# Patient Record
Sex: MALE | Race: WHITE | NOT HISPANIC OR LATINO | Employment: OTHER | URBAN - METROPOLITAN AREA
[De-identification: names, ages, dates, MRNs, and addresses within clinical notes are randomized per-mention and may not be internally consistent; named-entity substitution may affect disease eponyms.]

---

## 2017-01-02 RX ORDER — POTASSIUM CHLORIDE 20 MEQ/1
TABLET, EXTENDED RELEASE ORAL
Qty: 90 TABLET | Refills: 3 | Status: SHIPPED | OUTPATIENT
Start: 2017-01-02 | End: 2017-12-15 | Stop reason: SDUPTHER

## 2017-02-17 RX ORDER — DIGOXIN 125 MCG
TABLET ORAL
Qty: 90 TABLET | Refills: 3 | Status: SHIPPED | OUTPATIENT
Start: 2017-02-17

## 2017-02-17 RX ORDER — SIMVASTATIN 40 MG/1
TABLET, FILM COATED ORAL
Qty: 90 TABLET | Refills: 3 | Status: SHIPPED | OUTPATIENT
Start: 2017-02-17 | End: 2018-01-31

## 2017-02-27 ENCOUNTER — TELEPHONE (OUTPATIENT)
Dept: CARDIOLOGY | Facility: CLINIC | Age: 77
End: 2017-02-27

## 2017-02-27 NOTE — TELEPHONE ENCOUNTER
----- Message from Juanita Ramey sent at 2/27/2017 11:47 AM CST -----  Contact: Spouse/ Krista Velasco is requesting to speak with nurse in response to letter received by mail. Pls call Krista back at [phone]

## 2017-03-03 ENCOUNTER — OFFICE VISIT (OUTPATIENT)
Dept: URGENT CARE | Facility: CLINIC | Age: 77
End: 2017-03-03
Payer: MEDICARE

## 2017-03-03 VITALS
SYSTOLIC BLOOD PRESSURE: 120 MMHG | DIASTOLIC BLOOD PRESSURE: 70 MMHG | WEIGHT: 194.69 LBS | TEMPERATURE: 97 F | HEART RATE: 60 BPM | HEIGHT: 71 IN | OXYGEN SATURATION: 96 % | BODY MASS INDEX: 27.26 KG/M2

## 2017-03-03 DIAGNOSIS — S46.812A TRAPEZIUS STRAIN, LEFT, INITIAL ENCOUNTER: ICD-10-CM

## 2017-03-03 DIAGNOSIS — S16.1XXA NECK STRAIN, INITIAL ENCOUNTER: Primary | ICD-10-CM

## 2017-03-03 PROCEDURE — 3074F SYST BP LT 130 MM HG: CPT | Mod: S$GLB,,, | Performed by: NURSE PRACTITIONER

## 2017-03-03 PROCEDURE — 1159F MED LIST DOCD IN RCRD: CPT | Mod: S$GLB,,, | Performed by: NURSE PRACTITIONER

## 2017-03-03 PROCEDURE — 99213 OFFICE O/P EST LOW 20 MIN: CPT | Mod: S$GLB,,, | Performed by: NURSE PRACTITIONER

## 2017-03-03 PROCEDURE — 1125F AMNT PAIN NOTED PAIN PRSNT: CPT | Mod: S$GLB,,, | Performed by: NURSE PRACTITIONER

## 2017-03-03 PROCEDURE — 3078F DIAST BP <80 MM HG: CPT | Mod: S$GLB,,, | Performed by: NURSE PRACTITIONER

## 2017-03-03 PROCEDURE — 99999 PR PBB SHADOW E&M-EST. PATIENT-LVL IV: CPT | Mod: PBBFAC,,, | Performed by: NURSE PRACTITIONER

## 2017-03-03 PROCEDURE — 1157F ADVNC CARE PLAN IN RCRD: CPT | Mod: S$GLB,,, | Performed by: NURSE PRACTITIONER

## 2017-03-03 PROCEDURE — 1160F RVW MEDS BY RX/DR IN RCRD: CPT | Mod: S$GLB,,, | Performed by: NURSE PRACTITIONER

## 2017-03-03 RX ORDER — DICLOFENAC SODIUM 10 MG/G
2 GEL TOPICAL 3 TIMES DAILY PRN
Qty: 100 G | Refills: 0 | Status: SHIPPED | OUTPATIENT
Start: 2017-03-03 | End: 2017-04-17

## 2017-03-03 RX ORDER — TIZANIDINE 4 MG/1
4 TABLET ORAL EVERY 6 HOURS PRN
Qty: 30 TABLET | Refills: 0 | Status: SHIPPED | OUTPATIENT
Start: 2017-03-03 | End: 2017-03-13

## 2017-03-03 NOTE — PATIENT INSTRUCTIONS
Gentle massage may be helpful for easing tight muscles.    You may feel more soreness and spasm the first few days. Reduce your activity level until symptoms begin to improve.    When lying down, use a comfortable pillow that supports the head and keeps the spine in a neutral position. The position of the head should not be tilted forward or backwards.    Use ice packs (ice in a plastic bag, wrapped in a towel) to treat acute pain. Apply for 20 minutes every 2-4 hours during the first two days. Then, begin local heat (hot shower, hot bath or heating pad) and massage to reduce muscle spasm. Some patients feel best alternating hot and cold treatments, or just staying with one method only. Do what feels the best to you and gives the most relief.    If symptoms worsen or fail to improve with treatment, see your Primary Care Provider or go to the nearest Emergency Room.            Neck Sprain or Strain  A sudden force that causes turning or bending of the neck can cause sprain or strain. An example would be the force from a car accident. This can stretch or tear muscles called a strain. It can also stretch or tear ligaments called a sprain. Either of these can cause neck pain. Sometimes neck pain occurs after a simple awkward movement. In either case, muscle spasm is commonly present and contributes to the pain.    Unless you had a forceful physical injury (for example, a car accident or fall), X-rays are usually not ordered for the initial evaluation of neck pain. If pain continues and dose not respond to medical treatment, X-rays and other tests may be performed at a later time.  Home care  · You may feel more soreness and spasm the first few days after the injury. Rest until symptoms begin to improve.  · When lying down, use a comfortable pillow or a rolled towel that supports the head and keeps the spine in a neutral position. The position of the head should not be tilted forward or backward.  · Apply an ice pack  over the injured area for 15 to 20 minutes every 3 to 6 hours. You should do this for the first 24 to 48 hours. You can make an ice pack by filling a plastic bag that seals at the top with ice cubes and then wrapping it with a thin towel. After 48 hours, apply heat (warm shower or warm bath) for 15 to 20 minutes several times a day, or alternate ice and heat.  · You may use over-the-counter pain medicine to control pain, unless another pain medicine was prescribed. If you have chronic liver or kidney disease or ever had a stomach ulcer or GI bleeding, talk with your healthcare provider before using these medicines.  · If a soft cervical collar was prescribed, it should be worn only for periods of increased pain. It should not be worn for more than 3 hours a day, or for a period longer than 1 to 2 weeks.  Follow-up care  Follow up with your healthcare provider as directed. Physical therapy may be needed.  Sometimes fractures dont show up on the first X-ray. Bruises and sprains can sometimes hurt as much as a fracture. These injuries can take time to heal completely. If your symptoms dont improve or they get worse, talk with your healthcare provider. You may need a repeat X-ray or other tests. If X-rays were taken, you will be told of any new findings that may affect your care.  Call 911  Call 911 if you have:  · Neck swelling, difficulty or painful swallowing  · Difficulty breathing  · Chest pain  When to seek medical advice  Call your healthcare provider right away if any of these occur:  · Pain becomes worse or spreads into your arms  · Weakness or numbness in one or both arms  Date Last Reviewed: 11/19/2015 © 2000-2016 [email or web address]. [address] [place] [address], [place] [address]. All rights reserved. This information is not intended as a substitute for professional medical care. Always follow your healthcare professional's instructions.

## 2017-03-03 NOTE — MR AVS SNAPSHOT
[facility]  [address] [place] [address]  Phone: [phone]  Fax: [phone]                  Eleazar Solo   3/3/2017 9:00 AM   Office Visit    Description:  Male : 1940   Provider:  LATRICE Corcoran   Department:  [facility]           Reason for Visit     Neck Pain           Diagnoses this Visit        Comments    Neck strain, initial encounter    -  Primary     Trapezius strain, left, initial encounter                To Do List           Future Appointments        Provider Department Dept Phone    2017 9:20 AM Vinay Beck MD [facility] Cardiology [phone]    2017 9:20 AM Poly Fitch MD [facility] [phone]      Goals (5 Years of Data)     None      Follow-Up and Disposition     Return if symptoms worsen or fail to improve.       These Medications        Disp Refills Start End    diclofenac sodium (VOLTAREN) 1 % Gel 100 g 0 3/3/2017     Apply 2 g topically 3 (three) times daily as needed. Neck pain - Topical    Pharmacy: [facility] #[id number] - [place] - [address] Ph #: [phone]       Notes to Pharmacy: May substitute generic for lower copay    tizanidine (ZANAFLEX) 4 MG tablet 30 tablet 0 3/3/2017 3/13/2017    Take 1 tablet (4 mg total) by mouth every 6 (six) hours as needed (muscle spasms). - Oral    Pharmacy: [facility] #[id number] - [place] - [address] Ph #: [phone]         [facility] On Call     [facility] On Call Nurse Care Line -  Assistance  Registered nurses in the [facility] On Call Center provide clinical advisement, health education, appointment booking, and other advisory services.  Call for this free service at [phone].             Medications           Message regarding Medications     Verify the changes and/or additions to your medication regime listed below are the same as discussed with your clinician  "today.  If any of these changes or additions are incorrect, please notify your healthcare provider.        START taking these NEW medications        Refills    diclofenac sodium (VOLTAREN) 1 % Gel 0    Sig: Apply 2 g topically 3 (three) times daily as needed. Neck pain    Class: Normal    Route: Topical    tizanidine (ZANAFLEX) 4 MG tablet 0    Sig: Take 1 tablet (4 mg total) by mouth every 6 (six) hours as needed (muscle spasms).    Class: Normal    Route: Oral           Verify that the below list of medications is an accurate representation of the medications you are currently taking.  If none reported, the list may be blank. If incorrect, please contact your healthcare provider. Carry this list with you in case of emergency.           Current Medications     allopurinol (ZYLOPRIM) 100 MG tablet TAKE 2 TABLETS  DAILY.    diclofenac sodium (VOLTAREN) 1 % Gel Apply 2 g topically 3 (three) times daily as needed. Neck pain    digoxin (LANOXIN) 125 mcg tablet TAKE 1 TABLET ONE TIME DAILY    ELIQUIS 5 mg Tab TAKE 1 TABLET TWICE DAILY    hydrochlorothiazide (HYDRODIURIL) 25 MG tablet Take 1 tablet (25 mg total) by mouth once daily.    losartan (COZAAR) 100 MG tablet Take 1 tablet (100 mg total) by mouth once daily.    potassium chloride SA (K-DUR,KLOR-CON) 20 MEQ tablet TAKE 1 TABLET ONE TIME DAILY    simvastatin (ZOCOR) 40 MG tablet TAKE 1 TABLET EVERY DAY    tizanidine (ZANAFLEX) 4 MG tablet Take 1 tablet (4 mg total) by mouth every 6 (six) hours as needed (muscle spasms).    verapamil (CALAN-SR) 240 MG CR tablet TAKE 1 TABLET EVERY DAY           Clinical Reference Information           Your Vitals Were     BP Pulse Temp Height    120/70 (BP Location: Right arm, Patient Position: Sitting, BP Method: Manual) 60 96.8 °F (36 °C) (Tympanic) 5' 10.5" (1.791 m)    Weight SpO2 BMI    88.3 kg (194 lb 10.7 oz) 96% 27.54 kg/m2      Blood Pressure          Most Recent Value    BP  120/70      Allergies as of 3/3/2017     No Known " Drug Allergies      Immunizations Administered on Date of Encounter - 3/3/2017     None      Instructions      Gentle massage may be helpful for easing tight muscles.    You may feel more soreness and spasm the first few days. Reduce your activity level until symptoms begin to improve.    When lying down, use a comfortable pillow that supports the head and keeps the spine in a neutral position. The position of the head should not be tilted forward or backwards.    Use ice packs (ice in a plastic bag, wrapped in a towel) to treat acute pain. Apply for 20 minutes every 2-4 hours during the first two days. Then, begin local heat (hot shower, hot bath or heating pad) and massage to reduce muscle spasm. Some patients feel best alternating hot and cold treatments, or just staying with one method only. Do what feels the best to you and gives the most relief.    If symptoms worsen or fail to improve with treatment, see your Primary Care Provider or go to the nearest Emergency Room.            Neck Sprain or Strain  A sudden force that causes turning or bending of the neck can cause sprain or strain. An example would be the force from a car accident. This can stretch or tear muscles called a strain. It can also stretch or tear ligaments called a sprain. Either of these can cause neck pain. Sometimes neck pain occurs after a simple awkward movement. In either case, muscle spasm is commonly present and contributes to the pain.    Unless you had a forceful physical injury (for example, a car accident or fall), X-rays are usually not ordered for the initial evaluation of neck pain. If pain continues and dose not respond to medical treatment, X-rays and other tests may be performed at a later time.  Home care  · You may feel more soreness and spasm the first few days after the injury. Rest until symptoms begin to improve.  · When lying down, use a comfortable pillow or a rolled towel that supports the head and keeps the spine in  a neutral position. The position of the head should not be tilted forward or backward.  · Apply an ice pack over the injured area for 15 to 20 minutes every 3 to 6 hours. You should do this for the first 24 to 48 hours. You can make an ice pack by filling a plastic bag that seals at the top with ice cubes and then wrapping it with a thin towel. After 48 hours, apply heat (warm shower or warm bath) for 15 to 20 minutes several times a day, or alternate ice and heat.  · You may use over-the-counter pain medicine to control pain, unless another pain medicine was prescribed. If you have chronic liver or kidney disease or ever had a stomach ulcer or GI bleeding, talk with your healthcare provider before using these medicines.  · If a soft cervical collar was prescribed, it should be worn only for periods of increased pain. It should not be worn for more than 3 hours a day, or for a period longer than 1 to 2 weeks.  Follow-up care  Follow up with your healthcare provider as directed. Physical therapy may be needed.  Sometimes fractures dont show up on the first X-ray. Bruises and sprains can sometimes hurt as much as a fracture. These injuries can take time to heal completely. If your symptoms dont improve or they get worse, talk with your healthcare provider. You may need a repeat X-ray or other tests. If X-rays were taken, you will be told of any new findings that may affect your care.  Call 911  Call 911 if you have:  · Neck swelling, difficulty or painful swallowing  · Difficulty breathing  · Chest pain  When to seek medical advice  Call your healthcare provider right away if any of these occur:  · Pain becomes worse or spreads into your arms  · Weakness or numbness in one or both arms  Date Last Reviewed: 11/19/2015  © 2896-5559 [employer]. [address] [place] [address], [place] [address]. All rights reserved. This information is not intended as a substitute for professional medical care. Always follow your  healthcare professional's instructions.             Language Assistance Services     ATTENTION: Language assistance services are available, free of charge. Please call [phone].      ATENCIÓN: Si habla ariel, tiene a vizcarra disposición servicios gratuitos de asistencia lingüística. Llame al [phone].     CHÚ Ý: N?u b?n nói Ti?ng Vi?t, có các d?ch v? h? tr? ngôn ng? mi?n phí dành cho b?n. G?i s? [phone].         [facility] complies with applicable Federal civil rights laws and does not discriminate on the basis of race, color, national origin, age, disability, or sex.

## 2017-03-03 NOTE — PROGRESS NOTES
"Subjective:       Patient ID: Eleazar Solo is a 76 y.o. male.    Chief Complaint: Neck Pain    HPI Comments: Patient denies injury or trauma.     Neck Pain    This is a new problem. The current episode started yesterday. The problem occurs constantly. The problem has been waxing and waning. The pain is associated with nothing. The pain is present in the left side. The quality of the pain is described as aching. The pain is at a severity of 8/10. The symptoms are aggravated by position, twisting and bending. The pain is same all the time. Stiffness is present in the morning. Pertinent negatives include no chest pain, fever, headaches, leg pain, numbness, pain with swallowing, paresis, photophobia, syncope, tingling, trouble swallowing, visual change, weakness or weight loss. He has tried acetaminophen and heat for the symptoms. The treatment provided no relief.       /70 (BP Location: Right arm, Patient Position: Sitting, BP Method: Manual)  Pulse 60  Temp 96.8 °F (36 °C) (Tympanic)   Ht 5' 10.5" (1.791 m)  Wt 88.3 kg (194 lb 10.7 oz)  SpO2 96%  BMI 27.54 kg/m2    Review of Systems   Constitutional: Negative for activity change, appetite change, chills, diaphoresis, fatigue, fever, unexpected weight change and weight loss.   HENT: Negative.  Negative for trouble swallowing.    Eyes: Negative.  Negative for photophobia.   Respiratory: Negative for apnea, chest tightness, shortness of breath and stridor.    Cardiovascular: Negative for chest pain, palpitations, leg swelling and syncope.   Gastrointestinal: Negative.    Endocrine: Negative.    Genitourinary: Negative.    Musculoskeletal: Positive for myalgias (neck), neck pain and neck stiffness. Negative for arthralgias, back pain, gait problem and joint swelling.   Skin: Negative for color change, pallor, rash and wound.   Allergic/Immunologic: Negative.    Neurological: Negative for dizziness, tingling, facial asymmetry, weakness, light-headedness, " numbness and headaches.   Hematological: Negative for adenopathy. Bruises/bleeds easily.   Psychiatric/Behavioral: Negative for agitation and behavioral problems.       Objective:      Physical Exam   Constitutional: He is oriented to person, place, and time. He appears well-developed and well-nourished. No distress.   HENT:   Head: Normocephalic and atraumatic.   Neck: Muscular tenderness present. No spinous process tenderness present. No rigidity. Decreased range of motion present. No edema and no erythema present.       ttp to left neck per drawing. No rash, no erythema, swelling, or bruising. Negative spurlings.    Cardiovascular: Normal rate, regular rhythm and normal heart sounds.    Pulmonary/Chest: Effort normal and breath sounds normal. No respiratory distress.   Neurological: He is alert and oriented to person, place, and time.   Skin: Skin is warm and dry. No rash noted. He is not diaphoretic.   Psychiatric: He has a normal mood and affect. His behavior is normal. Judgment and thought content normal.   Nursing note and vitals reviewed.      Assessment:       1. Neck strain, initial encounter    2. Trapezius strain, left, initial encounter        Plan:       Eleazar was seen today for neck pain.    Diagnoses and all orders for this visit:    Neck strain, initial encounter  -     diclofenac sodium (VOLTAREN) 1 % Gel; Apply 2 g topically 3 (three) times daily as needed. Neck pain  -     tizanidine (ZANAFLEX) 4 MG tablet; Take 1 tablet (4 mg total) by mouth every 6 (six) hours as needed (muscle spasms).    Trapezius strain, left, initial encounter  -     diclofenac sodium (VOLTAREN) 1 % Gel; Apply 2 g topically 3 (three) times daily as needed. Neck pain  -     tizanidine (ZANAFLEX) 4 MG tablet; Take 1 tablet (4 mg total) by mouth every 6 (six) hours as needed (muscle spasms).    rest  Ice  No nsaids due to eliquis  Extra strength tylenol recommended  If symptoms worsen or fail to improve with treatment, see  your Primary Care Provider or go to the nearest Emergency Room.

## 2017-03-20 RX ORDER — ALLOPURINOL 100 MG/1
TABLET ORAL
Qty: 180 TABLET | Refills: 3 | Status: SHIPPED | OUTPATIENT
Start: 2017-03-20 | End: 2018-03-19 | Stop reason: SDUPTHER

## 2017-04-13 DIAGNOSIS — I10 BENIGN ESSENTIAL HTN: ICD-10-CM

## 2017-04-13 DIAGNOSIS — I48.19 PERSISTENT ATRIAL FIBRILLATION: Primary | ICD-10-CM

## 2017-04-13 DIAGNOSIS — E78.2 MIXED HYPERLIPIDEMIA: ICD-10-CM

## 2017-04-17 ENCOUNTER — CLINICAL SUPPORT (OUTPATIENT)
Dept: CARDIOLOGY | Facility: CLINIC | Age: 77
End: 2017-04-17
Payer: MEDICARE

## 2017-04-17 ENCOUNTER — OFFICE VISIT (OUTPATIENT)
Dept: CARDIOLOGY | Facility: CLINIC | Age: 77
End: 2017-04-17
Payer: MEDICARE

## 2017-04-17 VITALS
HEART RATE: 67 BPM | HEIGHT: 70 IN | BODY MASS INDEX: 28.06 KG/M2 | DIASTOLIC BLOOD PRESSURE: 60 MMHG | SYSTOLIC BLOOD PRESSURE: 90 MMHG | WEIGHT: 196 LBS

## 2017-04-17 DIAGNOSIS — I48.19 PERSISTENT ATRIAL FIBRILLATION: ICD-10-CM

## 2017-04-17 DIAGNOSIS — E78.2 MIXED HYPERLIPIDEMIA: Chronic | ICD-10-CM

## 2017-04-17 DIAGNOSIS — I48.21 ATRIAL FIBRILLATION, PERMANENT: Primary | Chronic | ICD-10-CM

## 2017-04-17 DIAGNOSIS — I10 BENIGN ESSENTIAL HTN: ICD-10-CM

## 2017-04-17 DIAGNOSIS — E78.2 MIXED HYPERLIPIDEMIA: ICD-10-CM

## 2017-04-17 DIAGNOSIS — I10 ESSENTIAL HYPERTENSION: Chronic | ICD-10-CM

## 2017-04-17 PROCEDURE — 1160F RVW MEDS BY RX/DR IN RCRD: CPT | Mod: S$GLB,,, | Performed by: NUCLEAR MEDICINE

## 2017-04-17 PROCEDURE — 99214 OFFICE O/P EST MOD 30 MIN: CPT | Mod: S$GLB,,, | Performed by: NUCLEAR MEDICINE

## 2017-04-17 PROCEDURE — 99499 UNLISTED E&M SERVICE: CPT | Mod: S$GLB,,, | Performed by: NUCLEAR MEDICINE

## 2017-04-17 PROCEDURE — 3074F SYST BP LT 130 MM HG: CPT | Mod: S$GLB,,, | Performed by: NUCLEAR MEDICINE

## 2017-04-17 PROCEDURE — 93000 ELECTROCARDIOGRAM COMPLETE: CPT | Mod: S$GLB,,, | Performed by: NUCLEAR MEDICINE

## 2017-04-17 PROCEDURE — 99999 PR PBB SHADOW E&M-EST. PATIENT-LVL III: CPT | Mod: PBBFAC,,, | Performed by: NUCLEAR MEDICINE

## 2017-04-17 PROCEDURE — 1159F MED LIST DOCD IN RCRD: CPT | Mod: S$GLB,,, | Performed by: NUCLEAR MEDICINE

## 2017-04-17 PROCEDURE — 3078F DIAST BP <80 MM HG: CPT | Mod: S$GLB,,, | Performed by: NUCLEAR MEDICINE

## 2017-04-17 PROCEDURE — 1126F AMNT PAIN NOTED NONE PRSNT: CPT | Mod: S$GLB,,, | Performed by: NUCLEAR MEDICINE

## 2017-04-17 NOTE — PROGRESS NOTES
Subjective:   Patient ID:  Eleazar Solo is a 77 y.o. male who presents for follow-up of Atrial Fibrillation (6 month followup)      HPI 1- CHRONIC PERMANENT AF, 2- ESSENTIAL HYPERTENSION,  3- DYSLIPIDEMIA  DOING WELL. NO RECENT HOSPITALIZATIONS FOR ACS OR ADHF. OR EXACERBATION OF PALPITATIONS. OR TIA OR STROKE  NO HX OF ANGINA. OR EQUIVALENT  NO ABNORMAL BLEEDING ON NOAC  NO FOCAL CNS SYMPTOMS OR SIGNS  TO SUGGEST TIA OR STROKE  NO EDEMA. NO CALVE TENDERNESS.  NO INTERMITTENT CLAUDICATION  CARD MED- GOOD COMPLIANCE    Review of Systems   Constitution: Negative for chills, fever, weakness, night sweats, weight gain and weight loss.   HENT: Negative for headaches and nosebleeds.    Eyes: Negative for blurred vision, double vision and visual disturbance.   Cardiovascular: Negative for chest pain, dyspnea on exertion, irregular heartbeat, leg swelling, orthopnea, palpitations, paroxysmal nocturnal dyspnea and syncope.   Respiratory: Negative for cough, hemoptysis and wheezing.    Endocrine: Negative for polydipsia and polyuria.   Hematologic/Lymphatic: Does not bruise/bleed easily.   Skin: Negative for rash.   Musculoskeletal: Negative for joint pain, joint swelling, muscle weakness and myalgias.   Gastrointestinal: Negative for abdominal pain, hematemesis, jaundice and melena.   Genitourinary: Negative for dysuria, hematuria and nocturia.   Neurological: Negative for dizziness, focal weakness and sensory change.   Psychiatric/Behavioral: Negative for depression. The patient does not have insomnia and is not nervous/anxious.      Family History   Problem Relation Age of Onset    Heart disease Mother     Hypertension Mother     Gout Mother     Stroke Father     Alcohol abuse Father     Hypertension Father     Heart disease Brother     Arrhythmia Brother     Diabetes Brother     Gout Brother     Diabetes Sister     Drug abuse Neg Hx     Cancer Neg Hx     Mental illness Neg Hx     Mental retardation Neg Hx      Kidney disease Neg Hx     Hyperlipidemia Neg Hx      Past Medical History:   Diagnosis Date    *Atrial fibrillation     Atrial fibrillation     Bilateral carotid artery disease 8/10/2015    COPD (chronic obstructive pulmonary disease)     Gout     Hyperlipidemia     Hypertension     Low testosterone     Pneumonia     Stroke 1/3/15 L occiput    Unspecified disorder of kidney and ureter      Current Outpatient Prescriptions on File Prior to Visit   Medication Sig Dispense Refill    allopurinol (ZYLOPRIM) 100 MG tablet TAKE 2 TABLETS  DAILY. 180 tablet 3    digoxin (LANOXIN) 125 mcg tablet TAKE 1 TABLET ONE TIME DAILY 90 tablet 3    ELIQUIS 5 mg Tab TAKE 1 TABLET TWICE DAILY 180 tablet 3    hydrochlorothiazide (HYDRODIURIL) 25 MG tablet Take 1 tablet (25 mg total) by mouth once daily. 90 tablet 3    losartan (COZAAR) 100 MG tablet Take 1 tablet (100 mg total) by mouth once daily. 90 tablet 3    potassium chloride SA (K-DUR,KLOR-CON) 20 MEQ tablet TAKE 1 TABLET ONE TIME DAILY 90 tablet 3    simvastatin (ZOCOR) 40 MG tablet TAKE 1 TABLET EVERY DAY 90 tablet 3    verapamil (CALAN-SR) 240 MG CR tablet TAKE 1 TABLET EVERY DAY 90 tablet 3    [DISCONTINUED] diclofenac sodium (VOLTAREN) 1 % Gel Apply 2 g topically 3 (three) times daily as needed. Neck pain 100 g 0     No current facility-administered medications on file prior to visit.      Review of patient's allergies indicates:   Allergen Reactions    No known drug allergies        Objective:     Physical Exam   Constitutional: He is oriented to person, place, and time. He appears well-developed. No distress.   HENT:   Head: Normocephalic.   Eyes: Conjunctivae are normal. Pupils are equal, round, and reactive to light.   Neck: Neck supple. No JVD present. No thyromegaly present.   Cardiovascular: Normal rate and intact distal pulses.  An irregularly irregular rhythm present. Exam reveals no gallop and no friction rub.    Murmur  heard.  High-pitched blowing crescendo holosystolic murmur is present with a grade of 3/6  at the apex radiating to the axilla The intensity increases with respiration.  Pulses:       Carotid pulses are 2+ on the right side, and 2+ on the left side.       Radial pulses are 2+ on the right side, and 2+ on the left side.        Femoral pulses are 2+ on the right side, and 2+ on the left side.       Popliteal pulses are 2+ on the right side, and 2+ on the left side.        Dorsalis pedis pulses are 2+ on the right side, and 2+ on the left side.        Posterior tibial pulses are 2+ on the right side, and 2+ on the left side.   Pulmonary/Chest: Breath sounds normal. He has no wheezes. He has no rales. He exhibits no tenderness.   Abdominal: Soft. Bowel sounds are normal. He exhibits no mass. There is no hepatosplenomegaly. There is no tenderness.   Musculoskeletal: He exhibits no edema or tenderness.        Cervical back: Normal.        Thoracic back: Normal.        Lumbar back: Normal.   Lymphadenopathy:     He has no cervical adenopathy.     He has no axillary adenopathy.        Right: No supraclavicular adenopathy present.        Left: No supraclavicular adenopathy present.   Neurological: He is alert and oriented to person, place, and time. He has normal strength. No sensory deficit. Gait normal.   Skin: Skin is warm. No cyanosis. No pallor. Nails show no clubbing.   Psychiatric: He has a normal mood and affect. His speech is normal and behavior is normal. Cognition and memory are normal.       Assessment:     1. Atrial fibrillation, permanent    2. Essential hypertension    3. Mixed hyperlipidemia      ECG TODAY- AF WITH CONTROLLED VR 67 BMP. NO ACUTE ST T CHANGES  NO CLINICAL EVIDENCE OF ACUTE HF. NO ACTIVE MYOCARDIAL ISCHEMIA  CNS STATUS STABLE  CONTROLLED BP  CARD MED WELL TOLERATED  Plan:     Atrial fibrillation, permanent    Essential hypertension    Mixed hyperlipidemia    1- CONTINUE PRESENT CARD  MANAGEMENT    2- RETURN IN 6 MONTHS.

## 2017-04-17 NOTE — MR AVS SNAPSHOT
[facility]  [address] [place] [address]  Phone: [phone]  Fax: [phone]                  Eleazar Solo   2017 9:20 AM   Office Visit    Description:  Male : 1940   Provider:  Vinay Beck MD   Department:  [facility] Cardiology           Reason for Visit     Atrial Fibrillation           Diagnoses this Visit        Comments    Atrial fibrillation, permanent    -  Primary     Essential hypertension         Mixed hyperlipidemia                To Do List           Future Appointments        Provider Department Dept Phone    2017 9:20 AM [facility] [phone]      Goals (5 Years of Data)     None      Follow-Up and Disposition     Return in about 6 months (around 10/17/2017).      [facility]     [facility] On Call Nurse Care Line -  Assistance  Unless otherwise directed by your provider, please contact [facility], our nurse care line that is available for  assistance.     Registered nurses in the [facility] On Call Center provide: appointment scheduling, clinical advisement, health education, and other advisory services.  Call: [phone] (toll free)               Medications           Message regarding Medications     Verify the changes and/or additions to your medication regime listed below are the same as discussed with your clinician today.  If any of these changes or additions are incorrect, please notify your healthcare provider.        STOP taking these medications     diclofenac sodium (VOLTAREN) 1 % Gel Apply 2 g topically 3 (three) times daily as needed. Neck pain           Verify that the below list of medications is an accurate representation of the medications you are currently taking.  If none reported, the list may be blank. If incorrect, please contact your healthcare provider. Carry this list with you in case of emergency.           Current Medications     allopurinol (ZYLOPRIM) 100 MG  "tablet TAKE 2 TABLETS  DAILY.    digoxin (LANOXIN) 125 mcg tablet TAKE 1 TABLET ONE TIME DAILY    ELIQUIS 5 mg Tab TAKE 1 TABLET TWICE DAILY    hydrochlorothiazide (HYDRODIURIL) 25 MG tablet Take 1 tablet (25 mg total) by mouth once daily.    losartan (COZAAR) 100 MG tablet Take 1 tablet (100 mg total) by mouth once daily.    menthol (BIOFREEZE, MENTHOL,) 4 % Gel Apply topically.    potassium chloride SA (K-DUR,KLOR-CON) 20 MEQ tablet TAKE 1 TABLET ONE TIME DAILY    simvastatin (ZOCOR) 40 MG tablet TAKE 1 TABLET EVERY DAY    verapamil (CALAN-SR) 240 MG CR tablet TAKE 1 TABLET EVERY DAY    VIT A/VIT C/VIT E/ZINC/COPPER (PRESERVISION AREDS ORAL) Take 1 capsule by mouth 2 (two) times daily.           Clinical Reference Information           Your Vitals Were     BP Pulse Height Weight BMI    90/60 (BP Location: Left arm, Patient Position: Sitting, BP Method: Manual) 67 5' 10" (1.778 m) 88.9 kg (196 lb) 28.12 kg/m2      Blood Pressure          Most Recent Value    Right Arm BP - Sitting  110/70    Right Arm BP - Standing  115/70    BP  90/60      Allergies as of 4/17/2017     No Known Drug Allergies      Immunizations Administered on Date of Encounter - 4/17/2017     None      Language Assistance Services     ATTENTION: Language assistance services are available, free of charge. Please call [phone].      ATENCIÓN: Si habla español, tiene a vizcarra disposición servicios gratuitos de asistencia lingüística. Llame al [phone].     [facility] Ý: N?u b?n nói Ti?ng Vi?t, có các d?ch v? h? tr? ngôn ng? mi?n phí dành cho b?n. G?i s? [phone].         [facility] complies with applicable Federal civil rights laws and does not discriminate on the basis of race, color, national origin, age, disability, or sex.        "

## 2017-05-18 ENCOUNTER — LAB VISIT (OUTPATIENT)
Dept: LAB | Facility: HOSPITAL | Age: 77
End: 2017-05-18
Attending: FAMILY MEDICINE
Payer: MEDICARE

## 2017-05-18 ENCOUNTER — OFFICE VISIT (OUTPATIENT)
Dept: INTERNAL MEDICINE | Facility: CLINIC | Age: 77
End: 2017-05-18
Payer: MEDICARE

## 2017-05-18 VITALS
TEMPERATURE: 98 F | HEIGHT: 70 IN | SYSTOLIC BLOOD PRESSURE: 112 MMHG | HEART RATE: 70 BPM | BODY MASS INDEX: 28.41 KG/M2 | DIASTOLIC BLOOD PRESSURE: 80 MMHG | WEIGHT: 198.44 LBS

## 2017-05-18 DIAGNOSIS — M1A.0710 IDIOPATHIC CHRONIC GOUT OF RIGHT FOOT WITHOUT TOPHUS: ICD-10-CM

## 2017-05-18 DIAGNOSIS — N18.30 CHRONIC KIDNEY DISEASE, STAGE 3: Chronic | ICD-10-CM

## 2017-05-18 DIAGNOSIS — Z79.01 CHRONIC ANTICOAGULATION: Chronic | ICD-10-CM

## 2017-05-18 DIAGNOSIS — I48.21 ATRIAL FIBRILLATION, PERMANENT: Chronic | ICD-10-CM

## 2017-05-18 DIAGNOSIS — E78.2 MIXED HYPERLIPIDEMIA: Chronic | ICD-10-CM

## 2017-05-18 DIAGNOSIS — I77.9 BILATERAL CAROTID ARTERY DISEASE: Chronic | ICD-10-CM

## 2017-05-18 DIAGNOSIS — J44.9 CHRONIC OBSTRUCTIVE PULMONARY DISEASE, UNSPECIFIED COPD TYPE: Chronic | ICD-10-CM

## 2017-05-18 DIAGNOSIS — I10 ESSENTIAL HYPERTENSION: Chronic | ICD-10-CM

## 2017-05-18 DIAGNOSIS — I70.0 ATHEROSCLEROSIS OF AORTA: Chronic | ICD-10-CM

## 2017-05-18 DIAGNOSIS — Z00.00 ROUTINE GENERAL MEDICAL EXAMINATION AT A HEALTH CARE FACILITY: ICD-10-CM

## 2017-05-18 DIAGNOSIS — Z00.00 ROUTINE GENERAL MEDICAL EXAMINATION AT A HEALTH CARE FACILITY: Primary | ICD-10-CM

## 2017-05-18 DIAGNOSIS — H35.30 MACULAR DEGENERATION: ICD-10-CM

## 2017-05-18 LAB
ALBUMIN SERPL BCP-MCNC: 3.7 G/DL
ALP SERPL-CCNC: 67 U/L
ALT SERPL W/O P-5'-P-CCNC: 40 U/L
ANION GAP SERPL CALC-SCNC: 10 MMOL/L
AST SERPL-CCNC: 32 U/L
BASOPHILS # BLD AUTO: 0.02 K/UL
BASOPHILS NFR BLD: 0.2 %
BILIRUB SERPL-MCNC: 1 MG/DL
BUN SERPL-MCNC: 20 MG/DL
CALCIUM SERPL-MCNC: 9.4 MG/DL
CHLORIDE SERPL-SCNC: 100 MMOL/L
CHOLEST/HDLC SERPL: 3.6 {RATIO}
CO2 SERPL-SCNC: 30 MMOL/L
CREAT SERPL-MCNC: 1.3 MG/DL
DIFFERENTIAL METHOD: ABNORMAL
EOSINOPHIL # BLD AUTO: 0.4 K/UL
EOSINOPHIL NFR BLD: 3.5 %
ERYTHROCYTE [DISTWIDTH] IN BLOOD BY AUTOMATED COUNT: 13.9 %
EST. GFR  (AFRICAN AMERICAN): >60 ML/MIN/1.73 M^2
EST. GFR  (NON AFRICAN AMERICAN): 53.4 ML/MIN/1.73 M^2
GLUCOSE SERPL-MCNC: 165 MG/DL
HCT VFR BLD AUTO: 42.6 %
HDL/CHOLESTEROL RATIO: 27.4 %
HDLC SERPL-MCNC: 135 MG/DL
HDLC SERPL-MCNC: 37 MG/DL
HGB BLD-MCNC: 14.4 G/DL
LDLC SERPL CALC-MCNC: 58 MG/DL
LYMPHOCYTES # BLD AUTO: 2 K/UL
LYMPHOCYTES NFR BLD: 19.3 %
MCH RBC QN AUTO: 33.2 PG
MCHC RBC AUTO-ENTMCNC: 33.8 %
MCV RBC AUTO: 98 FL
MONOCYTES # BLD AUTO: 1.3 K/UL
MONOCYTES NFR BLD: 12.8 %
NEUTROPHILS # BLD AUTO: 6.7 K/UL
NEUTROPHILS NFR BLD: 63.9 %
NONHDLC SERPL-MCNC: 98 MG/DL
PLATELET # BLD AUTO: 152 K/UL
PMV BLD AUTO: 12.5 FL
POTASSIUM SERPL-SCNC: 4.3 MMOL/L
PROT SERPL-MCNC: 7.1 G/DL
RBC # BLD AUTO: 4.34 M/UL
SODIUM SERPL-SCNC: 140 MMOL/L
TRIGL SERPL-MCNC: 200 MG/DL
URATE SERPL-MCNC: 4.7 MG/DL
WBC # BLD AUTO: 10.47 K/UL

## 2017-05-18 PROCEDURE — 85025 COMPLETE CBC W/AUTO DIFF WBC: CPT

## 2017-05-18 PROCEDURE — 3074F SYST BP LT 130 MM HG: CPT | Mod: S$GLB,,, | Performed by: FAMILY MEDICINE

## 2017-05-18 PROCEDURE — 3079F DIAST BP 80-89 MM HG: CPT | Mod: S$GLB,,, | Performed by: FAMILY MEDICINE

## 2017-05-18 PROCEDURE — 80053 COMPREHEN METABOLIC PANEL: CPT

## 2017-05-18 PROCEDURE — 99999 PR PBB SHADOW E&M-EST. PATIENT-LVL III: CPT | Mod: PBBFAC,,, | Performed by: FAMILY MEDICINE

## 2017-05-18 PROCEDURE — 80061 LIPID PANEL: CPT

## 2017-05-18 PROCEDURE — 99499 UNLISTED E&M SERVICE: CPT | Mod: S$GLB,,, | Performed by: FAMILY MEDICINE

## 2017-05-18 PROCEDURE — 36415 COLL VENOUS BLD VENIPUNCTURE: CPT | Mod: PO

## 2017-05-18 PROCEDURE — 99397 PER PM REEVAL EST PAT 65+ YR: CPT | Mod: S$GLB,,, | Performed by: FAMILY MEDICINE

## 2017-05-18 PROCEDURE — 84550 ASSAY OF BLOOD/URIC ACID: CPT

## 2017-05-18 NOTE — PROGRESS NOTES
Subjective:      Patient ID: Eleazar Solo is a 77 y.o. male.    Chief Complaint: Follow-up (6m)    HPI Comments: Pt is here today for prevention exam.   1. Recently saw Cardiologist in April. On 1/2 tablet of statin now due to CCB.  Also has hx of testosterone def, not on supplementation because of hx of CVA. Also with afib. Rate controlled. Labs doing today.   2. Gout- stable with allopurinol 200mg. Needing labs. Right ankle pain improved with meds.   3. Dyslipidemia- needing labs today. On statin but half dose.    4. htn- controlled with meds. No chest pain or headaches.   5. Copd- no sob. No recent exacerbations. Hx of spirometry 3-4 yrs ago through work, but none recently. Willing to do again. Quit smoking > 20 yrs ago.         Lab Results   Component Value Date    WBC 7.14 11/23/2016    HGB 14.8 11/23/2016    HCT 44.4 11/23/2016     11/23/2016    CHOL 140 11/23/2016    TRIG 139 11/23/2016    HDL 40 11/23/2016    ALT 41 11/23/2016    AST 36 11/23/2016     11/23/2016    K 4.4 11/23/2016     11/23/2016    CREATININE 1.2 11/23/2016    BUN 14 11/23/2016    CO2 30 (H) 11/23/2016    TSH 1.130 11/23/2016    PSA 0.84 11/23/2016    INR 2.2 05/09/2014    HGBA1C 5.7 01/02/2015       Review of Systems   Constitutional: Negative for chills, fatigue and unexpected weight change.   HENT: Negative for congestion, ear pain, postnasal drip, sneezing, sore throat and trouble swallowing.    Eyes: Negative for pain and visual disturbance.   Respiratory: Negative for cough and shortness of breath.    Cardiovascular: Negative for chest pain and leg swelling.   Gastrointestinal: Negative for abdominal pain, constipation, diarrhea, nausea and vomiting.   Endocrine: Negative for cold intolerance and heat intolerance.   Genitourinary: Negative for difficulty urinating, dysuria and flank pain.   Musculoskeletal: Negative for arthralgias, back pain, joint swelling and neck pain.   Skin: Negative for color change and  rash.   Neurological: Negative for dizziness, seizures and headaches.   Psychiatric/Behavioral: Negative for behavioral problems and dysphoric mood.     Objective:     Physical Exam   Constitutional: He is oriented to person, place, and time. He appears well-developed and well-nourished. No distress.   HENT:   Head: Normocephalic and atraumatic.   Right Ear: External ear normal.   Left Ear: External ear normal.   Nose: Nose normal.   Mouth/Throat: Oropharynx is clear and moist.   Eyes: EOM are normal. Pupils are equal, round, and reactive to light.   Neck: Normal range of motion. Neck supple. No thyromegaly present.   Cardiovascular: Normal rate.  An irregularly irregular rhythm present. Exam reveals no gallop and no friction rub.    Murmur heard.   Systolic murmur is present with a grade of 1/6   Pulmonary/Chest: Effort normal and breath sounds normal. No respiratory distress.   Abdominal: Soft. Bowel sounds are normal. He exhibits no distension. There is no tenderness. There is no rebound.   Musculoskeletal: Normal range of motion.   Lymphadenopathy:     He has no cervical adenopathy.   Neurological: He is alert and oriented to person, place, and time. Coordination normal.   Skin: Skin is warm and dry.   Psychiatric: He has a normal mood and affect.   Vitals reviewed.    Assessment:     1. Routine general medical examination at a health care facility    2. Mixed hyperlipidemia    3. Essential hypertension    4. Chronic kidney disease, stage 3    5. Chronic anticoagulation    6. Bilateral carotid artery disease    7. Atrial fibrillation, permanent    8. Atherosclerosis of aorta    9. Idiopathic chronic gout of right foot without tophus    10. Macular degeneration    11. Chronic obstructive pulmonary disease, unspecified COPD type      Plan:   Eleazar was seen today for follow-up.    Diagnoses and all orders for this visit:    Routine general medical examination at a health care facility- - labs ordered. Discussed  Health Maintenance issues.     -     Lipid panel; Future  -     Comprehensive metabolic panel; Future  -     CBC auto differential; Future  -     Uric acid; Future    Mixed hyperlipidemia - checking level since on half dose statin.  Stable  -     Lipid panel; Future  -     Comprehensive metabolic panel; Future  -     CBC auto differential; Future  -     Uric acid; Future    Essential hypertension - stable, Continue with current medications and interventions. Labs ordered    -     Lipid panel; Future  -     Comprehensive metabolic panel; Future  -     CBC auto differential; Future  -     Uric acid; Future    Chronic kidney disease, stage 3 - stable, Continue with current medications and interventions. Labs ordered    -     Lipid panel; Future  -     Comprehensive metabolic panel; Future  -     CBC auto differential; Future  -     Uric acid; Future    Chronic anticoagulation - stable, Continue with current medications and interventions. Labs ordered    -     Lipid panel; Future  -     Comprehensive metabolic panel; Future  -     CBC auto differential; Future  -     Uric acid; Future    Bilateral carotid artery disease - stable, Continue with current medications and interventions. Labs ordered    -     Lipid panel; Future  -     Comprehensive metabolic panel; Future  -     CBC auto differential; Future  -     Uric acid; Future    Atrial fibrillation, permanent - stable, Continue with current medications and interventions. Labs ordered    -     Lipid panel; Future  -     Comprehensive metabolic panel; Future  -     CBC auto differential; Future  -     Uric acid; Future    Atherosclerosis of aorta - stable, Continue with current medications and interventions. Labs ordered    -     Lipid panel; Future  -     Comprehensive metabolic panel; Future  -     CBC auto differential; Future  -     Uric acid; Future    Idiopathic chronic gout of right foot without tophus - stable, Continue with current medications and  interventions. Labs ordered    -     Lipid panel; Future  -     Comprehensive metabolic panel; Future  -     CBC auto differential; Future  -     Uric acid; Future    Macular degeneration- now on eye vitamin    Chronic obstructive pulmonary disease, unspecified COPD type- schedule pfts no symptoms.   -     Complete PFT with bronchodilator; Future  -     PULSE OXIMETRY WITH REST - PULM; Future          Return in about 1 year (around 5/18/2018) for physical.

## 2017-05-19 ENCOUNTER — PATIENT MESSAGE (OUTPATIENT)
Dept: INTERNAL MEDICINE | Facility: CLINIC | Age: 77
End: 2017-05-19

## 2017-05-19 DIAGNOSIS — R73.09 ABNORMAL GLUCOSE: Primary | ICD-10-CM

## 2017-05-19 NOTE — TELEPHONE ENCOUNTER
Cholesterol levels are similar to before with just the half tablet of the cholesterol medication, but his sugar level is up high consistent with diabetes since his blood work was fasting. Let's have him get an a1c drawn and get in with diabetes educator and me in 2 weeks. His  blood counts, kidney, liver functions, and thyroid functions are within goal ranges.     Poly Ficth MD

## 2017-06-01 ENCOUNTER — LAB VISIT (OUTPATIENT)
Dept: LAB | Facility: HOSPITAL | Age: 77
End: 2017-06-01
Attending: FAMILY MEDICINE
Payer: MEDICARE

## 2017-06-01 DIAGNOSIS — R73.09 ABNORMAL GLUCOSE: ICD-10-CM

## 2017-06-01 PROCEDURE — 83036 HEMOGLOBIN GLYCOSYLATED A1C: CPT

## 2017-06-01 PROCEDURE — 36415 COLL VENOUS BLD VENIPUNCTURE: CPT | Mod: PO

## 2017-06-02 ENCOUNTER — TELEPHONE (OUTPATIENT)
Dept: INTERNAL MEDICINE | Facility: CLINIC | Age: 77
End: 2017-06-02

## 2017-06-02 ENCOUNTER — PATIENT MESSAGE (OUTPATIENT)
Dept: INTERNAL MEDICINE | Facility: CLINIC | Age: 77
End: 2017-06-02

## 2017-06-02 ENCOUNTER — PROCEDURE VISIT (OUTPATIENT)
Dept: PULMONOLOGY | Facility: CLINIC | Age: 77
End: 2017-06-02
Payer: MEDICARE

## 2017-06-02 DIAGNOSIS — E11.9 TYPE 2 DIABETES MELLITUS WITHOUT COMPLICATION: ICD-10-CM

## 2017-06-02 DIAGNOSIS — J44.9 CHRONIC OBSTRUCTIVE PULMONARY DISEASE, UNSPECIFIED COPD TYPE: Chronic | ICD-10-CM

## 2017-06-02 LAB
ESTIMATED AVG GLUCOSE: 163 MG/DL
HBA1C MFR BLD HPLC: 7.3 %

## 2017-06-02 PROCEDURE — 94060 EVALUATION OF WHEEZING: CPT | Mod: S$GLB,,, | Performed by: INTERNAL MEDICINE

## 2017-06-02 PROCEDURE — 94729 DIFFUSING CAPACITY: CPT | Mod: S$GLB,,, | Performed by: INTERNAL MEDICINE

## 2017-06-02 PROCEDURE — 94726 PLETHYSMOGRAPHY LUNG VOLUMES: CPT | Mod: S$GLB,,, | Performed by: INTERNAL MEDICINE

## 2017-06-02 NOTE — TELEPHONE ENCOUNTER
Tried calling pt, no answer. Left message asking for a return call. That we need to get him scheduled with Dr. Fitch next week.

## 2017-06-02 NOTE — TELEPHONE ENCOUNTER
----- Message from Autumn Pryor sent at 6/2/2017  1:03 PM CDT -----  Contact: Pt  Pt is returning the nurse call. Pls call pt back at [phone].

## 2017-06-05 ENCOUNTER — PATIENT MESSAGE (OUTPATIENT)
Dept: INTERNAL MEDICINE | Facility: CLINIC | Age: 77
End: 2017-06-05

## 2017-06-05 ENCOUNTER — OFFICE VISIT (OUTPATIENT)
Dept: INTERNAL MEDICINE | Facility: CLINIC | Age: 77
End: 2017-06-05
Payer: MEDICARE

## 2017-06-05 VITALS
BODY MASS INDEX: 27.94 KG/M2 | DIASTOLIC BLOOD PRESSURE: 64 MMHG | SYSTOLIC BLOOD PRESSURE: 118 MMHG | TEMPERATURE: 98 F | HEART RATE: 64 BPM | HEIGHT: 70 IN | WEIGHT: 195.13 LBS

## 2017-06-05 DIAGNOSIS — J44.9 CHRONIC OBSTRUCTIVE PULMONARY DISEASE, UNSPECIFIED COPD TYPE: ICD-10-CM

## 2017-06-05 DIAGNOSIS — N18.30 CHRONIC KIDNEY DISEASE, STAGE 3: Chronic | ICD-10-CM

## 2017-06-05 DIAGNOSIS — R94.2 ABNORMAL PFT: Primary | ICD-10-CM

## 2017-06-05 DIAGNOSIS — E11.9 TYPE 2 DIABETES MELLITUS WITHOUT COMPLICATION, WITHOUT LONG-TERM CURRENT USE OF INSULIN: Primary | ICD-10-CM

## 2017-06-05 LAB
POST FEF 25 75: 0.43 L/S (ref 1.46–3.08)
POST FET 100: 15.02 S
POST FEV1 FVC: 48 %
POST FEV1: 1.77 L (ref 2.73–3.53)
POST FIF 50: 3.54 L/S
POST FVC: 3.67 L (ref 3.84–4.79)
POST PEF: 5.38 L/S (ref 6.76–9.11)
PRE DLCO: 18.31 ML/MMHG/MIN (ref 17.06–25.35)
PRE ERV: 0.35 L
PRE FEF 25 75: 0.45 L/S (ref 1.46–3.08)
PRE FET 100: 17.98 S
PRE FEV1 FVC: 49 %
PRE FEV1: 1.65 L (ref 2.73–3.53)
PRE FIF 50: 2.22 L/S
PRE FRC PL: 5.1 L (ref 3.08–4.29)
PRE FVC: 3.36 L (ref 3.84–4.79)
PRE KROGHS K: 3.26 1/MIN
PRE PEF: 4.95 L/S (ref 6.76–9.11)
PRE RV: 4.32 L (ref 2.27–3.06)
PRE SVC: 3.36 L
PRE TLC: 7.69 L (ref 6.05–7.05)
PREDICTED DLCO: 21.21 ML/MMHG/MIN (ref 17.06–25.35)
PREDICTED FEV1 FVC: 72.57 % (ref 67.73–77.41)
PREDICTED FEV1: 3.13 L (ref 2.73–3.53)
PREDICTED FRC N2: 3.68 L (ref 3.08–4.29)
PREDICTED FRC PL: 3.68 L (ref 3.08–4.29)
PREDICTED FVC: 4.31 L (ref 3.84–4.79)
PREDICTED RV: 2.66 L (ref 2.27–3.06)
PREDICTED SVC: 4.09 L
PREDICTED TLC: 6.55 L (ref 6.05–7.05)
PROVOCATION PROTOCOL: ABNORMAL

## 2017-06-05 PROCEDURE — 99999 PR PBB SHADOW E&M-EST. PATIENT-LVL III: CPT | Mod: PBBFAC,,, | Performed by: FAMILY MEDICINE

## 2017-06-05 PROCEDURE — 99499 UNLISTED E&M SERVICE: CPT | Mod: S$GLB,,, | Performed by: FAMILY MEDICINE

## 2017-06-05 PROCEDURE — 1159F MED LIST DOCD IN RCRD: CPT | Mod: S$GLB,,, | Performed by: FAMILY MEDICINE

## 2017-06-05 PROCEDURE — 3045F PR MOST RECENT HEMOGLOBIN A1C LEVEL 7.0-9.0%: CPT | Mod: S$GLB,,, | Performed by: FAMILY MEDICINE

## 2017-06-05 PROCEDURE — 99214 OFFICE O/P EST MOD 30 MIN: CPT | Mod: S$GLB,,, | Performed by: FAMILY MEDICINE

## 2017-06-05 PROCEDURE — 4010F ACE/ARB THERAPY RXD/TAKEN: CPT | Mod: S$GLB,,, | Performed by: FAMILY MEDICINE

## 2017-06-05 PROCEDURE — 1126F AMNT PAIN NOTED NONE PRSNT: CPT | Mod: S$GLB,,, | Performed by: FAMILY MEDICINE

## 2017-06-05 RX ORDER — GLIMEPIRIDE 2 MG/1
2 TABLET ORAL
Qty: 90 TABLET | Refills: 3 | Status: SHIPPED | OUTPATIENT
Start: 2017-06-05 | End: 2018-03-19 | Stop reason: SDUPTHER

## 2017-06-05 RX ORDER — INSULIN PUMP SYRINGE, 3 ML
EACH MISCELLANEOUS
Qty: 1 EACH | Refills: 0 | Status: SHIPPED | OUTPATIENT
Start: 2017-06-05

## 2017-06-05 RX ORDER — LANCETS 28 GAUGE
1 EACH MISCELLANEOUS DAILY PRN
Qty: 100 EACH | Refills: 3 | Status: SHIPPED | OUTPATIENT
Start: 2017-06-05

## 2017-06-05 NOTE — TELEPHONE ENCOUNTER
Pt with significant COPD on pulmonary function tests. Would recommend pt to see Pulm. Please schedule.

## 2017-06-05 NOTE — PROGRESS NOTES
Subjective:      Patient ID: Eleazar Solo is a 77 y.o. male.    Chief Complaint: Diabetes (follow up)    Patient's coming in today with a new onset of diabetes.  He's had some elevated sugars in the past but most recently his A1c was now checked and was noted to be an A1c is 7.3.  He reports that he really ever since he quit smoking is been eating a lot of sugary candies as well as regular Cokes.  Since finding out that his sugar number was elevated and now consistent with diabetes he subsequently cut out the takes and cut back on his sugar eating.  He finds that this is helped.  He prefer not to do a diabetes class nor check his blood sugars at this time.  He really just wants to change his diet if at all possible.  His son is diabetic and his wife's willing to help him with his diet as well.  He is willing to follow a diabetic diet also.  He has some history of having some damage to his feet some in the past due to hematoma last year as well as some nerve damage before but otherwise he just recently had an eye exam done last month for some cataract surgery macular degeneration by Dr. Hercules.  He is not due back for another year.  It was a dilated eye exam.      Diabetes   He presents for his initial diabetic visit. He has type 2 diabetes mellitus. No MedicAlert identification noted. His disease course has been worsening. There are no hypoglycemic associated symptoms. Pertinent negatives for diabetes include no blurred vision, no chest pain, no fatigue, no foot paresthesias, no foot ulcerations, no polydipsia, no polyphagia, no polyuria, no visual change, no weakness and no weight loss. There are no hypoglycemic complications. Symptoms are worsening. There are no diabetic complications. Risk factors for coronary artery disease include dyslipidemia, family history, obesity, male sex, hypertension and tobacco exposure. When asked about current treatments, none were reported. He is compliant with treatment some of  the time. His weight is decreasing steadily. He is following a generally unhealthy diet. When asked about meal planning, he reported none. He has not had a previous visit with a dietitian. He participates in exercise intermittently. There is no change in his home blood glucose trend. An ACE inhibitor/angiotensin II receptor blocker is being taken. He does not see a podiatrist.Eye exam is current.       Lab Results   Component Value Date    WBC 10.47 05/18/2017    HGB 14.4 05/18/2017    HCT 42.6 05/18/2017     05/18/2017    CHOL 135 05/18/2017    TRIG 200 (H) 05/18/2017    HDL 37 (L) 05/18/2017    ALT 40 05/18/2017    AST 32 05/18/2017     05/18/2017    K 4.3 05/18/2017     05/18/2017    CREATININE 1.3 05/18/2017    BUN 20 05/18/2017    CO2 30 (H) 05/18/2017    TSH 1.130 11/23/2016    PSA 0.84 11/23/2016    INR 2.2 05/09/2014    HGBA1C 7.3 (H) 06/01/2017       Review of Systems   Constitutional: Negative for activity change, appetite change, fatigue and weight loss.   Eyes: Negative for blurred vision, pain, discharge, redness and itching.   Respiratory: Negative for cough and shortness of breath.    Cardiovascular: Negative for chest pain and palpitations.   Endocrine: Negative for polydipsia, polyphagia and polyuria.   Skin: Negative for wound.   Neurological: Negative for weakness.     Objective:     Physical Exam   Constitutional: He appears well-developed and well-nourished.   Cardiovascular: Normal rate and regular rhythm.    Pulses:       Dorsalis pedis pulses are 2+ on the right side, and 2+ on the left side.   Pulmonary/Chest: Effort normal and breath sounds normal.   Musculoskeletal:        Right foot: There is normal range of motion and no deformity.        Left foot: There is normal range of motion and no deformity.   Feet:   Right Foot:   Protective Sensation: 4 sites tested. 4 sites sensed.   Skin Integrity: Positive for warmth. Negative for ulcer, blister, skin breakdown, erythema,  callus or dry skin.   Left Foot:   Protective Sensation: 4 sites tested. 4 sites sensed.   Skin Integrity: Positive for warmth. Negative for ulcer, blister, skin breakdown, erythema, callus or dry skin.   Vitals reviewed.    Assessment:     1. Type 2 diabetes mellitus without complication, without long-term current use of insulin    2. Chronic kidney disease, stage 3      Plan:   Eleazar was seen today for diabetes.    Diagnoses and all orders for this visit:    Type 2 diabetes mellitus without complication, without long-term current use of insulin-new diagnosis discussed care, increased risk for cardiovascular events discussed medications discussed dietary planning.  At this time the patient declined the dietitian visit.  He does not need a podiatrist visit at this time.  We will prescribe him a meter that he can use mainly just as needed if he's feeling bad but otherwise due to his chronic kidney disease history we'll start low-dose Chlamydia ride.  Advised him of hypoglycemic symptoms.  He'll follow back up in 3 months with lab work prior.  -     Hemoglobin A1c; Future  -     Comprehensive metabolic panel; Future    Chronic kidney disease stage III-for this reason were avoiding metformin at this time.  -     blood sugar diagnostic (TRUETEST TEST STRIPS) Strp; 1 strip by Misc.(Non-Drug; Combo Route) route daily as needed. [employer] True Metrix Test Strips  -     lancets (TRUEPLUS LANCETS) 28 gauge Misc; 1 lancet by Misc.(Non-Drug; Combo Route) route daily as needed. [employer] brand  -     blood-glucose meter (FREESTYLE SYSTEM KIT) kit; [employer] TRUE METRIX AIR METER Use as instructed for TWICE DAILY testing  -     glimepiride (AMARYL) 2 MG tablet; Take 1 tablet (2 mg total) by mouth before breakfast.            Return in about 3 months (around 9/5/2017) for diabetes .

## 2017-06-07 ENCOUNTER — TELEPHONE (OUTPATIENT)
Dept: INTERNAL MEDICINE | Facility: CLINIC | Age: 77
End: 2017-06-07

## 2017-06-07 NOTE — TELEPHONE ENCOUNTER
----- Message from Jenise Beckett sent at 6/7/2017  2:00 PM CDT -----  Contact: tova/wife [phone]  States that she is returning call please call back at [phone]//thank you acc

## 2017-06-09 RX ORDER — APIXABAN 5 MG/1
TABLET, FILM COATED ORAL
Qty: 180 TABLET | Refills: 3 | Status: SHIPPED | OUTPATIENT
Start: 2017-06-09 | End: 2018-06-21 | Stop reason: SDUPTHER

## 2017-06-09 RX ORDER — VERAPAMIL HYDROCHLORIDE 240 MG/1
TABLET, FILM COATED, EXTENDED RELEASE ORAL
Qty: 90 TABLET | Refills: 3 | Status: SHIPPED | OUTPATIENT
Start: 2017-06-09 | End: 2018-03-19 | Stop reason: SDUPTHER

## 2017-06-26 ENCOUNTER — TELEPHONE (OUTPATIENT)
Dept: PULMONOLOGY | Facility: CLINIC | Age: 77
End: 2017-06-26

## 2017-06-26 DIAGNOSIS — J44.9 CHRONIC OBSTRUCTIVE PULMONARY DISEASE, UNSPECIFIED COPD TYPE: Primary | ICD-10-CM

## 2017-06-28 ENCOUNTER — HOSPITAL ENCOUNTER (OUTPATIENT)
Dept: RADIOLOGY | Facility: HOSPITAL | Age: 77
Discharge: HOME | End: 2017-06-28
Attending: NURSE PRACTITIONER
Payer: MEDICARE

## 2017-06-28 ENCOUNTER — OFFICE VISIT (OUTPATIENT)
Dept: PULMONOLOGY | Facility: CLINIC | Age: 77
End: 2017-06-28
Payer: MEDICARE

## 2017-06-28 VITALS
HEART RATE: 92 BPM | HEIGHT: 70 IN | SYSTOLIC BLOOD PRESSURE: 124 MMHG | DIASTOLIC BLOOD PRESSURE: 70 MMHG | WEIGHT: 194.88 LBS | RESPIRATION RATE: 20 BRPM | OXYGEN SATURATION: 97 % | BODY MASS INDEX: 27.9 KG/M2

## 2017-06-28 DIAGNOSIS — I48.21 ATRIAL FIBRILLATION, PERMANENT: Chronic | ICD-10-CM

## 2017-06-28 DIAGNOSIS — J44.9 CHRONIC OBSTRUCTIVE PULMONARY DISEASE, UNSPECIFIED COPD TYPE: ICD-10-CM

## 2017-06-28 DIAGNOSIS — J44.9 COPD, MODERATE: Primary | ICD-10-CM

## 2017-06-28 PROCEDURE — 1159F MED LIST DOCD IN RCRD: CPT | Mod: S$GLB,,, | Performed by: NURSE PRACTITIONER

## 2017-06-28 PROCEDURE — 99214 OFFICE O/P EST MOD 30 MIN: CPT | Mod: S$GLB,,, | Performed by: NURSE PRACTITIONER

## 2017-06-28 PROCEDURE — 71020 XR CHEST PA AND LATERAL: CPT | Mod: 26,,, | Performed by: RADIOLOGY

## 2017-06-28 PROCEDURE — 99999 PR PBB SHADOW E&M-EST. PATIENT-LVL IV: CPT | Mod: PBBFAC,,, | Performed by: NURSE PRACTITIONER

## 2017-06-28 PROCEDURE — 99499 UNLISTED E&M SERVICE: CPT | Mod: S$GLB,,, | Performed by: NURSE PRACTITIONER

## 2017-06-28 NOTE — ASSESSMENT & PLAN NOTE
6/2/2017 CPFT moderate obstruction FEV1 53% predicted.  no significant change compared to 2010 pft with FEV1 54% predicted   No significant change compared to 2004 pft with FEV1 53% predicted.   Asymptomatic   Begin Anoro controller inhaler to preserve lung function.

## 2017-06-28 NOTE — LETTER
June 28, 2017      Poly Fitch MD  [address]  [address]  [place] [address]           [facility]  [address]  [place] [address]  Phone: [phone]  Fax: [phone]          Patient: Eleazar Solo   MR Number: [id number]   YOB: 1940   Date of Visit: 6/28/2017       Dear Dr. Poly Fitch:    Thank you for referring Eleazar Solo to me for evaluation. Attached you will find relevant portions of my assessment and plan of care.    If you have questions, please do not hesitate to call me. I look forward to following Eleazar Solo along with you.    Sincerely,    Sandy Hendricks, NP    Enclosure  CC:  No Recipients    If you would like to receive this communication electronically, please contact [email or web address] or [phone] to request more information on [employer] [facility] access.    For providers and/or their staff who would like to refer a patient to [facility], please contact us through our one-stop-shop provider referral line, [facility] , at [phone].    If you feel you have received this communication in error or would no longer like to receive these types of communications, please e-mail [email or web address]

## 2017-06-28 NOTE — PROGRESS NOTES
Subjective:      Patient ID: Eleazar Solo is a 77 y.o. male.    Patient Active Problem List   Diagnosis    Atrial fibrillation, permanent    Mixed hyperlipidemia    Testosterone deficiency    Essential hypertension    Right ankle pain    Chronic anticoagulation    COPD, moderate    Chronic gout without tophus    Chronic kidney disease, stage 3    Bilateral carotid artery disease    Atherosclerosis of aorta    History of stroke    Macular degeneration       Problem list has been reviewed.    he has been referred by Poly Fitch MD for evaluation and management for   Chief Complaint   Patient presents with    COPD       Chief Complaint: COPD      HPI:  77-year-old male reports to pulmonary clinic initial evaluation related to having a recent pulmonary function study done on 6/2/2017 that revealed moderate to severe obstructive defect.  Lung volumes and DLCO were normal.   There is no cough, no wheezing, no shortness of breath, no hemoptysis, no sputum production, no weight loss,  no chest pain.   Occupational History:  Retired. Occupational exposure to Asbestos. Exposure asbestosis in 1960's about 5 yrs while at work, never worked directly with asbestos.    Avocational Exposure:   None currently.     Pet Exposures:  Dogs inside/outside dog.  Denies allergy to Dog and  cat dander.     Former smoker quit 1995.  Immunizations up to date.     Discussed sleep apnea evaluation at this visit related to afib hx, patient and wife report no significant snoring, awakening refreshed, no day time sleepiness, occasional nap after lunch, about 1 hour.   Awakens 2 times up to bathroom. He had 2 episodes of awakening feeling shortness of breath and was diagnosis with a fib and once on med for a fib, no longer has felt shortness of breath at night.   Patient declines consideration for sleep testing, states will monitor at home and return if notices sign of sleep apnea.    Previous Report Reviewed: lab reports,  office notes and radiology reports     Past Medical History: The following portions of the patient's history were reviewed and updated as appropriate:   He  has a past surgical history that includes Fracture surgery (Left, 1986); Eye surgery (Bilateral, 5/21/15, 7/16/15); and Tonsillectomy (1948 approx).  His family history includes Alcohol abuse in his father; Arrhythmia in his brother; Diabetes in his brother and sister; Gout in his brother and mother; Heart disease in his brother and mother; Hypertension in his father and mother; Stroke in his father.  He  reports that he quit smoking about 22 years ago. His smoking use included Cigarettes. He has a 60.00 pack-year smoking history. He quit smokeless tobacco use about 18 years ago. His smokeless tobacco use included Chew. He reports that he drinks about 1.2 oz of alcohol per week . He reports that he does not use drugs.  He has a current medication list which includes the following prescription(s): allopurinol, blood sugar diagnostic, blood-glucose meter, digoxin, eliquis, glimepiride, hydrochlorothiazide, lancets, losartan, menthol, potassium chloride sa, simvastatin, verapamil, vit a/vit c/vit e/zinc/copper, and umeclidinium-vilanterol.  He is allergic to no known drug allergies..    Review of Systems   Constitutional: Negative for fever, chills, weight loss, weight gain, activity change, appetite change, fatigue and night sweats.   HENT: Negative for postnasal drip, rhinorrhea, sinus pressure, voice change and congestion.    Eyes: Negative for redness and itching.   Respiratory: Negative for snoring, cough, sputum production, chest tightness, shortness of breath, wheezing, orthopnea, asthma nighttime symptoms, dyspnea on extertion, use of rescue inhaler and somnolence.    Cardiovascular: Negative for chest pain, palpitations and leg swelling.   Genitourinary: Negative for difficulty urinating and hematuria.   Endocrine: Negative for polydipsia, polyphagia, cold  "intolerance, heat intolerance and polyuria.    Musculoskeletal: Negative for arthralgias, back pain, gait problem, joint swelling and myalgias.   Skin: Negative.    Gastrointestinal: Negative for nausea, vomiting, abdominal pain and acid reflux.   Neurological: Negative for dizziness, weakness, light-headedness and headaches.   Hematological: Negative for adenopathy. No excessive bruising.   Psychiatric/Behavioral: Negative for sleep disturbance. The patient is not nervous/anxious.         Objective:     Physical Exam   Constitutional: He is oriented to person, place, and time. Vital signs are normal. He appears well-developed and well-nourished. He is active and cooperative.  Non-toxic appearance. He does not have a sickly appearance. He does not appear ill. No distress.   HENT:   Head: Normocephalic and atraumatic.   Right Ear: External ear normal.   Left Ear: External ear normal.   Nose: Nose normal.   Mouth/Throat: Oropharynx is clear and moist. No oropharyngeal exudate.   Neck circumference: 44 cm (17 inches).  Malampati: 2.    Eyes: Conjunctivae are normal.   Neck: Normal range of motion. Neck supple.   Cardiovascular: Normal rate, regular rhythm, normal heart sounds and intact distal pulses.    Pulmonary/Chest: Effort normal and breath sounds normal.   Abdominal: Soft. Bowel sounds are normal.   Musculoskeletal: Normal range of motion.   Neurological: He is alert and oriented to person, place, and time. He has normal reflexes.   Skin: Skin is warm and dry.   Psychiatric: He has a normal mood and affect. His behavior is normal. Judgment and thought content normal.   Vitals reviewed.    Vitals:    06/28/17 1303   BP: 124/70   Pulse: 92   Resp: 20   SpO2: 97%   Weight: 88.4 kg (194 lb 14.2 oz)   Height: 5' 10.24" (1.784 m)     Estimated body mass index is 27.78 kg/m² as calculated from the following:    Height as of this encounter: 5' 10.24" (1.784 m).    Weight as of this encounter: 88.4 kg (194 lb 14.2 " randy).    Personal Diagnostic Review  Pulmonary function tests: 2017 FEV1: 1.65  (53 % predicted), FVC:  3.36 (78 % predicted), FEV1/FVC:  49 (68 % predicted), T.69 (117 % predicted), RV/TLVC: 56 (133 % predicted), DLCO: 18.3 (86 % predicted)   Post bronchodilator: FEV1: 1.77  (57 % predicted), FVC:  3.67 (65 % predicted), FEV1/FVC:  48 (67 % predicted).   Moderately severe obstruction (FEV1>50 and <59% of predicted).  Airflow is not clearly improved after bronchodilator. Clinical improvement following bronchodilator therapy may occur in  the absence of spirometric improvement.  Normal diffusion capacity (DLCO). (corrected for Hb)  Normal lung volumes.    2017 chest xray   Lungs are free of infiltrates or effusions.       echo  EF 55 - 65 60   Mitral Valve Regurgitation  MILD   Aortic Valve Stenosis  MILD    Est. PA Systolic Pressure  35.04   Tricuspid Valve Regurgitation   TRIVIAL TO MILD       Assessment:     1. COPD, moderate    2. Atrial fibrillation, permanent      Orders Placed This Encounter   Procedures    Spirometry with/without bronchodilator     Standing Status:   Future     Standing Expiration Date:   2018     Plan:     Discussed diagnosis, its evaluation, treatment and usual course. All questions answered.  Problem List Items Addressed This Visit     Atrial fibrillation, permanent (Chronic)     Followed by cardiology.  Patient declines sleep study at this visit.  No significant snoring, awakens refreshed, no witnessed apnea by wife  Continue to monitor          COPD, moderate - Primary (Chronic)     2017 CPFT moderate obstruction FEV1 53% predicted.  no significant change compared to  pft with FEV1 54% predicted   No significant change compared to  pft with FEV1 53% predicted.   Asymptomatic   Begin Anoro controller inhaler to preserve lung function.          Relevant Medications    umeclidinium-vilanterol (ANORO ELLIPTA) 62.5-25 mcg/actuation DsDv    Other Relevant  Orders    Spirometry with/without bronchodilator      Other Visit Diagnoses    None.         Return in about 2 months (around 8/28/2017) for COPD follow up with review of scottie after beginning anoro controller inhaler..

## 2017-06-28 NOTE — ASSESSMENT & PLAN NOTE
Followed by cardiology.  Patient declines sleep study at this visit.  No significant snoring, awakens refreshed, no witnessed apnea by wife  Continue to monitor

## 2017-08-23 RX ORDER — LOSARTAN POTASSIUM 100 MG/1
TABLET ORAL
Qty: 90 TABLET | Refills: 3 | Status: SHIPPED | OUTPATIENT
Start: 2017-08-23 | End: 2018-08-06 | Stop reason: SDUPTHER

## 2017-09-11 ENCOUNTER — LAB VISIT (OUTPATIENT)
Dept: LAB | Facility: HOSPITAL | Age: 77
End: 2017-09-11
Attending: FAMILY MEDICINE
Payer: MEDICARE

## 2017-09-11 DIAGNOSIS — E11.9 TYPE 2 DIABETES MELLITUS WITHOUT COMPLICATION, WITHOUT LONG-TERM CURRENT USE OF INSULIN: ICD-10-CM

## 2017-09-11 LAB
ALBUMIN SERPL BCP-MCNC: 3.8 G/DL
ALP SERPL-CCNC: 79 U/L
ALT SERPL W/O P-5'-P-CCNC: 20 U/L
ANION GAP SERPL CALC-SCNC: 10 MMOL/L
AST SERPL-CCNC: 22 U/L
BILIRUB SERPL-MCNC: 0.7 MG/DL
BUN SERPL-MCNC: 24 MG/DL
CALCIUM SERPL-MCNC: 9.6 MG/DL
CHLORIDE SERPL-SCNC: 103 MMOL/L
CO2 SERPL-SCNC: 29 MMOL/L
CREAT SERPL-MCNC: 1.2 MG/DL
EST. GFR  (AFRICAN AMERICAN): >60 ML/MIN/1.73 M^2
EST. GFR  (NON AFRICAN AMERICAN): 58.8 ML/MIN/1.73 M^2
GLUCOSE SERPL-MCNC: 126 MG/DL
POTASSIUM SERPL-SCNC: 4.1 MMOL/L
PROT SERPL-MCNC: 7.1 G/DL
SODIUM SERPL-SCNC: 142 MMOL/L

## 2017-09-11 PROCEDURE — 83036 HEMOGLOBIN GLYCOSYLATED A1C: CPT

## 2017-09-11 PROCEDURE — 80053 COMPREHEN METABOLIC PANEL: CPT

## 2017-09-11 PROCEDURE — 36415 COLL VENOUS BLD VENIPUNCTURE: CPT | Mod: PO

## 2017-09-12 LAB
ESTIMATED AVG GLUCOSE: 114 MG/DL
HBA1C MFR BLD HPLC: 5.6 %

## 2017-09-18 ENCOUNTER — OFFICE VISIT (OUTPATIENT)
Dept: INTERNAL MEDICINE | Facility: CLINIC | Age: 77
End: 2017-09-18
Payer: MEDICARE

## 2017-09-18 VITALS
TEMPERATURE: 98 F | SYSTOLIC BLOOD PRESSURE: 110 MMHG | WEIGHT: 192.25 LBS | HEART RATE: 92 BPM | HEIGHT: 70 IN | BODY MASS INDEX: 27.52 KG/M2 | DIASTOLIC BLOOD PRESSURE: 80 MMHG

## 2017-09-18 DIAGNOSIS — J44.9 COPD, MODERATE: Chronic | ICD-10-CM

## 2017-09-18 DIAGNOSIS — Z79.01 CHRONIC ANTICOAGULATION: Chronic | ICD-10-CM

## 2017-09-18 DIAGNOSIS — E11.29 CONTROLLED TYPE 2 DIABETES MELLITUS WITH OTHER DIABETIC KIDNEY COMPLICATION: Primary | ICD-10-CM

## 2017-09-18 DIAGNOSIS — I48.21 ATRIAL FIBRILLATION, PERMANENT: Chronic | ICD-10-CM

## 2017-09-18 DIAGNOSIS — I10 ESSENTIAL HYPERTENSION: Chronic | ICD-10-CM

## 2017-09-18 DIAGNOSIS — Z12.5 PROSTATE CANCER SCREENING: ICD-10-CM

## 2017-09-18 DIAGNOSIS — M20.61 TOE DEFORMITY, ACQUIRED, RIGHT: ICD-10-CM

## 2017-09-18 DIAGNOSIS — N18.30 CHRONIC KIDNEY DISEASE, STAGE 3: Chronic | ICD-10-CM

## 2017-09-18 PROCEDURE — 3079F DIAST BP 80-89 MM HG: CPT | Mod: S$GLB,,, | Performed by: FAMILY MEDICINE

## 2017-09-18 PROCEDURE — 1126F AMNT PAIN NOTED NONE PRSNT: CPT | Mod: S$GLB,,, | Performed by: FAMILY MEDICINE

## 2017-09-18 PROCEDURE — 99999 PR PBB SHADOW E&M-EST. PATIENT-LVL III: CPT | Mod: PBBFAC,,, | Performed by: FAMILY MEDICINE

## 2017-09-18 PROCEDURE — 1159F MED LIST DOCD IN RCRD: CPT | Mod: S$GLB,,, | Performed by: FAMILY MEDICINE

## 2017-09-18 PROCEDURE — 99214 OFFICE O/P EST MOD 30 MIN: CPT | Mod: S$GLB,,, | Performed by: FAMILY MEDICINE

## 2017-09-18 PROCEDURE — 3074F SYST BP LT 130 MM HG: CPT | Mod: S$GLB,,, | Performed by: FAMILY MEDICINE

## 2017-09-18 PROCEDURE — 4010F ACE/ARB THERAPY RXD/TAKEN: CPT | Mod: S$GLB,,, | Performed by: FAMILY MEDICINE

## 2017-09-18 PROCEDURE — 99499 UNLISTED E&M SERVICE: CPT | Mod: S$GLB,,, | Performed by: FAMILY MEDICINE

## 2017-09-18 PROCEDURE — 3044F HG A1C LEVEL LT 7.0%: CPT | Mod: S$GLB,,, | Performed by: FAMILY MEDICINE

## 2017-09-18 PROCEDURE — 3008F BODY MASS INDEX DOCD: CPT | Mod: S$GLB,,, | Performed by: FAMILY MEDICINE

## 2017-09-18 NOTE — PROGRESS NOTES
Subjective:      Patient ID: Eleazar Solo is a 77 y.o. male.    Chief Complaint: Follow-up (dm, breathing, labs)    Patient's coming in today for follow-up of diabetes.  He is now tolerating a half tablet of the glimeperide.   Labs look very good.  No low blood sugars.     Last night he forgot to take his evening medicines.  His heart rate is faster today.  Blood pressure is controlled however.  No chest pain no shortness of breath.    Does have COPD.  Has seen pulmonary but decided not to follow through with the follow-up visit because did not get started on the Mikayla.  Mainly due to cost and also because he doesn't feel like he has any issues with his breathing.    He would like to see the podiatrist because he has a little deformity of his right fifth digit.  It seems to overlap with his fourth digit and causes a lot of irritation.  He's tried some spacers but doesn't seem to be helping as much.          Diabetes   He presents for his follow-up diabetic visit. He has type 2 diabetes mellitus. No MedicAlert identification noted. His disease course has been worsening. There are no hypoglycemic associated symptoms. Pertinent negatives for hypoglycemia include no dizziness. Pertinent negatives for diabetes include no blurred vision, no chest pain, no fatigue, no foot paresthesias, no foot ulcerations, no polydipsia, no polyphagia, no polyuria, no visual change, no weakness and no weight loss. There are no hypoglycemic complications. Symptoms are improving. There are no diabetic complications. Risk factors for coronary artery disease include dyslipidemia, family history, obesity, male sex, hypertension and tobacco exposure. When asked about current treatments, none were reported. He is compliant with treatment most of the time. His weight is decreasing steadily. He is following a generally healthy diet. When asked about meal planning, he reported none. He has not had a previous visit with a dietitian. He  participates in exercise intermittently. There is no change in his home blood glucose trend. An ACE inhibitor/angiotensin II receptor blocker is being taken. He does not see a podiatrist.Eye exam is current.       Lab Results   Component Value Date    WBC 10.47 05/18/2017    HGB 14.4 05/18/2017    HCT 42.6 05/18/2017     05/18/2017    CHOL 135 05/18/2017    TRIG 200 (H) 05/18/2017    HDL 37 (L) 05/18/2017    ALT 20 09/11/2017    AST 22 09/11/2017     09/11/2017    K 4.1 09/11/2017     09/11/2017    CREATININE 1.2 09/11/2017    BUN 24 (H) 09/11/2017    CO2 29 09/11/2017    TSH 1.130 11/23/2016    PSA 0.84 11/23/2016    INR 2.2 05/09/2014    HGBA1C 5.6 09/11/2017       Review of Systems   Constitutional: Negative for activity change, appetite change, fatigue, fever and weight loss.   Eyes: Negative for blurred vision.   Respiratory: Negative for cough and shortness of breath.    Cardiovascular: Negative for chest pain, palpitations and leg swelling.   Gastrointestinal: Negative for abdominal distention and abdominal pain.   Endocrine: Negative for polydipsia, polyphagia and polyuria.   Musculoskeletal: Positive for arthralgias and gait problem.   Skin: Positive for color change and wound.   Neurological: Negative for dizziness, weakness, light-headedness and numbness.   Psychiatric/Behavioral: Negative for decreased concentration, dysphoric mood and sleep disturbance.     Objective:     Physical Exam   Constitutional: He appears well-developed and well-nourished.   HENT:   Head: Normocephalic and atraumatic.   Right Ear: Tympanic membrane normal.   Left Ear: Tympanic membrane normal.   Mouth/Throat: Oropharynx is clear and moist.   Eyes: Conjunctivae and EOM are normal.   Neck: Normal range of motion. Neck supple.   Cardiovascular: Normal rate and regular rhythm.    Pulmonary/Chest: Effort normal and breath sounds normal.   Musculoskeletal:        Right foot: There is deformity.   Feet:   Right  Foot:   Skin Integrity: Positive for blister, callus and dry skin. Negative for skin breakdown.   Psychiatric: He has a normal mood and affect. His behavior is normal.   Nursing note and vitals reviewed.    Assessment:     1. 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 Atrial fibrillation, permanent    3. Chronic kidney disease, stage 3    4. COPD, moderate    5. Essential hypertension    6. Chronic anticoagulation    7. Prostate cancer screening    8. Toe deformity, acquired, right      Plan:   Eleazar was seen today for follow-up.    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  Continue with current meds and interventions      -     Lipid panel; Future  -     Hemoglobin A1c; Future  -     Comprehensive metabolic panel; Future  -     CBC auto differential; Future  -     Uric acid; Future  -     TSH; Future  -     PSA, Screening; Future  -     Microalbumin/creatinine urine ratio; Future    Atrial fibrillation, permanent  Comments:  elevated today b/c missed last night's meds. go home and take verapamil.   Orders:  -     Lipid panel; Future  -     Hemoglobin A1c; Future  -     Comprehensive metabolic panel; Future  -     CBC auto differential; Future  -     Uric acid; Future  -     TSH; Future  -     PSA, Screening; Future  -     Microalbumin/creatinine urine ratio; Future    Chronic kidney disease, stage 3 - stable, labs reviewed today.  Continue with current meds and interventions    -     Lipid panel; Future  -     Hemoglobin A1c; Future  -     Comprehensive metabolic panel; Future  -     CBC auto differential; Future  -     Uric acid; Future  -     TSH; Future  -     PSA, Screening; Future  -     Microalbumin/creatinine urine ratio; Future    COPD, moderate- not taking anero due to cost and didn't feel it would make much benefit.   -     Lipid panel; Future  -      Hemoglobin A1c; Future  -     Comprehensive metabolic panel; Future  -     CBC auto differential; Future  -     Uric acid; Future  -     TSH; Future  -     PSA, Screening; Future  -     Microalbumin/creatinine urine ratio; Future    Essential hypertension  - stable, labs reviewed today.  Continue with current meds and interventions    -     Lipid panel; Future  -     Hemoglobin A1c; Future  -     Comprehensive metabolic panel; Future  -     CBC auto differential; Future  -     Uric acid; Future  -     TSH; Future  -     PSA, Screening; Future  -     Microalbumin/creatinine urine ratio; Future    Chronic anticoagulation  - stable, labs reviewed today.  Continue with current meds and interventions    -     Lipid panel; Future  -     Hemoglobin A1c; Future  -     Comprehensive metabolic panel; Future  -     CBC auto differential; Future  -     Uric acid; Future  -     TSH; Future  -     PSA, Screening; Future  -     Microalbumin/creatinine urine ratio; Future    Prostate cancer screening  -     Lipid panel; Future  -     Hemoglobin A1c; Future  -     Comprehensive metabolic panel; Future  -     CBC auto differential; Future  -     Uric acid; Future  -     TSH; Future  -     PSA, Screening; Future  -     Microalbumin/creatinine urine ratio; Future    Toe deformity, acquired, right- requests referral to Dr Ayala.   -     Ambulatory referral to Podiatry            Return in about 6 months (around 3/18/2018) for chronic issues Dr Fitch.

## 2017-10-06 ENCOUNTER — OFFICE VISIT (OUTPATIENT)
Dept: PODIATRY | Facility: CLINIC | Age: 77
End: 2017-10-06
Payer: MEDICARE

## 2017-10-06 VITALS — RESPIRATION RATE: 20 BRPM | BODY MASS INDEX: 27.49 KG/M2 | HEIGHT: 70 IN | WEIGHT: 192 LBS

## 2017-10-06 DIAGNOSIS — M20.61 ACQUIRED DEFORMITY OF RIGHT TOE: Primary | ICD-10-CM

## 2017-10-06 DIAGNOSIS — M89.8X9 BONY EXOSTOSIS: ICD-10-CM

## 2017-10-06 DIAGNOSIS — L84 CORN OR CALLUS: ICD-10-CM

## 2017-10-06 PROCEDURE — 99499 UNLISTED E&M SERVICE: CPT | Mod: S$GLB,,, | Performed by: PODIATRIST

## 2017-10-06 PROCEDURE — 99203 OFFICE O/P NEW LOW 30 MIN: CPT | Mod: 25,S$GLB,, | Performed by: PODIATRIST

## 2017-10-06 PROCEDURE — 99999 PR PBB SHADOW E&M-EST. PATIENT-LVL III: CPT | Mod: PBBFAC,,, | Performed by: PODIATRIST

## 2017-10-06 NOTE — PROGRESS NOTES
Office Visit 10/6/2017  Last encounter in this department: Visit date not found    Subjective:      Patient ID: Eleazar Sool is a 77 y.o. male.    Chief Complaint: Foot Problem; Gout; and Diabetes Mellitus (PCP Dr Fitch 9/18/17)    Eleazar Solo is a  77 y.o. year old male presenting to podiatry clinic for complaint of kissing corns between 4th toe and 5th toe on the right foot by referral from Poly Fitch MD. Patient relates noticing pain in between the toes for about 4 years. The patient noticed some buildup and thickening of the skin between the toes. The patient rates the pain as bad as 5/10 sharp pain. The patient has noticed the pain worse with weightbearing activities and certain types of tighter fitting shoes. The patient has tried different shoes but not corn pads.    Review of Systems   Constitution: Negative for chills and fever.   Cardiovascular: Negative for chest pain.   Respiratory: Negative for shortness of breath.    Gastrointestinal: Negative for nausea and vomiting.           Objective:      Physical Exam   Constitutional: He is oriented to person, place, and time. He appears well-developed and well-nourished. No distress.   Cardiovascular:   Pulses:       Dorsalis pedis pulses are 2+ on the right side, and 2+ on the left side.        Posterior tibial pulses are 2+ on the right side, and 2+ on the left side.   Capillary fill time 3-5 seconds bilaterally.   Musculoskeletal:   Lower extremities:    Deformities: Adducted fifth toe with hypertrophic condyle medial DIPJ and lateral fourth digit PIPJ right.     Normal arch height and rectus feet bilaterally.     5/5 muscle strength and tone in all four quadrants bilaterally.     Pain-free range of motion in all four quadrants with stiffness and limitation bilaterally.     Pain on palpation: sub keratotic lesions.   Neurological: He is alert and oriented to person, place, and time. He displays no Babinski's sign on the right side. He displays no  Babinski's sign on the left side.   Plantar protective threshold sensation by touch via 5.07 SWMF present bilaterally.    Skin:   Lower extremities:    Normal turgor, texture, temperature bilaterally. Digital hair present bilaterally. Warm equally bilaterally.     No edema, varicosities, pigmentary changes.    No wounds, drainage, malodor, erythema, interdigital maceration were noted.     Hyperkeratotic lesion: Lateral fourth digit PIPJ and medial fifth digit DIPJ right.     Nails are clear bilaterally.     Psychiatric: He has a normal mood and affect.   Nursing note and vitals reviewed.            X-rays: ordered, but results not yet available    Assessment:       Encounter Diagnoses   Name Primary?    Acquired deformity of right toe Yes    Bony exostosis     Corn or callus          Plan:       Eleazar was seen today for foot problem, gout and diabetes mellitus.    Diagnoses and all orders for this visit:    Acquired deformity of right toe  -     X-Ray Foot Complete Right; Future    Bony exostosis  -     X-Ray Foot Complete Right; Future    Corn or callus  -     X-Ray Foot Complete Right; Future      I counseled the patient on his conditions, their implications and medical management.    The interdigital corns were sharply excisionally debrided of hyperkeratotic tissue with number 15 blade.We also discussed the underlying cause of the lesion as a combination of the proximity of the toes rubbing on one another along with the prominent bony condyles or spurs. Various types of pads were dispensed to the patient to relieve pressure between the toes. The patient was also given guidance on shoe modifications along with urea lotion to prevent rapid build up of the keratotic tissue. We also briefly discussed the possibility of surgical resection of the prominent bone with possible soft tissue release if conservative measures fail to resolve the patients problem.

## 2017-10-06 NOTE — PATIENT INSTRUCTIONS
"How to Order Medium Roll Foam Pads from [facility] [place]:   (Pedi-Foam Sleeves MD 7/8"x12" [facility])     Call [phone] and present them with reorder number [id number]. [facility] [place] will ask for your insurance information and you may possibly be able to bill your insurance for the price of the pads depending on what insurance you have. [facility] [place] will then mail the bag of pads to your house.     Additional Information:     UPN Number: [id number]   [facility] PK/10    You may also use their web site, [email or web address], for ordering.     "

## 2017-10-06 NOTE — LETTER
October 6, 2017      Poly Fitch MD  [address]  [address]  [place] [address]           [place] [facility] Podiatry  [address] [address]  Phone: [phone]          Patient: Eleazar Solo   MR Number: [id number]   YOB: 1940   Date of Visit: 10/6/2017       Dear Dr. Poly Fitch:    Thank you for referring Eleazar Solo to me for evaluation. Attached you will find relevant portions of my assessment and plan of care.    If you have questions, please do not hesitate to call me. I look forward to following Eleazar Solo along with you.    Sincerely,    Lay Ayala, DPCARLITA    Enclosure  CC:  No Recipients    If you would like to receive this communication electronically, please contact [email or web address] or [phone] to request more information on [employer] Link access.    For providers and/or their staff who would like to refer a patient to [facility], please contact us through our one-stop-shop provider referral line, [facility], at [phone].    If you feel you have received this communication in error or would no longer like to receive these types of communications, please e-mail [email or web address]

## 2017-10-09 RX ORDER — HYDROCHLOROTHIAZIDE 25 MG/1
TABLET ORAL
Qty: 90 TABLET | Refills: 3 | Status: SHIPPED | OUTPATIENT
Start: 2017-10-09 | End: 2018-03-19

## 2017-10-16 ENCOUNTER — OFFICE VISIT (OUTPATIENT)
Dept: CARDIOLOGY | Facility: CLINIC | Age: 77
End: 2017-10-16
Payer: MEDICARE

## 2017-10-16 VITALS
WEIGHT: 191 LBS | BODY MASS INDEX: 27.35 KG/M2 | SYSTOLIC BLOOD PRESSURE: 86 MMHG | DIASTOLIC BLOOD PRESSURE: 56 MMHG | HEART RATE: 56 BPM | HEIGHT: 70 IN

## 2017-10-16 DIAGNOSIS — E78.2 MIXED HYPERLIPIDEMIA: Chronic | ICD-10-CM

## 2017-10-16 DIAGNOSIS — I48.21 ATRIAL FIBRILLATION, PERMANENT: Primary | ICD-10-CM

## 2017-10-16 DIAGNOSIS — I48.0 PAROXYSMAL ATRIAL FIBRILLATION: Primary | ICD-10-CM

## 2017-10-16 DIAGNOSIS — I10 ESSENTIAL HYPERTENSION: Chronic | ICD-10-CM

## 2017-10-16 DIAGNOSIS — I48.0 PAROXYSMAL ATRIAL FIBRILLATION: ICD-10-CM

## 2017-10-16 PROCEDURE — 93000 ELECTROCARDIOGRAM COMPLETE: CPT | Mod: S$GLB,,, | Performed by: INTERNAL MEDICINE

## 2017-10-16 PROCEDURE — 99999 PR PBB SHADOW E&M-EST. PATIENT-LVL III: CPT | Mod: PBBFAC,,, | Performed by: NUCLEAR MEDICINE

## 2017-10-16 PROCEDURE — 99499 UNLISTED E&M SERVICE: CPT | Mod: S$GLB,,, | Performed by: NUCLEAR MEDICINE

## 2017-10-16 PROCEDURE — 99214 OFFICE O/P EST MOD 30 MIN: CPT | Mod: S$GLB,,, | Performed by: NUCLEAR MEDICINE

## 2017-10-16 NOTE — PROGRESS NOTES
Subjective:   Patient ID:  Eleazar Solo is a 77 y.o. male who presents for follow-up of Atrial Fibrillation (6 month followup) and Hypertension      HPI1- CHRONIC PERMANENT AF,  2- ESSENTIAL HTN,  3- DYSLIPIDEMIA  DOING WELL. NO RECENT HOSPITALIZATIONS OR ED VISITS FOR TIA OR STROKE. OR ABNORMAL BLEEDING. OR EXACERBATION OF ARRHYTHMIAS      Review of Systems   Constitution: Negative for chills, fever, weakness, night sweats, weight gain and weight loss.   HENT: Negative for nosebleeds.    Eyes: Negative for blurred vision, double vision and visual disturbance.   Cardiovascular: Negative for chest pain, dyspnea on exertion, irregular heartbeat, leg swelling, orthopnea, palpitations, paroxysmal nocturnal dyspnea and syncope.   Respiratory: Negative for cough, hemoptysis and wheezing.    Endocrine: Negative for polydipsia and polyuria.   Hematologic/Lymphatic: Does not bruise/bleed easily.   Skin: Negative for rash.   Musculoskeletal: Negative for joint pain, joint swelling, muscle weakness and myalgias.   Gastrointestinal: Negative for abdominal pain, hematemesis, jaundice and melena.   Genitourinary: Negative for dysuria, hematuria and nocturia.   Neurological: Negative for dizziness, focal weakness, headaches and sensory change.   Psychiatric/Behavioral: Negative for depression. The patient does not have insomnia and is not nervous/anxious.      Family History   Problem Relation Age of Onset    Heart disease Mother     Hypertension Mother     Gout Mother     Stroke Father     Alcohol abuse Father     Hypertension Father     Heart disease Brother     Arrhythmia Brother     Diabetes Brother     Gout Brother     Diabetes Sister     Drug abuse Neg Hx     Cancer Neg Hx     Mental illness Neg Hx     Mental retardation Neg Hx     Kidney disease Neg Hx     Hyperlipidemia Neg Hx      Past Medical History:   Diagnosis Date    *Atrial fibrillation     Atrial fibrillation     Bilateral carotid artery  disease 8/10/2015    COPD (chronic obstructive pulmonary disease)     Diabetes mellitus     Gout     Hyperlipidemia     Hypertension     Low testosterone     Pneumonia     Stroke 1/3/15 L occiput    Unspecified disorder of kidney and ureter      Current Outpatient Prescriptions on File Prior to Visit   Medication Sig Dispense Refill    allopurinol (ZYLOPRIM) 100 MG tablet TAKE 2 TABLETS  DAILY. 180 tablet 3    blood sugar diagnostic (TRUETEST TEST STRIPS) Strp 1 strip by Misc.(Non-Drug; Combo Route) route daily as needed. [employer] True Metrix Test Strips 100 strip 3    blood-glucose meter (FREESTYLE SYSTEM KIT) kit [employer] TRUE METRIX AIR METER Use as instructed for TWICE DAILY testing 1 each 0    digoxin (LANOXIN) 125 mcg tablet TAKE 1 TABLET ONE TIME DAILY 90 tablet 3    ELIQUIS 5 mg Tab TAKE 1 TABLET TWICE DAILY 180 tablet 3    glimepiride (AMARYL) 2 MG tablet Take 1 tablet (2 mg total) by mouth before breakfast. 90 tablet 3    hydrochlorothiazide (HYDRODIURIL) 25 MG tablet TAKE 1 TABLET ONE TIME DAILY 90 tablet 3    lancets (TRUEPLUS LANCETS) 28 gauge Misc 1 lancet by Misc.(Non-Drug; Combo Route) route daily as needed. [employer] brand 100 each 3    losartan (COZAAR) 100 MG tablet TAKE 1 TABLET EVERY DAY 90 tablet 3    potassium chloride SA (K-DUR,KLOR-CON) 20 MEQ tablet TAKE 1 TABLET ONE TIME DAILY 90 tablet 3    simvastatin (ZOCOR) 40 MG tablet TAKE 1 TABLET EVERY DAY 90 tablet 3    verapamil (CALAN-SR) 240 MG CR tablet TAKE 1 TABLET EVERY DAY 90 tablet 3    VIT A/VIT C/VIT E/ZINC/COPPER (PRESERVISION AREDS ORAL) Take 1 capsule by mouth 2 (two) times daily.      menthol (BIOFREEZE, MENTHOL,) 4 % Gel Apply topically.       No current facility-administered medications on file prior to visit.      Review of patient's allergies indicates:   Allergen Reactions    No known drug allergies        Objective:     Physical Exam   Constitutional: He is oriented to person, place, and time. He appears  well-developed. No distress.   HENT:   Head: Normocephalic.   Eyes: Conjunctivae are normal. Pupils are equal, round, and reactive to light.   Neck: Neck supple. No JVD present. No thyromegaly present.   Cardiovascular: Intact distal pulses.  An irregularly irregular rhythm present. Bradycardia present.  Exam reveals no gallop and no friction rub.    Murmur heard.   Medium-pitched blowing midsystolic murmur is present with a grade of 3/6  at the upper left sternal border, lower left sternal border The intensity increases with respiration.  Pulses:       Carotid pulses are 2+ on the right side, and 2+ on the left side.       Radial pulses are 2+ on the right side, and 2+ on the left side.        Femoral pulses are 2+ on the right side, and 2+ on the left side.       Popliteal pulses are 2+ on the right side, and 2+ on the left side.        Dorsalis pedis pulses are 2+ on the right side, and 2+ on the left side.        Posterior tibial pulses are 2+ on the right side, and 2+ on the left side.   Pulmonary/Chest: Breath sounds normal. He has no wheezes. He has no rales. He exhibits no tenderness.   Abdominal: Soft. Bowel sounds are normal. He exhibits no mass. There is no hepatosplenomegaly. There is no tenderness.   Musculoskeletal: He exhibits no edema or tenderness.        Cervical back: Normal.        Thoracic back: Normal.        Lumbar back: Normal.   Lymphadenopathy:     He has no cervical adenopathy.     He has no axillary adenopathy.        Right: No supraclavicular adenopathy present.        Left: No supraclavicular adenopathy present.   Neurological: He is alert and oriented to person, place, and time. He has normal strength. No sensory deficit. Gait normal.   Skin: Skin is warm. No cyanosis. No pallor. Nails show no clubbing.   Psychiatric: He has a normal mood and affect. His speech is normal and behavior is normal. Cognition and memory are normal.       Assessment:     1. Atrial fibrillation, permanent     2. Essential hypertension    3. Mixed hyperlipidemia      ECG- AF WITH CONTROLLED VR- 56 BMP- NO ACUTE ST T CHANGES  BP WELL CONTROLLED  NO EVIDENCE OF ACTIVE MYOCARDIAL ISCHEMIA. NO ACUTE HF  CSN STATUS STABLE  NO ABNORMAL BLEEDING- ON NOAC  CARD MED WELL TOLERATED  Plan:     Atrial fibrillation, permanent    Essential hypertension    Mixed hyperlipidemia    1- CONTINUE PRESENT CARD MANAGEMENT    2- RETURN IN  6 MONTHS.

## 2017-11-21 ENCOUNTER — OFFICE VISIT (OUTPATIENT)
Dept: PODIATRY | Facility: CLINIC | Age: 77
End: 2017-11-21
Payer: MEDICARE

## 2017-11-21 ENCOUNTER — HOSPITAL ENCOUNTER (OUTPATIENT)
Dept: RADIOLOGY | Facility: HOSPITAL | Age: 77
Discharge: HOME | End: 2017-11-21
Attending: PODIATRIST
Payer: MEDICARE

## 2017-11-21 VITALS
HEIGHT: 70 IN | WEIGHT: 194.69 LBS | SYSTOLIC BLOOD PRESSURE: 112 MMHG | DIASTOLIC BLOOD PRESSURE: 64 MMHG | BODY MASS INDEX: 27.87 KG/M2

## 2017-11-21 DIAGNOSIS — L84 CORN OR CALLUS: ICD-10-CM

## 2017-11-21 DIAGNOSIS — M89.8X9 BONY EXOSTOSIS: ICD-10-CM

## 2017-11-21 DIAGNOSIS — M20.61 ACQUIRED DEFORMITY OF RIGHT TOE: Primary | ICD-10-CM

## 2017-11-21 DIAGNOSIS — M20.61 ACQUIRED DEFORMITY OF RIGHT TOE: ICD-10-CM

## 2017-11-21 PROCEDURE — 99213 OFFICE O/P EST LOW 20 MIN: CPT | Mod: S$GLB,,, | Performed by: PODIATRIST

## 2017-11-21 PROCEDURE — 73630 X-RAY EXAM OF FOOT: CPT | Mod: TC,PO,RT

## 2017-11-21 PROCEDURE — 73630 X-RAY EXAM OF FOOT: CPT | Mod: 26,RT,, | Performed by: RADIOLOGY

## 2017-11-21 PROCEDURE — 99999 PR PBB SHADOW E&M-EST. PATIENT-LVL II: CPT | Mod: PBBFAC,,, | Performed by: PODIATRIST

## 2017-11-21 NOTE — PROGRESS NOTES
Office Visit 11/21/2017  Last encounter in this department: 10/6/2017    Subjective:      Patient ID: Eleazar Solo is a 77 y.o. male.    Chief Complaint: right 5th toe (patient c/o pain when walking )    Eleazar Solo is a 77 y.o. year-old male following up for right 4th toe  and 5th toe kissing corns. Patient now rates pain as 4/10 aching that has slightly improved with use of padding that was dispensed last visit and modification of shoe gear.  However he continues to have pain in the area and difficulty keeping the padding in place.  Patient inquires if anything else can be done.  He is here to discuss further about corrective surgery and review x-rays.        Review of Systems   Constitution: Negative for chills and fever.   Cardiovascular: Negative for chest pain.   Respiratory: Negative for shortness of breath.    Gastrointestinal: Negative for nausea and vomiting.           Objective:      Physical Exam   Constitutional: He is oriented to person, place, and time. He appears well-developed and well-nourished. No distress.   Cardiovascular:   Pulses:       Dorsalis pedis pulses are 2+ on the right side, and 2+ on the left side.        Posterior tibial pulses are 2+ on the right side, and 2+ on the left side.   Capillary fill time 3-5 seconds bilaterally.   Musculoskeletal:   Lower extremities:    Deformities: Adducted fifth toe with hypertrophic condyle medial DIPJ and lateral fourth digit PIPJ right.     Normal arch height and rectus feet bilaterally.     5/5 muscle strength and tone in all four quadrants bilaterally.     Pain-free range of motion in all four quadrants with stiffness and limitation bilaterally.     Pain on palpation: sub keratotic lesions.   Neurological: He is alert and oriented to person, place, and time. He displays no Babinski's sign on the right side. He displays no Babinski's sign on the left side.   Plantar protective threshold sensation by touch via 5.07 SWMF present bilaterally.     Skin:   Lower extremities:    Normal turgor, texture, temperature bilaterally. Digital hair present bilaterally. Warm equally bilaterally.     No edema, varicosities, pigmentary changes.    No wounds, drainage, malodor, erythema, interdigital maceration were noted.     Hyperkeratotic lesion: Lateral fourth digit PIPJ and medial fifth digit DIPJ right.     Nails are clear bilaterally.     Psychiatric: He has a normal mood and affect.   Nursing note and vitals reviewed.            X-rays: Lateral fourth digit hypertrophic prominent condyles and adducted fifth digit right foot.    Assessment:       Encounter Diagnoses   Name Primary?    Acquired deformity of right toe Yes    Bony exostosis     Corn or callus          Plan:       Eleazar was seen today for right 5th toe.    Diagnoses and all orders for this visit:    Acquired deformity of right toe    Bony exostosis    Corn or callus      I counseled the patient on his conditions, their implications and medical management.  X-ray results were reviewed with the patient and we discussed how osseous prominences are contributing to recurrent callus and pain between the fourth and fifth digits.  Additional padding with modifications were dispensed to the patient today to try as a conservative measure.  We also discussed application of softening lotions to prevent recurrent callus formation.  At this time patient feels that after review of x-ray he would like to plan for surgical correction to prevent recurrent callus and pain.  He will try the new padding and wear appropriate shoe gear in the meantime and return to clinic to discuss further and possibly begin planning for corrective surgery.    .

## 2017-12-16 RX ORDER — POTASSIUM CHLORIDE 20 MEQ/1
TABLET, EXTENDED RELEASE ORAL
Qty: 90 TABLET | Refills: 3 | Status: SHIPPED | OUTPATIENT
Start: 2017-12-16 | End: 2018-03-19 | Stop reason: SDUPTHER

## 2018-01-31 RX ORDER — ROSUVASTATIN CALCIUM 10 MG/1
10 TABLET, COATED ORAL DAILY
Qty: 90 TABLET | Refills: 3 | Status: SHIPPED | OUTPATIENT
Start: 2018-01-31 | End: 2019-01-31

## 2018-01-31 NOTE — TELEPHONE ENCOUNTER
----- Message from Sadia Shah sent at 1/31/2018 11:52 AM CST -----  Contact: [facility]   Would like to consult with nurse regarding drug interaction. Please call back at [phone] ext [phone].        Thanks,  Sadia Shah

## 2018-03-07 ENCOUNTER — PATIENT OUTREACH (OUTPATIENT)
Dept: ADMINISTRATIVE | Facility: HOSPITAL | Age: 78
End: 2018-03-07

## 2018-03-14 ENCOUNTER — LAB VISIT (OUTPATIENT)
Dept: LAB | Facility: HOSPITAL | Age: 78
End: 2018-03-14
Attending: FAMILY MEDICINE
Payer: MEDICARE

## 2018-03-14 ENCOUNTER — PES CALL (OUTPATIENT)
Dept: ADMINISTRATIVE | Facility: CLINIC | Age: 78
End: 2018-03-14

## 2018-03-14 DIAGNOSIS — E78.5 HYPERLIPIDEMIA, UNSPECIFIED HYPERLIPIDEMIA TYPE: ICD-10-CM

## 2018-03-14 DIAGNOSIS — M10.9 GOUT, UNSPECIFIED CAUSE, UNSPECIFIED CHRONICITY, UNSPECIFIED SITE: ICD-10-CM

## 2018-03-14 DIAGNOSIS — Z79.01 CHRONIC ANTICOAGULATION: Chronic | ICD-10-CM

## 2018-03-14 DIAGNOSIS — N18.30 CHRONIC KIDNEY DISEASE, STAGE 3: Chronic | ICD-10-CM

## 2018-03-14 DIAGNOSIS — I48.21 ATRIAL FIBRILLATION, PERMANENT: Chronic | ICD-10-CM

## 2018-03-14 DIAGNOSIS — R73.09 ABNORMAL GLUCOSE: ICD-10-CM

## 2018-03-14 DIAGNOSIS — Z12.5 PROSTATE CANCER SCREENING: ICD-10-CM

## 2018-03-14 DIAGNOSIS — I10 ESSENTIAL HYPERTENSION: Chronic | ICD-10-CM

## 2018-03-14 DIAGNOSIS — E11.22 TYPE 2 DIABETES MELLITUS WITH CHRONIC KIDNEY DISEASE, WITHOUT LONG-TERM CURRENT USE OF INSULIN, UNSPECIFIED CKD STAGE: ICD-10-CM

## 2018-03-14 DIAGNOSIS — J44.9 COPD, MODERATE: Chronic | ICD-10-CM

## 2018-03-14 LAB
ALBUMIN SERPL BCP-MCNC: 3.9 G/DL
ALP SERPL-CCNC: 72 U/L
ALT SERPL W/O P-5'-P-CCNC: 21 U/L
ANION GAP SERPL CALC-SCNC: 11 MMOL/L
AST SERPL-CCNC: 24 U/L
BASOPHILS # BLD AUTO: 0.03 K/UL
BASOPHILS NFR BLD: 0.3 %
BILIRUB SERPL-MCNC: 0.7 MG/DL
BUN SERPL-MCNC: 23 MG/DL
CALCIUM SERPL-MCNC: 9.5 MG/DL
CHLORIDE SERPL-SCNC: 101 MMOL/L
CHOLEST SERPL-MCNC: 122 MG/DL
CHOLEST/HDLC SERPL: 2.9 {RATIO}
CO2 SERPL-SCNC: 30 MMOL/L
COMPLEXED PSA SERPL-MCNC: 0.9 NG/ML
CREAT SERPL-MCNC: 1.3 MG/DL
DIFFERENTIAL METHOD: ABNORMAL
EOSINOPHIL # BLD AUTO: 0.3 K/UL
EOSINOPHIL NFR BLD: 3 %
ERYTHROCYTE [DISTWIDTH] IN BLOOD BY AUTOMATED COUNT: 14 %
EST. GFR  (AFRICAN AMERICAN): >60 ML/MIN/1.73 M^2
EST. GFR  (NON AFRICAN AMERICAN): 53.4 ML/MIN/1.73 M^2
ESTIMATED AVG GLUCOSE: 114 MG/DL
GLUCOSE SERPL-MCNC: 113 MG/DL
HBA1C MFR BLD HPLC: 5.6 %
HCT VFR BLD AUTO: 45.1 %
HDLC SERPL-MCNC: 42 MG/DL
HDLC SERPL: 34.4 %
HGB BLD-MCNC: 14.9 G/DL
IMM GRANULOCYTES # BLD AUTO: 0.03 K/UL
IMM GRANULOCYTES NFR BLD AUTO: 0.3 %
LDLC SERPL CALC-MCNC: 52.8 MG/DL
LYMPHOCYTES # BLD AUTO: 2.6 K/UL
LYMPHOCYTES NFR BLD: 26.5 %
MCH RBC QN AUTO: 32.8 PG
MCHC RBC AUTO-ENTMCNC: 33 G/DL
MCV RBC AUTO: 99 FL
MONOCYTES # BLD AUTO: 1.2 K/UL
MONOCYTES NFR BLD: 12.5 %
NEUTROPHILS # BLD AUTO: 5.5 K/UL
NEUTROPHILS NFR BLD: 57.4 %
NONHDLC SERPL-MCNC: 80 MG/DL
NRBC BLD-RTO: 0 /100 WBC
PLATELET # BLD AUTO: 152 K/UL
PMV BLD AUTO: 12.4 FL
POTASSIUM SERPL-SCNC: 4.1 MMOL/L
PROT SERPL-MCNC: 7.1 G/DL
RBC # BLD AUTO: 4.54 M/UL
SODIUM SERPL-SCNC: 142 MMOL/L
TRIGL SERPL-MCNC: 136 MG/DL
TSH SERPL DL<=0.005 MIU/L-ACNC: 2.02 UIU/ML
URATE SERPL-MCNC: 4.4 MG/DL
WBC # BLD AUTO: 9.61 K/UL

## 2018-03-14 PROCEDURE — 83036 HEMOGLOBIN GLYCOSYLATED A1C: CPT

## 2018-03-14 PROCEDURE — 80061 LIPID PANEL: CPT

## 2018-03-14 PROCEDURE — 36415 COLL VENOUS BLD VENIPUNCTURE: CPT | Mod: PO

## 2018-03-14 PROCEDURE — 80053 COMPREHEN METABOLIC PANEL: CPT

## 2018-03-14 PROCEDURE — 84550 ASSAY OF BLOOD/URIC ACID: CPT

## 2018-03-14 PROCEDURE — 84443 ASSAY THYROID STIM HORMONE: CPT

## 2018-03-14 PROCEDURE — 85025 COMPLETE CBC W/AUTO DIFF WBC: CPT

## 2018-03-14 PROCEDURE — 84153 ASSAY OF PSA TOTAL: CPT

## 2018-03-19 ENCOUNTER — OFFICE VISIT (OUTPATIENT)
Dept: INTERNAL MEDICINE | Facility: CLINIC | Age: 78
End: 2018-03-19
Payer: MEDICARE

## 2018-03-19 VITALS
TEMPERATURE: 98 F | HEART RATE: 78 BPM | SYSTOLIC BLOOD PRESSURE: 98 MMHG | WEIGHT: 192.44 LBS | BODY MASS INDEX: 27.55 KG/M2 | HEIGHT: 70 IN | DIASTOLIC BLOOD PRESSURE: 60 MMHG

## 2018-03-19 DIAGNOSIS — Z00.00 ROUTINE GENERAL MEDICAL EXAMINATION AT A HEALTH CARE FACILITY: Primary | ICD-10-CM

## 2018-03-19 DIAGNOSIS — R71.8 ELEVATED MCV: ICD-10-CM

## 2018-03-19 DIAGNOSIS — N18.30 CHRONIC KIDNEY DISEASE, STAGE 3: Chronic | ICD-10-CM

## 2018-03-19 DIAGNOSIS — M1A.0710 IDIOPATHIC CHRONIC GOUT OF RIGHT FOOT WITHOUT TOPHUS: ICD-10-CM

## 2018-03-19 DIAGNOSIS — I10 ESSENTIAL HYPERTENSION: Chronic | ICD-10-CM

## 2018-03-19 DIAGNOSIS — Z12.5 PROSTATE CANCER SCREENING: ICD-10-CM

## 2018-03-19 DIAGNOSIS — J44.9 COPD, MODERATE: Chronic | ICD-10-CM

## 2018-03-19 DIAGNOSIS — H61.21 HEARING LOSS DUE TO CERUMEN IMPACTION, RIGHT: ICD-10-CM

## 2018-03-19 DIAGNOSIS — Z12.11 COLON CANCER SCREENING: ICD-10-CM

## 2018-03-19 DIAGNOSIS — E11.42 TYPE 2 DIABETES MELLITUS WITH DIABETIC POLYNEUROPATHY, WITHOUT LONG-TERM CURRENT USE OF INSULIN: ICD-10-CM

## 2018-03-19 DIAGNOSIS — E53.8 B12 DEFICIENCY: ICD-10-CM

## 2018-03-19 DIAGNOSIS — E78.2 MIXED HYPERLIPIDEMIA: Chronic | ICD-10-CM

## 2018-03-19 DIAGNOSIS — I70.0 ATHEROSCLEROSIS OF AORTA: Chronic | ICD-10-CM

## 2018-03-19 PROCEDURE — 99499 UNLISTED E&M SERVICE: CPT | Mod: S$GLB,,, | Performed by: FAMILY MEDICINE

## 2018-03-19 PROCEDURE — 99999 PR PBB SHADOW E&M-EST. PATIENT-LVL III: CPT | Mod: PBBFAC,,, | Performed by: FAMILY MEDICINE

## 2018-03-19 PROCEDURE — 99397 PER PM REEVAL EST PAT 65+ YR: CPT | Mod: S$GLB,,, | Performed by: FAMILY MEDICINE

## 2018-03-19 RX ORDER — GLIMEPIRIDE 1 MG/1
1 TABLET ORAL
Qty: 90 TABLET | Refills: 3 | Status: SHIPPED | OUTPATIENT
Start: 2018-03-19

## 2018-03-19 RX ORDER — FUROSEMIDE 20 MG/1
20 TABLET ORAL DAILY PRN
Qty: 90 TABLET | Refills: 3 | Status: SHIPPED | OUTPATIENT
Start: 2018-03-19 | End: 2019-03-19

## 2018-03-19 RX ORDER — VERAPAMIL HYDROCHLORIDE 240 MG/1
240 TABLET, FILM COATED, EXTENDED RELEASE ORAL DAILY
Qty: 90 TABLET | Refills: 3 | Status: ON HOLD | OUTPATIENT
Start: 2018-03-19 | End: 2018-09-21 | Stop reason: HOSPADM

## 2018-03-19 RX ORDER — PNV NO.95/FERROUS FUM/FOLIC AC 28MG-0.8MG
1000 TABLET ORAL DAILY
Start: 2018-03-19

## 2018-03-19 RX ORDER — ALLOPURINOL 100 MG/1
100 TABLET ORAL DAILY
Qty: 90 TABLET | Refills: 3 | Status: SHIPPED | OUTPATIENT
Start: 2018-03-19

## 2018-03-19 RX ORDER — POTASSIUM CHLORIDE 20 MEQ/1
20 TABLET, EXTENDED RELEASE ORAL DAILY PRN
Qty: 90 TABLET | Refills: 3 | Status: SHIPPED | OUTPATIENT
Start: 2018-03-19

## 2018-03-19 NOTE — PROGRESS NOTES
Subjective:      Patient ID: Eleazar Solo is a 77 y.o. male.    Chief Complaint: Follow-up (6m)    Disclaimer:  This note is prepared using voice recognition software and as such is likely to have errors and has not been proof read. Please contact me for questions.     Patient's coming in today for prevention exam and follow-up of medications.  Is doing okay but feeling a little bit tired.  Is notices blood pressures been running a little bit low.     He has type 2 diabetes .   He is now tolerating a half tablet of the glimeperide.   Labs look very good.  No low blood sugars.     Hypertension, getting diabetes.  No chest pain no shortness of breath.    Does have COPD.  not needing inhalers doing well at this time     Has seen the podiatrist and is using some buffering foam pads.  It is helping for the overlap of his fourth and fifth digits on his right foot.  Is needing a few more.      Is on chronic anticoagulation for paroxysmal atrial fibrillation.  Currently stable at this time.      Has had a history of gout currently on allopurinol at 200 mg and willing and desiring to decrease his dose.  No recent gout flareups.            Diabetes   He presents for his follow-up diabetic visit. He has type 2 diabetes mellitus. No MedicAlert identification noted. His disease course has been worsening. There are no hypoglycemic associated symptoms. Pertinent negatives for hypoglycemia include no dizziness, headaches or seizures. Associated symptoms include fatigue. Pertinent negatives for diabetes include no blurred vision, no chest pain, no foot paresthesias, no foot ulcerations, no polydipsia, no polyphagia, no polyuria, no visual change, no weakness and no weight loss. There are no hypoglycemic complications. Symptoms are improving. There are no diabetic complications. Risk factors for coronary artery disease include dyslipidemia, family history, obesity, male sex, hypertension and tobacco exposure. When asked about  current treatments, none were reported. He is compliant with treatment most of the time. His weight is decreasing steadily. He is following a generally healthy diet. When asked about meal planning, he reported none. He has not had a previous visit with a dietitian. He participates in exercise intermittently. There is no change in his home blood glucose trend. An ACE inhibitor/angiotensin II receptor blocker is being taken. He does not see a podiatrist.Eye exam is current.       Lab Results   Component Value Date    WBC 9.61 03/14/2018    HGB 14.9 03/14/2018    HCT 45.1 03/14/2018     03/14/2018    CHOL 122 03/14/2018    TRIG 136 03/14/2018    HDL 42 03/14/2018    ALT 21 03/14/2018    AST 24 03/14/2018     03/14/2018    K 4.1 03/14/2018     03/14/2018    CREATININE 1.3 03/14/2018    BUN 23 03/14/2018    CO2 30 (H) 03/14/2018    TSH 2.025 03/14/2018    PSA 0.90 03/14/2018    INR 2.2 05/09/2014    HGBA1C 5.6 03/14/2018       Review of Systems   Constitutional: Positive for fatigue. Negative for activity change, appetite change, chills, unexpected weight change and weight loss.   HENT: Negative for congestion, ear pain, postnasal drip, sneezing, sore throat and trouble swallowing.    Eyes: Negative for blurred vision, pain and visual disturbance.   Respiratory: Negative for cough and shortness of breath.    Cardiovascular: Negative for chest pain and leg swelling.   Gastrointestinal: Negative for abdominal pain, constipation, diarrhea, nausea and vomiting.   Endocrine: Negative for cold intolerance, heat intolerance, polydipsia, polyphagia and polyuria.   Genitourinary: Negative for difficulty urinating, dysuria and flank pain.   Musculoskeletal: Positive for arthralgias. Negative for back pain, joint swelling and neck pain.   Skin: Negative for color change and rash.   Neurological: Negative for dizziness, seizures, weakness and headaches.   Psychiatric/Behavioral: Negative for behavioral problems,  "dysphoric mood and sleep disturbance.     Objective:     Vitals:    03/19/18 0953   BP: 98/60   Pulse: 78   Temp: 97.7 °F (36.5 °C)   Weight: 87.3 kg (192 lb 7.4 oz)   Height: 5' 10" (1.778 m)     Physical Exam   Constitutional: He is oriented to person, place, and time. He appears well-developed and well-nourished. No distress.   HENT:   Head: Normocephalic and atraumatic.   Right Ear: External ear normal.   Left Ear: External ear normal.   Nose: Nose normal.   Mouth/Throat: Oropharynx is clear and moist.   Eyes: EOM are normal. Pupils are equal, round, and reactive to light.   Neck: Normal range of motion. Neck supple. No thyromegaly present.   Cardiovascular: Normal rate.  An irregularly irregular rhythm present. Exam reveals no gallop and no friction rub.    No murmur heard.  Pulses:       Dorsalis pedis pulses are 2+ on the right side, and 2+ on the left side.   Pulmonary/Chest: Effort normal and breath sounds normal. No respiratory distress.   Abdominal: Soft. Bowel sounds are normal. He exhibits no distension. There is no tenderness. There is no rebound.   Musculoskeletal: Normal range of motion.        Right foot: There is deformity. There is normal range of motion.        Left foot: There is deformity. There is normal range of motion.   Feet:   Right Foot:   Protective Sensation: 4 sites tested. 2 sites sensed.   Skin Integrity: Positive for warmth. Negative for ulcer, blister, skin breakdown, erythema, callus or dry skin.   Left Foot:   Protective Sensation: 4 sites tested. 2 sites sensed.   Skin Integrity: Positive for warmth. Negative for ulcer, blister, skin breakdown, erythema, callus or dry skin.   Lymphadenopathy:     He has no cervical adenopathy.   Neurological: He is alert and oriented to person, place, and time. Coordination normal.   Skin: Skin is warm and dry.   Psychiatric: He has a normal mood and affect.   Vitals reviewed.    Assessment:     1. Routine general medical examination at [facility] care facility    2. Atherosclerosis of aorta    3. Idiopathic chronic gout of right foot without tophus    4. Chronic kidney disease, stage 3    5. Essential hypertension    6. Mixed hyperlipidemia    7. Type 2 diabetes mellitus with diabetic polyneuropathy, without long-term current use of insulin    8. Hearing loss due to cerumen impaction, right    9. Colon cancer screening    10. COPD, moderate    11. Elevated MCV    12. Prostate cancer screening    13. B12 deficiency      Plan:   Eleazar was seen today for follow-up.    Diagnoses and all orders for this visit:    Routine general medical examination at a health care facility - labs reviewed. Discussed Health Maintenance issues.     -     Lipid panel; Future  -     Vitamin B12; Future  -     Uric acid; Future  -     Comprehensive metabolic panel; Future  -     CBC auto differential; Future  -     PSA, Screening; Future  -     Microalbumin/creatinine urine ratio; Future  -     Hemoglobin A1c; Future    Atherosclerosis of aorta - stable, labs reviewed.  today.  Continue with current medications and interventions   -     Lipid panel; Future  -     Vitamin B12; Future  -     Uric acid; Future  -     Comprehensive metabolic panel; Future  -     CBC auto differential; Future  -     PSA, Screening; Future  -     Microalbumin/creatinine urine ratio; Future  -     Hemoglobin A1c; Future    Idiopathic chronic gout of right foot without tophus  Comments:  stopping hctz. change to lasix as needed for swelling or edema with stopping hctz. pull back allopurinol from 200mg to 100mg  Orders:  -     Lipid panel; Future  -     Vitamin B12; Future  -     Uric acid; Future  -     Comprehensive metabolic panel; Future  -     CBC auto differential; Future  -     PSA, Screening; Future  -     Microalbumin/creatinine urine ratio; Future  -     Hemoglobin A1c; Future    Chronic kidney disease, stage 3  Comments:  stopping hctz. change to lasix as needed for swelling or edema with stopping  hctz.   Orders:  -     Lipid panel; Future  -     Vitamin B12; Future  -     Uric acid; Future  -     Comprehensive metabolic panel; Future  -     CBC auto differential; Future  -     PSA, Screening; Future  -     Microalbumin/creatinine urine ratio; Future  -     Hemoglobin A1c; Future    Essential hypertension  Comments:  having some lower BP meds. stopping hctz. change to lasix as needed for swelling or edema with stopping hctz.   Orders:  -     Lipid panel; Future  -     Vitamin B12; Future  -     Uric acid; Future  -     Comprehensive metabolic panel; Future  -     CBC auto differential; Future  -     PSA, Screening; Future  -     Microalbumin/creatinine urine ratio; Future  -     Hemoglobin A1c; Future    Mixed hyperlipidemia  Comments:  improved with crestor 10mg. labs reviewed.   Orders:  -     Lipid panel; Future  -     Vitamin B12; Future  -     Uric acid; Future  -     Comprehensive metabolic panel; Future  -     CBC auto differential; Future  -     PSA, Screening; Future  -     Microalbumin/creatinine urine ratio; Future  -     Hemoglobin A1c; Future    Type 2 diabetes mellitus with diabetic polyneuropathy, without long-term current use of insulin  Comments:  adjusting meds to take off hctz. controlled with HAILE of only 1mg of glimeperide. . a1c at goals.   Orders:  -     Lipid panel; Future  -     Vitamin B12; Future  -     Uric acid; Future  -     Comprehensive metabolic panel; Future  -     CBC auto differential; Future  -     PSA, Screening; Future  -     Microalbumin/creatinine urine ratio; Future  -     Hemoglobin A1c; Future    Hearing loss due to cerumen impaction, right  Comments:  clean out today in office.   Orders:  -     Lipid panel; Future  -     Vitamin B12; Future  -     Uric acid; Future  -     Comprehensive metabolic panel; Future  -     CBC auto differential; Future  -     PSA, Screening; Future  -     Microalbumin/creatinine urine ratio; Future  -     Hemoglobin A1c; Future    Colon  cancer screening  -     Fecal Immunochemical Test (iFOBT); Future    COPD, moderate  Comments:  stable with meds. no breathing issues.     Elevated MCV- starting b12  -     Lipid panel; Future  -     Vitamin B12; Future  -     Uric acid; Future  -     Comprehensive metabolic panel; Future  -     CBC auto differential; Future  -     PSA, Screening; Future  -     Microalbumin/creatinine urine ratio; Future  -     Hemoglobin A1c; Future    Prostate cancer screening  -     PSA, Screening; Future    B12 deficiency  -     Vitamin B12; Future    Other orders  -     furosemide (LASIX) 20 MG tablet; Take 1 tablet (20 mg total) by mouth daily as needed. As needed for swelling  -     allopurinol (ZYLOPRIM) 100 MG tablet; Take 1 tablet (100 mg total) by mouth once daily.  -     verapamil (CALAN-SR) 240 MG CR tablet; Take 1 tablet (240 mg total) by mouth once daily.  -     potassium chloride SA (K-DUR,KLOR-CON) 20 MEQ tablet; Take 1 tablet (20 mEq total) by mouth daily as needed (with taking furosemide).  -     glimepiride (AMARYL) 1 MG tablet; Take 1 tablet (1 mg total) by mouth before breakfast.  -     cyanocobalamin (VITAMIN B-12) 100 MCG tablet; Take 10 tablets (1,000 mcg total) by mouth once daily.            Follow-up in about 1 year (around 3/19/2019) for physical with Dr BISHOP.

## 2018-03-27 ENCOUNTER — APPOINTMENT (OUTPATIENT)
Dept: LAB | Facility: HOSPITAL | Age: 78
End: 2018-03-27
Attending: FAMILY MEDICINE
Payer: MEDICARE

## 2018-04-03 ENCOUNTER — HOSPITAL ENCOUNTER (OUTPATIENT)
Dept: RADIOLOGY | Facility: HOSPITAL | Age: 78
Discharge: HOME | End: 2018-04-03
Payer: MEDICARE

## 2018-04-03 ENCOUNTER — OFFICE VISIT (OUTPATIENT)
Dept: INTERNAL MEDICINE | Facility: CLINIC | Age: 78
End: 2018-04-03
Payer: MEDICARE

## 2018-04-03 ENCOUNTER — TELEPHONE (OUTPATIENT)
Dept: INTERNAL MEDICINE | Facility: CLINIC | Age: 78
End: 2018-04-03

## 2018-04-03 VITALS
HEIGHT: 70 IN | TEMPERATURE: 98 F | SYSTOLIC BLOOD PRESSURE: 140 MMHG | DIASTOLIC BLOOD PRESSURE: 80 MMHG | OXYGEN SATURATION: 97 % | WEIGHT: 195.56 LBS | HEART RATE: 100 BPM | BODY MASS INDEX: 28 KG/M2

## 2018-04-03 DIAGNOSIS — R06.02 SHORTNESS OF BREATH: ICD-10-CM

## 2018-04-03 DIAGNOSIS — I10 ESSENTIAL HYPERTENSION: Primary | ICD-10-CM

## 2018-04-03 DIAGNOSIS — I48.21 ATRIAL FIBRILLATION, PERMANENT: ICD-10-CM

## 2018-04-03 PROCEDURE — 71046 X-RAY EXAM CHEST 2 VIEWS: CPT | Mod: 26,,, | Performed by: RADIOLOGY

## 2018-04-03 PROCEDURE — 93010 ELECTROCARDIOGRAM REPORT: CPT | Mod: S$GLB,,, | Performed by: INTERNAL MEDICINE

## 2018-04-03 PROCEDURE — 3077F SYST BP >= 140 MM HG: CPT | Mod: CPTII,S$GLB,,

## 2018-04-03 PROCEDURE — 3079F DIAST BP 80-89 MM HG: CPT | Mod: CPTII,S$GLB,,

## 2018-04-03 PROCEDURE — 99214 OFFICE O/P EST MOD 30 MIN: CPT | Mod: S$GLB,,,

## 2018-04-03 PROCEDURE — 93005 ELECTROCARDIOGRAM TRACING: CPT | Mod: S$GLB,,,

## 2018-04-03 PROCEDURE — 99999 PR PBB SHADOW E&M-EST. PATIENT-LVL V: CPT | Mod: PBBFAC,,,

## 2018-04-03 PROCEDURE — 99499 UNLISTED E&M SERVICE: CPT | Mod: S$GLB,,,

## 2018-04-03 PROCEDURE — 71046 X-RAY EXAM CHEST 2 VIEWS: CPT | Mod: TC,FY,PO

## 2018-04-03 RX ORDER — HYDROCHLOROTHIAZIDE 12.5 MG/1
12.5 TABLET ORAL DAILY
Qty: 90 TABLET | Refills: 3 | Status: SHIPPED | OUTPATIENT
Start: 2018-04-03 | End: 2018-04-16 | Stop reason: SDUPTHER

## 2018-04-03 NOTE — TELEPHONE ENCOUNTER
----- Message from Ashley Hutchins sent at 4/3/2018  8:03 AM CDT -----  Contact: Mrs. Solo/wife  Mrs. Solo called to speak with the nurse; she stated the patients medications were changed and she thinks he had a reaction to them last night. She can be contacted at [phone].    Thanks,  Ashley

## 2018-04-03 NOTE — TELEPHONE ENCOUNTER
Wife states that he was taken off of the HCTZ and put on lasix. The lasix is only as needed and then he is to take a potassium as well when if he takes. His pressure was elevated around 161/108 and pulse was 104 at 8:00 last night. At this point he HCTZ and a potassium. He started this on Sunday. He is wanting to know what to do at this point? He did also have the feeling of not being able to breathe again back when he had this and that was when they stated he had A-fib.       Losartan 100 mg tablet taking once a day in the morning   Verapamil 240 mg tablet taking once a day in the evening   Digoxin 125 mcg tablet taking once every other day

## 2018-04-03 NOTE — TELEPHONE ENCOUNTER
Have him come on into the urgent care or see Dusty in primary care to be reevaluated and that way they can do an EKG as well.

## 2018-04-03 NOTE — PROGRESS NOTES
"Subjective:       Patient ID: Eleazar Solo is a 78 y.o. male.    Chief Complaint: Hypertension (SOB )    HPI   The patient presents with a one-day complaint of shortness of breath that he says is mild and has been self-limited.  He states that he has been taking hydrochlorothiazide 25 mg by mouth daily for several years for essential hypertension.  Recently his hydrochlorothiazide was discontinued because his blood pressure was running low in the high 80s to low 90s systolically.  The patient was given Lasix on an as-needed basis for any edema.  He stopped taking his hydrochlorothiazide 3 days ago he woke up department 2 AM this morning feeling short of breath like he was "smothering".  He checked his blood pressure noted that it was 170/104.  He took one of his hydrochlorthiazide's and waited an hour or so and then went back to bed.  He states this morning he does not feel as short of breath.  He says his blood pressure was 110/75 this morning when he checked.  The patient says that he does not feel short of breath at this time.  He denies any chest pain.  The patient has a chronic history of atrial fibrillation and COPD.     Review of Systems   Constitutional: Negative for activity change, appetite change, fatigue and unexpected weight change.   HENT: Negative.    Eyes: Negative.    Respiratory: Positive for shortness of breath. Negative for cough, chest tightness and wheezing.    Cardiovascular: Negative for chest pain, palpitations and leg swelling.        Elevated blood pressure   Gastrointestinal: Negative for constipation, diarrhea, nausea and vomiting.   Endocrine: Negative.    Genitourinary: Negative.    Musculoskeletal: Negative.    Skin: Negative for color change.   Allergic/Immunologic: Negative.    Neurological: Negative for dizziness, weakness and light-headedness.   Hematological: Negative.    Psychiatric/Behavioral: Negative for sleep disturbance.         Objective:      Physical Exam "   Constitutional: He is oriented to person, place, and time. Vital signs are normal. He appears well-developed and well-nourished. He is active and cooperative. No distress.   HENT:   Head: Normocephalic and atraumatic.   Eyes: Conjunctivae are normal. Pupils are equal, round, and reactive to light. No scleral icterus.   Neck: Normal range of motion. Neck supple. Hepatojugular reflux and JVD present.   Cardiovascular: Normal rate and intact distal pulses.  An irregularly irregular rhythm present. Exam reveals no gallop and no friction rub.    Murmur heard.   Systolic murmur is present with a grade of 3/6   Pulses:       Carotid pulses are 2+ on the right side, and 2+ on the left side.       Radial pulses are 2+ on the right side, and 2+ on the left side.        Dorsalis pedis pulses are 2+ on the right side, and 2+ on the left side.   Pulmonary/Chest: Effort normal. No respiratory distress. He has no wheezes. He has rales in the right lower field. He exhibits no tenderness.   Musculoskeletal: Normal range of motion. He exhibits no edema or tenderness.   Neurological: He is alert and oriented to person, place, and time. He exhibits normal muscle tone. Coordination normal.   Skin: Skin is warm and dry. No rash noted. He is not diaphoretic. No erythema. No pallor.   Psychiatric: He has a normal mood and affect. His speech is normal and behavior is normal. Judgment and thought content normal.       Assessment:       1. Essential hypertension    2. Atrial fibrillation, permanent    3. Shortness of breath          Plan:   Essential hypertension  -     EKG 12-lead  -     hydroCHLOROthiazide (HYDRODIURIL) 12.5 MG Tab; Take 1 tablet (12.5 mg total) by mouth once daily.  Dispense: 90 tablet; Refill: 3    Atrial fibrillation, permanent  -     X-Ray Chest PA And Lateral; Future; Expected date: 04/03/2018  -     EKG 12-lead    Shortness of breath  -     X-Ray Chest PA And Lateral; Future; Expected date: 04/03/2018  -     Brain  natriuretic peptide; Future; Expected date: 04/03/2018            Disclaimer: This note is prepared using voice recognition software.

## 2018-04-16 ENCOUNTER — OFFICE VISIT (OUTPATIENT)
Dept: CARDIOLOGY | Facility: CLINIC | Age: 78
End: 2018-04-16
Payer: MEDICARE

## 2018-04-16 ENCOUNTER — CLINICAL SUPPORT (OUTPATIENT)
Dept: CARDIOLOGY | Facility: CLINIC | Age: 78
End: 2018-04-16
Payer: MEDICARE

## 2018-04-16 VITALS
WEIGHT: 191 LBS | HEIGHT: 70 IN | DIASTOLIC BLOOD PRESSURE: 76 MMHG | HEART RATE: 73 BPM | BODY MASS INDEX: 27.35 KG/M2 | SYSTOLIC BLOOD PRESSURE: 126 MMHG

## 2018-04-16 DIAGNOSIS — I48.91 ATRIAL FIBRILLATION: ICD-10-CM

## 2018-04-16 DIAGNOSIS — I48.21 ATRIAL FIBRILLATION, PERMANENT: Primary | ICD-10-CM

## 2018-04-16 DIAGNOSIS — I48.19 PERSISTENT ATRIAL FIBRILLATION: ICD-10-CM

## 2018-04-16 DIAGNOSIS — I48.19 PERSISTENT ATRIAL FIBRILLATION: Primary | ICD-10-CM

## 2018-04-16 DIAGNOSIS — E78.2 MIXED HYPERLIPIDEMIA: Chronic | ICD-10-CM

## 2018-04-16 DIAGNOSIS — I48.91 ATRIAL FIBRILLATION: Primary | ICD-10-CM

## 2018-04-16 DIAGNOSIS — I10 ESSENTIAL HYPERTENSION: Chronic | ICD-10-CM

## 2018-04-16 PROCEDURE — 93000 ELECTROCARDIOGRAM COMPLETE: CPT | Mod: S$GLB,,, | Performed by: NUCLEAR MEDICINE

## 2018-04-16 PROCEDURE — 99214 OFFICE O/P EST MOD 30 MIN: CPT | Mod: S$GLB,,, | Performed by: NUCLEAR MEDICINE

## 2018-04-16 PROCEDURE — 3078F DIAST BP <80 MM HG: CPT | Mod: CPTII,S$GLB,, | Performed by: NUCLEAR MEDICINE

## 2018-04-16 PROCEDURE — 99999 PR PBB SHADOW E&M-EST. PATIENT-LVL III: CPT | Mod: PBBFAC,,, | Performed by: NUCLEAR MEDICINE

## 2018-04-16 PROCEDURE — 3074F SYST BP LT 130 MM HG: CPT | Mod: CPTII,S$GLB,, | Performed by: NUCLEAR MEDICINE

## 2018-04-16 RX ORDER — HYDROCHLOROTHIAZIDE 25 MG/1
25 TABLET ORAL DAILY
Qty: 90 TABLET | Refills: 3 | Status: SHIPPED | OUTPATIENT
Start: 2018-04-16 | End: 2018-06-25 | Stop reason: SDUPTHER

## 2018-04-16 NOTE — PROGRESS NOTES
Subjective:   Patient ID:  Eleazar Solo is a 78 y.o. male who presents for follow-up of Hypertension (6 month followup) and Atrial Fibrillation (chronic, permanent)      HPI1- CHRONIC PERMANENT AF,  2- ESSENTIAL HYPERTENSION  2- DYSLPIDEMIA  DOING WELL. NO RECENT HOSPITALIZATIONS OR ED VISITS FOR EXACERBATION OF CARD ARRHYTHMIAS.  TIA OR STROKE, NO ACS OR ADHF  NO ABNORMAL BLEEDING- ON NOAC  NO ANGINA OR EQUIVALENT  NO EDEMA. NO CALVE TENDERNESS  NO ORTHOPNEA OR PND  ECG - AF , CONTROLLED VR. 74, NO ACUTE ST T CHANGES  NO FOCAL CNS SYMPTOMS OR SIGNS TO SUGGEST TIA OR STROKE  CARD MED GOOD COMPLIANCE    Review of Systems   Constitution: Negative for chills, fever, weakness, night sweats, weight gain and weight loss.   HENT: Negative for nosebleeds.    Eyes: Negative for blurred vision, double vision and visual disturbance.   Cardiovascular: Negative for chest pain, dyspnea on exertion, irregular heartbeat, leg swelling, orthopnea, palpitations, paroxysmal nocturnal dyspnea and syncope.   Respiratory: Negative for cough, hemoptysis and wheezing.    Endocrine: Negative for polydipsia and polyuria.   Hematologic/Lymphatic: Does not bruise/bleed easily.   Skin: Negative for rash.   Musculoskeletal: Negative for joint pain, joint swelling, muscle weakness and myalgias.   Gastrointestinal: Negative for abdominal pain, hematemesis, jaundice and melena.   Genitourinary: Negative for dysuria, hematuria and nocturia.   Neurological: Negative for dizziness, focal weakness, headaches and sensory change.   Psychiatric/Behavioral: Negative for depression. The patient does not have insomnia and is not nervous/anxious.      Family History   Problem Relation Age of Onset    Heart disease Mother     Hypertension Mother     Gout Mother     Stroke Father     Alcohol abuse Father     Hypertension Father     Heart disease Brother     Arrhythmia Brother     Diabetes Brother     Gout Brother     Diabetes Sister     Drug abuse  Neg Hx     Cancer Neg Hx     Mental illness Neg Hx     Mental retardation Neg Hx     Kidney disease Neg Hx     Hyperlipidemia Neg Hx      Past Medical History:   Diagnosis Date    *Atrial fibrillation     Atrial fibrillation     Bilateral carotid artery disease 8/10/2015    COPD (chronic obstructive pulmonary disease)     Diabetes mellitus     Gout     Hyperlipidemia     Hypertension     Low testosterone     Pneumonia     Stroke 1/3/15 L occiput    Unspecified disorder of kidney and ureter      Current Outpatient Prescriptions on File Prior to Visit   Medication Sig Dispense Refill    allopurinol (ZYLOPRIM) 100 MG tablet Take 1 tablet (100 mg total) by mouth once daily. 90 tablet 3    cyanocobalamin (VITAMIN B-12) 100 MCG tablet Take 10 tablets (1,000 mcg total) by mouth once daily. (Patient taking differently: Take by mouth once daily. )      digoxin (LANOXIN) 125 mcg tablet TAKE 1 TABLET ONE TIME DAILY 90 tablet 3    ELIQUIS 5 mg Tab TAKE 1 TABLET TWICE DAILY 180 tablet 3    hydroCHLOROthiazide (HYDRODIURIL) 12.5 MG Tab Take 1 tablet (12.5 mg total) by mouth once daily. (Patient taking differently: Take 25 mg by mouth once daily. ) 90 tablet 3    losartan (COZAAR) 100 MG tablet TAKE 1 TABLET EVERY DAY 90 tablet 3    potassium chloride SA (K-DUR,KLOR-CON) 20 MEQ tablet Take 1 tablet (20 mEq total) by mouth daily as needed (with taking furosemide). 90 tablet 3    rosuvastatin (CRESTOR) 10 MG tablet Take 1 tablet (10 mg total) by mouth once daily. Stop simvastatin 90 tablet 3    verapamil (CALAN-SR) 240 MG CR tablet Take 1 tablet (240 mg total) by mouth once daily. 90 tablet 3    VIT A/VIT C/VIT E/ZINC/COPPER (PRESERVISION AREDS ORAL) Take 1 capsule by mouth 2 (two) times daily.      blood sugar diagnostic (TRUETEST TEST STRIPS) Strp 1 strip by Misc.(Non-Drug; Combo Route) route daily as needed. [employer] True Metrix Test Strips 100 strip 3    blood-glucose meter (FREESTYLE SYSTEM KIT) kit  [employer] TRUE METRIX AIR METER Use as instructed for TWICE DAILY testing 1 each 0    furosemide (LASIX) 20 MG tablet Take 1 tablet (20 mg total) by mouth daily as needed. As needed for swelling 90 tablet 3    glimepiride (AMARYL) 1 MG tablet Take 1 tablet (1 mg total) by mouth before breakfast. 90 tablet 3    lancets (TRUEPLUS LANCETS) 28 gauge Misc 1 lancet by Misc.(Non-Drug; Combo Route) route daily as needed. [employer] brand 100 each 3    menthol (BIOFREEZE, MENTHOL,) 4 % Gel Apply topically.       No current facility-administered medications on file prior to visit.      Review of patient's allergies indicates:   Allergen Reactions    No known drug allergies        Objective:     Physical Exam   Constitutional: He is oriented to person, place, and time. He appears well-developed. No distress.   HENT:   Head: Normocephalic.   Eyes: Conjunctivae are normal. Pupils are equal, round, and reactive to light.   Neck: Neck supple. No JVD present. No thyromegaly present.   Cardiovascular: Normal rate and intact distal pulses.  An irregularly irregular rhythm present. Exam reveals no gallop and no friction rub.    Murmur heard.   Medium-pitched harsh crescendo-decrescendo midsystolic murmur is present with a grade of 3/6  at the upper left sternal border, apex  Pulses:       Carotid pulses are 2+ on the right side, and 2+ on the left side.       Radial pulses are 2+ on the right side, and 2+ on the left side.        Femoral pulses are 2+ on the right side, and 2+ on the left side.       Popliteal pulses are 2+ on the right side, and 2+ on the left side.        Dorsalis pedis pulses are 2+ on the right side, and 2+ on the left side.        Posterior tibial pulses are 2+ on the right side, and 2+ on the left side.   Pulmonary/Chest: Breath sounds normal. He has no wheezes. He has no rales. He exhibits no tenderness.   Abdominal: Soft. Bowel sounds are normal. He exhibits no mass. There is no hepatosplenomegaly. There is no  tenderness.   Musculoskeletal: He exhibits no edema or tenderness.        Cervical back: Normal.        Thoracic back: Normal.        Lumbar back: Normal.   Lymphadenopathy:     He has no cervical adenopathy.     He has no axillary adenopathy.        Right: No supraclavicular adenopathy present.        Left: No supraclavicular adenopathy present.   Neurological: He is alert and oriented to person, place, and time. He has normal strength. No sensory deficit. Gait normal.   Skin: Skin is warm. No cyanosis. No pallor. Nails show no clubbing.   Psychiatric: He has a normal mood and affect. His speech is normal and behavior is normal. Cognition and memory are normal.       Assessment:     1. Atrial fibrillation, permanent    2. Essential hypertension    3. Mixed hyperlipidemia      STABLE CV STATUS  AF WITH CONTROLLED VR  NO ACTIVE MYOCARDIAL ISCHEMIA.   NO ADHF.  CNS STATUS STABLE  BP WELL CONTROLLED  Plan:     Atrial fibrillation, permanent    Essential hypertension    Mixed hyperlipidemia    1- CONTINUE PRESENT CARD MANAGEMENT    2- RETURN IN 6 MONTHS.

## 2018-04-18 ENCOUNTER — TELEPHONE (OUTPATIENT)
Dept: CARDIOLOGY | Facility: CLINIC | Age: 78
End: 2018-04-18

## 2018-04-18 NOTE — TELEPHONE ENCOUNTER
----- Message from Renata Romero sent at 4/18/2018  1:26 PM CDT -----  Contact: ms canela-wife  needs callback rg hydrochlorothiazide mgs, will elaborate..[phone]

## 2018-04-18 NOTE — TELEPHONE ENCOUNTER
Returned call and patient not ready to send HCTZ refill to [facility]. Will call back when refill needed, 2 weeks in advance.

## 2018-06-21 RX ORDER — APIXABAN 5 MG/1
TABLET, FILM COATED ORAL
Qty: 180 TABLET | Refills: 3 | Status: SHIPPED | OUTPATIENT
Start: 2018-06-21

## 2018-06-25 DIAGNOSIS — I10 ESSENTIAL HYPERTENSION: Chronic | ICD-10-CM

## 2018-06-25 RX ORDER — HYDROCHLOROTHIAZIDE 25 MG/1
25 TABLET ORAL DAILY
Qty: 90 TABLET | Refills: 3 | Status: SHIPPED | OUTPATIENT
Start: 2018-06-25 | End: 2019-06-25

## 2018-06-25 NOTE — TELEPHONE ENCOUNTER
Spouse notified    Approved Medications   hydroCHLOROthiazide (HYDRODIURIL) 25 MG tablet  Take 1 tablet (25 mg total) by mouth once daily.  Disp: 90 tablet Refills: 3    Class: Normal Start: 6/25/2018 - 6/25/2019   For: Essential hypertension  Approved by: Vinay Beck MD  To be filled at: [facility] - [facility] [address]: [phone]

## 2018-06-25 NOTE — TELEPHONE ENCOUNTER
----- Message from Laureen Phoenix sent at 6/25/2018 12:25 PM CDT -----  Contact: wife(Candi)[phone]  .1. What is the name of the medication you are requesting? Hydrochlorothiazide  2. What is the dose? 25mg  3. How do you take the medication? Orally, topically, etc? orally  4. How often do you take this medication? Once a day  5. Do you need a 30 day or 90 day supply? 90  6. How many refills are you requesting? 1  7. What is your preferred pharmacy and location of the pharmacy? .    [facility] - [place], [place] - [address]  [address] [address]  Phone: [phone] Fax: [phone]      8. Who can we contact with further questions? Wife(Candi)-[phone]

## 2018-08-06 RX ORDER — LOSARTAN POTASSIUM 100 MG/1
TABLET ORAL
Qty: 90 TABLET | Refills: 3 | Status: ON HOLD | OUTPATIENT
Start: 2018-08-06 | End: 2018-09-21 | Stop reason: HOSPADM

## 2018-09-06 ENCOUNTER — TELEPHONE (OUTPATIENT)
Dept: CARDIOLOGY | Facility: CLINIC | Age: 78
End: 2018-09-06

## 2018-09-06 NOTE — TELEPHONE ENCOUNTER
----- Message from Hany Bean sent at 9/6/2018  3:54 PM CDT -----  Contact: Pt Wife   Pt Wife is calling regarding requesting nurse  To call. Pt wife did not want to state the reason for the call .[phone]

## 2018-09-06 NOTE — TELEPHONE ENCOUNTER
I returned call to pts wife. She says pt is in hospital in [place]. She says he c/o upset stomach last night. Started coughing and vomiting blood this morning.   They have done a scope to see where bleeding is coming from. She is awaiting results from this now.   I advised her to let us know as soon as pt is discharged so we can schedule f/u appt. I let her know I would notify Dr Beck

## 2018-09-10 ENCOUNTER — TELEPHONE (OUTPATIENT)
Dept: CARDIOLOGY | Facility: CLINIC | Age: 78
End: 2018-09-10

## 2018-09-10 ENCOUNTER — HOSPITAL ENCOUNTER (INPATIENT)
Facility: HOSPITAL | Age: 78
LOS: 11 days | Discharge: HOME HEALTH | DRG: 871 | End: 2018-09-21
Attending: EMERGENCY MEDICINE | Admitting: HOSPITALIST
Payer: MEDICARE

## 2018-09-10 DIAGNOSIS — I50.33 ACUTE ON CHRONIC DIASTOLIC CONGESTIVE HEART FAILURE: ICD-10-CM

## 2018-09-10 DIAGNOSIS — I48.20 CHRONIC ATRIAL FIBRILLATION WITH RVR: ICD-10-CM

## 2018-09-10 DIAGNOSIS — I48.91 ATRIAL FIBRILLATION: ICD-10-CM

## 2018-09-10 DIAGNOSIS — R65.21 SEPTIC SHOCK: ICD-10-CM

## 2018-09-10 DIAGNOSIS — I35.0 NONRHEUMATIC AORTIC VALVE STENOSIS: ICD-10-CM

## 2018-09-10 DIAGNOSIS — A41.9 SEPTIC SHOCK: ICD-10-CM

## 2018-09-10 DIAGNOSIS — N18.30 CHRONIC KIDNEY DISEASE, STAGE 3: Chronic | ICD-10-CM

## 2018-09-10 DIAGNOSIS — G72.81 CRITICAL ILLNESS MYOPATHY: ICD-10-CM

## 2018-09-10 DIAGNOSIS — J44.0 CHRONIC OBSTRUCTIVE PULMONARY DISEASE WITH ACUTE LOWER RESPIRATORY INFECTION: ICD-10-CM

## 2018-09-10 DIAGNOSIS — K92.2 ACUTE UPPER GI BLEED: ICD-10-CM

## 2018-09-10 DIAGNOSIS — J18.9 PNEUMONIA OF BOTH LUNGS DUE TO INFECTIOUS ORGANISM, UNSPECIFIED PART OF LUNG: ICD-10-CM

## 2018-09-10 DIAGNOSIS — J69.0 ASPIRATION PNEUMONIA OF RIGHT UPPER LOBE, UNSPECIFIED ASPIRATION PNEUMONIA TYPE: ICD-10-CM

## 2018-09-10 DIAGNOSIS — J18.9 PNEUMONIA OF RIGHT LOWER LOBE DUE TO INFECTIOUS ORGANISM: Primary | ICD-10-CM

## 2018-09-10 DIAGNOSIS — J96.01 ACUTE HYPOXEMIC RESPIRATORY FAILURE: ICD-10-CM

## 2018-09-10 DIAGNOSIS — I48.21 ATRIAL FIBRILLATION, PERMANENT: Chronic | ICD-10-CM

## 2018-09-10 DIAGNOSIS — E11.9 TYPE 2 DIABETES MELLITUS WITHOUT COMPLICATION, WITHOUT LONG-TERM CURRENT USE OF INSULIN: Chronic | ICD-10-CM

## 2018-09-10 DIAGNOSIS — Z99.11 ON MECHANICALLY ASSISTED VENTILATION: ICD-10-CM

## 2018-09-10 DIAGNOSIS — D64.9 ANEMIA, UNSPECIFIED TYPE: ICD-10-CM

## 2018-09-10 LAB
ALBUMIN SERPL BCP-MCNC: 2.4 G/DL
ALLENS TEST: ABNORMAL
ALP SERPL-CCNC: 89 U/L
ALT SERPL W/O P-5'-P-CCNC: 155 U/L
ANION GAP SERPL CALC-SCNC: 10 MMOL/L
APTT BLDCRRT: 32.7 SEC
AST SERPL-CCNC: 77 U/L
BASOPHILS # BLD AUTO: 0.01 K/UL
BASOPHILS NFR BLD: 0.1 %
BILIRUB SERPL-MCNC: 1.7 MG/DL
BNP SERPL-MCNC: 845 PG/ML
BUN SERPL-MCNC: 38 MG/DL
CALCIUM SERPL-MCNC: 7.6 MG/DL
CHLORIDE SERPL-SCNC: 100 MMOL/L
CO2 SERPL-SCNC: 24 MMOL/L
CREAT SERPL-MCNC: 1.4 MG/DL
DELSYS: ABNORMAL
DIFFERENTIAL METHOD: ABNORMAL
EOSINOPHIL # BLD AUTO: 0 K/UL
EOSINOPHIL NFR BLD: 0 %
EP: 5
ERYTHROCYTE [DISTWIDTH] IN BLOOD BY AUTOMATED COUNT: 15.8 %
ERYTHROCYTE [SEDIMENTATION RATE] IN BLOOD BY WESTERGREN METHOD: 16 MM/H
EST. GFR  (AFRICAN AMERICAN): 56 ML/MIN/1.73 M^2
EST. GFR  (NON AFRICAN AMERICAN): 48 ML/MIN/1.73 M^2
FIO2: 50
GLUCOSE SERPL-MCNC: 163 MG/DL
HCO3 UR-SCNC: 26.4 MMOL/L (ref 24–28)
HCT VFR BLD AUTO: 24.6 %
HGB BLD-MCNC: 8.4 G/DL
INR PPP: 1.2
IP: 10
LACTATE SERPL-SCNC: 1.5 MMOL/L
LYMPHOCYTES # BLD AUTO: 1.3 K/UL
LYMPHOCYTES NFR BLD: 7.8 %
MAGNESIUM SERPL-MCNC: 2.2 MG/DL
MCH RBC QN AUTO: 32.3 PG
MCHC RBC AUTO-ENTMCNC: 34.1 G/DL
MCV RBC AUTO: 95 FL
MODE: ABNORMAL
MONOCYTES # BLD AUTO: 3.5 K/UL
MONOCYTES NFR BLD: 21.2 %
NEUTROPHILS # BLD AUTO: 11.7 K/UL
NEUTROPHILS NFR BLD: 71.4 %
PCO2 BLDA: 44.4 MMHG (ref 35–45)
PH SMN: 7.38 [PH] (ref 7.35–7.45)
PLATELET # BLD AUTO: 136 K/UL
PMV BLD AUTO: 11.1 FL
PO2 BLDA: 60 MMHG (ref 80–100)
POC BE: 1 MMOL/L
POC SATURATED O2: 90 % (ref 95–100)
POCT GLUCOSE: 164 MG/DL (ref 70–110)
POLYCHROMASIA BLD QL SMEAR: ABNORMAL
POTASSIUM SERPL-SCNC: 4.1 MMOL/L
PROT SERPL-MCNC: 5.6 G/DL
PROTHROMBIN TIME: 12.4 SEC
RBC # BLD AUTO: 2.6 M/UL
SAMPLE: ABNORMAL
SITE: ABNORMAL
SODIUM SERPL-SCNC: 134 MMOL/L
TSH SERPL DL<=0.005 MIU/L-ACNC: 1.58 UIU/ML
WBC # BLD AUTO: 16.46 K/UL

## 2018-09-10 PROCEDURE — 83735 ASSAY OF MAGNESIUM: CPT

## 2018-09-10 PROCEDURE — 20000000 HC ICU ROOM

## 2018-09-10 PROCEDURE — 84443 ASSAY THYROID STIM HORMONE: CPT

## 2018-09-10 PROCEDURE — 25000003 PHARM REV CODE 250: Performed by: NURSE PRACTITIONER

## 2018-09-10 PROCEDURE — 85730 THROMBOPLASTIN TIME PARTIAL: CPT

## 2018-09-10 PROCEDURE — 27000190 HC CPAP FULL FACE MASK W/VALVE

## 2018-09-10 PROCEDURE — 63600175 PHARM REV CODE 636 W HCPCS: Performed by: INTERNAL MEDICINE

## 2018-09-10 PROCEDURE — 36415 COLL VENOUS BLD VENIPUNCTURE: CPT

## 2018-09-10 PROCEDURE — 85610 PROTHROMBIN TIME: CPT

## 2018-09-10 PROCEDURE — 99900035 HC TECH TIME PER 15 MIN (STAT)

## 2018-09-10 PROCEDURE — 36600 WITHDRAWAL OF ARTERIAL BLOOD: CPT

## 2018-09-10 PROCEDURE — 85025 COMPLETE CBC W/AUTO DIFF WBC: CPT

## 2018-09-10 PROCEDURE — 94640 AIRWAY INHALATION TREATMENT: CPT

## 2018-09-10 PROCEDURE — 83880 ASSAY OF NATRIURETIC PEPTIDE: CPT

## 2018-09-10 PROCEDURE — 87040 BLOOD CULTURE FOR BACTERIA: CPT

## 2018-09-10 PROCEDURE — 25000242 PHARM REV CODE 250 ALT 637 W/ HCPCS: Performed by: INTERNAL MEDICINE

## 2018-09-10 PROCEDURE — 83605 ASSAY OF LACTIC ACID: CPT

## 2018-09-10 PROCEDURE — 80053 COMPREHEN METABOLIC PANEL: CPT

## 2018-09-10 PROCEDURE — C9113 INJ PANTOPRAZOLE SODIUM, VIA: HCPCS | Performed by: INTERNAL MEDICINE

## 2018-09-10 PROCEDURE — 94660 CPAP INITIATION&MGMT: CPT

## 2018-09-10 PROCEDURE — 25000003 PHARM REV CODE 250: Performed by: INTERNAL MEDICINE

## 2018-09-10 PROCEDURE — 82803 BLOOD GASES ANY COMBINATION: CPT

## 2018-09-10 RX ORDER — ARFORMOTEROL TARTRATE 15 UG/2ML
15 SOLUTION RESPIRATORY (INHALATION) 2 TIMES DAILY
Status: DISCONTINUED | OUTPATIENT
Start: 2018-09-10 | End: 2018-09-21 | Stop reason: HOSPADM

## 2018-09-10 RX ORDER — SODIUM CHLORIDE 9 MG/ML
INJECTION, SOLUTION INTRAVENOUS CONTINUOUS
Status: DISCONTINUED | OUTPATIENT
Start: 2018-09-10 | End: 2018-09-11

## 2018-09-10 RX ORDER — IPRATROPIUM BROMIDE AND ALBUTEROL SULFATE 2.5; .5 MG/3ML; MG/3ML
3 SOLUTION RESPIRATORY (INHALATION) EVERY 6 HOURS PRN
Status: DISCONTINUED | OUTPATIENT
Start: 2018-09-10 | End: 2018-09-21 | Stop reason: HOSPADM

## 2018-09-10 RX ORDER — IBUPROFEN 200 MG
16 TABLET ORAL
Status: DISCONTINUED | OUTPATIENT
Start: 2018-09-10 | End: 2018-09-21 | Stop reason: HOSPADM

## 2018-09-10 RX ORDER — GLUCAGON 1 MG
1 KIT INJECTION
Status: DISCONTINUED | OUTPATIENT
Start: 2018-09-10 | End: 2018-09-21 | Stop reason: HOSPADM

## 2018-09-10 RX ORDER — IBUPROFEN 200 MG
24 TABLET ORAL
Status: DISCONTINUED | OUTPATIENT
Start: 2018-09-10 | End: 2018-09-21 | Stop reason: HOSPADM

## 2018-09-10 RX ORDER — BUDESONIDE 0.5 MG/2ML
0.5 INHALANT ORAL 2 TIMES DAILY
Status: DISCONTINUED | OUTPATIENT
Start: 2018-09-10 | End: 2018-09-21 | Stop reason: HOSPADM

## 2018-09-10 RX ORDER — PANTOPRAZOLE SODIUM 40 MG/10ML
40 INJECTION, POWDER, LYOPHILIZED, FOR SOLUTION INTRAVENOUS DAILY
Status: DISCONTINUED | OUTPATIENT
Start: 2018-09-10 | End: 2018-09-14

## 2018-09-10 RX ORDER — VANCOMYCIN HCL IN 5 % DEXTROSE 1G/250ML
1000 PLASTIC BAG, INJECTION (ML) INTRAVENOUS ONCE
Status: COMPLETED | OUTPATIENT
Start: 2018-09-10 | End: 2018-09-11

## 2018-09-10 RX ORDER — INSULIN ASPART 100 [IU]/ML
1-10 INJECTION, SOLUTION INTRAVENOUS; SUBCUTANEOUS
Status: DISCONTINUED | OUTPATIENT
Start: 2018-09-10 | End: 2018-09-13

## 2018-09-10 RX ADMIN — PANTOPRAZOLE SODIUM 40 MG: 40 INJECTION, POWDER, LYOPHILIZED, FOR SOLUTION INTRAVENOUS at 08:09

## 2018-09-10 RX ADMIN — SODIUM CHLORIDE 1000 ML: 0.9 INJECTION, SOLUTION INTRAVENOUS at 10:09

## 2018-09-10 RX ADMIN — BUDESONIDE 0.5 MG: 0.5 SUSPENSION RESPIRATORY (INHALATION) at 08:09

## 2018-09-10 RX ADMIN — AMPICILLIN AND SULBACTAM 1.5 G: 1; .5 INJECTION, POWDER, FOR SOLUTION INTRAVENOUS at 11:09

## 2018-09-10 RX ADMIN — ARFORMOTEROL TARTRATE 15 MCG: 15 SOLUTION RESPIRATORY (INHALATION) at 08:09

## 2018-09-10 RX ADMIN — INSULIN ASPART 1 UNITS: 100 INJECTION, SOLUTION INTRAVENOUS; SUBCUTANEOUS at 09:09

## 2018-09-10 RX ADMIN — SODIUM CHLORIDE: 0.9 INJECTION, SOLUTION INTRAVENOUS at 08:09

## 2018-09-10 NOTE — TELEPHONE ENCOUNTER
Returned call, spouse stated  is in ICU at [facility] for bloody emesis.  Also spoke with daughter and seeking information how to transfer from [facility] ICU to [facility]- ICU.    Dr. Beck was informed and spoke with family and provided information on transfer if decided.    All questions and concerns addressed and NP Good updated.

## 2018-09-10 NOTE — TELEPHONE ENCOUNTER
----- Message from Denise Huston sent at 9/10/2018  7:54 AM CDT -----  Contact: Candi/pt spouse   Caller states that she need to speak with nurse regarding pt being in hospital. [phone]

## 2018-09-11 PROBLEM — E11.9 TYPE 2 DIABETES MELLITUS WITHOUT COMPLICATION, WITHOUT LONG-TERM CURRENT USE OF INSULIN: Chronic | Status: ACTIVE | Noted: 2018-09-11

## 2018-09-11 PROBLEM — R79.89 ELEVATED LFTS: Status: ACTIVE | Noted: 2018-09-11

## 2018-09-11 LAB
ABO + RH BLD: NORMAL
ALBUMIN SERPL BCP-MCNC: 2.2 G/DL
ALBUMIN SERPL BCP-MCNC: 2.5 G/DL
ALLENS TEST: ABNORMAL
ALP SERPL-CCNC: 80 U/L
ALP SERPL-CCNC: 91 U/L
ALT SERPL W/O P-5'-P-CCNC: 124 U/L
ALT SERPL W/O P-5'-P-CCNC: 150 U/L
ANION GAP SERPL CALC-SCNC: 6 MMOL/L
ANION GAP SERPL CALC-SCNC: 7 MMOL/L
AORTIC VALVE STENOSIS: ABNORMAL
APTT BLDCRRT: 32.8 SEC
AST SERPL-CCNC: 55 U/L
AST SERPL-CCNC: 74 U/L
BACTERIA #/AREA URNS HPF: ABNORMAL /HPF
BASOPHILS # BLD AUTO: 0.01 K/UL
BASOPHILS # BLD AUTO: 0.01 K/UL
BASOPHILS NFR BLD: 0.1 %
BASOPHILS NFR BLD: 0.1 %
BILIRUB SERPL-MCNC: 1.6 MG/DL
BILIRUB SERPL-MCNC: 1.8 MG/DL
BILIRUB UR QL STRIP: ABNORMAL
BILIRUB UR QL STRIP: NORMAL
BLD GP AB SCN CELLS X3 SERPL QL: NORMAL
BUN SERPL-MCNC: 42 MG/DL
BUN SERPL-MCNC: 42 MG/DL
CALCIUM SERPL-MCNC: 7.1 MG/DL
CALCIUM SERPL-MCNC: 7.5 MG/DL
CHLORIDE SERPL-SCNC: 102 MMOL/L
CHLORIDE SERPL-SCNC: 103 MMOL/L
CLARITY UR: CLEAR
CLARITY UR: CLEAR
CO2 SERPL-SCNC: 27 MMOL/L
CO2 SERPL-SCNC: 27 MMOL/L
COLOR UR: NORMAL
COLOR UR: YELLOW
CREAT SERPL-MCNC: 1.2 MG/DL
CREAT SERPL-MCNC: 1.3 MG/DL
DELSYS: ABNORMAL
DIASTOLIC DYSFUNCTION: NO
DIFFERENTIAL METHOD: ABNORMAL
DIFFERENTIAL METHOD: ABNORMAL
EOSINOPHIL # BLD AUTO: 0 K/UL
EOSINOPHIL # BLD AUTO: 0 K/UL
EOSINOPHIL NFR BLD: 0 %
EOSINOPHIL NFR BLD: 0.1 %
EP: 5
ERYTHROCYTE [DISTWIDTH] IN BLOOD BY AUTOMATED COUNT: 15.7 %
ERYTHROCYTE [DISTWIDTH] IN BLOOD BY AUTOMATED COUNT: 15.9 %
ERYTHROCYTE [SEDIMENTATION RATE] IN BLOOD BY WESTERGREN METHOD: 16 MM/H
EST. GFR  (AFRICAN AMERICAN): >60 ML/MIN/1.73 M^2
EST. GFR  (AFRICAN AMERICAN): >60 ML/MIN/1.73 M^2
EST. GFR  (NON AFRICAN AMERICAN): 53 ML/MIN/1.73 M^2
EST. GFR  (NON AFRICAN AMERICAN): 58 ML/MIN/1.73 M^2
ESTIMATED PA SYSTOLIC PRESSURE: 56
FIO2: 50
GLUCOSE SERPL-MCNC: 130 MG/DL
GLUCOSE SERPL-MCNC: 139 MG/DL
GLUCOSE UR QL STRIP: NEGATIVE
GLUCOSE UR QL STRIP: NORMAL
HCO3 UR-SCNC: 24.7 MMOL/L (ref 24–28)
HCT VFR BLD AUTO: 21.9 %
HCT VFR BLD AUTO: 22.6 %
HCT VFR BLD AUTO: 24.5 %
HCT VFR BLD AUTO: 25.3 %
HGB BLD-MCNC: 7.5 G/DL
HGB BLD-MCNC: 7.5 G/DL
HGB BLD-MCNC: 8.2 G/DL
HGB BLD-MCNC: 8.4 G/DL
HGB UR QL STRIP: NEGATIVE
HGB UR QL STRIP: NORMAL
HYALINE CASTS #/AREA URNS LPF: 20 /LPF
INR PPP: 1.2
IP: 12
KETONES UR QL STRIP: NEGATIVE
KETONES UR QL STRIP: NORMAL
LACTATE SERPL-SCNC: 1.4 MMOL/L
LEUKOCYTE ESTERASE UR QL STRIP: NEGATIVE
LEUKOCYTE ESTERASE UR QL STRIP: NORMAL
LYMPHOCYTES # BLD AUTO: 1.2 K/UL
LYMPHOCYTES # BLD AUTO: 1.3 K/UL
LYMPHOCYTES NFR BLD: 6.9 %
LYMPHOCYTES NFR BLD: 7.5 %
MAGNESIUM SERPL-MCNC: 2.2 MG/DL
MCH RBC QN AUTO: 31.5 PG
MCH RBC QN AUTO: 31.8 PG
MCHC RBC AUTO-ENTMCNC: 33.2 G/DL
MCHC RBC AUTO-ENTMCNC: 33.5 G/DL
MCV RBC AUTO: 95 FL
MCV RBC AUTO: 95 FL
MICROSCOPIC COMMENT: ABNORMAL
MITRAL VALVE REGURGITATION: ABNORMAL
MODE: ABNORMAL
MONOCYTES # BLD AUTO: 2.4 K/UL
MONOCYTES # BLD AUTO: 2.7 K/UL
MONOCYTES NFR BLD: 14.4 %
MONOCYTES NFR BLD: 15.4 %
NEUTROPHILS # BLD AUTO: 13.3 K/UL
NEUTROPHILS # BLD AUTO: 13.4 K/UL
NEUTROPHILS NFR BLD: 76.9 %
NEUTROPHILS NFR BLD: 79.2 %
NITRITE UR QL STRIP: NEGATIVE
NITRITE UR QL STRIP: NORMAL
PCO2 BLDA: 49.9 MMHG (ref 35–45)
PH SMN: 7.3 [PH] (ref 7.35–7.45)
PH UR STRIP: 6 [PH] (ref 5–8)
PH UR STRIP: NORMAL [PH] (ref 5–8)
PHOSPHATE SERPL-MCNC: 1.9 MG/DL
PLATELET # BLD AUTO: 147 K/UL
PLATELET # BLD AUTO: 152 K/UL
PMV BLD AUTO: 11.1 FL
PMV BLD AUTO: 11.2 FL
PO2 BLDA: 62 MMHG (ref 80–100)
POC BE: -2 MMOL/L
POC SATURATED O2: 88 % (ref 95–100)
POCT GLUCOSE: 126 MG/DL (ref 70–110)
POCT GLUCOSE: 148 MG/DL (ref 70–110)
POCT GLUCOSE: 153 MG/DL (ref 70–110)
POCT GLUCOSE: 182 MG/DL (ref 70–110)
POTASSIUM SERPL-SCNC: 3.9 MMOL/L
POTASSIUM SERPL-SCNC: 4.1 MMOL/L
PROT SERPL-MCNC: 5.2 G/DL
PROT SERPL-MCNC: 5.9 G/DL
PROT UR QL STRIP: ABNORMAL
PROT UR QL STRIP: NORMAL
PROTHROMBIN TIME: 12.2 SEC
RBC # BLD AUTO: 2.38 M/UL
RBC # BLD AUTO: 2.58 M/UL
RBC #/AREA URNS HPF: 1 /HPF (ref 0–4)
RETIRED EF AND QEF - SEE NOTES: 50 (ref 55–65)
SAMPLE: ABNORMAL
SITE: ABNORMAL
SODIUM SERPL-SCNC: 136 MMOL/L
SODIUM SERPL-SCNC: 136 MMOL/L
SP GR UR STRIP: 1.02 (ref 1–1.03)
SP GR UR STRIP: NORMAL (ref 1–1.03)
TRICUSPID VALVE REGURGITATION: ABNORMAL
URN SPEC COLLECT METH UR: ABNORMAL
URN SPEC COLLECT METH UR: NORMAL
UROBILINOGEN UR STRIP-ACNC: ABNORMAL EU/DL
UROBILINOGEN UR STRIP-ACNC: NORMAL EU/DL
VANCOMYCIN SERPL-MCNC: 5 UG/ML
WBC # BLD AUTO: 17 K/UL
WBC # BLD AUTO: 17.31 K/UL
WBC #/AREA URNS HPF: 1 /HPF (ref 0–5)

## 2018-09-11 PROCEDURE — 85018 HEMOGLOBIN: CPT | Mod: 91

## 2018-09-11 PROCEDURE — 85610 PROTHROMBIN TIME: CPT

## 2018-09-11 PROCEDURE — 25000003 PHARM REV CODE 250: Performed by: FAMILY MEDICINE

## 2018-09-11 PROCEDURE — 87106 FUNGI IDENTIFICATION YEAST: CPT

## 2018-09-11 PROCEDURE — 84100 ASSAY OF PHOSPHORUS: CPT

## 2018-09-11 PROCEDURE — 99291 CRITICAL CARE FIRST HOUR: CPT | Mod: 25,,, | Performed by: INTERNAL MEDICINE

## 2018-09-11 PROCEDURE — 85730 THROMBOPLASTIN TIME PARTIAL: CPT

## 2018-09-11 PROCEDURE — 99223 1ST HOSP IP/OBS HIGH 75: CPT | Mod: ,,, | Performed by: INTERNAL MEDICINE

## 2018-09-11 PROCEDURE — 99223 1ST HOSP IP/OBS HIGH 75: CPT | Mod: ,,, | Performed by: PHYSICIAN ASSISTANT

## 2018-09-11 PROCEDURE — 83735 ASSAY OF MAGNESIUM: CPT

## 2018-09-11 PROCEDURE — 81003 URINALYSIS AUTO W/O SCOPE: CPT

## 2018-09-11 PROCEDURE — 85014 HEMATOCRIT: CPT

## 2018-09-11 PROCEDURE — 5A1945Z RESPIRATORY VENTILATION, 24-96 CONSECUTIVE HOURS: ICD-10-PCS | Performed by: INTERNAL MEDICINE

## 2018-09-11 PROCEDURE — 94660 CPAP INITIATION&MGMT: CPT

## 2018-09-11 PROCEDURE — 36600 WITHDRAWAL OF ARTERIAL BLOOD: CPT

## 2018-09-11 PROCEDURE — 63600175 PHARM REV CODE 636 W HCPCS: Performed by: INTERNAL MEDICINE

## 2018-09-11 PROCEDURE — 99900035 HC TECH TIME PER 15 MIN (STAT)

## 2018-09-11 PROCEDURE — 87070 CULTURE OTHR SPECIMN AEROBIC: CPT

## 2018-09-11 PROCEDURE — 25000242 PHARM REV CODE 250 ALT 637 W/ HCPCS: Performed by: INTERNAL MEDICINE

## 2018-09-11 PROCEDURE — 86901 BLOOD TYPING SEROLOGIC RH(D): CPT

## 2018-09-11 PROCEDURE — 63600175 PHARM REV CODE 636 W HCPCS: Performed by: NURSE PRACTITIONER

## 2018-09-11 PROCEDURE — 0BH17EZ INSERTION OF ENDOTRACHEAL AIRWAY INTO TRACHEA, VIA NATURAL OR ARTIFICIAL OPENING: ICD-10-PCS | Performed by: INTERNAL MEDICINE

## 2018-09-11 PROCEDURE — 94002 VENT MGMT INPAT INIT DAY: CPT

## 2018-09-11 PROCEDURE — 97802 MEDICAL NUTRITION INDIV IN: CPT

## 2018-09-11 PROCEDURE — 81000 URINALYSIS NONAUTO W/SCOPE: CPT

## 2018-09-11 PROCEDURE — 20000000 HC ICU ROOM

## 2018-09-11 PROCEDURE — 82803 BLOOD GASES ANY COMBINATION: CPT

## 2018-09-11 PROCEDURE — 31500 INSERT EMERGENCY AIRWAY: CPT | Mod: 59,,, | Performed by: NURSE PRACTITIONER

## 2018-09-11 PROCEDURE — 02HV33Z INSERTION OF INFUSION DEVICE INTO SUPERIOR VENA CAVA, PERCUTANEOUS APPROACH: ICD-10-PCS | Performed by: FAMILY MEDICINE

## 2018-09-11 PROCEDURE — 83605 ASSAY OF LACTIC ACID: CPT

## 2018-09-11 PROCEDURE — 80053 COMPREHEN METABOLIC PANEL: CPT | Mod: 91

## 2018-09-11 PROCEDURE — 93306 TTE W/DOPPLER COMPLETE: CPT

## 2018-09-11 PROCEDURE — 25000003 PHARM REV CODE 250: Performed by: NURSE PRACTITIONER

## 2018-09-11 PROCEDURE — 80053 COMPREHEN METABOLIC PANEL: CPT

## 2018-09-11 PROCEDURE — 36556 INSERT NON-TUNNEL CV CATH: CPT | Mod: ,,, | Performed by: NURSE PRACTITIONER

## 2018-09-11 PROCEDURE — 36415 COLL VENOUS BLD VENIPUNCTURE: CPT

## 2018-09-11 PROCEDURE — 25000003 PHARM REV CODE 250: Performed by: INTERNAL MEDICINE

## 2018-09-11 PROCEDURE — 63600175 PHARM REV CODE 636 W HCPCS: Performed by: FAMILY MEDICINE

## 2018-09-11 PROCEDURE — 94640 AIRWAY INHALATION TREATMENT: CPT

## 2018-09-11 PROCEDURE — 36556 INSERT NON-TUNNEL CV CATH: CPT

## 2018-09-11 PROCEDURE — 80202 ASSAY OF VANCOMYCIN: CPT

## 2018-09-11 PROCEDURE — 87205 SMEAR GRAM STAIN: CPT

## 2018-09-11 PROCEDURE — 99900026 HC AIRWAY MAINTENANCE (STAT)

## 2018-09-11 PROCEDURE — 86920 COMPATIBILITY TEST SPIN: CPT

## 2018-09-11 PROCEDURE — 25000003 PHARM REV CODE 250: Performed by: STUDENT IN AN ORGANIZED HEALTH CARE EDUCATION/TRAINING PROGRAM

## 2018-09-11 PROCEDURE — C9113 INJ PANTOPRAZOLE SODIUM, VIA: HCPCS | Performed by: INTERNAL MEDICINE

## 2018-09-11 PROCEDURE — 93306 TTE W/DOPPLER COMPLETE: CPT | Mod: 26,,, | Performed by: INTERNAL MEDICINE

## 2018-09-11 PROCEDURE — 85025 COMPLETE CBC W/AUTO DIFF WBC: CPT | Mod: 91

## 2018-09-11 RX ORDER — NOREPINEPHRINE BITARTRATE/D5W 8 MG/250ML
0.04 PLASTIC BAG, INJECTION (ML) INTRAVENOUS CONTINUOUS
Status: DISCONTINUED | OUTPATIENT
Start: 2018-09-11 | End: 2018-09-14

## 2018-09-11 RX ORDER — METOPROLOL TARTRATE 1 MG/ML
2.5 INJECTION, SOLUTION INTRAVENOUS ONCE
Status: COMPLETED | OUTPATIENT
Start: 2018-09-11 | End: 2018-09-11

## 2018-09-11 RX ORDER — ONDANSETRON 2 MG/ML
4 INJECTION INTRAMUSCULAR; INTRAVENOUS EVERY 6 HOURS PRN
Status: DISCONTINUED | OUTPATIENT
Start: 2018-09-11 | End: 2018-09-21 | Stop reason: HOSPADM

## 2018-09-11 RX ORDER — DIGOXIN 125 MCG
0.12 TABLET ORAL DAILY
Status: DISCONTINUED | OUTPATIENT
Start: 2018-09-11 | End: 2018-09-19

## 2018-09-11 RX ORDER — FENTANYL CITRAT/DEXTROSE 5%/PF 100 MCG/10
PATIENT CONTROLLED ANALGESIA SYRINGE INTRAVENOUS CONTINUOUS
Status: DISCONTINUED | OUTPATIENT
Start: 2018-09-11 | End: 2018-09-13

## 2018-09-11 RX ORDER — VANCOMYCIN HCL IN 5 % DEXTROSE 1G/250ML
1000 PLASTIC BAG, INJECTION (ML) INTRAVENOUS
Status: DISCONTINUED | OUTPATIENT
Start: 2018-09-13 | End: 2018-09-12

## 2018-09-11 RX ORDER — FUROSEMIDE 10 MG/ML
INJECTION INTRAMUSCULAR; INTRAVENOUS
Status: DISPENSED
Start: 2018-09-11 | End: 2018-09-11

## 2018-09-11 RX ORDER — FUROSEMIDE 10 MG/ML
20 INJECTION INTRAMUSCULAR; INTRAVENOUS ONCE
Status: COMPLETED | OUTPATIENT
Start: 2018-09-11 | End: 2018-09-11

## 2018-09-11 RX ORDER — HEPARIN SODIUM,PORCINE/D5W 25000/250
12 INTRAVENOUS SOLUTION INTRAVENOUS CONTINUOUS
Status: DISCONTINUED | OUTPATIENT
Start: 2018-09-11 | End: 2018-09-11

## 2018-09-11 RX ORDER — CHLORHEXIDINE GLUCONATE ORAL RINSE 1.2 MG/ML
15 SOLUTION DENTAL 2 TIMES DAILY
Status: DISCONTINUED | OUTPATIENT
Start: 2018-09-11 | End: 2018-09-18

## 2018-09-11 RX ORDER — PROPOFOL 10 MG/ML
20 INJECTION, EMULSION INTRAVENOUS CONTINUOUS
Status: DISCONTINUED | OUTPATIENT
Start: 2018-09-11 | End: 2018-09-14

## 2018-09-11 RX ORDER — ACETAMINOPHEN 325 MG/1
650 TABLET ORAL EVERY 6 HOURS PRN
Status: DISCONTINUED | OUTPATIENT
Start: 2018-09-11 | End: 2018-09-21 | Stop reason: HOSPADM

## 2018-09-11 RX ORDER — PROPOFOL 10 MG/ML
60 VIAL (ML) INTRAVENOUS ONCE
Status: COMPLETED | OUTPATIENT
Start: 2018-09-11 | End: 2018-09-11

## 2018-09-11 RX ORDER — METOPROLOL TARTRATE 1 MG/ML
5 INJECTION, SOLUTION INTRAVENOUS EVERY 6 HOURS PRN
Status: DISCONTINUED | OUTPATIENT
Start: 2018-09-11 | End: 2018-09-14

## 2018-09-11 RX ADMIN — PROPOFOL 25 MCG/KG/MIN: 10 INJECTION, EMULSION INTRAVENOUS at 05:09

## 2018-09-11 RX ADMIN — SODIUM CHLORIDE 500 ML: 0.9 INJECTION, SOLUTION INTRAVENOUS at 02:09

## 2018-09-11 RX ADMIN — FUROSEMIDE 20 MG: 10 INJECTION, SOLUTION INTRAVENOUS at 07:09

## 2018-09-11 RX ADMIN — AMPICILLIN AND SULBACTAM 1.5 G: 1; .5 INJECTION, POWDER, FOR SOLUTION INTRAVENOUS at 11:09

## 2018-09-11 RX ADMIN — AMPICILLIN AND SULBACTAM 1.5 G: 1; .5 INJECTION, POWDER, FOR SOLUTION INTRAVENOUS at 06:09

## 2018-09-11 RX ADMIN — BUDESONIDE 0.5 MG: 0.5 SUSPENSION RESPIRATORY (INHALATION) at 07:09

## 2018-09-11 RX ADMIN — DIGOXIN 0.12 MG: 125 TABLET ORAL at 09:09

## 2018-09-11 RX ADMIN — PROPOFOL 60 MG: 10 INJECTION, EMULSION INTRAVENOUS at 11:09

## 2018-09-11 RX ADMIN — PROPOFOL 20 MCG/KG/MIN: 10 INJECTION, EMULSION INTRAVENOUS at 12:09

## 2018-09-11 RX ADMIN — AMPICILLIN AND SULBACTAM 1.5 G: 1; .5 INJECTION, POWDER, FOR SOLUTION INTRAVENOUS at 02:09

## 2018-09-11 RX ADMIN — Medication 200 MCG: at 09:09

## 2018-09-11 RX ADMIN — VANCOMYCIN HYDROCHLORIDE 1000 MG: 1 INJECTION, POWDER, LYOPHILIZED, FOR SOLUTION INTRAVENOUS at 12:09

## 2018-09-11 RX ADMIN — PANTOPRAZOLE SODIUM 40 MG: 40 INJECTION, POWDER, LYOPHILIZED, FOR SOLUTION INTRAVENOUS at 08:09

## 2018-09-11 RX ADMIN — METOPROLOL TARTRATE 2.5 MG: 5 INJECTION, SOLUTION INTRAVENOUS at 08:09

## 2018-09-11 RX ADMIN — Medication 50 MCG: at 11:09

## 2018-09-11 RX ADMIN — INSULIN ASPART 2 UNITS: 100 INJECTION, SOLUTION INTRAVENOUS; SUBCUTANEOUS at 05:09

## 2018-09-11 RX ADMIN — VANCOMYCIN HYDROCHLORIDE 1500 MG: 1 INJECTION, POWDER, LYOPHILIZED, FOR SOLUTION INTRAVENOUS at 09:09

## 2018-09-11 RX ADMIN — ARFORMOTEROL TARTRATE 15 MCG: 15 SOLUTION RESPIRATORY (INHALATION) at 07:09

## 2018-09-11 RX ADMIN — FUROSEMIDE 20 MG: 10 INJECTION, SOLUTION INTRAMUSCULAR; INTRAVENOUS at 08:09

## 2018-09-11 RX ADMIN — SODIUM CHLORIDE: 0.9 INJECTION, SOLUTION INTRAVENOUS at 02:09

## 2018-09-11 RX ADMIN — CHLORHEXIDINE GLUCONATE 15 ML: 1.2 RINSE ORAL at 08:09

## 2018-09-11 RX ADMIN — IPRATROPIUM BROMIDE AND ALBUTEROL SULFATE 3 ML: .5; 3 SOLUTION RESPIRATORY (INHALATION) at 08:09

## 2018-09-11 RX ADMIN — BUDESONIDE 0.5 MG: 0.5 SUSPENSION RESPIRATORY (INHALATION) at 08:09

## 2018-09-11 RX ADMIN — METOPROLOL TARTRATE 2.5 MG: 5 INJECTION, SOLUTION INTRAVENOUS at 09:09

## 2018-09-11 RX ADMIN — Medication 0.04 MCG/KG/MIN: at 12:09

## 2018-09-11 RX ADMIN — CHLORHEXIDINE GLUCONATE 15 ML: 1.2 RINSE ORAL at 12:09

## 2018-09-11 RX ADMIN — INSULIN ASPART 1 UNITS: 100 INJECTION, SOLUTION INTRAVENOUS; SUBCUTANEOUS at 11:09

## 2018-09-11 NOTE — ASSESSMENT & PLAN NOTE
- CXR shows right upper lobe infiltrate, new since previous imaging on 9/8.  WBC 16.5, RR >20, SBP <90.  - Will obtain sputum and blood cultures.  - Empiric IV Unasyn and vanc, pharmacy consult for vanc dosing.  - IV fluid resuscitation.  Follow BP response and add pressors if needed.  - Continue BiPAP, wean as tolerated.  Duonebs PRN.    - Continue LABA + ICS.  - Pulmonology/ICU team following, appreciate assistance.

## 2018-09-11 NOTE — PROGRESS NOTES
Patient blood pressure 84/40 with a MAP of 54. Dr. Alvarez notified via EICU. Plan of care discussed with MD and made aware of patient blood pressure being consistently systolic in the 80s and the MAP being in between 54-59. MD orders another 500mL bolus of normal saline at this time. MD made aware of patient not voiding since admission. No new orders at this time related to patient not voiding. Patient currently resting on Bipap in bed with oxygen saturation of 99%. Patient A. Fib on monitor with a heart rate 71. No distress noted. Will continue to monitor.

## 2018-09-11 NOTE — ASSESSMENT & PLAN NOTE
- Patient hypotensive at present.    - Hold CCB, ARB, and diuretics, follow BP trends and resume as needed.

## 2018-09-11 NOTE — HPI
78 year old male admitted to MICU, transfer from [facility].  I have reviewed the patient's medical history in detail and updated the computerized patient record.  Upper GI bleeding, Scoped x 2 and clipped  Developed Af RVR, Known chr A fib on elliquis on hold  Treated with Digoxin, Amiodarone and cardizem GTT  Family requested transfer  Known COPD FEV1:1.65L ( 53%)  ANORO ellipta, not on oxygen  Presented to MICU needed BIPAP for WOB  CXR shows RML infiltrate  Overnight, anxious, WOB  Last echo EF 60%

## 2018-09-11 NOTE — PROGRESS NOTES
Patient SBP currently remaining in the high 80s since arriving to ICU. MAP remaining in between 55-59. CKami Lawler NP notified. NP to order stat lactic acid lab to be drawn. MD also orders 1 time fluid bolus of 1L of normal saline and also to change rate of continuous normal saline. Patient currently resting in bed on Bipap with no complaints. Will continue to monitor.

## 2018-09-11 NOTE — PLAN OF CARE
Problem: Patient Care Overview  Goal: Plan of Care Review  Outcome: Ongoing (interventions implemented as appropriate)  Recommendations     Recommendation/Intervention: 1.Consider tube feed for nutrition support until extubated: Peptamen Bariatric at 80 ml/hr, with flushes as needed per MD or NP for hydration. Provides 1920 calories, 179 g protein, and 1613 ml free water. 2. Check residuals Q4 hours. Hold if > 500 cc. 3. Will continue to monitor.    Goals: Meet > 85 % EEN/EPN while admitted  Nutrition Goal Status: new  Communication of RD Recs: (Reviewed with NP)

## 2018-09-11 NOTE — PROGRESS NOTES
Patient heart rate A. Fib currently 122 beats/min on monitor. Dr. Alvarez notified via EICU. MD wants to review 0600 hemoglobin and hematocrit results first before any new orders are given. MD also orders to be notified if heart rate gets in the 140s. Lab currently at bed side drawing blood. Patient currently resting on Bipap in bed. Last current blood pressure was 121/48 with a MAP 72. Patient oxygen saturation  92%. No complaints of shortness of breathe. Will continue to monitor.

## 2018-09-11 NOTE — CONSULTS
"[facility] -   Gastroenterology  Consult Note    Patient Name: Eleazar Solo  MRN: [id number]  Admission Date: 9/10/2018  Hospital Length of Stay: 1 days  Code Status: Full Code   Attending Provider: Prachi Boyer MD   Consulting Provider: Bob James PA-C  Primary Care Physician: Poly Fitch MD  Principal Problem:Acute upper GI bleed    Inpatient consult to Gastroenterology  Consult performed by: Bob James PA-C  Consult ordered by: Neyda Lawler NP  Reason for consult: Acute GI bleed        Subjective:     HPI:  The patient has been transferred from [facility]. He originally presented with hematemesis on Eliquis. He had an EGD which showed an actively bleeding lesion with clot at the GE junction. It was treated with epi and clipped. He was supposed to have repeat EGD the following day, but developed Afib with RVR requiring cardizem, as well as digoxin and lopressor. After he was stabilized, repeat EGD noted large stable clot with intact clip on gastric side of GE junction. He has been admitted to our ICU, intubated and started on pressors and Heparin. The nurse said he had a couple of stools with "old blood." Hgb has been stable when compared to Hgb at discharge from [facility]. He is noted to have elevated LFTs. They were normal at the time of admit to [facility]. This is likely due to ischemia/sepsis. WBC count elevated, but afebrile. CXR showed worsening consolidation bilaterally.         Past Medical History:   Diagnosis Date    *Atrial fibrillation     Acute hypoxemic respiratory failure     Atrial fibrillation     Bilateral carotid artery disease 8/10/2015    Chronic obstructive pulmonary disease with acute lower respiratory infection     COPD (chronic obstructive pulmonary disease)     Diabetes mellitus     Gout     Hyperlipidemia     Hypertension     Low testosterone     Pneumonia     Stroke 1/3/15 L occiput    Unspecified disorder of kidney " and ureter        Past Surgical History:   Procedure Laterality Date    EYE SURGERY Bilateral 5/21/15, 7/16/15    cataract w/IOL    FRACTURE SURGERY Left     ORIF  heel fx, hardware later removed     TONSILLECTOMY  194 approx       Review of patient's allergies indicates:   Allergen Reactions    No known drug allergies      Family History     Problem Relation (Age of Onset)    Alcohol abuse Father    Arrhythmia Brother    Diabetes Brother, Sister    Gout Mother, Brother    Heart disease Mother, Brother    Hypertension Mother, Father    Stroke Father        Tobacco Use    Smoking status: Former Smoker     Packs/day: 2.00     Years: 30.00     Pack years: 60.00     Types: Cigarettes     Last attempt to quit: 1995     Years since quittin.7    Smokeless tobacco: Former User     Types: Chew     Quit date: 8/10/1998   Substance and Sexual Activity    Alcohol use: Yes     Alcohol/week: 1.2 oz     Types: 2 Cans of beer per week    Drug use: No    Sexual activity: No     Partners: Female     Review of Systems   Unable to perform ROS: Intubated     Objective:     Vital Signs (Most Recent):  Temp: 97.4 °F (36.3 °C) (18 0705)  Pulse: 95 (18 1153)  Resp: (!) 22 (18 1153)  BP: (!) 70/46 (18 1121)  SpO2: (!) 94 % (18 1155) Vital Signs (24h Range):  Temp:  [97.4 °F (36.3 °C)-99.7 °F (37.6 °C)] 97.4 °F (36.3 °C)  Pulse:  [] 95  Resp:  [14-32] 22  SpO2:  [85 %-99 %] 94 %  BP: ()/() 70/46     Weight: 95 kg (209 lb 7 oz) (18 0505)  Body mass index is 30.05 kg/m².      Intake/Output Summary (Last 24 hours) at 2018 1304  Last data filed at 2018 0900  Gross per 24 hour   Intake 3153.33 ml   Output 850 ml   Net 2303.33 ml       Lines/Drains/Airways     Central Venous Catheter Line                 Percutaneous Central Line Insertion/Assessment - triple lumen  18 1124 left internal jugular less than 1 day          Drain                 NG/OG Tube  09/11/18 1123 orogastric Center mouth less than 1 day         Urethral Catheter 09/11/18 1123 Double-lumen less than 1 day          Airway                 Airway - Non-Surgical 09/11/18 1110 Endotracheal Tube less than 1 day          Peripheral Intravenous Line                 Peripheral IV - Single Lumen 09/10/18 1949 Left Antecubital less than 1 day                Physical Exam   Constitutional: He appears well-developed and well-nourished. No distress.   HENT:   Head: Normocephalic and atraumatic.   Eyes: EOM are normal.   Cardiovascular: Normal rate and regular rhythm.   Pulmonary/Chest:   Intubated. No wheezing.    Abdominal: Soft. Bowel sounds are normal. He exhibits no distension. There is no tenderness.   Neurological:   Opens eyes and nods.    Skin: He is not diaphoretic.   Psychiatric: His behavior is normal.       Significant Labs:  CBC:   Recent Labs   Lab  09/10/18   2045  09/11/18   0237  09/11/18   0627  09/11/18   1153   WBC  16.46*   --   17.31*  17.00*   HGB  8.4*  7.5*  8.2*  8.2*  7.5*   HCT  24.6*  21.9*  24.5*  24.5*  22.6*   PLT  136*   --   147*  152     CMP:   Recent Labs   Lab  09/11/18   1200   GLU  130*   CALCIUM  7.1*   ALBUMIN  2.2*   PROT  5.2*   NA  136   K  3.9   CO2  27   CL  103   BUN  42*   CREATININE  1.2   ALKPHOS  80   ALT  124*   AST  55*   BILITOT  1.6*     Coagulation:   Recent Labs   Lab  09/11/18   1153   INR  1.2   APTT  32.8*       Significant Imaging:  Imaging results within the past 24 hours have been reviewed.    Assessment/Plan:     * Acute upper GI bleed with acute blood loss anemia    UGI bleed was secondary to a lesion at the GE junction.   No active bleeding at the time of last scope. However, he is being put on Heparin for his cardiac disease.   Recommend monitoring H/H closely and transfuse as needed.   Contact GI for signs or symptoms of active bleeding.           Elevated LFTs    Elevation is acute and likely related to his acute decompensation.  Monitor  LFTs daily.         Atrial fibrillation, permanent    Management per Cardiology.             Thank you for your consult. I will follow-up with patient. Please contact us if you have any additional questions.    Bob James PA-C  Gastroenterology  [facility] [place]

## 2018-09-11 NOTE — ASSESSMENT & PLAN NOTE
- EGD 9/6 with clot at GE junction s/p clipping and epi injection.  Repeat EGD 9/7 negative for active bleeding.  - Follow serial H&H and transfuse as needed.  - Continue IV Protonix daily.  - Hold Eliquis.  - NPO, IV hydration.  - GI consult in AM.

## 2018-09-11 NOTE — ASSESSMENT & PLAN NOTE
- Cr. Stable at 1.4 (baseline 1.3)  - Avoid nephrotoxic agents.  IV hydration.  Strict I&O's.  - Follow daily BMP.

## 2018-09-11 NOTE — CONSULTS
[facility]  Cardiology  Consult Note    Patient Name: Eleazar Solo  MRN: [id number]  Admission Date: 9/10/2018  Hospital Length of Stay: 1 days  Code Status: Full Code   Attending Provider: Prachi Boyer MD   Consulting Provider: BAKARI Plasencia  Primary Care Physician: Poly Fitch MD  Principal Problem:Acute upper GI bleed    Patient information was obtained from ER records.     Inpatient consult to Cardiology  Consult performed by: BAKARI Plasnecia  Consult ordered by: Neyda Lawler NP  Reason for consult: A-fib with RVR         Subjective:     Chief Complaint:  Initially presented with hemoptysis to [place]      HPI:   Mr. Solo is a 77yo male with a PMHx of permanent AF on Eliquis, HTN, HLD, COPD, DM II, and h/o CVA, who was transferred from [facility] per family request as patient is followed OP by [facility] physicians.  He was originally admitted to [facility] on 9/6 for acute upper GI bleed with blood loss anemia (Hb 12 > 7.4 > 7.9).  Patient received total of 2 units PRBC and Eliquis held (last dose 9/6 AM).  He underwent EGD on 9/6 that showed a clot at the GE junction which was clipped  and injected with epi.  There was concern for further bleeding, therefore, repeat EGD was performed on 9/7 which showed no evidence of active bleeding.  During admission, patient developed AFib with RVR and was placed on diltiazem drip and PO dig.  CXR on 9/8 showed left pneumonia, IV Rocephin started.  Prior to transfer, vital signs and labs stable.  Upon arrival to [facility] ICU, patient hypotensive (SBP 70-80's), now on pressors and was hypoxic requiring continuous BiPAP.  Remains in AFib with controlled rate.  Repeat CXR showed RUL pneumonia.  Repeat labs resulted WBC 16.5, H&H 8.4/24.6, plt 136, BUN 38, cr. 1.4, AST 77, . ABG with pH 7.382, pCO2 44, pO2 60, HCO3 26.  Patient c/o SOB which is resolved while on BiPAP. CXR today shows marked amount of  alveolar consolidation in the lower 2/3 of both lungs.  The this represents an interval worsening of the appearance of the lungs and is characteristic of pneumonia. Decision made to intubate patient  Due to resp distress and wheezing.  H/H this morning 7.5/22.6          Past Medical History:   Diagnosis Date    *Atrial fibrillation     Acute hypoxemic respiratory failure     Atrial fibrillation     Bilateral carotid artery disease 8/10/2015    Chronic obstructive pulmonary disease with acute lower respiratory infection     COPD (chronic obstructive pulmonary disease)     Diabetes mellitus     Gout     Hyperlipidemia     Hypertension     Low testosterone     Pneumonia     Stroke 1/3/15 L occiput    Unspecified disorder of kidney and ureter        Past Surgical History:   Procedure Laterality Date    EYE SURGERY Bilateral 5/21/15, 7/16/15    cataract w/IOL    FRACTURE SURGERY Left 1986    ORIF  heel fx, hardware later removed     TONSILLECTOMY  1948 approx       Review of patient's allergies indicates:   Allergen Reactions    No known drug allergies        No current facility-administered medications on file prior to encounter.      Current Outpatient Medications on File Prior to Encounter   Medication Sig    allopurinol (ZYLOPRIM) 100 MG tablet Take 1 tablet (100 mg total) by mouth once daily.    blood sugar diagnostic (TRUETEST TEST STRIPS) Strp 1 strip by Misc.(Non-Drug; Combo Route) route daily as needed. [employer] True Metrix Test Strips    blood-glucose meter (FREESTYLE SYSTEM KIT) kit [employer] TRUE METRIX AIR METER Use as instructed for TWICE DAILY testing    cyanocobalamin (VITAMIN B-12) 100 MCG tablet Take 10 tablets (1,000 mcg total) by mouth once daily. (Patient taking differently: Take by mouth once daily. )    digoxin (LANOXIN) 125 mcg tablet TAKE 1 TABLET ONE TIME DAILY    ELIQUIS 5 mg Tab TAKE 1 TABLET TWICE DAILY    furosemide (LASIX) 20 MG tablet Take 1 tablet (20 mg total) by  mouth daily as needed. As needed for swelling    glimepiride (AMARYL) 1 MG tablet Take 1 tablet (1 mg total) by mouth before breakfast.    hydroCHLOROthiazide (HYDRODIURIL) 25 MG tablet Take 1 tablet (25 mg total) by mouth once daily.    lancets (TRUEPLUS LANCETS) 28 gauge Misc 1 lancet by Misc.(Non-Drug; Combo Route) route daily as needed. [employer] brand    losartan (COZAAR) 100 MG tablet TAKE 1 TABLET EVERY DAY    menthol (BIOFREEZE, MENTHOL,) 4 % Gel Apply topically.    potassium chloride SA (K-DUR,KLOR-CON) 20 MEQ tablet Take 1 tablet (20 mEq total) by mouth daily as needed (with taking furosemide).    rosuvastatin (CRESTOR) 10 MG tablet Take 1 tablet (10 mg total) by mouth once daily. Stop simvastatin    verapamil (CALAN-SR) 240 MG CR tablet Take 1 tablet (240 mg total) by mouth once daily.    VIT A/VIT C/VIT E/ZINC/COPPER (PRESERVISION AREDS ORAL) Take 1 capsule by mouth 2 (two) times daily.     Family History     Problem Relation (Age of Onset)    Alcohol abuse Father    Arrhythmia Brother    Diabetes Brother, Sister    Gout Mother, Brother    Heart disease Mother, Brother    Hypertension Mother, Father    Stroke Father        Tobacco Use    Smoking status: Former Smoker     Packs/day: 2.00     Years: 30.00     Pack years: 60.00     Types: Cigarettes     Last attempt to quit: 1995     Years since quittin.7    Smokeless tobacco: Former User     Types: Chew     Quit date: 8/10/1998   Substance and Sexual Activity    Alcohol use: Yes     Alcohol/week: 1.2 oz     Types: 2 Cans of beer per week    Drug use: No    Sexual activity: No     Partners: Female     Review of Systems   Reason unable to perform ROS: patient intubated and sedated      Objective:     Vital Signs (Most Recent):  Temp: 97.4 °F (36.3 °C) (18 0705)  Pulse: 87 (18 1346)  Resp: 18 (18 134)  BP: (!) 104/49 (18 1346)  SpO2: (!) 92 % (18 1346) Vital Signs (24h Range):  Temp:  [97.4 °F (36.3 °C)-99.7  °F (37.6 °C)] 97.4 °F (36.3 °C)  Pulse:  [] 87  Resp:  [14-32] 18  SpO2:  [85 %-99 %] 92 %  BP: ()/() 104/49     Weight: 95 kg (209 lb 7 oz)  Body mass index is 30.05 kg/m².    SpO2: (!) 92 %  O2 Device (Oxygen Therapy): ventilator      Intake/Output Summary (Last 24 hours) at 9/11/2018 1443  Last data filed at 9/11/2018 0900  Gross per 24 hour   Intake 3153.33 ml   Output 850 ml   Net 2303.33 ml       Lines/Drains/Airways     Central Venous Catheter Line                 Percutaneous Central Line Insertion/Assessment - triple lumen  09/11/18 1124 left internal jugular less than 1 day          Drain                 NG/OG Tube 09/11/18 1123 orogastric Center mouth less than 1 day         Urethral Catheter 09/11/18 1123 Double-lumen less than 1 day          Airway                 Airway - Non-Surgical 09/11/18 1110 Endotracheal Tube less than 1 day          Peripheral Intravenous Line                 Peripheral IV - Single Lumen 09/10/18 1949 Left Antecubital less than 1 day                Physical Exam   Constitutional: He appears well-developed and well-nourished. He is sedated and intubated.   Neck: Neck supple. No JVD present.   Cardiovascular: Normal rate and normal pulses. An irregularly irregular rhythm present. Exam reveals no friction rub.   Murmur heard.   Harsh midsystolic murmur is present at the upper right sternal border radiating to the neck.  Pulmonary/Chest: Effort normal and breath sounds normal. He is intubated. No respiratory distress. He has no wheezes. He has no rales.   Abdominal: Soft. Bowel sounds are normal. He exhibits no distension.   Musculoskeletal: He exhibits no edema or tenderness.   Neurological:   Patient currently sedated      Skin: Skin is warm and dry. No rash noted.   Nursing note and vitals reviewed.      Significant Labs:   All pertinent lab results from the last 24 hours have been reviewed. and   Recent Lab Results       09/11/18  1234 09/11/18  1200  09/11/18  1153 09/11/18  0906 09/11/18  0848      Albumin  2.2        Alkaline Phosphatase  80        Allens Test     Pass     ALT  124        Anion Gap  6        Appearance, UA  Clear        aPTT   32.8  Comment:  aPTT therapeutic range = 39-69 seconds       AST  55        Bacteria, UA          Baso #   0.01       Basophil%   0.1       Bilirubin (UA)  1+  Comment:  Positive urine bilirubin is not confirmed. Correlate with   serum bilirubin and clinical presentation.          Total Bilirubin  1.6  Comment:  For infants and newborns, interpretation of results should be based  on gestational age, weight and in agreement with clinical  observations.  Premature Infant recommended reference ranges:  Up to 24 hours.............<8.0 mg/dL  Up to 48 hours............<12.0 mg/dL  3-5 days..................<15.0 mg/dL  6-29 days.................<15.0 mg/dL          Blood Culture, Routine          BNP          Site     RR     BUN, Bld  42        Calcium  7.1        Chloride  103        CO2  27        Color, UA  Yellow        Creatinine  1.2        DelSys     CPAP/BiPAP     Differential Method   Automated       eGFR if   >60        eGFR if non   58  Comment:  Calculation used to obtain the estimated glomerular filtration  rate (eGFR) is the CKD-EPI equation.           Eos #   0.0       Eosinophil%   0.0       EP     5     FiO2     50     Glucose  130        Glucose, UA  Negative        Gran # (ANC)   13.4       Gran%   79.2       Group & Rh          Hematocrit   22.6       Hemoglobin   7.5       Hyaline Casts, UA          INDIRECT NUHA          Coumadin Monitoring INR   1.2  Comment:  Coumadin Therapy:  2.0 - 3.0 for INR for all indicators except mechanical heart valves  and antiphospholipid syndromes which should use 2.5 - 3.5.         IP     12     Ketones, UA  Negative        Lactate, Kirk    1.4  Comment:  Falsely low lactic acid results can be found in samples   containing >=13.0 mg/dL  total bilirubin and/or >=3.5 mg/dL   direct bilirubin.        Leukocytes, UA  Negative        Lymph #   1.2       Lymph%   6.9       Magnesium          MCH   31.5       MCHC   33.2       MCV   95       Microscopic Comment          Mode     BiPAP     Mono #   2.4       Mono%   14.4       MPV   11.2       Nitrite, UA  Negative        Occult Blood UA  Negative        pH, UA  6.0        Phosphorus          Platelets   152       POC BE     -2     POC HCO3     24.7     POC PCO2     49.9     POC PH     7.303     POC PO2     62     POC SATURATED O2     88     POCT Glucose 126         Poly          Potassium  3.9        Total Protein  5.2        Protein, UA  Trace  Comment:  Recommend a 24 hour urine protein or a urine   protein/creatinine ratio if globulin induced proteinuria is  clinically suspected.          Protime   12.2       Rate     16     RBC   2.38       RBC, UA          RDW   15.9       Sample     ARTERIAL     Sodium  136        Specific Gravity, UA  1.020        Specimen UA  Urine, Catheterized        TSH          Urobilinogen, UA  4.0-6.0        WBC, UA          WBC   17.00                   09/11/18  0627 09/11/18  0514 09/11/18  0237 09/11/18  0230 09/10/18  2250      Albumin 2.5         Alkaline Phosphatase 91         Allens Test                   Anion Gap 7         Appearance, UA    Clear      aPTT          AST 74         Bacteria, UA    Rare      Baso # 0.01         Basophil% 0.1         Bilirubin (UA)    SEE COMMENT  Comment:  Color may interfere with results      Total Bilirubin 1.8  Comment:  For infants and newborns, interpretation of results should be based  on gestational age, weight and in agreement with clinical  observations.  Premature Infant recommended reference ranges:  Up to 24 hours.............<8.0 mg/dL  Up to 48 hours............<12.0 mg/dL  3-5 days..................<15.0 mg/dL  6-29 days.................<15.0 mg/dL           Blood Culture, Routine     No Growth to date[P]     BNP           Site          BUN, Bld 42         Calcium 7.5         Chloride 102         CO2 27         Color, UA    Maria Fernanda      Creatinine 1.3         DelSys          Differential Method Automated         eGFR if  >60         eGFR if non  53  Comment:  Calculation used to obtain the estimated glomerular filtration  rate (eGFR) is the CKD-EPI equation.            Eos # 0.0         Eosinophil% 0.1         EP          FiO2          Glucose 139         Glucose, UA    SEE COMMENT  Comment:  Color may interfere with results      Gran # (ANC) 13.3         Gran% 76.9         Group & Rh O POS         Hematocrit 24.5  21.9        24.5         Hemoglobin 8.2  7.5        8.2         Hyaline Casts, UA    20      INDIRECT NUHA NEG         Coumadin Monitoring INR          IP          Ketones, UA    SEE COMMENT  Comment:  Color may interfere with results      Lactate, Kirk          Leukocytes, UA    SEE COMMENT  Comment:  Color may interfere with results      Lymph # 1.3         Lymph% 7.5         Magnesium 2.2         MCH 31.8         MCHC 33.5         MCV 95         Microscopic Comment    SEE COMMENT  Comment:  Other formed elements not mentioned in the report are not   present in the microscopic examination.         Mode          Mono # 2.7         Mono% 15.4         MPV 11.1         Nitrite, UA    SEE COMMENT  Comment:  Color may interfere with results      Occult Blood UA    SEE COMMENT  Comment:  Color may interfere with results      pH, UA    SEE COMMENT  Comment:  Color may interfere with results      Phosphorus 1.9         Platelets 147         POC BE          POC HCO3          POC PCO2          POC PH          POC PO2          POC SATURATED O2          POCT Glucose  153        Poly          Potassium 4.1         Total Protein 5.9         Protein, UA    SEE COMMENT  Comment:  Recommend a 24 hour urine protein or a urine   protein/creatinine ratio if globulin induced proteinuria is  clinically  suspected.  Color may interfere with results        Protime          Rate          RBC 2.58         RBC, UA    1      RDW 15.7         Sample          Sodium 136         Specific Holt, UA    SEE COMMENT  Comment:  Color may interfere with results      Specimen UA    Urine, Clean Catch      TSH          Urobilinogen, UA    SEE COMMENT  Comment:  Color may interfere with results      WBC, UA    1      WBC 17.31                     09/10/18  2214 09/10/18  2213 09/10/18  2137 09/10/18  2045 09/10/18  2022      Albumin    2.4      Alkaline Phosphatase    89      Allens Test     Pass     ALT    155      Anion Gap    10      Appearance, UA          aPTT    32.7  Comment:  aPTT therapeutic range = 39-69 seconds      AST    77      Bacteria, UA          Baso #    0.01      Basophil%    0.1      Bilirubin (UA)          Total Bilirubin    1.7  Comment:  For infants and newborns, interpretation of results should be based  on gestational age, weight and in agreement with clinical  observations.  Premature Infant recommended reference ranges:  Up to 24 hours.............<8.0 mg/dL  Up to 48 hours............<12.0 mg/dL  3-5 days..................<15.0 mg/dL  6-29 days.................<15.0 mg/dL        Blood Culture, Routine  No Growth to date[P]        BNP    845  Comment:  Values of less than 100 pg/ml are consistent with non-CHF populations.      Site     LR     BUN, Bld    38      Calcium    7.6      Chloride    100      CO2    24      Color, UA          Creatinine    1.4      DelSys     CPAP/BiPAP     Differential Method    Automated      eGFR if     56      eGFR if non     48  Comment:  Calculation used to obtain the estimated glomerular filtration  rate (eGFR) is the CKD-EPI equation.         Eos #    0.0      Eosinophil%    0.0      EP     5     FiO2     50     Glucose    163      Glucose, UA          Gran # (ANC)    11.7      Gran%    71.4      Group & Rh          Hematocrit    24.6       Hemoglobin    8.4      Hyaline Casts, UA          INDIRECT NUHA          Coumadin Monitoring INR    1.2  Comment:  Coumadin Therapy:  2.0 - 3.0 for INR for all indicators except mechanical heart valves  and antiphospholipid syndromes which should use 2.5 - 3.5.        IP     10     Ketones, UA          Lactate, Kirk 1.5  Comment:  Falsely low lactic acid results can be found in samples   containing >=13.0 mg/dL total bilirubin and/or >=3.5 mg/dL   direct bilirubin.           Leukocytes, UA          Lymph #    1.3      Lymph%    7.8      Magnesium    2.2      MCH    32.3      MCHC    34.1      MCV    95      Microscopic Comment          Mode     BiPAP     Mono #    3.5      Mono%    21.2      MPV    11.1      Nitrite, UA          Occult Blood UA          pH, UA          Phosphorus          Platelets    136      POC BE     1     POC HCO3     26.4     POC PCO2     44.4     POC PH     7.382     POC PO2     60     POC SATURATED O2     90     POCT Glucose   164       Poly    Occasional      Potassium    4.1      Total Protein    5.6      Protein, UA          Protime    12.4      Rate     16     RBC    2.60      RBC, UA          RDW    15.8      Sample     ARTERIAL     Sodium    134      Specific Gravity, UA          Specimen UA          TSH    1.577      Urobilinogen, UA          WBC, UA          WBC    16.46                      Significant Imaging: Echocardiogram:   2D echo with color flow doppler:   Results for orders placed or performed during the hospital encounter of 01/02/15   2D echo with color flow doppler   Result Value Ref Range    EF 60 55 - 65    Mitral Valve Regurgitation MILD     Aortic Valve Stenosis MILD (A)     Est. PA Systolic Pressure 35.04     Tricuspid Valve Regurgitation TRIVIAL TO MILD     and X-Ray: CXR: X-Ray Chest 1 View (CXR):   Results for orders placed or performed during the hospital encounter of 09/10/18   X-Ray Chest 1 View    Narrative    EXAMINATION:  XR CHEST 1 VIEW    CLINICAL  HISTORY:  sob;    COMPARISON:  09/10/2018    FINDINGS:  The size of the heart is normal. The lungs are clear.  There are emphysematous changes in the apex of the right lung.  There is a marked amount of alveolar consolidation in the lower 2/3 of both lungs.  There is no pneumothorax.  The costophrenic angles are sharp.      Impression    1. There is a marked amount of alveolar consolidation in the lower 2/3 of both lungs.  The this represents an interval worsening of the appearance of the lungs and is characteristic of pneumonia.  2. There are emphysematous changes in the apex of the right lung.  .      Electronically signed by: Eleazar Samuel MD  Date:    09/11/2018  Time:    08:43    and X-Ray Chest PA and Lateral (CXR): No results found for this visit on 09/10/18.    Assessment and Plan:     * Acute upper GI bleed with acute blood loss anemia    -Has been transfused 2 units at transferring facility  -Still appears to be anemic on morning labs  -Consider repeat transfusion   -Recommend holding anticoagulation until H/H proven to be more stable and greater than 8.0        Severe sepsis secondary to pneumonia of right upper lobe with acute hypoxic respiratory failure    Being addressed by primary team         Essential hypertension    -BP improving with pressor support  -continue current medical management for now  -Home meds on hold         Atrial fibrillation, permanent    -Patient with chronic A-fib that is currently controlled with Digoxin  -BP unable to tolerate BB or CCB. Is requiring pressor support  -Do not recommend restarting anticoagulation at this time due to severe anemia, recent GIB. Consider restarting anticoagulation when H/H more stable and preferably greater than 8.0  -Will check 2D echo            VTE Risk Mitigation (From admission, onward)        Ordered     IP VTE HIGH RISK PATIENT  Once      09/11/18 0425     Reason for No Pharmacological VTE Prophylaxis  Once      09/11/18 0425     Place  sequential compression device  Until discontinued      09/11/18 9323        Chart reviewed. Patient examined by Dr. Belle and agrees with plan that has been outlined.     Thank you for your consult. I will follow-up with patient. Please contact us if you have any additional questions.    BAKARI Plasencia  Cardiology   [facility]

## 2018-09-11 NOTE — H&P
Tissue Perfusion Assessment        Vital signs reviewed. A focused perfusion assessment was completed.      Neyda Lawler

## 2018-09-11 NOTE — PROCEDURES
"Eleazar Solo is a 78 y.o. male patient.    Temp: 97.4 °F (36.3 °C) (09/11/18 0705)  Pulse: 95 (09/11/18 1153)  Resp: (!) 22 (09/11/18 1153)  BP: (!) 70/46 (09/11/18 1121)  SpO2: (!) 94 % (09/11/18 1155)  Weight: 95 kg (209 lb 7 oz) (09/11/18 0505)  Height: 5' 10" (177.8 cm) (09/11/18 0505)       Intubation  Date/Time: 9/11/2018 11:00 AM  Location procedure was performed: [facility] INTENSIVE CARE UNIT  Performed by: London Ruelas NP  Authorized by: London Ruelas NP   Pre-operative diagnosis: resp failure  Post-operative diagnosis: hypoxic resp failure  Consent Done: Not Needed  Indications: respiratory distress,  respiratory failure and  hypoxemia  Description of findings: none   Intubation method: oral  Patient status: awake  Preoxygenation: bag valve mask  Sedatives: propofol  Paralytic: none  Laryngoscope size: Mac 4  Tube size: 8.0 mm  Tube type: cuffed  Number of attempts: 2  Ventilation between attempts: BVM  Cricoid pressure: no  Cords visualized: yes  Post-procedure assessment: chest rise and CO2 detector  Breath sounds: equal and absent over the epigastrium and rales/crackles  Cuff inflated: yes  ETT to lip: 25 cm  Tube secured with: ETT mae  Chest x-ray interpreted by me.  Chest x-ray findings: endotracheal tube too high  Tube repositioned: tube repositioned successfully  Patient tolerance: Patient tolerated the procedure well with no immediate complications  Complications: No  Estimated blood loss (mL): 0  Specimens: No  Implants: No          London Ruelas  9/11/2018  "

## 2018-09-11 NOTE — SUBJECTIVE & OBJECTIVE
Past Medical History:   Diagnosis Date    *Atrial fibrillation     Atrial fibrillation     Bilateral carotid artery disease 8/10/2015    COPD (chronic obstructive pulmonary disease)     Diabetes mellitus     Gout     Hyperlipidemia     Hypertension     Low testosterone     Pneumonia     Stroke 1/3/15 L occiput    Unspecified disorder of kidney and ureter        Past Surgical History:   Procedure Laterality Date    EYE SURGERY Bilateral 5/21/15, 7/16/15    cataract w/IOL    FRACTURE SURGERY Left 1986    ORIF  heel fx, hardware later removed     TONSILLECTOMY  1948 approx       Review of patient's allergies indicates:   Allergen Reactions    No known drug allergies        No current facility-administered medications on file prior to encounter.      Current Outpatient Medications on File Prior to Encounter   Medication Sig    allopurinol (ZYLOPRIM) 100 MG tablet Take 1 tablet (100 mg total) by mouth once daily.    blood sugar diagnostic (TRUETEST TEST STRIPS) Strp 1 strip by Misc.(Non-Drug; Combo Route) route daily as needed. [employer] True Metrix Test Strips    blood-glucose meter (FREESTYLE SYSTEM KIT) kit [employer] TRUE METRIX AIR METER Use as instructed for TWICE DAILY testing    cyanocobalamin (VITAMIN B-12) 100 MCG tablet Take 10 tablets (1,000 mcg total) by mouth once daily. (Patient taking differently: Take by mouth once daily. )    digoxin (LANOXIN) 125 mcg tablet TAKE 1 TABLET ONE TIME DAILY    ELIQUIS 5 mg Tab TAKE 1 TABLET TWICE DAILY    furosemide (LASIX) 20 MG tablet Take 1 tablet (20 mg total) by mouth daily as needed. As needed for swelling    glimepiride (AMARYL) 1 MG tablet Take 1 tablet (1 mg total) by mouth before breakfast.    hydroCHLOROthiazide (HYDRODIURIL) 25 MG tablet Take 1 tablet (25 mg total) by mouth once daily.    lancets (TRUEPLUS LANCETS) 28 gauge Misc 1 lancet by Misc.(Non-Drug; Combo Route) route daily as needed. [employer] brand    losartan (COZAAR) 100 MG tablet  TAKE 1 TABLET EVERY DAY    menthol (BIOFREEZE, MENTHOL,) 4 % Gel Apply topically.    potassium chloride SA (K-DUR,KLOR-CON) 20 MEQ tablet Take 1 tablet (20 mEq total) by mouth daily as needed (with taking furosemide).    rosuvastatin (CRESTOR) 10 MG tablet Take 1 tablet (10 mg total) by mouth once daily. Stop simvastatin    verapamil (CALAN-SR) 240 MG CR tablet Take 1 tablet (240 mg total) by mouth once daily.    VIT A/VIT C/VIT E/ZINC/COPPER (PRESERVISION AREDS ORAL) Take 1 capsule by mouth 2 (two) times daily.     Family History     Problem Relation (Age of Onset)    Alcohol abuse Father    Arrhythmia Brother    Diabetes Brother, Sister    Gout Mother, Brother    Heart disease Mother, Brother    Hypertension Mother, Father    Stroke Father        Tobacco Use    Smoking status: Former Smoker     Packs/day: 2.00     Years: 30.00     Pack years: 60.00     Types: Cigarettes     Last attempt to quit: 1995     Years since quittin.7    Smokeless tobacco: Former User     Types: Chew     Quit date: 8/10/1998   Substance and Sexual Activity    Alcohol use: Yes     Alcohol/week: 1.2 oz     Types: 2 Cans of beer per week    Drug use: No    Sexual activity: No     Partners: Female     Review of Systems   Constitutional: Negative for chills, diaphoresis, fatigue and fever.   HENT: Negative for congestion, sore throat and trouble swallowing.    Eyes: Negative for visual disturbance.   Respiratory: Negative for cough, shortness of breath and wheezing.    Cardiovascular: Negative for chest pain, palpitations and leg swelling.   Gastrointestinal: Negative for abdominal distention, abdominal pain, blood in stool, constipation, diarrhea, nausea and vomiting.   Genitourinary: Negative for decreased urine volume, difficulty urinating, dysuria, frequency, hematuria and urgency.   Musculoskeletal: Negative for arthralgias, back pain and myalgias.   Skin: Negative for pallor, rash and wound.   Neurological: Negative for  dizziness, syncope, facial asymmetry, speech difficulty, weakness, light-headedness, numbness and headaches.   Psychiatric/Behavioral: Negative for confusion. The patient is not nervous/anxious.    All other systems reviewed and are negative.    Objective:     Vital Signs (Most Recent):  Temp: 98.6 °F (37 °C) (09/10/18 1949)  Pulse: 64 (09/10/18 2033)  Resp: 14 (09/10/18 2033)  BP: (!) 89/42 (09/10/18 2015)  SpO2: (!) 92 % (09/10/18 2033) Vital Signs (24h Range):  Temp:  [98.6 °F (37 °C)-98.7 °F (37.1 °C)] 98.6 °F (37 °C)  Pulse:  [] 64  Resp:  [14-25] 14  SpO2:  [91 %-95 %] 92 %  BP: ()/(41-62) 89/42     Weight: 94.9 kg (209 lb 3.5 oz)  Body mass index is 30.02 kg/m².    Physical Exam   Constitutional: He is oriented to person, place, and time. He appears well-developed and well-nourished. No distress.   HENT:   Head: Normocephalic and atraumatic.   Eyes: Conjunctivae are normal.   PERRL; EOM intact.   Neck: Normal range of motion. Neck supple. No JVD present.   Cardiovascular: Normal rate and intact distal pulses. An irregularly irregular rhythm present. Exam reveals no gallop.   No murmur heard.  Pulses:       Radial pulses are 2+ on the right side, and 2+ on the left side.        Dorsalis pedis pulses are 2+ on the right side, and 2+ on the left side.        Posterior tibial pulses are 2+ on the right side, and 2+ on the left side.   Normal S1, variable S2.   Pulmonary/Chest: Effort normal. No accessory muscle usage. No tachypnea. No respiratory distress. He has decreased breath sounds. He has no wheezes. He has no rhonchi. He has no rales.   Abdominal: Soft. Bowel sounds are normal. He exhibits no distension. There is no tenderness. There is no rigidity, no rebound, no guarding and no CVA tenderness.   Musculoskeletal: Normal range of motion. He exhibits no edema, tenderness or deformity.   Neurological: He is alert and oriented to person, place, and time. No cranial nerve deficit or sensory  deficit.   Skin: Skin is warm, dry and intact. No rash noted. He is not diaphoretic. No cyanosis or erythema.   Psychiatric: He has a normal mood and affect. His speech is normal and behavior is normal. Cognition and memory are normal.   Nursing note and vitals reviewed.          Significant Labs:   Results for orders placed or performed during the hospital encounter of 09/10/18   ISTAT PROCEDURE   Result Value Ref Range    POC PH 7.382 7.35 - 7.45    POC PCO2 44.4 35 - 45 mmHg    POC PO2 60 (L) 80 - 100 mmHg    POC HCO3 26.4 24 - 28 mmol/L    POC BE 1 -2 to 2 mmol/L    POC SATURATED O2 90 (L) 95 - 100 %    Rate 16     Sample ARTERIAL     Site LR     Allens Test Pass     DelSys CPAP/BiPAP     Mode BiPAP     FiO2 50     IP 10     EP 5       All pertinent labs within the past 24 hours have been reviewed.    Significant Imaging:      I have reviewed all pertinent imaging results/findings within the past 24 hours.     EKG: EKG from transferring facility reviewed and showed AFib with RVR, LAD, nonspecific ST-T abnormality, no significant change from previous tracings.

## 2018-09-11 NOTE — ASSESSMENT & PLAN NOTE
- Rate controlled at present.  Monitor telemetry.  - Continue PO dig.  Hold diltiazem due to hypotension, resume once BP can tolerate.  - NOAC on hold as above.  - Cardiology consult in AM.

## 2018-09-11 NOTE — PROGRESS NOTES
Patient given scheduled fluid bolus of 1L of normal saline. Patient systolic blood pressure still currently still in the 80's. MAP is 55-58. Current blood pressure is 88/39 with a MAP of 57. C. LINDSEY Lawler notified. NP also made aware that patient has not voided since arrival to ICU. Patient bladder scanned and currently has 175mL of urine in bladder. NP made aware. NP orders to continue to monitor BP. NP orders to continue with normal saline @ 125mL/hr and monitor patient. Patient has no complaints of shortness of breath at this time. Patient in controlled A. Fib on the monitor with a heart rate 68. Patient on Bipap with O2 saturation of 96%. Patient has no complaints of pain. No distress noted. Will continue to monitor.

## 2018-09-11 NOTE — SUBJECTIVE & OBJECTIVE
Past Medical History:   Diagnosis Date    *Atrial fibrillation     Acute hypoxemic respiratory failure     Atrial fibrillation     Bilateral carotid artery disease 8/10/2015    Chronic obstructive pulmonary disease with acute lower respiratory infection     COPD (chronic obstructive pulmonary disease)     Diabetes mellitus     Gout     Hyperlipidemia     Hypertension     Low testosterone     Pneumonia     Stroke 1/3/15 L occiput    Unspecified disorder of kidney and ureter        Past Surgical History:   Procedure Laterality Date    EYE SURGERY Bilateral 5/21/15, 7/16/15    cataract w/IOL    FRACTURE SURGERY Left 1986    ORIF  heel fx, hardware later removed     TONSILLECTOMY  1948 approx       Review of patient's allergies indicates:   Allergen Reactions    No known drug allergies        No current facility-administered medications on file prior to encounter.      Current Outpatient Medications on File Prior to Encounter   Medication Sig    allopurinol (ZYLOPRIM) 100 MG tablet Take 1 tablet (100 mg total) by mouth once daily.    blood sugar diagnostic (TRUETEST TEST STRIPS) Strp 1 strip by Misc.(Non-Drug; Combo Route) route daily as needed. [employer] True Metrix Test Strips    blood-glucose meter (FREESTYLE SYSTEM KIT) kit [employer] TRUE METRIX AIR METER Use as instructed for TWICE DAILY testing    cyanocobalamin (VITAMIN B-12) 100 MCG tablet Take 10 tablets (1,000 mcg total) by mouth once daily. (Patient taking differently: Take by mouth once daily. )    digoxin (LANOXIN) 125 mcg tablet TAKE 1 TABLET ONE TIME DAILY    ELIQUIS 5 mg Tab TAKE 1 TABLET TWICE DAILY    furosemide (LASIX) 20 MG tablet Take 1 tablet (20 mg total) by mouth daily as needed. As needed for swelling    glimepiride (AMARYL) 1 MG tablet Take 1 tablet (1 mg total) by mouth before breakfast.    hydroCHLOROthiazide (HYDRODIURIL) 25 MG tablet Take 1 tablet (25 mg total) by mouth once daily.    lancets (TRUEPLUS LANCETS) 28  gauge Misc 1 lancet by Misc.(Non-Drug; Combo Route) route daily as needed. [employer] brand    losartan (COZAAR) 100 MG tablet TAKE 1 TABLET EVERY DAY    menthol (BIOFREEZE, MENTHOL,) 4 % Gel Apply topically.    potassium chloride SA (K-DUR,KLOR-CON) 20 MEQ tablet Take 1 tablet (20 mEq total) by mouth daily as needed (with taking furosemide).    rosuvastatin (CRESTOR) 10 MG tablet Take 1 tablet (10 mg total) by mouth once daily. Stop simvastatin    verapamil (CALAN-SR) 240 MG CR tablet Take 1 tablet (240 mg total) by mouth once daily.    VIT A/VIT C/VIT E/ZINC/COPPER (PRESERVISION AREDS ORAL) Take 1 capsule by mouth 2 (two) times daily.     Family History     Problem Relation (Age of Onset)    Alcohol abuse Father    Arrhythmia Brother    Diabetes Brother, Sister    Gout Mother, Brother    Heart disease Mother, Brother    Hypertension Mother, Father    Stroke Father        Tobacco Use    Smoking status: Former Smoker     Packs/day: 2.00     Years: 30.00     Pack years: 60.00     Types: Cigarettes     Last attempt to quit: 1995     Years since quittin.7    Smokeless tobacco: Former User     Types: Chew     Quit date: 8/10/1998   Substance and Sexual Activity    Alcohol use: Yes     Alcohol/week: 1.2 oz     Types: 2 Cans of beer per week    Drug use: No    Sexual activity: No     Partners: Female     Review of Systems   Reason unable to perform ROS: patient intubated and sedated      Objective:     Vital Signs (Most Recent):  Temp: 97.4 °F (36.3 °C) (18 0705)  Pulse: 87 (18 1346)  Resp: 18 (18 1346)  BP: (!) 104/49 (18 1346)  SpO2: (!) 92 % (18 1346) Vital Signs (24h Range):  Temp:  [97.4 °F (36.3 °C)-99.7 °F (37.6 °C)] 97.4 °F (36.3 °C)  Pulse:  [] 87  Resp:  [14-32] 18  SpO2:  [85 %-99 %] 92 %  BP: ()/() 104/49     Weight: 95 kg (209 lb 7 oz)  Body mass index is 30.05 kg/m².    SpO2: (!) 92 %  O2 Device (Oxygen Therapy): ventilator      Intake/Output  Summary (Last 24 hours) at 9/11/2018 1443  Last data filed at 9/11/2018 0900  Gross per 24 hour   Intake 3153.33 ml   Output 850 ml   Net 2303.33 ml       Lines/Drains/Airways     Central Venous Catheter Line                 Percutaneous Central Line Insertion/Assessment - triple lumen  09/11/18 1124 left internal jugular less than 1 day          Drain                 NG/OG Tube 09/11/18 1123 orogastric Center mouth less than 1 day         Urethral Catheter 09/11/18 1123 Double-lumen less than 1 day          Airway                 Airway - Non-Surgical 09/11/18 1110 Endotracheal Tube less than 1 day          Peripheral Intravenous Line                 Peripheral IV - Single Lumen 09/10/18 1949 Left Antecubital less than 1 day                Physical Exam   Constitutional: He appears well-developed and well-nourished. He is sedated and intubated.   Neck: Neck supple. No JVD present.   Cardiovascular: Normal rate and normal pulses. An irregularly irregular rhythm present. Exam reveals no friction rub.   Murmur heard.   Harsh midsystolic murmur is present at the upper right sternal border radiating to the neck.  Pulmonary/Chest: Effort normal and breath sounds normal. He is intubated. No respiratory distress. He has no wheezes. He has no rales.   Abdominal: Soft. Bowel sounds are normal. He exhibits no distension.   Musculoskeletal: He exhibits no edema or tenderness.   Neurological:   Patient currently sedated      Skin: Skin is warm and dry. No rash noted.   Nursing note and vitals reviewed.      Significant Labs:   All pertinent lab results from the last 24 hours have been reviewed. and   Recent Lab Results       09/11/18  1234 09/11/18  1200 09/11/18  1153 09/11/18  0906 09/11/18  0848      Albumin  2.2        Alkaline Phosphatase  80        Allens Test     Pass     ALT  124        Anion Gap  6        Appearance, UA  Clear        aPTT   32.8  Comment:  aPTT therapeutic range = 39-69 seconds       AST  55         Bacteria, UA          Baso #   0.01       Basophil%   0.1       Bilirubin (UA)  1+  Comment:  Positive urine bilirubin is not confirmed. Correlate with   serum bilirubin and clinical presentation.          Total Bilirubin  1.6  Comment:  For infants and newborns, interpretation of results should be based  on gestational age, weight and in agreement with clinical  observations.  Premature Infant recommended reference ranges:  Up to 24 hours.............<8.0 mg/dL  Up to 48 hours............<12.0 mg/dL  3-5 days..................<15.0 mg/dL  6-29 days.................<15.0 mg/dL          Blood Culture, Routine          BNP          Site     RR     BUN, Bld  42        Calcium  7.1        Chloride  103        CO2  27        Color, UA  Yellow        Creatinine  1.2        DelSys     CPAP/BiPAP     Differential Method   Automated       eGFR if   >60        eGFR if non   58  Comment:  Calculation used to obtain the estimated glomerular filtration  rate (eGFR) is the CKD-EPI equation.           Eos #   0.0       Eosinophil%   0.0       EP     5     FiO2     50     Glucose  130        Glucose, UA  Negative        Gran # (ANC)   13.4       Gran%   79.2       Group & Rh          Hematocrit   22.6       Hemoglobin   7.5       Hyaline Casts, UA          INDIRECT NUHA          Coumadin Monitoring INR   1.2  Comment:  Coumadin Therapy:  2.0 - 3.0 for INR for all indicators except mechanical heart valves  and antiphospholipid syndromes which should use 2.5 - 3.5.         IP     12     Ketones, UA  Negative        Lactate, Kirk    1.4  Comment:  Falsely low lactic acid results can be found in samples   containing >=13.0 mg/dL total bilirubin and/or >=3.5 mg/dL   direct bilirubin.        Leukocytes, UA  Negative        Lymph #   1.2       Lymph%   6.9       Magnesium          MCH   31.5       MCHC   33.2       MCV   95       Microscopic Comment          Mode     BiPAP     Mono #   2.4       Mono%    14.4       MPV   11.2       Nitrite, UA  Negative        Occult Blood UA  Negative        pH, UA  6.0        Phosphorus          Platelets   152       POC BE     -2     POC HCO3     24.7     POC PCO2     49.9     POC PH     7.303     POC PO2     62     POC SATURATED O2     88     POCT Glucose 126         Poly          Potassium  3.9        Total Protein  5.2        Protein, UA  Trace  Comment:  Recommend a 24 hour urine protein or a urine   protein/creatinine ratio if globulin induced proteinuria is  clinically suspected.          Protime   12.2       Rate     16     RBC   2.38       RBC, UA          RDW   15.9       Sample     ARTERIAL     Sodium  136        Specific Gravity, UA  1.020        Specimen UA  Urine, Catheterized        TSH          Urobilinogen, UA  4.0-6.0        WBC, UA          WBC   17.00                   09/11/18  0627 09/11/18  0514 09/11/18  0237 09/11/18  0230 09/10/18  2250      Albumin 2.5         Alkaline Phosphatase 91         Allens Test                   Anion Gap 7         Appearance, UA    Clear      aPTT          AST 74         Bacteria, UA    Rare      Baso # 0.01         Basophil% 0.1         Bilirubin (UA)    SEE COMMENT  Comment:  Color may interfere with results      Total Bilirubin 1.8  Comment:  For infants and newborns, interpretation of results should be based  on gestational age, weight and in agreement with clinical  observations.  Premature Infant recommended reference ranges:  Up to 24 hours.............<8.0 mg/dL  Up to 48 hours............<12.0 mg/dL  3-5 days..................<15.0 mg/dL  6-29 days.................<15.0 mg/dL           Blood Culture, Routine     No Growth to date[P]     BNP          Site          BUN, Bld 42         Calcium 7.5         Chloride 102         CO2 27         Color, UA    Maria Fernanda      Creatinine 1.3         DelSys          Differential Method Automated         eGFR if  >60         eGFR if non   53  Comment:  Calculation used to obtain the estimated glomerular filtration  rate (eGFR) is the CKD-EPI equation.            Eos # 0.0         Eosinophil% 0.1         EP          FiO2          Glucose 139         Glucose, UA    SEE COMMENT  Comment:  Color may interfere with results      Gran # (ANC) 13.3         Gran% 76.9         Group & Rh O POS         Hematocrit 24.5  21.9        24.5         Hemoglobin 8.2  7.5        8.2         Hyaline Casts, UA    20      INDIRECT NUHA NEG         Coumadin Monitoring INR          IP          Ketones, UA    SEE COMMENT  Comment:  Color may interfere with results      Lactate, Kirk          Leukocytes, UA    SEE COMMENT  Comment:  Color may interfere with results      Lymph # 1.3         Lymph% 7.5         Magnesium 2.2         MCH 31.8         MCHC 33.5         MCV 95         Microscopic Comment    SEE COMMENT  Comment:  Other formed elements not mentioned in the report are not   present in the microscopic examination.         Mode          Mono # 2.7         Mono% 15.4         MPV 11.1         Nitrite, UA    SEE COMMENT  Comment:  Color may interfere with results      Occult Blood UA    SEE COMMENT  Comment:  Color may interfere with results      pH, UA    SEE COMMENT  Comment:  Color may interfere with results      Phosphorus 1.9         Platelets 147         POC BE          POC HCO3          POC PCO2          POC PH          POC PO2          POC SATURATED O2          POCT Glucose  153        Poly          Potassium 4.1         Total Protein 5.9         Protein, UA    SEE COMMENT  Comment:  Recommend a 24 hour urine protein or a urine   protein/creatinine ratio if globulin induced proteinuria is  clinically suspected.  Color may interfere with results        Protime          Rate          RBC 2.58         RBC, UA    1      RDW 15.7         Sample          Sodium 136         Specific [place], UA    SEE COMMENT  Comment:  Color may interfere with results      Specimen UA     Urine, Clean Catch      TSH          Urobilinogen, UA    SEE COMMENT  Comment:  Color may interfere with results      WBC, UA    1      WBC 17.31                     09/10/18  2214 09/10/18  2213 09/10/18  2137 09/10/18  2045 09/10/18  2022      Albumin    2.4      Alkaline Phosphatase    89      Allens Test     Pass     ALT    155      Anion Gap    10      Appearance, UA          aPTT    32.7  Comment:  aPTT therapeutic range = 39-69 seconds      AST    77      Bacteria, UA          Baso #    0.01      Basophil%    0.1      Bilirubin (UA)          Total Bilirubin    1.7  Comment:  For infants and newborns, interpretation of results should be based  on gestational age, weight and in agreement with clinical  observations.  Premature Infant recommended reference ranges:  Up to 24 hours.............<8.0 mg/dL  Up to 48 hours............<12.0 mg/dL  3-5 days..................<15.0 mg/dL  6-29 days.................<15.0 mg/dL        Blood Culture, Routine  No Growth to date[P]        BNP    845  Comment:  Values of less than 100 pg/ml are consistent with non-CHF populations.      Site     LR     BUN, Bld    38      Calcium    7.6      Chloride    100      CO2    24      Color, UA          Creatinine    1.4      DelSys     CPAP/BiPAP     Differential Method    Automated      eGFR if     56      eGFR if non     48  Comment:  Calculation used to obtain the estimated glomerular filtration  rate (eGFR) is the CKD-EPI equation.         Eos #    0.0      Eosinophil%    0.0      EP     5     FiO2     50     Glucose    163      Glucose, UA          Gran # (ANC)    11.7      Gran%    71.4      Group & Rh          Hematocrit    24.6      Hemoglobin    8.4      Hyaline Casts, UA          INDIRECT NUHA          Coumadin Monitoring INR    1.2  Comment:  Coumadin Therapy:  2.0 - 3.0 for INR for all indicators except mechanical heart valves  and antiphospholipid syndromes which should use 2.5 - 3.5.         IP     10     Ketones, UA          Lactate, Kirk 1.5  Comment:  Falsely low lactic acid results can be found in samples   containing >=13.0 mg/dL total bilirubin and/or >=3.5 mg/dL   direct bilirubin.           Leukocytes, UA          Lymph #    1.3      Lymph%    7.8      Magnesium    2.2      MCH    32.3      MCHC    34.1      MCV    95      Microscopic Comment          Mode     BiPAP     Mono #    3.5      Mono%    21.2      MPV    11.1      Nitrite, UA          Occult Blood UA          pH, UA          Phosphorus          Platelets    136      POC BE     1     POC HCO3     26.4     POC PCO2     44.4     POC PH     7.382     POC PO2     60     POC SATURATED O2     90     POCT Glucose   164       Poly    Occasional      Potassium    4.1      Total Protein    5.6      Protein, UA          Protime    12.4      Rate     16     RBC    2.60      RBC, UA          RDW    15.8      Sample     ARTERIAL     Sodium    134      Specific Gravity, UA          Specimen UA          TSH    1.577      Urobilinogen, UA          WBC, UA          WBC    16.46                      Significant Imaging: Echocardiogram:   2D echo with color flow doppler:   Results for orders placed or performed during the hospital encounter of 01/02/15   2D echo with color flow doppler   Result Value Ref Range    EF 60 55 - 65    Mitral Valve Regurgitation MILD     Aortic Valve Stenosis MILD (A)     Est. PA Systolic Pressure 35.04     Tricuspid Valve Regurgitation TRIVIAL TO MILD     and X-Ray: CXR: X-Ray Chest 1 View (CXR):   Results for orders placed or performed during the hospital encounter of 09/10/18   X-Ray Chest 1 View    Narrative    EXAMINATION:  XR CHEST 1 VIEW    CLINICAL HISTORY:  sob;    COMPARISON:  09/10/2018    FINDINGS:  The size of the heart is normal. The lungs are clear.  There are emphysematous changes in the apex of the right lung.  There is a marked amount of alveolar consolidation in the lower 2/3 of both lungs.  There is no  pneumothorax.  The costophrenic angles are sharp.      Impression    1. There is a marked amount of alveolar consolidation in the lower 2/3 of both lungs.  The this represents an interval worsening of the appearance of the lungs and is characteristic of pneumonia.  2. There are emphysematous changes in the apex of the right lung.  .      Electronically signed by: Eleazar Samuel MD  Date:    09/11/2018  Time:    08:43    and X-Ray Chest PA and Lateral (CXR): No results found for this visit on 09/10/18.

## 2018-09-11 NOTE — HOSPITAL COURSE
09/11: CXR shows melanie infiltrate: edema.  . Lopressor 2.5mg and lasix 20 mg given  9/12 - intubated yesterday and required pressor with sedation.  Resting on vent.  Cynthia TF.  No active bleeding  9/13 - remains intubated, sedated on mechanical ventilation, pO2 trending up with high PEEP ventilation  9/14 - remains intubated on mechanical ventilation with oxygen/PEEP demand decreased; continued atrial fib with RVR despite amiodarone infusion, some improvement with metoprolol yesterday  9/15 - tolerated extubation, on high flow nasal cannula support; awake, slow to respond and faint verbalization but oriented to self and place; remains atrial fib with RVR variable 120-140; afebrile, wbc trending down  9/16 - remains vapotherm with high flow, moderate oxygen demand; afebrile, wbc continues down; afib rate remains uncontrolled  9/17 - remains on vapotherm with slow weaning; mentation with slight improvement daily; remains afebrile; wbc continues down  9/18 - increased work of breathing and hypoxia last evening improved with NIPPV, transitioned back to vapotherm when awake this am; HR range  variable; continued intermittent confusion/agitation worse evening/night  9/19 - restless night reported with intermittent agitation/NIPPV use; improved rate control since increase cardizem dosing, now bradycardia low 40s with hypotension after receiving all am meds  9/20 - tolerating weaning of Vapotherm.  Cynthia diet and OOB daily with PT/OT

## 2018-09-11 NOTE — H&P
[facility] Hospital Medicine  History & Physical    Patient Name: Eleazar Solo  MRN: [id number]  Admission Date: 9/10/2018  Attending Physician: Prachi Boyer MD   Primary Care Provider: Poly Fitch MD         Patient information was obtained from patient, spouse/SO, past medical records, transferring facility records and ER records.     Subjective:     Principal Problem:Acute upper GI bleed    Chief Complaint: Hematemesis       HPI: Mr. Solo is a 77yo male with a PMHx of permanent AF on Eliquis, HTN, HLD, COPD, DM II, and h/o CVA, who was transferred from [facility] per family request as patient is followed OP by [facility] physicians.  He was originally admitted to [facility] on 9/6 for acute upper GI bleed with blood loss anemia (Hb 12 > 7.4 > 7.9).  Patient received total of 2 units PRBC and Eliquis held (last dose 9/6 AM).  He underwent EGD on 9/6 that showed a clot at the GE junction which was clipped  and injected with epi.  There was concern for further bleeding, therefore, repeat EGD was performed on 9/7 which showed no evidence of active bleeding.  During admission, patient developed AFib with RVR and was placed on diltiazem drip and PO dig.  CXR on 9/8 showed left pneumonia, IV Rocephin started.  Prior to transfer, vital signs and labs stable.  Upon arrival to [facility] ICU, patient hypotensive (SBP 70-80's) and hypoxic requiring continuous BiPAP.  Remains in AFib with controlled rate.  Repeat CXR showed RUL pneumonia.  Repeat labs resulted WBC 16.5, H&H 8.4/24.6, plt 136, BUN 38, cr. 1.4, AST 77, . ABG with pH 7.382, pCO2 44, pO2 60, HCO3 26.  Patient c/o SOB which is resolved while on BiPAP.  Denies any CP, palpitations, orthopnea, PND, cough, edema, ABD pain, N/V/D, hematemesis, melena, hematochezia, dysuria, lightheadedness/dzziness, syncope, HA, AMS, focal deficits, weakness, fever, or chills.  Patient admitted to ICU under Hospital Medicine services.        Past Medical History:   Diagnosis Date    *Atrial fibrillation     Atrial fibrillation     Bilateral carotid artery disease 8/10/2015    COPD (chronic obstructive pulmonary disease)     Diabetes mellitus     Gout     Hyperlipidemia     Hypertension     Low testosterone     Pneumonia     Stroke 1/3/15 L occiput    Unspecified disorder of kidney and ureter        Past Surgical History:   Procedure Laterality Date    EYE SURGERY Bilateral 5/21/15, 7/16/15    cataract w/IOL    FRACTURE SURGERY Left 1986    ORIF  heel fx, hardware later removed     TONSILLECTOMY  1948 approx       Review of patient's allergies indicates:   Allergen Reactions    No known drug allergies        No current facility-administered medications on file prior to encounter.      Current Outpatient Medications on File Prior to Encounter   Medication Sig    allopurinol (ZYLOPRIM) 100 MG tablet Take 1 tablet (100 mg total) by mouth once daily.    blood sugar diagnostic (TRUETEST TEST STRIPS) Strp 1 strip by Misc.(Non-Drug; Combo Route) route daily as needed. [employer] True Metrix Test Strips    blood-glucose meter (FREESTYLE SYSTEM KIT) kit [employer] TRUE METRIX AIR METER Use as instructed for TWICE DAILY testing    cyanocobalamin (VITAMIN B-12) 100 MCG tablet Take 10 tablets (1,000 mcg total) by mouth once daily. (Patient taking differently: Take by mouth once daily. )    digoxin (LANOXIN) 125 mcg tablet TAKE 1 TABLET ONE TIME DAILY    ELIQUIS 5 mg Tab TAKE 1 TABLET TWICE DAILY    furosemide (LASIX) 20 MG tablet Take 1 tablet (20 mg total) by mouth daily as needed. As needed for swelling    glimepiride (AMARYL) 1 MG tablet Take 1 tablet (1 mg total) by mouth before breakfast.    hydroCHLOROthiazide (HYDRODIURIL) 25 MG tablet Take 1 tablet (25 mg total) by mouth once daily.    lancets (TRUEPLUS LANCETS) 28 gauge Misc 1 lancet by Misc.(Non-Drug; Combo Route) route daily as needed. [employer] brand    losartan (COZAAR) 100 MG  tablet TAKE 1 TABLET EVERY DAY    menthol (BIOFREEZE, MENTHOL,) 4 % Gel Apply topically.    potassium chloride SA (K-DUR,KLOR-CON) 20 MEQ tablet Take 1 tablet (20 mEq total) by mouth daily as needed (with taking furosemide).    rosuvastatin (CRESTOR) 10 MG tablet Take 1 tablet (10 mg total) by mouth once daily. Stop simvastatin    verapamil (CALAN-SR) 240 MG CR tablet Take 1 tablet (240 mg total) by mouth once daily.    VIT A/VIT C/VIT E/ZINC/COPPER (PRESERVISION AREDS ORAL) Take 1 capsule by mouth 2 (two) times daily.     Family History     Problem Relation (Age of Onset)    Alcohol abuse Father    Arrhythmia Brother    Diabetes Brother, Sister    Gout Mother, Brother    Heart disease Mother, Brother    Hypertension Mother, Father    Stroke Father        Tobacco Use    Smoking status: Former Smoker     Packs/day: 2.00     Years: 30.00     Pack years: 60.00     Types: Cigarettes     Last attempt to quit: 1995     Years since quittin.7    Smokeless tobacco: Former User     Types: Chew     Quit date: 8/10/1998   Substance and Sexual Activity    Alcohol use: Yes     Alcohol/week: 1.2 oz     Types: 2 Cans of beer per week    Drug use: No    Sexual activity: No     Partners: Female     Review of Systems   Constitutional: Negative for chills, diaphoresis, fatigue and fever.   HENT: Negative for congestion, sore throat and trouble swallowing.    Eyes: Negative for visual disturbance.   Respiratory: Negative for cough, shortness of breath and wheezing.    Cardiovascular: Negative for chest pain, palpitations and leg swelling.   Gastrointestinal: Negative for abdominal distention, abdominal pain, blood in stool, constipation, diarrhea, nausea and vomiting.   Genitourinary: Negative for decreased urine volume, difficulty urinating, dysuria, frequency, hematuria and urgency.   Musculoskeletal: Negative for arthralgias, back pain and myalgias.   Skin: Negative for pallor, rash and wound.   Neurological:  Negative for dizziness, syncope, facial asymmetry, speech difficulty, weakness, light-headedness, numbness and headaches.   Psychiatric/Behavioral: Negative for confusion. The patient is not nervous/anxious.    All other systems reviewed and are negative.    Objective:     Vital Signs (Most Recent):  Temp: 98.6 °F (37 °C) (09/10/18 1949)  Pulse: 64 (09/10/18 2033)  Resp: 14 (09/10/18 2033)  BP: (!) 89/42 (09/10/18 2015)  SpO2: (!) 92 % (09/10/18 2033) Vital Signs (24h Range):  Temp:  [98.6 °F (37 °C)-98.7 °F (37.1 °C)] 98.6 °F (37 °C)  Pulse:  [] 64  Resp:  [14-25] 14  SpO2:  [91 %-95 %] 92 %  BP: ()/(41-62) 89/42     Weight: 94.9 kg (209 lb 3.5 oz)  Body mass index is 30.02 kg/m².    Physical Exam   Constitutional: He is oriented to person, place, and time. He appears well-developed and well-nourished. No distress.   HENT:   Head: Normocephalic and atraumatic.   Eyes: Conjunctivae are normal.   PERRL; EOM intact.   Neck: Normal range of motion. Neck supple. No JVD present.   Cardiovascular: Normal rate and intact distal pulses. An irregularly irregular rhythm present. Exam reveals no gallop.   No murmur heard.  Pulses:       Radial pulses are 2+ on the right side, and 2+ on the left side.        Dorsalis pedis pulses are 2+ on the right side, and 2+ on the left side.        Posterior tibial pulses are 2+ on the right side, and 2+ on the left side.   Normal S1, variable S2.   Pulmonary/Chest: Effort normal. No accessory muscle usage. No tachypnea. No respiratory distress. He has decreased breath sounds. He has no wheezes. He has no rhonchi. He has no rales.   Abdominal: Soft. Bowel sounds are normal. He exhibits no distension. There is no tenderness. There is no rigidity, no rebound, no guarding and no CVA tenderness.   Musculoskeletal: Normal range of motion. He exhibits no edema, tenderness or deformity.   Neurological: He is alert and oriented to person, place, and time. No cranial nerve deficit or  sensory deficit.   Skin: Skin is warm, dry and intact. No rash noted. He is not diaphoretic. No cyanosis or erythema.   Psychiatric: He has a normal mood and affect. His speech is normal and behavior is normal. Cognition and memory are normal.   Nursing note and vitals reviewed.          Significant Labs:   Results for orders placed or performed during the hospital encounter of 09/10/18   ISTAT PROCEDURE   Result Value Ref Range    POC PH 7.382 7.35 - 7.45    POC PCO2 44.4 35 - 45 mmHg    POC PO2 60 (L) 80 - 100 mmHg    POC HCO3 26.4 24 - 28 mmol/L    POC BE 1 -2 to 2 mmol/L    POC SATURATED O2 90 (L) 95 - 100 %    Rate 16     Sample ARTERIAL     Site LR     Allens Test Pass     DelSys CPAP/BiPAP     Mode BiPAP     FiO2 50     IP 10     EP 5       All pertinent labs within the past 24 hours have been reviewed.    Significant Imaging:      I have reviewed all pertinent imaging results/findings within the past 24 hours.     EKG: EKG from transferring facility reviewed and showed AFib with RVR, LAD, nonspecific ST-T abnormality, no significant change from previous tracings.           Assessment/Plan:     * Acute upper GI bleed with acute blood loss anemia    - EGD 9/6 with clot at GE junction s/p clipping and epi injection.  Repeat EGD 9/7 negative for active bleeding.  - Follow serial H&H and transfuse as needed.  - Continue IV Protonix daily.  - Hold Eliquis.  - NPO, IV hydration.  - GI consult in AM.        Severe sepsis secondary to pneumonia of right upper lobe with acute hypoxic respiratory failure    - CXR shows right upper lobe infiltrate, new since previous imaging on 9/8.  WBC 16.5, RR >20, SBP <90.  - Will obtain sputum and blood cultures.  - Empiric IV Unasyn and vanc, pharmacy consult for vanc dosing.  - IV fluid resuscitation.  Follow BP response and add pressors if needed.  - Continue BiPAP, wean as tolerated.  Duonebs PRN.    - Continue LABA + ICS.  - Pulmonology/ICU team following, appreciate  assistance.        Atrial fibrillation, permanent    - Rate controlled at present.  Monitor telemetry.  - Continue PO dig.  Hold diltiazem due to hypotension, resume once BP can tolerate.  - NOAC on hold as above.  - Cardiology consult in AM.        Type 2 diabetes mellitus without complication, without long-term current use of insulin    - Hold Amaryl, will resume at DC.  - Accuchecks with low SSI.  - Check HbA1c.        Chronic kidney disease, stage 3    - Cr. Stable at 1.4 (baseline 1.3)  - Avoid nephrotoxic agents.  IV hydration.  Strict I&O's.  - Follow daily BMP.        Chronic obstructive pulmonary disease with acute lower respiratory infection    - Plan as above.  - Pulmonology following.        Essential hypertension    - Patient hypotensive at present.    - Hold CCB, ARB, and diuretics.  Follow BP trends and resume as needed.          VTE Risk Mitigation (From admission, onward)        Ordered     IP VTE HIGH RISK PATIENT  Once      09/11/18 0425     Reason for No Pharmacological VTE Prophylaxis  Once      09/11/18 0425     Place sequential compression device  Until discontinued      09/11/18 0425        Critical care time spent on the evaluation and treatment of severe organ dysfunction, review of pertinent labs and imaging studies, discussions with consulting providers and discussions with patient/family: 40 minutes.     Neyda Lawler NP  Department of Hospital Medicine   [facility]

## 2018-09-11 NOTE — CONSULTS
[facility]   Critical Care Medicine  Consult Note    Patient Name: Eleazar Solo  MRN: [id number]  Admission Date: 9/10/2018  Hospital Length of Stay: 1 days  Code Status: Full Code  Attending Physician: Prachi Boyer MD   Primary Care Provider: Poly Fitch MD   Principal Problem: Acute upper GI bleed      Subjective:     HPI:  78 year old male admitted to MICU, transfer from [facility].  I have reviewed the patient's medical history in detail and updated the computerized patient record.  Upper GI bleeding, Scoped x 2 and clipped  Developed Af RVR, Known chr A fib on elliquis on hold  Treated with Digoxin, Amiodarone and cardizem GTT  Family requested transfer  Known COPD FEV1:1.65L ( 53%)  ANORO ellipta, not on oxygen  Presented to MICU needed BIPAP for WOB  CXR shows RML infiltrate  Overnight, anxious, WOB  Last echo EF 60%      Hospital/ICU Course:  09/11: CXR shows melanie infiltrate: edema  . Lopressor 2.5mg and lasix 20 mg given    Past Medical History:   Diagnosis Date    *Atrial fibrillation     Atrial fibrillation     Bilateral carotid artery disease 8/10/2015    Chronic obstructive pulmonary disease with acute lower respiratory infection     COPD (chronic obstructive pulmonary disease)     Diabetes mellitus     Gout     Hyperlipidemia     Hypertension     Low testosterone     Pneumonia     Stroke 1/3/15 L occiput    Unspecified disorder of kidney and ureter        Past Surgical History:   Procedure Laterality Date    EYE SURGERY Bilateral 5/21/15, 7/16/15    cataract w/IOL    FRACTURE SURGERY Left 1986    ORIF  heel fx, hardware later removed     TONSILLECTOMY  1948 approx       Review of patient's allergies indicates:   Allergen Reactions    No known drug allergies        Family History     Problem Relation (Age of Onset)    Alcohol abuse Father    Arrhythmia Brother    Diabetes Brother, Sister    Gout Mother, Brother    Heart disease Mother, Brother     Hypertension Mother, Father    Stroke Father        Tobacco Use    Smoking status: Former Smoker     Packs/day: 2.00     Years: 30.00     Pack years: 60.00     Types: Cigarettes     Last attempt to quit: 1995     Years since quittin.7    Smokeless tobacco: Former User     Types: Chew     Quit date: 8/10/1998   Substance and Sexual Activity    Alcohol use: Yes     Alcohol/week: 1.2 oz     Types: 2 Cans of beer per week    Drug use: No    Sexual activity: No     Partners: Female         Review of Systems   Constitutional: Positive for fatigue.   HENT: Negative.    Respiratory: Positive for shortness of breath.    Cardiovascular: Negative.    Gastrointestinal: Positive for abdominal distention.   Endocrine: Negative.    Genitourinary: Negative.    Allergic/Immunologic: Negative.    Neurological: Negative.    Hematological: Negative.    Psychiatric/Behavioral: The patient is nervous/anxious.      Objective:     Vital Signs (Most Recent):  Temp: 97.4 °F (36.3 °C) (18 07)  Pulse: 106 (18)  Resp: (!) 30 (18)  BP: (!) 143/100 (18)  SpO2: 95 % (18) Vital Signs (24h Range):  Temp:  [97.4 °F (36.3 °C)-99.7 °F (37.6 °C)] 97.4 °F (36.3 °C)  Pulse:  [] 106  Resp:  [14-32] 30  SpO2:  [85 %-99 %] 95 %  BP: ()/() 143/100     Weight: 95 kg (209 lb 7 oz)  Body mass index is 30.05 kg/m².      Intake/Output Summary (Last 24 hours) at 2018 0835  Last data filed at 2018 0800  Gross per 24 hour   Intake 3153.33 ml   Output 650 ml   Net 2503.33 ml       Physical Exam   Constitutional: He is oriented to person, place, and time. He appears well-developed and well-nourished. He has a sickly appearance.       HENT:   Head: Normocephalic and atraumatic.   Nose: Nose normal.   Mouth/Throat: Oropharynx is clear and moist.   bipap Mask   Eyes: EOM are normal. Pupils are equal, round, and reactive to light.   Pulmonary/Chest: He is in respiratory distress.  He has wheezes.   Abdominal: Soft. He exhibits distension (scanty bowel sounds).   Musculoskeletal: Normal range of motion. He exhibits no edema.   Neurological: He is alert and oriented to person, place, and time.   Skin: Skin is warm and dry. Capillary refill takes 2 to 3 seconds.   Vitals reviewed.      Vents:  Oxygen Concentration (%): 50 (09/11/18 0739)    Lines/Drains/Airways     Peripheral Intravenous Line                 Peripheral IV - Single Lumen 09/10/18 1949 Left Antecubital less than 1 day         Peripheral IV - Single Lumen 09/10/18 1949 Left Wrist less than 1 day                Significant Labs:    CBC/Anemia Profile:  Recent Labs   Lab  09/10/18   2045  09/11/18   0237  09/11/18 0627   WBC  16.46*   --   17.31*   HGB  8.4*  7.5*  8.2*  8.2*   HCT  24.6*  21.9*  24.5*  24.5*   PLT  136*   --   147*   MCV  95   --   95   RDW  15.8*   --   15.7*        Chemistries:  Recent Labs   Lab  09/10/18   2045  09/11/18   0627   NA  134*  136   K  4.1  4.1   CL  100  102   CO2  24  27   BUN  38*  42*   CREATININE  1.4  1.3   CALCIUM  7.6*  7.5*   ALBUMIN  2.4*  2.5*   PROT  5.6*  5.9*   BILITOT  1.7*  1.8*   ALKPHOS  89  91   ALT  155*  150*   AST  77*  74*   MG  2.2  2.2   PHOS   --   1.9*       ABGs:   Recent Labs   Lab  09/10/18   2022   PH  7.382   PCO2  44.4   HCO3  26.4   POCSATURATED  90*   BE  1     Blood Culture: No results for input(s): LABBLOO in the last 48 hours.  Lactic Acid:   Recent Labs   Lab  09/10/18   2214   LACTATE  1.5     Respiratory Culture: No results for input(s): GSRESP, RESPIRATORYC in the last 48 hours.  All pertinent labs within the past 24 hours have been reviewed.    Significant Imaging:   CXR: I have reviewed all pertinent results/findings within the past 24 hours and my personal findings are:        Right upper lobe consolidation.  Small right effusion.  No pneumothorax.  Patient rotated to the right.  Cardiac silhouette and mediastinum are unremarkable.  Osseous structures  intact.      Impression       Pneumonia.         Assessment/Plan:     Pulmonary   Chronic obstructive pulmonary disease with acute lower respiratory infection    Moderate severe COPD  Bronchodilators, immunizations current          Severe sepsis secondary to pneumonia of right upper lobe with acute hypoxic respiratory failure    Serial ABG, will transition from BIPAP to AVAPS  Low threshold for intubation  Bronchodilator  Emperic Abx vancomycin, Unasyn  Keep SpO2 > 92%  Repeat lactate  Cultures: Blood and sputum  Not on pressors  Lasix IV x 1          Cardiac/Vascular   Atrial fibrillation, permanent    Repeat echo  IV Lopressor Q6  Po digoxin          Endocrine   Type 2 diabetes mellitus without complication, without long-term current use of insulin    Sliding scale  POCT glucose        GI   * Acute upper GI bleed with acute blood loss anemia    PPI  Serial H&H  Monitor stools              Critical Care Daily Checklist:    A: Awake: RASS Goal/Actual Goal:    Actual: [place]donnell Agitation Sedation Scale (RASS): Restless   B: Spontaneous Breathing Trial Performed?     C: SAT & SBT Coordinated?  For intubation                      D: Delirium: CAM-ICU Overall CAM-ICU: Positive   E: Early Mobility Performed? Yes   F: Feeding Goal:    Status:     Current Diet Order   Procedures    Diet NPO Except for: Medication     Order Specific Question:   Except for     Answer:   Medication      AS: Analgesia/Sedation NONE   T: Thromboembolic Prophylaxis SCD, LMWH   H: HOB > 300 Yes   U: Stress Ulcer Prophylaxis (if needed) PPI   G: Glucose Control Sliding scale   B: Bowel Function Stool Occurrence: 0   I: Indwelling Catheter (Lines & Adams) Necessity Adams and central line   D: De-escalation of Antimicrobials/Pharmacotherapies Await cultures    Plan for the day/ETD Intubated for hypoxic respiratory failure    Code Status:  Family/Goals of Care: Full Code       Critical Care Time: 45 minutes  Critical secondary to Patient has a  condition that poses threat to life and bodily function: Severe Respiratory Distress     Critical care was time spent personally by me on the following activities: development of treatment plan with patient or surrogate and bedside caregivers, discussions with consultants, evaluation of patient's response to treatment, examination of patient, ordering and performing treatments and interventions, ordering and review of laboratory studies, ordering and review of radiographic studies, pulse oximetry, re-evaluation of patient's condition. This critical care time did not overlap with that of any other provider or involve time for any procedures.    Thank you for your consult. I will follow-up with patient. Please contact us if you have any additional questions.     Abilio Cosme MD  Critical Care Medicine  [facility]

## 2018-09-11 NOTE — HPI
Mr. Solo is a 77yo male with a PMHx of permanent AF on Eliquis, HTN, HLD, COPD, DM II, and h/o CVA, who was transferred from [facility] per family request as patient is followed OP by [facility] physicians.  He was originally admitted to [facility] on 9/6 for acute upper GI bleed with blood loss anemia (Hb 12 > 7.4 > 7.9).  Patient received total of 2 units PRBC and Eliquis held (last dose 9/6 AM).  He underwent EGD on 9/6 that showed a clot at the GE junction which was clipped and injected with epi.  There was concern for further bleeding, therefore, repeat EGD was performed on 9/7 which showed no evidence of active bleeding.  During admission, patient developed AFib with RVR and was placed on diltiazem drip and PO dig.  CXR on 9/8 showed left pneumonia, IV Rocephin started.  Prior to transfer, vital signs and labs stable.  Upon arrival to [facility] ICU, patient hypotensive (SBP 70-80's) and hypoxic requiring continuous BiPAP.  Remains in AFib with controlled rate.  Repeat CXR showed RUL pneumonia.  Repeat labs resulted WBC 16.5, H&H 8.4/24.6, plt 136, BUN 38, cr. 1.4, AST 77, . ABG with pH 7.382, pCO2 44, pO2 60, HCO3 26.  Patient c/o SOB which is resolved while on BiPAP.  Denies any CP, palpitations, orthopnea, PND, cough, edema, ABD pain, N/V/D, hematemesis, melena, hematochezia, dysuria, lightheadedness/dzziness, syncope, HA, AMS, focal deficits, weakness, fever, or chills.  Patient admitted to ICU under Hospital Medicine services.

## 2018-09-11 NOTE — ASSESSMENT & PLAN NOTE
- Patient hypotensive and on pressor  - Hold CCB, ARB, and diuretics.  Follow BP trends and resume as needed.

## 2018-09-11 NOTE — ASSESSMENT & PLAN NOTE
-Has been transfused 2 units at transferring facility  -Still appears to be anemic on morning labs  -Consider repeat transfusion   -Recommend holding anticoagulation until H/H proven to be more stable and greater than 8.0

## 2018-09-11 NOTE — PLAN OF CARE
Patient is intubated and CM unable to plan for an optimal d/c at this time.     09/11/18 1205   Discharge Assessment   Assessment Type Discharge Planning Assessment   Confirmed/corrected address and phone number on facesheet? No   Assessment information obtained from? Medical Record   Expected Length of Stay (days) (TBD)   Communicated expected length of stay with patient/caregiver no   Prior to hospitilization cognitive status: Alert/Oriented   Prior to hospitalization functional status: Independent   Current cognitive status: Alert/Oriented   Current Functional Status: Independent   Lives With spouse   Able to Return to Prior Arrangements unable to determine at this time (comments)   Is patient able to care for self after discharge? Yes   Patient's perception of discharge disposition home or selfcare   Readmission Within The Last 30 Days no previous admission in last 30 days   Patient currently being followed by outpatient case management? Unable to determine (comments)   Patient currently receives any other outside agency services? No   Equipment Currently Used at Home none   Do you have any problems affording any of your prescribed medications? No   Is the patient taking medications as prescribed? yes   Does the patient have transportation home? Yes   Transportation Available family or friend will provide   Does the patient receive services at the [facility]? No   Discharge Plan A Other   Discharge Plan B Other   Patient/Family In Agreement With Plan unable to assess

## 2018-09-11 NOTE — CONSULTS
Eleazar Solo [id number] is a 78 y.o. male who has been consulted for vancomycin dosing.    Dx: Sepsis  Goal trough: 15-20     The patient has the following labs:     Date Creatinine (mg/dl)    BUN WBC Count   9/11/2018 CrCl cannot be calculated (Unknown ideal weight.). Lab Results   Component Value Date    BUN 38 (H) 09/10/2018     Lab Results   Component Value Date    WBC 16.46 (H) 09/10/2018        Current weight is 94.9 kg (209 lb 3.5 oz)    The patient will be started on vancomycin at a dose of 1,000 mg x 1. Patient will be PULSE DOSED due to Scr of 1.4 and patient not voiding. Placeholder dose of 1,000 mg every 48 hours to be given once Vancomycin level <18. Patient will be followed by pharmacy for changes in renal function, toxicity, and efficacy.  The vancomycin Random has been ordered for 9/11 at 1400.     Thank you for allowing us to participate in this patient's care.     Sada Patrick

## 2018-09-11 NOTE — ASSESSMENT & PLAN NOTE
-Patient with chronic A-fib that is currently controlled with Digoxin  -BP unable to tolerate BB or CCB. Is requiring pressor support  -Do not recommend restarting anticoagulation at this time due to severe anemia, recent GIB. Consider restarting anticoagulation when H/H more stable and preferably greater than 8.0  -Will check 2D echo

## 2018-09-11 NOTE — ASSESSMENT & PLAN NOTE
-BP improving with pressor support  -continue current medical management for now  -Home meds on hold

## 2018-09-11 NOTE — SUBJECTIVE & OBJECTIVE
Past Medical History:   Diagnosis Date    *Atrial fibrillation     Atrial fibrillation     Bilateral carotid artery disease 8/10/2015    Chronic obstructive pulmonary disease with acute lower respiratory infection     COPD (chronic obstructive pulmonary disease)     Diabetes mellitus     Gout     Hyperlipidemia     Hypertension     Low testosterone     Pneumonia     Stroke 1/3/15 L occiput    Unspecified disorder of kidney and ureter        Past Surgical History:   Procedure Laterality Date    EYE SURGERY Bilateral 5/21/15, 7/16/15    cataract w/IOL    FRACTURE SURGERY Left     ORIF  heel fx, hardware later removed     TONSILLECTOMY   approx       Review of patient's allergies indicates:   Allergen Reactions    No known drug allergies        Family History     Problem Relation (Age of Onset)    Alcohol abuse Father    Arrhythmia Brother    Diabetes Brother, Sister    Gout Mother, Brother    Heart disease Mother, Brother    Hypertension Mother, Father    Stroke Father        Tobacco Use    Smoking status: Former Smoker     Packs/day: 2.00     Years: 30.00     Pack years: 60.00     Types: Cigarettes     Last attempt to quit: 1995     Years since quittin.7    Smokeless tobacco: Former User     Types: Chew     Quit date: 8/10/1998   Substance and Sexual Activity    Alcohol use: Yes     Alcohol/week: 1.2 oz     Types: 2 Cans of beer per week    Drug use: No    Sexual activity: No     Partners: Female         Review of Systems   Constitutional: Positive for fatigue.   HENT: Negative.    Respiratory: Positive for shortness of breath.    Cardiovascular: Negative.    Gastrointestinal: Positive for abdominal distention.   Endocrine: Negative.    Genitourinary: Negative.    Allergic/Immunologic: Negative.    Neurological: Negative.    Hematological: Negative.    Psychiatric/Behavioral: The patient is nervous/anxious.      Objective:     Vital Signs (Most Recent):  Temp: 97.4 °F (36.3 °C)  (09/11/18 0705)  Pulse: 106 (09/11/18 0742)  Resp: (!) 30 (09/11/18 0742)  BP: (!) 143/100 (09/11/18 0730)  SpO2: 95 % (09/11/18 0742) Vital Signs (24h Range):  Temp:  [97.4 °F (36.3 °C)-99.7 °F (37.6 °C)] 97.4 °F (36.3 °C)  Pulse:  [] 106  Resp:  [14-32] 30  SpO2:  [85 %-99 %] 95 %  BP: ()/() 143/100     Weight: 95 kg (209 lb 7 oz)  Body mass index is 30.05 kg/m².      Intake/Output Summary (Last 24 hours) at 9/11/2018 0835  Last data filed at 9/11/2018 0800  Gross per 24 hour   Intake 3153.33 ml   Output 650 ml   Net 2503.33 ml       Physical Exam   Constitutional: He is oriented to person, place, and time. He appears well-developed and well-nourished. He has a sickly appearance.       HENT:   Head: Normocephalic and atraumatic.   Nose: Nose normal.   Mouth/Throat: Oropharynx is clear and moist.   bipap Mask   Eyes: EOM are normal. Pupils are equal, round, and reactive to light.   Pulmonary/Chest: He is in respiratory distress. He has wheezes.   Abdominal: Soft. He exhibits distension (scanty bowel sounds).   Musculoskeletal: Normal range of motion. He exhibits no edema.   Neurological: He is alert and oriented to person, place, and time.   Skin: Skin is warm and dry. Capillary refill takes 2 to 3 seconds.   Vitals reviewed.      Vents:  Oxygen Concentration (%): 50 (09/11/18 0739)    Lines/Drains/Airways     Peripheral Intravenous Line                 Peripheral IV - Single Lumen 09/10/18 1949 Left Antecubital less than 1 day         Peripheral IV - Single Lumen 09/10/18 1949 Left Wrist less than 1 day                Significant Labs:    CBC/Anemia Profile:  Recent Labs   Lab  09/10/18   2045  09/11/18   0237  09/11/18   0627   WBC  16.46*   --   17.31*   HGB  8.4*  7.5*  8.2*  8.2*   HCT  24.6*  21.9*  24.5*  24.5*   PLT  136*   --   147*   MCV  95   --   95   RDW  15.8*   --   15.7*        Chemistries:  Recent Labs   Lab  09/10/18   2045  09/11/18   0627   NA  134*  136   K  4.1  4.1   CL   100  102   CO2  24  27   BUN  38*  42*   CREATININE  1.4  1.3   CALCIUM  7.6*  7.5*   ALBUMIN  2.4*  2.5*   PROT  5.6*  5.9*   BILITOT  1.7*  1.8*   ALKPHOS  89  91   ALT  155*  150*   AST  77*  74*   MG  2.2  2.2   PHOS   --   1.9*       ABGs:   Recent Labs   Lab  09/10/18   2022   PH  7.382   PCO2  44.4   HCO3  26.4   POCSATURATED  90*   BE  1     Blood Culture: No results for input(s): LABBLOO in the last 48 hours.  Lactic Acid:   Recent Labs   Lab  09/10/18   2214   LACTATE  1.5     Respiratory Culture: No results for input(s): GSRESP, RESPIRATORYC in the last 48 hours.  All pertinent labs within the past 24 hours have been reviewed.    Significant Imaging:   CXR: I have reviewed all pertinent results/findings within the past 24 hours and my personal findings are:        Right upper lobe consolidation.  Small right effusion.  No pneumothorax.  Patient rotated to the right.  Cardiac silhouette and mediastinum are unremarkable.  Osseous structures intact.      Impression       Pneumonia.

## 2018-09-11 NOTE — SUBJECTIVE & OBJECTIVE
Past Medical History:   Diagnosis Date    *Atrial fibrillation     Acute hypoxemic respiratory failure     Atrial fibrillation     Bilateral carotid artery disease 8/10/2015    Chronic obstructive pulmonary disease with acute lower respiratory infection     COPD (chronic obstructive pulmonary disease)     Diabetes mellitus     Gout     Hyperlipidemia     Hypertension     Low testosterone     Pneumonia     Stroke 1/3/15 L occiput    Unspecified disorder of kidney and ureter        Past Surgical History:   Procedure Laterality Date    EYE SURGERY Bilateral 5/21/15, 7/16/15    cataract w/IOL    FRACTURE SURGERY Left     ORIF  heel fx, hardware later removed     TONSILLECTOMY  194 approx       Review of patient's allergies indicates:   Allergen Reactions    No known drug allergies      Family History     Problem Relation (Age of Onset)    Alcohol abuse Father    Arrhythmia Brother    Diabetes Brother, Sister    Gout Mother, Brother    Heart disease Mother, Brother    Hypertension Mother, Father    Stroke Father        Tobacco Use    Smoking status: Former Smoker     Packs/day: 2.00     Years: 30.00     Pack years: 60.00     Types: Cigarettes     Last attempt to quit: 1995     Years since quittin.7    Smokeless tobacco: Former User     Types: Chew     Quit date: 8/10/1998   Substance and Sexual Activity    Alcohol use: Yes     Alcohol/week: 1.2 oz     Types: 2 Cans of beer per week    Drug use: No    Sexual activity: No     Partners: Female     Review of Systems   Unable to perform ROS: Intubated     Objective:     Vital Signs (Most Recent):  Temp: 97.4 °F (36.3 °C) (18 0705)  Pulse: 95 (18 1153)  Resp: (!) 22 (18 1153)  BP: (!) 70/46 (18 1121)  SpO2: (!) 94 % (18 1155) Vital Signs (24h Range):  Temp:  [97.4 °F (36.3 °C)-99.7 °F (37.6 °C)] 97.4 °F (36.3 °C)  Pulse:  [] 95  Resp:  [14-32] 22  SpO2:  [85 %-99 %] 94 %  BP: ()/() 70/46      Weight: 95 kg (209 lb 7 oz) (09/11/18 0505)  Body mass index is 30.05 kg/m².      Intake/Output Summary (Last 24 hours) at 9/11/2018 1304  Last data filed at 9/11/2018 0900  Gross per 24 hour   Intake 3153.33 ml   Output 850 ml   Net 2303.33 ml       Lines/Drains/Airways     Central Venous Catheter Line                 Percutaneous Central Line Insertion/Assessment - triple lumen  09/11/18 1124 left internal jugular less than 1 day          Drain                 NG/OG Tube 09/11/18 1123 orogastric Center mouth less than 1 day         Urethral Catheter 09/11/18 1123 Double-lumen less than 1 day          Airway                 Airway - Non-Surgical 09/11/18 1110 Endotracheal Tube less than 1 day          Peripheral Intravenous Line                 Peripheral IV - Single Lumen 09/10/18 1949 Left Antecubital less than 1 day                Physical Exam   Constitutional: He appears well-developed and well-nourished. No distress.   HENT:   Head: Normocephalic and atraumatic.   Eyes: EOM are normal.   Cardiovascular: Normal rate and regular rhythm.   Pulmonary/Chest:   Intubated. No wheezing.    Abdominal: Soft. Bowel sounds are normal. He exhibits no distension. There is no tenderness.   Neurological:   Opens eyes and nods.    Skin: He is not diaphoretic.   Psychiatric: His behavior is normal.       Significant Labs:  CBC:   Recent Labs   Lab  09/10/18   2045  09/11/18   0237  09/11/18   0627  09/11/18   1153   WBC  16.46*   --   17.31*  17.00*   HGB  8.4*  7.5*  8.2*  8.2*  7.5*   HCT  24.6*  21.9*  24.5*  24.5*  22.6*   PLT  136*   --   147*  152     CMP:   Recent Labs   Lab  09/11/18   1200   GLU  130*   CALCIUM  7.1*   ALBUMIN  2.2*   PROT  5.2*   NA  136   K  3.9   CO2  27   CL  103   BUN  42*   CREATININE  1.2   ALKPHOS  80   ALT  124*   AST  55*   BILITOT  1.6*     Coagulation:   Recent Labs   Lab  09/11/18   1153   INR  1.2   APTT  32.8*       Significant Imaging:  Imaging results within the past 24 hours  have been reviewed.

## 2018-09-11 NOTE — ASSESSMENT & PLAN NOTE
Serial ABG, will transition from BIPAP to AVAPS  Low threshold for intubation  Bronchodilator  Emperic Abx vancomycin, Unasyn  Keep SpO2 > 92%  Repeat lactate  Cultures: Blood and sputum  Not on pressors  Lasix IV x 1

## 2018-09-11 NOTE — CONSULTS
"  [facility] -   Adult Nutrition  Consult Note    SUMMARY     Recommendations    Recommendation/Intervention: 1.Consider tube feed for nutrition support until extubated: Peptamen Bariatric at 80 ml/hr, with flushes as needed per MD or NP for hydration. Provides 1920 calories, 179 g protein, and 1613 ml free water. 2. Check residuals Q4 hours. Hold if > 500 cc. 3. Will continue to monitor.    Goals: Meet > 85 % EEN/EPN while admitted  Nutrition Goal Status: new  Communication of RD Recs: (Reviewed with NP)    Reason for Assessment    Reason for Assessment: consult  Dx:  1. Acute upper GI bleed    2. Atrial fibrillation      Past Medical History:   Diagnosis Date    *Atrial fibrillation     Atrial fibrillation     Bilateral carotid artery disease 8/10/2015    Chronic obstructive pulmonary disease with acute lower respiratory infection     COPD (chronic obstructive pulmonary disease)     Diabetes mellitus     Gout     Hyperlipidemia     Hypertension     Low testosterone     Pneumonia     Stroke 1/3/15 L occiput    Unspecified disorder of kidney and ureter        Interdisciplinary Rounds: attended  General Information Comments: RD consulted x TF recs. Pt currently in ICU, intubated. Reviewed TF recs w/ NP today.   Nutrition Discharge Planning: too soon to determine    Nutrition Risk Screen    Nutrition Risk Screen: no indicators present    Nutrition/Diet History    Do you have any cultural, spiritual, Latter-day conflicts, given your current situation?: None    Anthropometrics    Temp: 97.4 °F (36.3 °C)  Height Method: Stated  Height: 5' 10" (177.8 cm)  Height (inches): 70 in  Weight Method: Bed Scale  Weight: 95 kg (209 lb 7 oz)  Weight (lb): 209.44 lb  Ideal Body Weight (IBW), Male: 166 lb  % Ideal Body Weight, Male (lb): 126.17 lb  BMI (Calculated): 30.1  BMI Grade: 30 - 34.9- obesity - grade I       Lab/Procedures/Meds    Pertinent Labs Reviewed: reviewed  BMP  Lab Results   Component Value " Date     09/11/2018    K 4.1 09/11/2018     09/11/2018    CO2 27 09/11/2018    BUN 42 (H) 09/11/2018    CREATININE 1.3 09/11/2018    CALCIUM 7.5 (L) 09/11/2018    ANIONGAP 7 (L) 09/11/2018    ESTGFRAFRICA >60 09/11/2018    EGFRNONAA 53 (A) 09/11/2018     Lab Results   Component Value Date    CALCIUM 7.5 (L) 09/11/2018    PHOS 1.9 (L) 09/11/2018     Lab Results   Component Value Date    ALBUMIN 2.5 (L) 09/11/2018     Recent Labs   Lab  09/11/18   0514   POCTGLUCOSE  153*     Lab Results   Component Value Date    HGBA1C 5.6 03/14/2018       Pertinent Medications Reviewed: reviewed    Physical Findings/Assessment    Overall Physical Appearance: on ventilator support  Tubes: (OG)  Oral/Mouth Cavity: tooth/teeth missing  Skin: (Lawson score 15)    Estimated/Assessed Needs    Weight Used For Calorie Calculations: 95 kg (209 lb 7 oz)  Energy Calorie Requirements (kcal): 2077  Energy Need Method: [place] (modified)  Protein Requirements: 150-188 g  Weight Used For Protein Calculations: 75.3 kg (166 lb)(IBW - Obese ICU)     Fluid Need Method: RDA Method(or per MD)  RDA Method (mL): 2077  CHO Requirement: 50 % EEN      Nutrition Prescription Ordered    Current Diet Order: NPO    Evaluation of Received Nutrient/Fluid Intake    Intake/Output Summary (Last 24 hours) at 9/11/2018 1210  Last data filed at 9/11/2018 0900  Gross per 24 hour   Intake 3153.33 ml   Output 850 ml   Net 2303.33 ml          % Intake of Estimated Energy Needs: 0 - 25 %  % Meal Intake: NPO    Nutrition Risk      2xweekly    Assessment and Plan      Nutrition Problem  Inadequate oral intake     Related to (etiology):   Mechanical ventilation     Signs and Symptoms (as evidenced by):   NPO status     Interventions/Recommendations (treatment strategy):  See above     Nutrition Diagnosis Status:   New        Monitor and Evaluation    Food and Nutrient Intake: energy intake, enteral nutrition intake  Food and Nutrient Adminstration: enteral and  parenteral nutrition administration  Anthropometric Measurements: weight  Biochemical Data, Medical Tests and Procedures: electrolyte and renal panel, glucose/endocrine profile  Nutrition-Focused Physical Findings: overall appearance     Nutrition Follow-Up    RD Follow-up?: Yes(2xweekly)

## 2018-09-11 NOTE — ASSESSMENT & PLAN NOTE
UGI bleed was secondary to a lesion at the GE junction.   No active bleeding at the time of last scope. However, he is being put on Heparin for his cardiac disease.   Recommend monitoring H/H closely and transfuse as needed.   Contact GI for signs or symptoms of active bleeding.

## 2018-09-11 NOTE — HPI
"The patient has been transferred from [facility]. He originally presented with hematemesis on Eliquis. He had an EGD which showed an actively bleeding lesion with clot at the GE junction. It was treated with epi and clipped. He was supposed to have repeat EGD the following day, but developed Afib with RVR requiring cardizem, as well as digoxin and lopressor. After he was stabilized, repeat EGD noted large stable clot with intact clip on gastric side of GE junction. He has been admitted to our ICU, intubated and started on pressors and Heparin. The nurse said he had a couple of stools with "old blood." Hgb has been stable when compared to Hgb at discharge from [facility]. He is noted to have elevated LFTs. They were normal at the time of admit to [facility]. This is likely due to ischemia/sepsis. WBC count elevated, but afebrile. CXR showed worsening consolidation bilaterally.       "

## 2018-09-11 NOTE — PLAN OF CARE
Problem: Patient Care Overview  Goal: Plan of Care Review  Outcome: Ongoing (interventions implemented as appropriate)  Patient currently resting quietly in bed. Patient in controlled A. Fib on monitor at this time. Patient still currently hypotensive but has stable blood pressure. Patient currently receiving oxygen via Bipap. Patient receiving normal saline @ 125mL/hr. Patient encouraged to turn in bed every 2 hours to avoid skin breakdown to back side. Patient turns in bed independently. No sign of wound development or skin breakdown noted. Patient educated on fall prevention and encouraged to call light for all needs. Patient bed alarm set. Call light within reach. Patient family at bedside. Patient free of falls. Patient currently has no sign of active bleeding at this time. Plan of care updated with patient and family. There are no further questions after updated on plan of care at this time. No distress noted.Will continue to monitor.

## 2018-09-11 NOTE — HPI
Mr. Solo is a 79yo male with a PMHx of permanent AF on Eliquis, HTN, HLD, COPD, DM II, and h/o CVA, who was transferred from [facility] per family request as patient is followed OP by [facility].  He was originally admitted to [facility] on 9/6 for acute upper GI bleed with blood loss anemia (Hb 12 > 7.4 > 7.9).  Patient received total of 2 units PRBC and Eliquis held (last dose 9/6 AM).  He underwent EGD on 9/6 that showed a clot at the GE junction which was clipped  and injected with epi.  There was concern for further bleeding, therefore, repeat EGD was performed on 9/7 which showed no evidence of active bleeding.  During admission, patient developed AFib with RVR and was placed on diltiazem drip and PO dig.  CXR on 9/8 showed left pneumonia, IV Rocephin started.  Prior to transfer, vital signs and labs stable.  Upon arrival to [facility] ICU, patient hypotensive (SBP 70-80's), now on pressors and was hypoxic requiring continuous BiPAP.  Remains in AFib with controlled rate.  Repeat CXR showed RUL pneumonia.  Repeat labs resulted WBC 16.5, H&H 8.4/24.6, plt 136, BUN 38, cr. 1.4, AST 77, . ABG with pH 7.382, pCO2 44, pO2 60, HCO3 26.  Patient c/o SOB which is resolved while on BiPAP. CXR today shows marked amount of alveolar consolidation in the lower 2/3 of both lungs.  The this represents an interval worsening of the appearance of the lungs and is characteristic of pneumonia. Decision made to intubate patient  Due to resp distress and wheezing.  H/H this morning 7.5/22.6

## 2018-09-11 NOTE — PROCEDURES
"Eleazar Solo is a 78 y.o. male patient.    Temp: 97.4 °F (36.3 °C) (09/11/18 0705)  Pulse: 95 (09/11/18 1153)  Resp: (!) 22 (09/11/18 1153)  BP: (!) 70/46 (09/11/18 1121)  SpO2: (!) 94 % (09/11/18 1155)  Weight: 95 kg (209 lb 7 oz) (09/11/18 0505)  Height: 5' 10" (177.8 cm) (09/11/18 0505)       Central Line  Date/Time: 9/11/2018 10:30 AM  Location procedure was performed: [facility] INTENSIVE CARE UNIT  Performed by: London Ruelas NP  Pre-operative Diagnosis: shock  Post-operative diagnosis: shock post sedation  Consent Done: Yes  Time out: Immediately prior to procedure a "time out" was called to verify the correct patient, procedure, equipment, support staff and site/side marked as required.  Indications: vascular access and med administration  Anesthesia: local infiltration    Anesthesia:  Local Anesthetic: lidocaine 1% without epinephrine  Anesthetic total: 2 mL  Preparation: skin prepped with ChloraPrep  Skin prep agent dried: skin prep agent completely dried prior to procedure  Sterile barriers: all five maximum sterile barriers used - cap, mask, sterile gown, sterile gloves, and large sterile sheet  Hand hygiene: hand hygiene performed prior to central venous catheter insertion  Location details: left internal jugular  Catheter type: triple lumen  Catheter size: 7 Fr  Catheter Length: 16cm    Ultrasound guidance: yes  Vessel Caliber: medium, patent, compressibility normal  Vascular Doppler: not done  Needle advanced into vessel with real time Ultrasound guidance.  Guidewire confirmed in vessel.  Sterile sheath used.  Manometry: No   Number of attempts: 2  Assessment: placement verified by x-ray and successful placement  Complications: none  Estimated blood loss (mL): 2  Specimens: No  Implants: No  Post-procedure: line sutured,  chlorhexidine patch,  sterile dressing applied and blood return through all ports  Complications: No          London Ruelas  9/11/2018  "

## 2018-09-12 PROBLEM — D64.9 ANEMIA, UNSPECIFIED: Status: ACTIVE | Noted: 2018-09-12

## 2018-09-12 PROBLEM — A41.9 SEPTIC SHOCK: Status: ACTIVE | Noted: 2018-09-10

## 2018-09-12 PROBLEM — I50.33 ACUTE ON CHRONIC DIASTOLIC CONGESTIVE HEART FAILURE: Status: ACTIVE | Noted: 2018-09-12

## 2018-09-12 PROBLEM — R65.21 SEPTIC SHOCK: Status: ACTIVE | Noted: 2018-09-10

## 2018-09-12 PROBLEM — J18.9 PNEUMONIA OF BOTH LUNGS DUE TO INFECTIOUS ORGANISM: Status: ACTIVE | Noted: 2018-09-12

## 2018-09-12 PROBLEM — I35.0 NONRHEUMATIC AORTIC VALVE STENOSIS: Status: ACTIVE | Noted: 2018-09-12

## 2018-09-12 LAB
ALBUMIN SERPL BCP-MCNC: 2.1 G/DL
ALLENS TEST: ABNORMAL
ALP SERPL-CCNC: 86 U/L
ALT SERPL W/O P-5'-P-CCNC: 105 U/L
ANION GAP SERPL CALC-SCNC: 8 MMOL/L
APTT BLDCRRT: 31.5 SEC
APTT BLDCRRT: 42.7 SEC
AST SERPL-CCNC: 41 U/L
BASOPHILS # BLD AUTO: 0.02 K/UL
BASOPHILS # BLD AUTO: 0.02 K/UL
BASOPHILS NFR BLD: 0.1 %
BASOPHILS NFR BLD: 0.1 %
BILIRUB SERPL-MCNC: 0.9 MG/DL
BUN SERPL-MCNC: 41 MG/DL
CALCIUM SERPL-MCNC: 7.5 MG/DL
CHLORIDE SERPL-SCNC: 103 MMOL/L
CO2 SERPL-SCNC: 27 MMOL/L
CREAT SERPL-MCNC: 1.2 MG/DL
DELSYS: ABNORMAL
DIFFERENTIAL METHOD: ABNORMAL
DIFFERENTIAL METHOD: ABNORMAL
EOSINOPHIL # BLD AUTO: 0.1 K/UL
EOSINOPHIL # BLD AUTO: 0.1 K/UL
EOSINOPHIL NFR BLD: 0.7 %
EOSINOPHIL NFR BLD: 0.8 %
ERYTHROCYTE [DISTWIDTH] IN BLOOD BY AUTOMATED COUNT: 15.7 %
ERYTHROCYTE [DISTWIDTH] IN BLOOD BY AUTOMATED COUNT: 16.1 %
ERYTHROCYTE [SEDIMENTATION RATE] IN BLOOD BY WESTERGREN METHOD: 18 MM/H
EST. GFR  (AFRICAN AMERICAN): >60 ML/MIN/1.73 M^2
EST. GFR  (NON AFRICAN AMERICAN): 58 ML/MIN/1.73 M^2
FIO2: 70
GLUCOSE SERPL-MCNC: 132 MG/DL
HCO3 UR-SCNC: 29.5 MMOL/L (ref 24–28)
HCT VFR BLD AUTO: 23.9 %
HCT VFR BLD AUTO: 23.9 %
HGB BLD-MCNC: 7.8 G/DL
HGB BLD-MCNC: 7.8 G/DL
INR PPP: 1
IT: 0.77
LYMPHOCYTES # BLD AUTO: 1.8 K/UL
LYMPHOCYTES # BLD AUTO: 1.9 K/UL
LYMPHOCYTES NFR BLD: 10.5 %
LYMPHOCYTES NFR BLD: 11.6 %
MAGNESIUM SERPL-MCNC: 2.1 MG/DL
MCH RBC QN AUTO: 31.1 PG
MCH RBC QN AUTO: 31.5 PG
MCHC RBC AUTO-ENTMCNC: 32.6 G/DL
MCHC RBC AUTO-ENTMCNC: 32.6 G/DL
MCV RBC AUTO: 95 FL
MCV RBC AUTO: 96 FL
MODE: ABNORMAL
MONOCYTES # BLD AUTO: 2.1 K/UL
MONOCYTES # BLD AUTO: 2.4 K/UL
MONOCYTES NFR BLD: 13.1 %
MONOCYTES NFR BLD: 13.8 %
NEUTROPHILS # BLD AUTO: 11.3 K/UL
NEUTROPHILS # BLD AUTO: 14 K/UL
NEUTROPHILS NFR BLD: 75.6 %
NEUTROPHILS NFR BLD: 77 %
PCO2 BLDA: 60.6 MMHG (ref 35–45)
PEEP: 10
PH SMN: 7.3 [PH] (ref 7.35–7.45)
PHOSPHATE SERPL-MCNC: 2.4 MG/DL
PLATELET # BLD AUTO: 215 K/UL
PLATELET # BLD AUTO: 230 K/UL
PLATELET BLD QL SMEAR: ABNORMAL
PMV BLD AUTO: 10.7 FL
PMV BLD AUTO: 10.8 FL
PO2 BLDA: 112 MMHG (ref 80–100)
POC BE: 3 MMOL/L
POC SATURATED O2: 98 % (ref 95–100)
POCT GLUCOSE: 122 MG/DL (ref 70–110)
POCT GLUCOSE: 145 MG/DL (ref 70–110)
POCT GLUCOSE: 173 MG/DL (ref 70–110)
POTASSIUM SERPL-SCNC: 4 MMOL/L
PROT SERPL-MCNC: 5.5 G/DL
PROTHROMBIN TIME: 10.7 SEC
RBC # BLD AUTO: 2.48 M/UL
RBC # BLD AUTO: 2.51 M/UL
SAMPLE: ABNORMAL
SITE: ABNORMAL
SODIUM SERPL-SCNC: 138 MMOL/L
VANCOMYCIN SERPL-MCNC: 16.5 UG/ML
VT: 450
WBC # BLD AUTO: 15.34 K/UL
WBC # BLD AUTO: 18.49 K/UL

## 2018-09-12 PROCEDURE — 36600 WITHDRAWAL OF ARTERIAL BLOOD: CPT

## 2018-09-12 PROCEDURE — 25000003 PHARM REV CODE 250: Performed by: NURSE PRACTITIONER

## 2018-09-12 PROCEDURE — 25000242 PHARM REV CODE 250 ALT 637 W/ HCPCS: Performed by: INTERNAL MEDICINE

## 2018-09-12 PROCEDURE — 80053 COMPREHEN METABOLIC PANEL: CPT

## 2018-09-12 PROCEDURE — 85025 COMPLETE CBC W/AUTO DIFF WBC: CPT | Mod: 91

## 2018-09-12 PROCEDURE — 85610 PROTHROMBIN TIME: CPT

## 2018-09-12 PROCEDURE — 94640 AIRWAY INHALATION TREATMENT: CPT

## 2018-09-12 PROCEDURE — 80202 ASSAY OF VANCOMYCIN: CPT

## 2018-09-12 PROCEDURE — 83735 ASSAY OF MAGNESIUM: CPT

## 2018-09-12 PROCEDURE — 99900035 HC TECH TIME PER 15 MIN (STAT)

## 2018-09-12 PROCEDURE — 84100 ASSAY OF PHOSPHORUS: CPT

## 2018-09-12 PROCEDURE — 20000000 HC ICU ROOM

## 2018-09-12 PROCEDURE — 85730 THROMBOPLASTIN TIME PARTIAL: CPT

## 2018-09-12 PROCEDURE — 25000003 PHARM REV CODE 250: Performed by: INTERNAL MEDICINE

## 2018-09-12 PROCEDURE — 25000003 PHARM REV CODE 250: Performed by: FAMILY MEDICINE

## 2018-09-12 PROCEDURE — 63600175 PHARM REV CODE 636 W HCPCS: Performed by: FAMILY MEDICINE

## 2018-09-12 PROCEDURE — 63600175 PHARM REV CODE 636 W HCPCS: Performed by: INTERNAL MEDICINE

## 2018-09-12 PROCEDURE — 85730 THROMBOPLASTIN TIME PARTIAL: CPT | Mod: 91

## 2018-09-12 PROCEDURE — 94003 VENT MGMT INPAT SUBQ DAY: CPT

## 2018-09-12 PROCEDURE — 82803 BLOOD GASES ANY COMBINATION: CPT

## 2018-09-12 PROCEDURE — 99291 CRITICAL CARE FIRST HOUR: CPT | Mod: ,,, | Performed by: NURSE PRACTITIONER

## 2018-09-12 PROCEDURE — 99900026 HC AIRWAY MAINTENANCE (STAT)

## 2018-09-12 PROCEDURE — 63600175 PHARM REV CODE 636 W HCPCS: Performed by: NURSE PRACTITIONER

## 2018-09-12 PROCEDURE — 99233 SBSQ HOSP IP/OBS HIGH 50: CPT | Mod: ,,, | Performed by: INTERNAL MEDICINE

## 2018-09-12 PROCEDURE — C9113 INJ PANTOPRAZOLE SODIUM, VIA: HCPCS | Performed by: INTERNAL MEDICINE

## 2018-09-12 RX ORDER — HEPARIN SODIUM,PORCINE/D5W 25000/250
12 INTRAVENOUS SOLUTION INTRAVENOUS CONTINUOUS
Status: DISCONTINUED | OUTPATIENT
Start: 2018-09-12 | End: 2018-09-15

## 2018-09-12 RX ORDER — VANCOMYCIN HCL IN 5 % DEXTROSE 1G/250ML
1000 PLASTIC BAG, INJECTION (ML) INTRAVENOUS
Status: DISCONTINUED | OUTPATIENT
Start: 2018-09-12 | End: 2018-09-14

## 2018-09-12 RX ADMIN — PANTOPRAZOLE SODIUM 40 MG: 40 INJECTION, POWDER, LYOPHILIZED, FOR SOLUTION INTRAVENOUS at 09:09

## 2018-09-12 RX ADMIN — AMIODARONE HYDROCHLORIDE 150 MG: 1.5 INJECTION, SOLUTION INTRAVENOUS at 10:09

## 2018-09-12 RX ADMIN — INSULIN ASPART 2 UNITS: 100 INJECTION, SOLUTION INTRAVENOUS; SUBCUTANEOUS at 06:09

## 2018-09-12 RX ADMIN — AMPICILLIN AND SULBACTAM 1.5 G: 1; .5 INJECTION, POWDER, FOR SOLUTION INTRAVENOUS at 11:09

## 2018-09-12 RX ADMIN — VANCOMYCIN HYDROCHLORIDE 1000 MG: 1 INJECTION, POWDER, LYOPHILIZED, FOR SOLUTION INTRAVENOUS at 01:09

## 2018-09-12 RX ADMIN — AMIODARONE HYDROCHLORIDE 1 MG/MIN: 1.8 INJECTION, SOLUTION INTRAVENOUS at 10:09

## 2018-09-12 RX ADMIN — ACETAMINOPHEN 650 MG: 325 TABLET, FILM COATED ORAL at 07:09

## 2018-09-12 RX ADMIN — ARFORMOTEROL TARTRATE 15 MCG: 15 SOLUTION RESPIRATORY (INHALATION) at 07:09

## 2018-09-12 RX ADMIN — AMIODARONE HYDROCHLORIDE 0.5 MG/MIN: 1.8 INJECTION, SOLUTION INTRAVENOUS at 03:09

## 2018-09-12 RX ADMIN — Medication 2500 MCG: at 09:09

## 2018-09-12 RX ADMIN — Medication 200 MCG/HR: at 10:09

## 2018-09-12 RX ADMIN — DIGOXIN 0.12 MG: 125 TABLET ORAL at 09:09

## 2018-09-12 RX ADMIN — CHLORHEXIDINE GLUCONATE 15 ML: 1.2 RINSE ORAL at 09:09

## 2018-09-12 RX ADMIN — METOPROLOL TARTRATE 5 MG: 5 INJECTION, SOLUTION INTRAVENOUS at 03:09

## 2018-09-12 RX ADMIN — ARFORMOTEROL TARTRATE 15 MCG: 15 SOLUTION RESPIRATORY (INHALATION) at 08:09

## 2018-09-12 RX ADMIN — Medication 0.04 MCG/KG/MIN: at 07:09

## 2018-09-12 RX ADMIN — AMPICILLIN AND SULBACTAM 1.5 G: 1; .5 INJECTION, POWDER, FOR SOLUTION INTRAVENOUS at 03:09

## 2018-09-12 RX ADMIN — AMPICILLIN AND SULBACTAM 1.5 G: 1; .5 INJECTION, POWDER, FOR SOLUTION INTRAVENOUS at 06:09

## 2018-09-12 RX ADMIN — HEPARIN SODIUM AND DEXTROSE 12 UNITS/KG/HR: 10000; 5 INJECTION INTRAVENOUS at 12:09

## 2018-09-12 RX ADMIN — HEPARIN SODIUM AND DEXTROSE 12 UNITS/KG/HR: 10000; 5 INJECTION INTRAVENOUS at 09:09

## 2018-09-12 RX ADMIN — BUDESONIDE 0.5 MG: 0.5 SUSPENSION RESPIRATORY (INHALATION) at 08:09

## 2018-09-12 RX ADMIN — BUDESONIDE 0.5 MG: 0.5 SUSPENSION RESPIRATORY (INHALATION) at 07:09

## 2018-09-12 NOTE — ASSESSMENT & PLAN NOTE
Likely acute blood loss anemia from recent GI bleed  Currently no active bleeding, monitor closely on Heparin infusion  Conservative transfusion protocol  If H/H drops tomorrow and still hypotensive may need PRBC transfusion  CBC in AM

## 2018-09-12 NOTE — ASSESSMENT & PLAN NOTE
-Patient with chronic A-fib that is currently controlled with Digoxin.   -Agree with amio gtt  -BP unable to tolerate BB or CCB. Is requiring pressor support, but being weaned   -H/H stable this  Morning.   -Dr. Belle has discussed starting heparin gtt with Dr. Cosme. Will need H/H  Monitored closely   - Consider restarting anticoagulation when H/H more stable and preferably greater than 8.0  - Echo shows normal LVF with Moderate to severe aortic stenosis and pulm HTN

## 2018-09-12 NOTE — NURSING
EICU notified of increasing heart rate, despite prn metoprolol. Patient on Levo. Will cont to monitor

## 2018-09-12 NOTE — PROGRESS NOTES
[facility] Hospital Medicine  Progress Note    Patient Name: Eleazar Solo  MRN: [id number]  Patient Class: IP- Inpatient   Admission Date: 9/10/2018  Length of Stay: 2 days  Attending Physician: Prachi Boyer MD  Primary Care Provider: Poly Fitch MD        Subjective:     Principal Problem:Acute hypoxemic respiratory failure    HPI:  Mr. Solo is a 77yo male with a PMHx of permanent AF on Eliquis, HTN, HLD, COPD, DM II, and h/o CVA, who was transferred from [facility] per family request as patient is followed OP by [facility] physicians.  He was originally admitted to [facility] on 9/6 for acute upper GI bleed with blood loss anemia (Hb 12 > 7.4 > 7.9).  Patient received total of 2 units PRBC and Eliquis held (last dose 9/6 AM).  He underwent EGD on 9/6 that showed a clot at the GE junction which was clipped  and injected with epi.  There was concern for further bleeding, therefore, repeat EGD was performed on 9/7 which showed no evidence of active bleeding.  During admission, patient developed AFib with RVR and was placed on diltiazem drip and PO dig.  CXR on 9/8 showed left pneumonia, IV Rocephin started.  Prior to transfer, vital signs and labs stable.  Upon arrival to [facility] ICU, patient hypotensive (SBP 70-80's) and hypoxic requiring continuous BiPAP.  Remains in AFib with controlled rate.  Repeat CXR showed RUL pneumonia.  Repeat labs resulted WBC 16.5, H&H 8.4/24.6, plt 136, BUN 38, cr. 1.4, AST 77, . ABG with pH 7.382, pCO2 44, pO2 60, HCO3 26.  Patient c/o SOB which is resolved while on BiPAP.  Denies any CP, palpitations, orthopnea, PND, cough, edema, ABD pain, N/V/D, hematemesis, melena, hematochezia, dysuria, lightheadedness/dzziness, syncope, HA, AMS, focal deficits, weakness, fever, or chills.  Patient admitted to ICU under Hospital Medicine services.     Hospital Course:  Pt admitted and started on bipap, eventually requiring intubation on 9/11. Started  on levophed, heparin, Dig and Amiodarone infusion. Echo shows normal LVF with Moderate to severe aortic stenosis and pulm HTN.         Interval History:intubated yesterday and required pressor with sedation.  Resting on vent.  Cynthia TF.  No active bleeding        Review of Systems   Unable to perform ROS: Intubated     Objective:     Vital Signs (Most Recent):  Temp: 98.9 °F (37.2 °C) (09/12/18 1500)  Pulse: 106 (09/12/18 1800)  Resp: 18 (09/12/18 1800)  BP: (!) 95/46 (09/12/18 1800)  SpO2: 96 % (09/12/18 1800) Vital Signs (24h Range):  Temp:  [98.7 °F (37.1 °C)-99.1 °F (37.3 °C)] 98.9 °F (37.2 °C)  Pulse:  [] 106  Resp:  [16-21] 18  SpO2:  [93 %-100 %] 96 %  BP: ()/(37-86) 95/46     Weight: 96.1 kg (211 lb 13.8 oz)  Body mass index is 30.4 kg/m².    Intake/Output Summary (Last 24 hours) at 9/12/2018 1836  Last data filed at 9/12/2018 1800  Gross per 24 hour   Intake 3120.07 ml   Output 2345 ml   Net 775.07 ml      Physical Exam Constitutional: He appears well-developed and well-nourished. He is easily aroused.  Non-toxic appearance. He has a sickly appearance. He appears ill. No distress. He is sedated, intubated and restrained.   HENT:   Head: Normocephalic and atraumatic.   Cardiovascular: Normal rate and normal pulses. An irregularly irregular rhythm present.Murmur heard.  Pulmonary/Chest: Effort normal and breath sounds normal.  Genitourinary Comments: Adams in place   Musculoskeletal: Normal range of motion.        Right foot: There is no deformity.        Left foot: There is no deformity.   Pedal edema   Lymphadenopathy:     He has no cervical adenopathy.   Neurological: He is easily aroused.   Sedated on vent   Skin: Skin is warm, dry and intact. Capillary refill takes less than 2 seconds. No rash noted. No cyanosis.         Significant Labs:   CBC:   Recent Labs   Lab  09/11/18   1153   09/11/18   2256  09/12/18   0402  09/12/18   1100   WBC  17.00*   --    --   18.49*  15.34*   HGB  7.5*   < >   8.2*  7.8*  7.8*   HCT  22.6*   < >  24.5*  23.9*  23.9*   PLT  152   --    --   230  215    < > = values in this interval not displayed.     CMP:   Recent Labs   Lab  09/11/18   0627  09/11/18   1200  09/12/18   0402   NA  136  136  138   K  4.1  3.9  4.0   CL  102  103  103   CO2  27  27  27   GLU  139*  130*  132*   BUN  42*  42*  41*   CREATININE  1.3  1.2  1.2   CALCIUM  7.5*  7.1*  7.5*   PROT  5.9*  5.2*  5.5*   ALBUMIN  2.5*  2.2*  2.1*   BILITOT  1.8*  1.6*  0.9   ALKPHOS  91  80  86   AST  74*  55*  41*   ALT  150*  124*  105*   ANIONGAP  7*  6*  8   EGFRNONAA  53*  58*  58*       Significant Imaging: I have reviewed all pertinent imaging results/findings within the past 24 hours.   Results for orders placed or performed during the hospital encounter of 09/10/18   Blood culture   Result Value Ref Range    Blood Culture, Routine No Growth to date     Blood Culture, Routine No Growth to date    Blood culture   Result Value Ref Range    Blood Culture, Routine No Growth to date     Blood Culture, Routine No Growth to date    Culture, Respiratory with Gram Stain   Result Value Ref Range    Gram Stain (Respiratory) <10 epithelial cells per low power field.     Gram Stain (Respiratory) Rare WBC's     Gram Stain (Respiratory) Rare yeast    CBC auto differential   Result Value Ref Range    WBC 16.46 (H) 3.90 - 12.70 K/uL    RBC 2.60 (L) 4.60 - 6.20 M/uL    Hemoglobin 8.4 (L) 14.0 - 18.0 g/dL    Hematocrit 24.6 (L) 40.0 - 54.0 %    MCV 95 82 - 98 fL    MCH 32.3 (H) 27.0 - 31.0 pg    MCHC 34.1 32.0 - 36.0 g/dL    RDW 15.8 (H) 11.5 - 14.5 %    Platelets 136 (L) 150 - 350 K/uL    MPV 11.1 9.2 - 12.9 fL    Gran # (ANC) 11.7 (H) 1.8 - 7.7 K/uL    Lymph # 1.3 1.0 - 4.8 K/uL    Mono # 3.5 (H) 0.3 - 1.0 K/uL    Eos # 0.0 0.0 - 0.5 K/uL    Baso # 0.01 0.00 - 0.20 K/uL    Gran% 71.4 38.0 - 73.0 %    Lymph% 7.8 (L) 18.0 - 48.0 %    Mono% 21.2 (H) 4.0 - 15.0 %    Eosinophil% 0.0 0.0 - 8.0 %    Basophil% 0.1 0.0 - 1.9 %    Poly  Occasional     Differential Method Automated    Comprehensive metabolic panel   Result Value Ref Range    Sodium 134 (L) 136 - 145 mmol/L    Potassium 4.1 3.5 - 5.1 mmol/L    Chloride 100 95 - 110 mmol/L    CO2 24 23 - 29 mmol/L    Glucose 163 (H) 70 - 110 mg/dL    BUN, Bld 38 (H) 8 - 23 mg/dL    Creatinine 1.4 0.5 - 1.4 mg/dL    Calcium 7.6 (L) 8.7 - 10.5 mg/dL    Total Protein 5.6 (L) 6.0 - 8.4 g/dL    Albumin 2.4 (L) 3.5 - 5.2 g/dL    Total Bilirubin 1.7 (H) 0.1 - 1.0 mg/dL    Alkaline Phosphatase 89 55 - 135 U/L    AST 77 (H) 10 - 40 U/L     (H) 10 - 44 U/L    Anion Gap 10 8 - 16 mmol/L    eGFR if African American 56 (A) >60 mL/min/1.73 m^2    eGFR if non African American 48 (A) >60 mL/min/1.73 m^2   APTT   Result Value Ref Range    aPTT 32.7 (H) 21.0 - 32.0 sec   Protime-INR   Result Value Ref Range    Prothrombin Time 12.4 9.0 - 12.5 sec    INR 1.2 0.8 - 1.2   TSH   Result Value Ref Range    TSH 1.577 0.400 - 4.000 uIU/mL   Urinalysis   Result Value Ref Range    Specimen UA Urine, Clean Catch     Color, UA Maria Fernanda Yellow, Straw, Maria Fernanda    Appearance, UA Clear Clear    pH, UA SEE COMMENT 5.0 - 8.0    Specific Gravity, UA SEE COMMENT 1.005 - 1.030    Protein, UA SEE COMMENT Negative    Glucose, UA SEE COMMENT Negative    Ketones, UA SEE COMMENT Negative    Bilirubin (UA) SEE COMMENT Negative    Occult Blood UA SEE COMMENT Negative    Nitrite, UA SEE COMMENT Negative    Urobilinogen, UA SEE COMMENT <2.0 EU/dL    Leukocytes, UA SEE COMMENT Negative   Brain natriuretic peptide   Result Value Ref Range     (H) 0 - 99 pg/mL   Magnesium   Result Value Ref Range    Magnesium 2.2 1.6 - 2.6 mg/dL   Lactic acid, plasma   Result Value Ref Range    Lactate (Lactic Acid) 1.5 0.5 - 2.2 mmol/L   CBC auto differential   Result Value Ref Range    WBC 17.31 (H) 3.90 - 12.70 K/uL    RBC 2.58 (L) 4.60 - 6.20 M/uL    Hemoglobin 8.2 (L) 14.0 - 18.0 g/dL    Hematocrit 24.5 (L) 40.0 - 54.0 %    MCV 95 82 - 98 fL    MCH 31.8  (H) 27.0 - 31.0 pg    MCHC 33.5 32.0 - 36.0 g/dL    RDW 15.7 (H) 11.5 - 14.5 %    Platelets 147 (L) 150 - 350 K/uL    MPV 11.1 9.2 - 12.9 fL    Gran # (ANC) 13.3 (H) 1.8 - 7.7 K/uL    Lymph # 1.3 1.0 - 4.8 K/uL    Mono # 2.7 (H) 0.3 - 1.0 K/uL    Eos # 0.0 0.0 - 0.5 K/uL    Baso # 0.01 0.00 - 0.20 K/uL    Gran% 76.9 (H) 38.0 - 73.0 %    Lymph% 7.5 (L) 18.0 - 48.0 %    Mono% 15.4 (H) 4.0 - 15.0 %    Eosinophil% 0.1 0.0 - 8.0 %    Basophil% 0.1 0.0 - 1.9 %    Differential Method Automated    Comprehensive metabolic panel   Result Value Ref Range    Sodium 136 136 - 145 mmol/L    Potassium 4.1 3.5 - 5.1 mmol/L    Chloride 102 95 - 110 mmol/L    CO2 27 23 - 29 mmol/L    Glucose 139 (H) 70 - 110 mg/dL    BUN, Bld 42 (H) 8 - 23 mg/dL    Creatinine 1.3 0.5 - 1.4 mg/dL    Calcium 7.5 (L) 8.7 - 10.5 mg/dL    Total Protein 5.9 (L) 6.0 - 8.4 g/dL    Albumin 2.5 (L) 3.5 - 5.2 g/dL    Total Bilirubin 1.8 (H) 0.1 - 1.0 mg/dL    Alkaline Phosphatase 91 55 - 135 U/L    AST 74 (H) 10 - 40 U/L     (H) 10 - 44 U/L    Anion Gap 7 (L) 8 - 16 mmol/L    eGFR if African American >60 >60 mL/min/1.73 m^2    eGFR if non African American 53 (A) >60 mL/min/1.73 m^2   Magnesium   Result Value Ref Range    Magnesium 2.2 1.6 - 2.6 mg/dL   Phosphorus   Result Value Ref Range    Phosphorus 1.9 (L) 2.7 - 4.5 mg/dL   Hemoglobin   Result Value Ref Range    Hemoglobin 7.5 (L) 14.0 - 18.0 g/dL   Hemoglobin   Result Value Ref Range    Hemoglobin 8.2 (L) 14.0 - 18.0 g/dL   Hematocrit   Result Value Ref Range    Hematocrit 21.9 (L) 40.0 - 54.0 %   Hematocrit   Result Value Ref Range    Hematocrit 24.5 (L) 40.0 - 54.0 %   Urinalysis Microscopic   Result Value Ref Range    RBC, UA 1 0 - 4 /hpf    WBC, UA 1 0 - 5 /hpf    Bacteria, UA Rare None-Occ /hpf    Hyaline Casts, UA 20 (A) 0-1/lpf /lpf    Microscopic Comment SEE COMMENT    Lactic acid, plasma   Result Value Ref Range    Lactate (Lactic Acid) 1.4 0.5 - 2.2 mmol/L   APTT   Result Value Ref Range     aPTT 32.8 (H) 21.0 - 32.0 sec   CBC auto differential   Result Value Ref Range    WBC 17.00 (H) 3.90 - 12.70 K/uL    RBC 2.38 (L) 4.60 - 6.20 M/uL    Hemoglobin 7.5 (L) 14.0 - 18.0 g/dL    Hematocrit 22.6 (L) 40.0 - 54.0 %    MCV 95 82 - 98 fL    MCH 31.5 (H) 27.0 - 31.0 pg    MCHC 33.2 32.0 - 36.0 g/dL    RDW 15.9 (H) 11.5 - 14.5 %    Platelets 152 150 - 350 K/uL    MPV 11.2 9.2 - 12.9 fL    Gran # (ANC) 13.4 (H) 1.8 - 7.7 K/uL    Lymph # 1.2 1.0 - 4.8 K/uL    Mono # 2.4 (H) 0.3 - 1.0 K/uL    Eos # 0.0 0.0 - 0.5 K/uL    Baso # 0.01 0.00 - 0.20 K/uL    Gran% 79.2 (H) 38.0 - 73.0 %    Lymph% 6.9 (L) 18.0 - 48.0 %    Mono% 14.4 4.0 - 15.0 %    Eosinophil% 0.0 0.0 - 8.0 %    Basophil% 0.1 0.0 - 1.9 %    Differential Method Automated    Protime-INR   Result Value Ref Range    Prothrombin Time 12.2 9.0 - 12.5 sec    INR 1.2 0.8 - 1.2   Comprehensive metabolic panel   Result Value Ref Range    Sodium 136 136 - 145 mmol/L    Potassium 3.9 3.5 - 5.1 mmol/L    Chloride 103 95 - 110 mmol/L    CO2 27 23 - 29 mmol/L    Glucose 130 (H) 70 - 110 mg/dL    BUN, Bld 42 (H) 8 - 23 mg/dL    Creatinine 1.2 0.5 - 1.4 mg/dL    Calcium 7.1 (L) 8.7 - 10.5 mg/dL    Total Protein 5.2 (L) 6.0 - 8.4 g/dL    Albumin 2.2 (L) 3.5 - 5.2 g/dL    Total Bilirubin 1.6 (H) 0.1 - 1.0 mg/dL    Alkaline Phosphatase 80 55 - 135 U/L    AST 55 (H) 10 - 40 U/L     (H) 10 - 44 U/L    Anion Gap 6 (L) 8 - 16 mmol/L    eGFR if African American >60 >60 mL/min/1.73 m^2    eGFR if non African American 58 (A) >60 mL/min/1.73 m^2   Urinalysis   Result Value Ref Range    Specimen UA Urine, Catheterized     Color, UA Yellow Yellow, Straw, Maria Fernanda    Appearance, UA Clear Clear    pH, UA 6.0 5.0 - 8.0    Specific Gravity, UA 1.020 1.005 - 1.030    Protein, UA Trace (A) Negative    Glucose, UA Negative Negative    Ketones, UA Negative Negative    Bilirubin (UA) 1+ (A) Negative    Occult Blood UA Negative Negative    Nitrite, UA Negative Negative    Urobilinogen, UA  4.0-6.0 (A) <2.0 EU/dL    Leukocytes, UA Negative Negative   Vancomycin, random   Result Value Ref Range    Vancomycin, Random 5.0 Not established ug/mL   Hemoglobin   Result Value Ref Range    Hemoglobin 8.4 (L) 14.0 - 18.0 g/dL   Hematocrit   Result Value Ref Range    Hematocrit 25.3 (L) 40.0 - 54.0 %   Hemoglobin   Result Value Ref Range    Hemoglobin 8.2 (L) 14.0 - 18.0 g/dL   Hematocrit   Result Value Ref Range    Hematocrit 24.5 (L) 40.0 - 54.0 %   CBC auto differential   Result Value Ref Range    WBC 18.49 (H) 3.90 - 12.70 K/uL    RBC 2.51 (L) 4.60 - 6.20 M/uL    Hemoglobin 7.8 (L) 14.0 - 18.0 g/dL    Hematocrit 23.9 (L) 40.0 - 54.0 %    MCV 95 82 - 98 fL    MCH 31.1 (H) 27.0 - 31.0 pg    MCHC 32.6 32.0 - 36.0 g/dL    RDW 15.7 (H) 11.5 - 14.5 %    Platelets 230 150 - 350 K/uL    MPV 10.8 9.2 - 12.9 fL    Gran # (ANC) 14.0 (H) 1.8 - 7.7 K/uL    Lymph # 1.9 1.0 - 4.8 K/uL    Mono # 2.4 (H) 0.3 - 1.0 K/uL    Eos # 0.1 0.0 - 0.5 K/uL    Baso # 0.02 0.00 - 0.20 K/uL    Gran% 77.0 (H) 38.0 - 73.0 %    Lymph% 10.5 (L) 18.0 - 48.0 %    Mono% 13.1 4.0 - 15.0 %    Eosinophil% 0.8 0.0 - 8.0 %    Basophil% 0.1 0.0 - 1.9 %    Platelet Estimate Appears normal     Differential Method Automated    Comprehensive metabolic panel   Result Value Ref Range    Sodium 138 136 - 145 mmol/L    Potassium 4.0 3.5 - 5.1 mmol/L    Chloride 103 95 - 110 mmol/L    CO2 27 23 - 29 mmol/L    Glucose 132 (H) 70 - 110 mg/dL    BUN, Bld 41 (H) 8 - 23 mg/dL    Creatinine 1.2 0.5 - 1.4 mg/dL    Calcium 7.5 (L) 8.7 - 10.5 mg/dL    Total Protein 5.5 (L) 6.0 - 8.4 g/dL    Albumin 2.1 (L) 3.5 - 5.2 g/dL    Total Bilirubin 0.9 0.1 - 1.0 mg/dL    Alkaline Phosphatase 86 55 - 135 U/L    AST 41 (H) 10 - 40 U/L     (H) 10 - 44 U/L    Anion Gap 8 8 - 16 mmol/L    eGFR if African American >60 >60 mL/min/1.73 m^2    eGFR if non African American 58 (A) >60 mL/min/1.73 m^2   Magnesium   Result Value Ref Range    Magnesium 2.1 1.6 - 2.6 mg/dL   Phosphorus    Result Value Ref Range    Phosphorus 2.4 (L) 2.7 - 4.5 mg/dL   Vancomycin, random   Result Value Ref Range    Vancomycin, Random 16.5 Not established ug/mL   APTT   Result Value Ref Range    aPTT 31.5 21.0 - 32.0 sec   Protime-INR   Result Value Ref Range    Prothrombin Time 10.7 9.0 - 12.5 sec    INR 1.0 0.8 - 1.2   CBC auto differential   Result Value Ref Range    WBC 15.34 (H) 3.90 - 12.70 K/uL    RBC 2.48 (L) 4.60 - 6.20 M/uL    Hemoglobin 7.8 (L) 14.0 - 18.0 g/dL    Hematocrit 23.9 (L) 40.0 - 54.0 %    MCV 96 82 - 98 fL    MCH 31.5 (H) 27.0 - 31.0 pg    MCHC 32.6 32.0 - 36.0 g/dL    RDW 16.1 (H) 11.5 - 14.5 %    Platelets 215 150 - 350 K/uL    MPV 10.7 9.2 - 12.9 fL    Gran # (ANC) 11.3 (H) 1.8 - 7.7 K/uL    Lymph # 1.8 1.0 - 4.8 K/uL    Mono # 2.1 (H) 0.3 - 1.0 K/uL    Eos # 0.1 0.0 - 0.5 K/uL    Baso # 0.02 0.00 - 0.20 K/uL    Gran% 75.6 (H) 38.0 - 73.0 %    Lymph% 11.6 (L) 18.0 - 48.0 %    Mono% 13.8 4.0 - 15.0 %    Eosinophil% 0.7 0.0 - 8.0 %    Basophil% 0.1 0.0 - 1.9 %    Differential Method Automated    2D echo with color flow doppler   Result Value Ref Range    EF 50 55 - 65    Mitral Valve Regurgitation MILD     Diastolic Dysfunction No     Aortic Valve Stenosis MODERATE TO SEVERE (A)     Est. PA Systolic Pressure 56 (A)     Tricuspid Valve Regurgitation MILD TO MODERATE    Type & Screen   Result Value Ref Range    Group & Rh O POS     Indirect Raciel NEG    ISTAT PROCEDURE   Result Value Ref Range    POC PH 7.382 7.35 - 7.45    POC PCO2 44.4 35 - 45 mmHg    POC PO2 60 (L) 80 - 100 mmHg    POC HCO3 26.4 24 - 28 mmol/L    POC BE 1 -2 to 2 mmol/L    POC SATURATED O2 90 (L) 95 - 100 %    Rate 16     Sample ARTERIAL     Site LR     Allens Test Pass     DelSys CPAP/BiPAP     Mode BiPAP     FiO2 50     IP 10     EP 5    POCT glucose   Result Value Ref Range    POCT Glucose 164 (H) 70 - 110 mg/dL   POCT glucose   Result Value Ref Range    POCT Glucose 153 (H) 70 - 110 mg/dL   ISTAT PROCEDURE   Result Value  Ref Range    POC PH 7.303 (L) 7.35 - 7.45    POC PCO2 49.9 (H) 35 - 45 mmHg    POC PO2 62 (L) 80 - 100 mmHg    POC HCO3 24.7 24 - 28 mmol/L    POC BE -2 -2 to 2 mmol/L    POC SATURATED O2 88 (L) 95 - 100 %    Rate 16     Sample ARTERIAL     Site RR     Allens Test Pass     DelSys CPAP/BiPAP     Mode BiPAP     FiO2 50     IP 12     EP 5    POCT glucose   Result Value Ref Range    POCT Glucose 126 (H) 70 - 110 mg/dL   POCT glucose   Result Value Ref Range    POCT Glucose 148 (H) 70 - 110 mg/dL   POCT glucose   Result Value Ref Range    POCT Glucose 182 (H) 70 - 110 mg/dL   ISTAT PROCEDURE   Result Value Ref Range    POC PH 7.296 (L) 7.35 - 7.45    POC PCO2 60.6 (HH) 35 - 45 mmHg    POC PO2 112 (H) 80 - 100 mmHg    POC HCO3 29.5 (H) 24 - 28 mmol/L    POC BE 3 -2 to 2 mmol/L    POC SATURATED O2 98 95 - 100 %    Rate 18     Sample ARTERIAL     Site RR     Allens Test Pass     DelSys Adult Vent     Mode PCV     Vt 450     PEEP 10     FiO2 70     IT .77    POCT glucose   Result Value Ref Range    POCT Glucose 122 (H) 70 - 110 mg/dL   POCT glucose   Result Value Ref Range    POCT Glucose 145 (H) 70 - 110 mg/dL   POCT glucose   Result Value Ref Range    POCT Glucose 173 (H) 70 - 110 mg/dL     Assessment/Plan:      * Acute hypoxemic respiratory failure    intubated          Type 2 diabetes mellitus without complication, without long-term current use of insulin    - Hold Amaryl, will resume at DC.  - Accuchecks with low SSI.          Septic shock    - CXR shows right upper lobe infiltrate, new since previous imaging on 9/8.  WBC 16.5, RR >20, SBP <90.  - sputum and blood cultures pending  - Empiric IV Unasyn and vanc, pharmacy consult for vanc dosing.    - Continue LABA + ICS.  - Pulmonology/ICU team following, appreciate assistance.  -intubated         Acute upper GI bleed with acute blood loss anemia    - EGD 9/6 with clot at GE junction s/p clipping and epi injection.  Repeat EGD 9/7 negative for active bleeding.  - Follow  serial H&H and transfuse as needed.  - Continue IV Protonix daily.  - Hold Eliquis.  - NPO, IV hydration.  - GI consult in AM.        Chronic kidney disease, stage 3    - Cr. Stable at 1.4 (baseline 1.3)  - Avoid nephrotoxic agents.trict I&O's.  - Follow daily BMP.        Chronic obstructive pulmonary disease with acute lower respiratory infection    - Plan as above.  - Pulmonology following.        Essential hypertension    - Patient hypotensive and on pressor  - Hold CCB, ARB, and diuretics.  Follow BP trends and resume as needed.        Atrial fibrillation, permanent    - Rate controlled at present.  Monitor telemetry.  -heparin and amio drip  - Cardiology on board          VTE Risk Mitigation (From admission, onward)        Ordered     heparin 25,000 units in dextrose 5% 250 mL (100 units/mL) infusion LOW INTENSITY nomogram - [facility]  Continuous      09/12/18 1048     heparin 25,000 units in dextrose 5% (100 units/ml) IV bolus from bag - ADDITIONAL PRN BOLUS - 60 units/kg  As needed (PRN)      09/12/18 1048     heparin 25,000 units in dextrose 5% (100 units/ml) IV bolus from bag - ADDITIONAL PRN BOLUS - 30 units/kg  As needed (PRN)      09/12/18 1048     IP VTE HIGH RISK PATIENT  Once      09/11/18 0425     Reason for No Pharmacological VTE Prophylaxis  Once      09/11/18 0425     Place sequential compression device  Until discontinued      09/11/18 0425          Critical care time spent on the evaluation and treatment of severe organ dysfunction, review of pertinent labs and imaging studies, discussions with consulting providers and discussions with patient/family: >30 minutes.    Prachi Boyer MD  Department of Hospital Medicine   [facility] -

## 2018-09-12 NOTE — PROGRESS NOTES
Vancomycin Progress Notes:    Vancomycin Random = 5 from 9/11 @ 9807  Indication -Pneumonia   Gave a 1500 mg dose x 1   Continue Pulse dosing and give another dose when levels are < 18/Next random 9/12 @ 0800  Destini Nicolas [facility] 9/11/2018 9:00 PM

## 2018-09-12 NOTE — ASSESSMENT & PLAN NOTE
- CXR shows right upper lobe infiltrate, new since previous imaging on 9/8.  WBC 16.5, RR >20, SBP <90.  - sputum and blood cultures pending  - Empiric IV Unasyn and vanc, pharmacy consult for vanc dosing.    - Continue LABA + ICS.  - Pulmonology/ICU team following, appreciate assistance.  -intubated

## 2018-09-12 NOTE — PROGRESS NOTES
Clinical Pharmacy: Vancomycin Progress Note    Day #2 of Vancomycin    Vancomycin random level = 16.5 mcg/ml    WBC = 15.34 (down from 17.31)  Tmax = 99.1  SCr = 1.2 (stable)     Blood Cx: NGTD x 2 days  Resp Cx: Rare yeast  Dx: Severe Sepsis secondary to PNA     Goal trough: 15-20 mcg/ml     We will schedule patient's dosing at this time. Patient will be started on 1gm every 18 hours and a trough will be ordered prior to the 3rd total dose.   Trough due: 09/13 at 2330    Thank you for allowing us to participate in this patient's care.   Sobia Soriano, PharmD 9/12/2018 12:19 PM

## 2018-09-12 NOTE — PLAN OF CARE
Problem: Patient Care Overview  Goal: Plan of Care Review  Outcome: Ongoing (interventions implemented as appropriate)  Plan of care reviewed. Patient intubated and sedated. ETT 25 at the lips, ACVC. Sedated with Fentanyl gtt and propofol. Needing Norepinephrine to keep up Blood pressure. Titrated to keep MAP above 65. (+) corneal, gag, and cough reflexes. Withdraws to noxious stimuli. IV antibiotics. LIJ, dressing clean dry and intact. OG tube, with cont tube feeding. Peptamen Intense at 40 ml/hr with goal rate at 80 ml/hr. Metoprolol PRN given per orders. Afib on monitor. Blood glucose monitoring. Bilateral soft restraints to upper extremities. Adams to gravity, clear yellow urine. One time dose of Lasix given per orders. No signs of bleeding at this time. Turn every two hours to prevent skin breakdown. Will cont to monitor

## 2018-09-12 NOTE — SUBJECTIVE & OBJECTIVE
Review of Systems   Reason unable to perform ROS: patient intubated and sedated      Objective:     Vital Signs (Most Recent):  Temp: 99.1 °F (37.3 °C) (09/12/18 0315)  Pulse: (!) 114 (09/12/18 1327)  Resp: 18 (09/12/18 1327)  BP: (!) 105/59 (09/12/18 0630)  SpO2: 98 % (09/12/18 1327) Vital Signs (24h Range):  Temp:  [98.5 °F (36.9 °C)-99.1 °F (37.3 °C)] 99.1 °F (37.3 °C)  Pulse:  [] 114  Resp:  [16-20] 18  SpO2:  [88 %-100 %] 98 %  BP: ()/(40-80) 105/59     Weight: 96.1 kg (211 lb 13.8 oz)  Body mass index is 30.4 kg/m².     SpO2: 98 %  O2 Device (Oxygen Therapy): ventilator      Intake/Output Summary (Last 24 hours) at 9/12/2018 1345  Last data filed at 9/12/2018 0607  Gross per 24 hour   Intake 1868.77 ml   Output 2210 ml   Net -341.23 ml       Lines/Drains/Airways     Central Venous Catheter Line                 Percutaneous Central Line Insertion/Assessment - triple lumen  09/11/18 1124 left internal jugular 1 day          Drain                 NG/OG Tube 09/11/18 1123 orogastric Center mouth 1 day         Urethral Catheter 09/11/18 1123 Double-lumen 1 day          Airway                 Airway - Non-Surgical 09/11/18 1110 Endotracheal Tube 1 day          Peripheral Intravenous Line                 Peripheral IV - Single Lumen 09/10/18 1949 Left Antecubital 1 day                Physical Exam   Constitutional: He appears well-developed and well-nourished. He is sedated and intubated.   Neck: Neck supple. No JVD present.   Cardiovascular: Normal rate and normal pulses. An irregularly irregular rhythm present. Exam reveals no friction rub.   Murmur heard.   Harsh midsystolic murmur is present at the upper right sternal border radiating to the neck.  Pulmonary/Chest: Effort normal and breath sounds normal. He is intubated. No respiratory distress. He has no wheezes. He has no rales.   Abdominal: Soft. Bowel sounds are normal. He exhibits no distension.   Musculoskeletal: He exhibits no edema or  tenderness.   Neurological:   Patient currently sedated      Skin: Skin is warm and dry. No rash noted.   Nursing note and vitals reviewed.      Significant Labs:   All pertinent lab results from the last 24 hours have been reviewed. and   Recent Lab Results       09/12/18  1112 09/12/18  1100 09/12/18  0755 09/12/18  0521 09/12/18  0439      Albumin          Alkaline Phosphatase          Allens Test     Pass     ALT          Anion Gap          aPTT  31.5  Comment:  aPTT therapeutic range = 39-69 seconds        AST          Baso #  0.02        Basophil%  0.1        Total Bilirubin          Site     RR     BUN, Bld          Calcium          Chloride          CO2          Creatinine          DelSys     Adult Vent     Differential Method  Automated        eGFR if           eGFR if non           Eos #  0.1        Eosinophil%  0.7        FiO2     70     Glucose          Gran # (ANC)  11.3        Gran%  75.6        Hematocrit  23.9        Hemoglobin  7.8        Coumadin Monitoring INR  1.0  Comment:  Coumadin Therapy:  2.0 - 3.0 for INR for all indicators except mechanical heart valves  and antiphospholipid syndromes which should use 2.5 - 3.5.          IT     .77     Lymph #  1.8        Lymph%  11.6        Magnesium          MCH  31.5        MCHC  32.6        MCV  96        Mode     PCV     Mono #  2.1        Mono%  13.8        MPV  10.7        PEEP     10     Phosphorus          Platelet Estimate          Platelets  215        POC BE     3     POC HCO3     29.5     POC PCO2     60.6     POC PH     7.296     POC PO2     112     POC SATURATED O2     98     POCT Glucose 145   122      Potassium          Total Protein          Protime  10.7        Rate     18     RBC  2.48        RDW  16.1        Sample     ARTERIAL     Sodium          Vancomycin, Random   16.5       Vt     450     WBC  15.34                    09/12/18  0402 09/11/18  2308 09/11/18  2256 09/11/18  1840 09/11/18  1811       Albumin 2.1         Alkaline Phosphatase 86         Allens Test                   Anion Gap 8         aPTT          AST 41         Baso # 0.02         Basophil% 0.1         Total Bilirubin 0.9  Comment:  For infants and newborns, interpretation of results should be based  on gestational age, weight and in agreement with clinical  observations.  Premature Infant recommended reference ranges:  Up to 24 hours.............<8.0 mg/dL  Up to 48 hours............<12.0 mg/dL  3-5 days..................<15.0 mg/dL  6-29 days.................<15.0 mg/dL           Site          BUN, Bld 41         Calcium 7.5         Chloride 103         CO2 27         Creatinine 1.2         DelSys          Differential Method Automated         eGFR if  >60         eGFR if non  58  Comment:  Calculation used to obtain the estimated glomerular filtration  rate (eGFR) is the CKD-EPI equation.            Eos # 0.1         Eosinophil% 0.8         FiO2          Glucose 132         Gran # (ANC) 14.0         Gran% 77.0         Hematocrit 23.9  24.5  25.3     Hemoglobin 7.8  8.2  8.4     Coumadin Monitoring INR          IT          Lymph # 1.9         Lymph% 10.5         Magnesium 2.1         MCH 31.1         MCHC 32.6         MCV 95         Mode          Mono # 2.4         Mono% 13.1         MPV 10.8         PEEP          Phosphorus 2.4         Platelet Estimate Appears normal         Platelets 230  Comment:  Results confirmed, test repeated  Corrected result; previously reported as 230 on 09/12/2018 at 04:43.  [C]         POC BE          POC HCO3          POC PCO2          POC PH          POC PO2          POC SATURATED O2          POCT Glucose  182  148      Potassium 4.0         Total Protein 5.5         Protime          Rate          RBC 2.51         RDW 15.7         Sample          Sodium 138         Vancomycin, Random          Vt          WBC 18.49                     09/11/18  1557      Albumin       Alkaline Phosphatase      Allens Test      ALT      Anion Gap      aPTT      AST      Baso #      Basophil%      Total Bilirubin      Site      BUN, Bld      Calcium      Chloride      CO2      Creatinine      DelSys      Differential Method      eGFR if       eGFR if non       Eos #      Eosinophil%      FiO2      Glucose      Gran # (ANC)      Gran%      Hematocrit      Hemoglobin      Coumadin Monitoring INR      IT      Lymph #      Lymph%      Magnesium      MCH      MCHC      MCV      Mode      Mono #      Mono%      MPV      PEEP      Phosphorus      Platelet Estimate      Platelets      POC BE      POC HCO3      POC PCO2      POC PH      POC PO2      POC SATURATED O2      POCT Glucose      Potassium      Total Protein      Protime      Rate      RBC      RDW      Sample      Sodium      Vancomycin, Random 5.0     Vt      WBC            Significant Imaging: Echocardiogram:   2D echo with color flow doppler:   Results for orders placed or performed during the hospital encounter of 09/10/18   2D echo with color flow doppler   Result Value Ref Range    EF 50 55 - 65    Mitral Valve Regurgitation MILD     Diastolic Dysfunction No     Aortic Valve Stenosis MODERATE TO SEVERE (A)     Est. PA Systolic Pressure 56 (A)     Tricuspid Valve Regurgitation MILD TO MODERATE     and X-Ray: CXR: X-Ray Chest 1 View (CXR):   Results for orders placed or performed during the hospital encounter of 09/10/18   X-Ray Chest 1 View    Narrative    EXAMINATION:  XR CHEST 1 VIEW    CLINICAL HISTORY:  sob;    COMPARISON:  09/10/2018    FINDINGS:  The size of the heart is normal. The lungs are clear.  There are emphysematous changes in the apex of the right lung.  There is a marked amount of alveolar consolidation in the lower 2/3 of both lungs.  There is no pneumothorax.  The costophrenic angles are sharp.      Impression    1. There is a marked amount of alveolar consolidation in the lower 2/3 of both  lungs.  The this represents an interval worsening of the appearance of the lungs and is characteristic of pneumonia.  2. There are emphysematous changes in the apex of the right lung.  .      Electronically signed by: Eleazar Samuel MD  Date:    09/11/2018  Time:    08:43    and X-Ray Chest PA and Lateral (CXR): No results found for this visit on 09/10/18.

## 2018-09-12 NOTE — HOSPITAL COURSE
Pt admitted and started on bipap, eventually requiring intubation on 9/11. Started on levophed, heparin, Dig and Amiodarone infusion. Echo shows normal LVF with Moderate to severe aortic stenosis and pulm HTN. Remains intubated. Hb to 7.3. Transfuse 1 U PBRCs. Pt continued atrial fib with RVR despite amiodarone infusion. Cards will add BB, Cont dig, and amio d/c'd.  . Respiratory cx pending candida. Abx changed to avelox and diflucan. Pt extubated on 9/14. Doing well s/p extubation.  Cards will add small dose of CCB. IV Lopressor transitioned to po. Heparin drip to eliquis. More alert and oriented as progressed. Pt remains on vapotherm. WBCs trending down . Continued intermittent confusion/agitation worse evening/night.   9/19 - restless night reported with intermittent agitation/NIPPV use; improved rate control since increase cardizem dosing, now bradycardia low 40s with hypotension after receiving all am meds, which were decreased to Cozaar 25 daily, D/c Verapamil and start Cardizem 60 tid and Toprol XL 50 daily. He responded well and afib remained under controlled, BP improved. He however remains terribly weak and is max assist for any activity-- hence SNF consulted. He was weaned off vapotherm to NC 4 L. He appears a little better, eating drinking better, walking around better and is more alert and oriented. He was accepted at [facility] today. He was seen and examined today and deemed stable for discharge.  9/20: Weaning vapotherm   Awaiting  snf?

## 2018-09-12 NOTE — EICU
[facility]G notified: no changes.  Type 2 failure.  RN gave a prn metoprolol for HR > 110 as ordered.  On levophed.  On dig for a fib RVR also.    Watch for hypotension worsening.

## 2018-09-12 NOTE — HOSPITAL COURSE
9/12/18- Remains intubated and sedated. Still in A-fib. Rate 100-115 bpm this morning. Started on Amio gtt for rate control. H/H stable at 7.8/23.9. Continues abx for bilateral pneumonia. Patient being weaned from Dopamine gtt.Liver enzymes improved today. Echo shows normal LVF with Moderate to severe aortic stenosis and pulm HTN     9/13/18-Patient seen and examined today. No acute events overnight. Remains intubated. Still in afib, HR variable. H and H slightly worse, 7.3/22.3. Would benefit from transfusion.     9/14/18-Patient seen and examined today. Pressor re-started overnight. Still intubated. Remains in afib with rapid rate. H and H improved s/p transfusion.     9/15/18-Patient seen and examined today, now extubated and sitting in chair. Feels ok. Remains in afib with RVR, meds adjusted. Labs reviewed. H and H stable overnight.     9/16/18-Patient seen and examined today. Looks and feels much better. More awake and alert. SOB improving. Remains in afib, HR better controlled. Labs stable.    9/17/18--Patient seen and examined today. He seems to be feeling better today, more awake and interactive. Remains in afib on Eliquis. Labs reviewed, Cr 1.0. Resume BB.     9/18/18--Patient seen and examined today in ICU, in bedside chair. Denies chest pain or anginal equivalents. Continues to be in AFIB today, on eliquis. Labs reviewed, H/H 9/29.4, Cr 0.9. IV lasix x 1 dose this AM.     9/19/18--Patient seen and examined in room, Denies chest pain or anginal equivalents. Remains in AFIB, VR controlled in 60's today. Labs reviewed, Mag 2.0, K+ 3.4, Cr 1.0, H/H 8.7/28.5.     9/20/18--Patient seen and examined in room, lying in bed. Denies chest pain or anginal equivalents. Remains in AFIB, VR controlled in 60-70's this AM. CXR revealed mild amount of interstitial opacities seen in both lungs, characteristic of pulmonary edema. BNP 1316, Mag 2.0,  K+3.5, Cr 1.1.    9/21/18--Patient seen and examined in ICU, sitting in  bedside chair. Seems to be doing better each day. Remains in AFIB, VR controlled 70's. Labs reviewed, K+4.2, Cr 0.9, Na 142.

## 2018-09-12 NOTE — PROGRESS NOTES
[facility]  Cardiology  Progress Note    Patient Name: Eleazar Solo  MRN: [id number]  Admission Date: 9/10/2018  Hospital Length of Stay: 2 days  Code Status: Full Code   Attending Physician: Prachi Boyer MD   Primary Care Physician: Poly Fitch MD  Expected Discharge Date:   Principal Problem:Acute upper GI bleed    Subjective:   Brief HPI:  Mr. Solo is a 79yo male with a PMHx of permanent AF on Eliquis, HTN, HLD, COPD, DM II, and h/o CVA, who was transferred from [facility] per family request as patient is followed OP by [facility] physicians.  He was originally admitted to [facility] on 9/6 for acute upper GI bleed with blood loss anemia (Hb 12 > 7.4 > 7.9).  Patient received total of 2 units PRBC and Eliquis held (last dose 9/6 AM).  He underwent EGD on 9/6 that showed a clot at the GE junction which was clipped  and injected with epi.  There was concern for further bleeding, therefore, repeat EGD was performed on 9/7 which showed no evidence of active bleeding.  During admission, patient developed AFib with RVR and was placed on diltiazem drip and PO dig.  CXR on 9/8 showed left pneumonia, IV Rocephin started.  Prior to transfer, vital signs and labs stable.  Upon arrival to [facility] ICU, patient hypotensive (SBP 70-80's), now on pressors and was hypoxic requiring continuous BiPAP.  Remains in AFib with controlled rate.  Repeat CXR showed RUL pneumonia.  Repeat labs resulted WBC 16.5, H&H 8.4/24.6, plt 136, BUN 38, cr. 1.4, AST 77, . ABG with pH 7.382, pCO2 44, pO2 60, HCO3 26.  Patient c/o SOB which is resolved while on BiPAP. CXR today shows marked amount of alveolar consolidation in the lower 2/3 of both lungs.  The this represents an interval worsening of the appearance of the lungs and is characteristic of pneumonia. Decision made to intubate patient  Due to resp distress and wheezing.  H/H this morning 7.5/22.6    Hospital Course:   9/12/18- Remains intubated  and sedated. Still in A-fib. Rate 100-115 bpm this morning. Started on Amio gtt for rate control. H/H stable at 7.8/23.9. Continues abx for bilateral pneumonia. Patient being weaned from Dopamine gtt.Liver enzymes improved today. Echo shows normal LVF with Moderate to severe aortic stenosis and pulm HTN       Review of Systems   Reason unable to perform ROS: patient intubated and sedated      Objective:     Vital Signs (Most Recent):  Temp: 99.1 °F (37.3 °C) (09/12/18 0315)  Pulse: (!) 114 (09/12/18 1327)  Resp: 18 (09/12/18 1327)  BP: (!) 105/59 (09/12/18 0630)  SpO2: 98 % (09/12/18 1327) Vital Signs (24h Range):  Temp:  [98.5 °F (36.9 °C)-99.1 °F (37.3 °C)] 99.1 °F (37.3 °C)  Pulse:  [] 114  Resp:  [16-20] 18  SpO2:  [88 %-100 %] 98 %  BP: ()/(40-80) 105/59     Weight: 96.1 kg (211 lb 13.8 oz)  Body mass index is 30.4 kg/m².     SpO2: 98 %  O2 Device (Oxygen Therapy): ventilator      Intake/Output Summary (Last 24 hours) at 9/12/2018 1345  Last data filed at 9/12/2018 0607  Gross per 24 hour   Intake 1868.77 ml   Output 2210 ml   Net -341.23 ml       Lines/Drains/Airways     Central Venous Catheter Line                 Percutaneous Central Line Insertion/Assessment - triple lumen  09/11/18 1124 left internal jugular 1 day          Drain                 NG/OG Tube 09/11/18 1123 orogastric Center mouth 1 day         Urethral Catheter 09/11/18 1123 Double-lumen 1 day          Airway                 Airway - Non-Surgical 09/11/18 1110 Endotracheal Tube 1 day          Peripheral Intravenous Line                 Peripheral IV - Single Lumen 09/10/18 1949 Left Antecubital 1 day                Physical Exam   Constitutional: He appears well-developed and well-nourished. He is sedated and intubated.   Neck: Neck supple. No JVD present.   Cardiovascular: Normal rate and normal pulses. An irregularly irregular rhythm present. Exam reveals no friction rub.   Murmur heard.   Harsh midsystolic murmur is present at  the upper right sternal border radiating to the neck.  Pulmonary/Chest: Effort normal and breath sounds normal. He is intubated. No respiratory distress. He has no wheezes. He has no rales.   Abdominal: Soft. Bowel sounds are normal. He exhibits no distension.   Musculoskeletal: He exhibits no edema or tenderness.   Neurological:   Patient currently sedated      Skin: Skin is warm and dry. No rash noted.   Nursing note and vitals reviewed.      Significant Labs:   All pertinent lab results from the last 24 hours have been reviewed. and   Recent Lab Results       09/12/18  1112 09/12/18  1100 09/12/18  0755 09/12/18  0521 09/12/18  0439      Albumin          Alkaline Phosphatase          Allens Test     Pass     ALT          Anion Gap          aPTT  31.5  Comment:  aPTT therapeutic range = 39-69 seconds        AST          Baso #  0.02        Basophil%  0.1        Total Bilirubin          Site     RR     BUN, Bld          Calcium          Chloride          CO2          Creatinine          DelSys     Adult Vent     Differential Method  Automated        eGFR if           eGFR if non           Eos #  0.1        Eosinophil%  0.7        FiO2     70     Glucose          Gran # (ANC)  11.3        Gran%  75.6        Hematocrit  23.9        Hemoglobin  7.8        Coumadin Monitoring INR  1.0  Comment:  Coumadin Therapy:  2.0 - 3.0 for INR for all indicators except mechanical heart valves  and antiphospholipid syndromes which should use 2.5 - 3.5.          IT     .77     Lymph #  1.8        Lymph%  11.6        Magnesium          MCH  31.5        MCHC  32.6        MCV  96        Mode     PCV     Mono #  2.1        Mono%  13.8        MPV  10.7        PEEP     10     Phosphorus          Platelet Estimate          Platelets  215        POC BE     3     POC HCO3     29.5     POC PCO2     60.6     POC PH     7.296     POC PO2     112     POC SATURATED O2     98     POCT Glucose 145   122       Potassium          Total Protein          Protime  10.7        Rate     18     RBC  2.48        RDW  16.1        Sample     ARTERIAL     Sodium          Vancomycin, Random   16.5       Vt     450     WBC  15.34                    09/12/18  0402 09/11/18  2308 09/11/18  2256 09/11/18  1840 09/11/18  1811      Albumin 2.1         Alkaline Phosphatase 86         Allens Test                   Anion Gap 8         aPTT          AST 41         Baso # 0.02         Basophil% 0.1         Total Bilirubin 0.9  Comment:  For infants and newborns, interpretation of results should be based  on gestational age, weight and in agreement with clinical  observations.  Premature Infant recommended reference ranges:  Up to 24 hours.............<8.0 mg/dL  Up to 48 hours............<12.0 mg/dL  3-5 days..................<15.0 mg/dL  6-29 days.................<15.0 mg/dL           Site          BUN, Bld 41         Calcium 7.5         Chloride 103         CO2 27         Creatinine 1.2         DelSys          Differential Method Automated         eGFR if  >60         eGFR if non  58  Comment:  Calculation used to obtain the estimated glomerular filtration  rate (eGFR) is the CKD-EPI equation.            Eos # 0.1         Eosinophil% 0.8         FiO2          Glucose 132         Gran # (ANC) 14.0         Gran% 77.0         Hematocrit 23.9  24.5  25.3     Hemoglobin 7.8  8.2  8.4     Coumadin Monitoring INR          IT          Lymph # 1.9         Lymph% 10.5         Magnesium 2.1         MCH 31.1         MCHC 32.6         MCV 95         Mode          Mono # 2.4         Mono% 13.1         MPV 10.8         PEEP          Phosphorus 2.4         Platelet Estimate Appears normal         Platelets 230  Comment:  Results confirmed, test repeated  Corrected result; previously reported as 230 on 09/12/2018 at 04:43.  [C]         POC BE          POC HCO3          POC PCO2          POC PH          POC PO2           POC SATURATED O2          POCT Glucose  182  148      Potassium 4.0         Total Protein 5.5         Protime          Rate          RBC 2.51         RDW 15.7         Sample          Sodium 138         Vancomycin, Random          Vt          WBC 18.49                     09/11/18  1557      Albumin      Alkaline Phosphatase      Allens Test      ALT      Anion Gap      aPTT      AST      Baso #      Basophil%      Total Bilirubin      Site      BUN, Bld      Calcium      Chloride      CO2      Creatinine      DelSys      Differential Method      eGFR if       eGFR if non       Eos #      Eosinophil%      FiO2      Glucose      Gran # (ANC)      Gran%      Hematocrit      Hemoglobin      Coumadin Monitoring INR      IT      Lymph #      Lymph%      Magnesium      MCH      MCHC      MCV      Mode      Mono #      Mono%      MPV      PEEP      Phosphorus      Platelet Estimate      Platelets      POC BE      POC HCO3      POC PCO2      POC PH      POC PO2      POC SATURATED O2      POCT Glucose      Potassium      Total Protein      Protime      Rate      RBC      RDW      Sample      Sodium      Vancomycin, Random 5.0     Vt      WBC            Significant Imaging: Echocardiogram:   2D echo with color flow doppler:   Results for orders placed or performed during the hospital encounter of 09/10/18   2D echo with color flow doppler   Result Value Ref Range    EF 50 55 - 65    Mitral Valve Regurgitation MILD     Diastolic Dysfunction No     Aortic Valve Stenosis MODERATE TO SEVERE (A)     Est. PA Systolic Pressure 56 (A)     Tricuspid Valve Regurgitation MILD TO MODERATE     and X-Ray: CXR: X-Ray Chest 1 View (CXR):   Results for orders placed or performed during the hospital encounter of 09/10/18   X-Ray Chest 1 View    Narrative    EXAMINATION:  XR CHEST 1 VIEW    CLINICAL HISTORY:  sob;    COMPARISON:  09/10/2018    FINDINGS:  The size of the heart is normal. The lungs are clear.  There  are emphysematous changes in the apex of the right lung.  There is a marked amount of alveolar consolidation in the lower 2/3 of both lungs.  There is no pneumothorax.  The costophrenic angles are sharp.      Impression    1. There is a marked amount of alveolar consolidation in the lower 2/3 of both lungs.  The this represents an interval worsening of the appearance of the lungs and is characteristic of pneumonia.  2. There are emphysematous changes in the apex of the right lung.  .      Electronically signed by: Eleazar Samuel MD  Date:    09/11/2018  Time:    08:43    and X-Ray Chest PA and Lateral (CXR): No results found for this visit on 09/10/18.    Assessment and Plan:       * Acute upper GI bleed with acute blood loss anemia    -H/H stable  -Plans in place to start Heparin gtt for CVA prophylaxis         Severe sepsis secondary to pneumonia of right upper lobe with acute hypoxic respiratory failure    Being addressed by primary team         Essential hypertension    -BP improving with pressor support  -continue current medical management for now  -Home meds on hold         Atrial fibrillation, permanent    -Patient with chronic A-fib that is currently controlled with Digoxin.   -Agree with amio gtt  -BP unable to tolerate BB or CCB. Is requiring pressor support, but being weaned   -H/H stable this  Morning.   -Dr. Belle has discussed starting heparin gtt with Dr. Cosme. Will need H/H  Monitored closely   - Consider restarting anticoagulation when H/H more stable and preferably greater than 8.0  - Echo shows normal LVF with Moderate to severe aortic stenosis and pulm HTN            VTE Risk Mitigation (From admission, onward)        Ordered     heparin 25,000 units in dextrose 5% 250 mL (100 units/mL) infusion LOW INTENSITY nomogram - [facility]  Continuous      09/12/18 1048     heparin 25,000 units in dextrose 5% (100 units/ml) IV bolus from bag - ADDITIONAL PRN BOLUS - 60 units/kg  As needed (PRN)       09/12/18 1048     heparin 25,000 units in dextrose 5% (100 units/ml) IV bolus from bag - ADDITIONAL PRN BOLUS - 30 units/kg  As needed (PRN)      09/12/18 1048     IP VTE HIGH RISK PATIENT  Once      09/11/18 0425     Reason for No Pharmacological VTE Prophylaxis  Once      09/11/18 0425     Place sequential compression device  Until discontinued      09/11/18 0425        Chart reviewed. Patient examined by Dr. Belle and agrees with plan that has been outlined.       BAKARI Plasencia  Cardiology  [facility]

## 2018-09-12 NOTE — SUBJECTIVE & OBJECTIVE
Interval History:intubated yesterday and required pressor with sedation.  Resting on vent.  Cynthia TF.  No active bleeding        Review of Systems   Unable to perform ROS: Intubated     Objective:     Vital Signs (Most Recent):  Temp: 98.9 °F (37.2 °C) (09/12/18 1500)  Pulse: 106 (09/12/18 1800)  Resp: 18 (09/12/18 1800)  BP: (!) 95/46 (09/12/18 1800)  SpO2: 96 % (09/12/18 1800) Vital Signs (24h Range):  Temp:  [98.7 °F (37.1 °C)-99.1 °F (37.3 °C)] 98.9 °F (37.2 °C)  Pulse:  [] 106  Resp:  [16-21] 18  SpO2:  [93 %-100 %] 96 %  BP: ()/(37-86) 95/46     Weight: 96.1 kg (211 lb 13.8 oz)  Body mass index is 30.4 kg/m².    Intake/Output Summary (Last 24 hours) at 9/12/2018 1836  Last data filed at 9/12/2018 1800  Gross per 24 hour   Intake 3120.07 ml   Output 2345 ml   Net 775.07 ml      Physical Exam Constitutional: He appears well-developed and well-nourished. He is easily aroused.  Non-toxic appearance. He has a sickly appearance. He appears ill. No distress. He is sedated, intubated and restrained.   HENT:   Head: Normocephalic and atraumatic.   Cardiovascular: Normal rate and normal pulses. An irregularly irregular rhythm present.Murmur heard.  Pulmonary/Chest: Effort normal and breath sounds normal.  Genitourinary Comments: Adams in place   Musculoskeletal: Normal range of motion.        Right foot: There is no deformity.        Left foot: There is no deformity.   Pedal edema   Lymphadenopathy:     He has no cervical adenopathy.   Neurological: He is easily aroused.   Sedated on vent   Skin: Skin is warm, dry and intact. Capillary refill takes less than 2 seconds. No rash noted. No cyanosis.         Significant Labs:   CBC:   Recent Labs   Lab  09/11/18   1153   09/11/18   2256  09/12/18   0402  09/12/18   1100   WBC  17.00*   --    --   18.49*  15.34*   HGB  7.5*   < >  8.2*  7.8*  7.8*   HCT  22.6*   < >  24.5*  23.9*  23.9*   PLT  152   --    --   230  215    < > = values in this interval not  displayed.     CMP:   Recent Labs   Lab  09/11/18   0627  09/11/18   1200  09/12/18   0402   NA  136  136  138   K  4.1  3.9  4.0   CL  102  103  103   CO2  27  27  27   GLU  139*  130*  132*   BUN  42*  42*  41*   CREATININE  1.3  1.2  1.2   CALCIUM  7.5*  7.1*  7.5*   PROT  5.9*  5.2*  5.5*   ALBUMIN  2.5*  2.2*  2.1*   BILITOT  1.8*  1.6*  0.9   ALKPHOS  91  80  86   AST  74*  55*  41*   ALT  150*  124*  105*   ANIONGAP  7*  6*  8   EGFRNONAA  53*  58*  58*       Significant Imaging: I have reviewed all pertinent imaging results/findings within the past 24 hours.   Results for orders placed or performed during the hospital encounter of 09/10/18   Blood culture   Result Value Ref Range    Blood Culture, Routine No Growth to date     Blood Culture, Routine No Growth to date    Blood culture   Result Value Ref Range    Blood Culture, Routine No Growth to date     Blood Culture, Routine No Growth to date    Culture, Respiratory with Gram Stain   Result Value Ref Range    Gram Stain (Respiratory) <10 epithelial cells per low power field.     Gram Stain (Respiratory) Rare WBC's     Gram Stain (Respiratory) Rare yeast    CBC auto differential   Result Value Ref Range    WBC 16.46 (H) 3.90 - 12.70 K/uL    RBC 2.60 (L) 4.60 - 6.20 M/uL    Hemoglobin 8.4 (L) 14.0 - 18.0 g/dL    Hematocrit 24.6 (L) 40.0 - 54.0 %    MCV 95 82 - 98 fL    MCH 32.3 (H) 27.0 - 31.0 pg    MCHC 34.1 32.0 - 36.0 g/dL    RDW 15.8 (H) 11.5 - 14.5 %    Platelets 136 (L) 150 - 350 K/uL    MPV 11.1 9.2 - 12.9 fL    Gran # (ANC) 11.7 (H) 1.8 - 7.7 K/uL    Lymph # 1.3 1.0 - 4.8 K/uL    Mono # 3.5 (H) 0.3 - 1.0 K/uL    Eos # 0.0 0.0 - 0.5 K/uL    Baso # 0.01 0.00 - 0.20 K/uL    Gran% 71.4 38.0 - 73.0 %    Lymph% 7.8 (L) 18.0 - 48.0 %    Mono% 21.2 (H) 4.0 - 15.0 %    Eosinophil% 0.0 0.0 - 8.0 %    Basophil% 0.1 0.0 - 1.9 %    Poly Occasional     Differential Method Automated    Comprehensive metabolic panel   Result Value Ref Range    Sodium 134 (L) 136 -  145 mmol/L    Potassium 4.1 3.5 - 5.1 mmol/L    Chloride 100 95 - 110 mmol/L    CO2 24 23 - 29 mmol/L    Glucose 163 (H) 70 - 110 mg/dL    BUN, Bld 38 (H) 8 - 23 mg/dL    Creatinine 1.4 0.5 - 1.4 mg/dL    Calcium 7.6 (L) 8.7 - 10.5 mg/dL    Total Protein 5.6 (L) 6.0 - 8.4 g/dL    Albumin 2.4 (L) 3.5 - 5.2 g/dL    Total Bilirubin 1.7 (H) 0.1 - 1.0 mg/dL    Alkaline Phosphatase 89 55 - 135 U/L    AST 77 (H) 10 - 40 U/L     (H) 10 - 44 U/L    Anion Gap 10 8 - 16 mmol/L    eGFR if African American 56 (A) >60 mL/min/1.73 m^2    eGFR if non African American 48 (A) >60 mL/min/1.73 m^2   APTT   Result Value Ref Range    aPTT 32.7 (H) 21.0 - 32.0 sec   Protime-INR   Result Value Ref Range    Prothrombin Time 12.4 9.0 - 12.5 sec    INR 1.2 0.8 - 1.2   TSH   Result Value Ref Range    TSH 1.577 0.400 - 4.000 uIU/mL   Urinalysis   Result Value Ref Range    Specimen UA Urine, Clean Catch     Color, UA Maria Fernanda Yellow, Straw, Maria Fernanda    Appearance, UA Clear Clear    pH, UA SEE COMMENT 5.0 - 8.0    Specific Gravity, UA SEE COMMENT 1.005 - 1.030    Protein, UA SEE COMMENT Negative    Glucose, UA SEE COMMENT Negative    Ketones, UA SEE COMMENT Negative    Bilirubin (UA) SEE COMMENT Negative    Occult Blood UA SEE COMMENT Negative    Nitrite, UA SEE COMMENT Negative    Urobilinogen, UA SEE COMMENT <2.0 EU/dL    Leukocytes, UA SEE COMMENT Negative   Brain natriuretic peptide   Result Value Ref Range     (H) 0 - 99 pg/mL   Magnesium   Result Value Ref Range    Magnesium 2.2 1.6 - 2.6 mg/dL   Lactic acid, plasma   Result Value Ref Range    Lactate (Lactic Acid) 1.5 0.5 - 2.2 mmol/L   CBC auto differential   Result Value Ref Range    WBC 17.31 (H) 3.90 - 12.70 K/uL    RBC 2.58 (L) 4.60 - 6.20 M/uL    Hemoglobin 8.2 (L) 14.0 - 18.0 g/dL    Hematocrit 24.5 (L) 40.0 - 54.0 %    MCV 95 82 - 98 fL    MCH 31.8 (H) 27.0 - 31.0 pg    MCHC 33.5 32.0 - 36.0 g/dL    RDW 15.7 (H) 11.5 - 14.5 %    Platelets 147 (L) 150 - 350 K/uL    MPV 11.1  9.2 - 12.9 fL    Gran # (ANC) 13.3 (H) 1.8 - 7.7 K/uL    Lymph # 1.3 1.0 - 4.8 K/uL    Mono # 2.7 (H) 0.3 - 1.0 K/uL    Eos # 0.0 0.0 - 0.5 K/uL    Baso # 0.01 0.00 - 0.20 K/uL    Gran% 76.9 (H) 38.0 - 73.0 %    Lymph% 7.5 (L) 18.0 - 48.0 %    Mono% 15.4 (H) 4.0 - 15.0 %    Eosinophil% 0.1 0.0 - 8.0 %    Basophil% 0.1 0.0 - 1.9 %    Differential Method Automated    Comprehensive metabolic panel   Result Value Ref Range    Sodium 136 136 - 145 mmol/L    Potassium 4.1 3.5 - 5.1 mmol/L    Chloride 102 95 - 110 mmol/L    CO2 27 23 - 29 mmol/L    Glucose 139 (H) 70 - 110 mg/dL    BUN, Bld 42 (H) 8 - 23 mg/dL    Creatinine 1.3 0.5 - 1.4 mg/dL    Calcium 7.5 (L) 8.7 - 10.5 mg/dL    Total Protein 5.9 (L) 6.0 - 8.4 g/dL    Albumin 2.5 (L) 3.5 - 5.2 g/dL    Total Bilirubin 1.8 (H) 0.1 - 1.0 mg/dL    Alkaline Phosphatase 91 55 - 135 U/L    AST 74 (H) 10 - 40 U/L     (H) 10 - 44 U/L    Anion Gap 7 (L) 8 - 16 mmol/L    eGFR if African American >60 >60 mL/min/1.73 m^2    eGFR if non African American 53 (A) >60 mL/min/1.73 m^2   Magnesium   Result Value Ref Range    Magnesium 2.2 1.6 - 2.6 mg/dL   Phosphorus   Result Value Ref Range    Phosphorus 1.9 (L) 2.7 - 4.5 mg/dL   Hemoglobin   Result Value Ref Range    Hemoglobin 7.5 (L) 14.0 - 18.0 g/dL   Hemoglobin   Result Value Ref Range    Hemoglobin 8.2 (L) 14.0 - 18.0 g/dL   Hematocrit   Result Value Ref Range    Hematocrit 21.9 (L) 40.0 - 54.0 %   Hematocrit   Result Value Ref Range    Hematocrit 24.5 (L) 40.0 - 54.0 %   Urinalysis Microscopic   Result Value Ref Range    RBC, UA 1 0 - 4 /hpf    WBC, UA 1 0 - 5 /hpf    Bacteria, UA Rare None-Occ /hpf    Hyaline Casts, UA 20 (A) 0-1/lpf /lpf    Microscopic Comment SEE COMMENT    Lactic acid, plasma   Result Value Ref Range    Lactate (Lactic Acid) 1.4 0.5 - 2.2 mmol/L   APTT   Result Value Ref Range    aPTT 32.8 (H) 21.0 - 32.0 sec   CBC auto differential   Result Value Ref Range    WBC 17.00 (H) 3.90 - 12.70 K/uL    RBC 2.38  (L) 4.60 - 6.20 M/uL    Hemoglobin 7.5 (L) 14.0 - 18.0 g/dL    Hematocrit 22.6 (L) 40.0 - 54.0 %    MCV 95 82 - 98 fL    MCH 31.5 (H) 27.0 - 31.0 pg    MCHC 33.2 32.0 - 36.0 g/dL    RDW 15.9 (H) 11.5 - 14.5 %    Platelets 152 150 - 350 K/uL    MPV 11.2 9.2 - 12.9 fL    Gran # (ANC) 13.4 (H) 1.8 - 7.7 K/uL    Lymph # 1.2 1.0 - 4.8 K/uL    Mono # 2.4 (H) 0.3 - 1.0 K/uL    Eos # 0.0 0.0 - 0.5 K/uL    Baso # 0.01 0.00 - 0.20 K/uL    Gran% 79.2 (H) 38.0 - 73.0 %    Lymph% 6.9 (L) 18.0 - 48.0 %    Mono% 14.4 4.0 - 15.0 %    Eosinophil% 0.0 0.0 - 8.0 %    Basophil% 0.1 0.0 - 1.9 %    Differential Method Automated    Protime-INR   Result Value Ref Range    Prothrombin Time 12.2 9.0 - 12.5 sec    INR 1.2 0.8 - 1.2   Comprehensive metabolic panel   Result Value Ref Range    Sodium 136 136 - 145 mmol/L    Potassium 3.9 3.5 - 5.1 mmol/L    Chloride 103 95 - 110 mmol/L    CO2 27 23 - 29 mmol/L    Glucose 130 (H) 70 - 110 mg/dL    BUN, Bld 42 (H) 8 - 23 mg/dL    Creatinine 1.2 0.5 - 1.4 mg/dL    Calcium 7.1 (L) 8.7 - 10.5 mg/dL    Total Protein 5.2 (L) 6.0 - 8.4 g/dL    Albumin 2.2 (L) 3.5 - 5.2 g/dL    Total Bilirubin 1.6 (H) 0.1 - 1.0 mg/dL    Alkaline Phosphatase 80 55 - 135 U/L    AST 55 (H) 10 - 40 U/L     (H) 10 - 44 U/L    Anion Gap 6 (L) 8 - 16 mmol/L    eGFR if African American >60 >60 mL/min/1.73 m^2    eGFR if non African American 58 (A) >60 mL/min/1.73 m^2   Urinalysis   Result Value Ref Range    Specimen UA Urine, Catheterized     Color, UA Yellow Yellow, Straw, Maria Fernanda    Appearance, UA Clear Clear    pH, UA 6.0 5.0 - 8.0    Specific Gravity, UA 1.020 1.005 - 1.030    Protein, UA Trace (A) Negative    Glucose, UA Negative Negative    Ketones, UA Negative Negative    Bilirubin (UA) 1+ (A) Negative    Occult Blood UA Negative Negative    Nitrite, UA Negative Negative    Urobilinogen, UA 4.0-6.0 (A) <2.0 EU/dL    Leukocytes, UA Negative Negative   Vancomycin, random   Result Value Ref Range    Vancomycin, Random  5.0 Not established ug/mL   Hemoglobin   Result Value Ref Range    Hemoglobin 8.4 (L) 14.0 - 18.0 g/dL   Hematocrit   Result Value Ref Range    Hematocrit 25.3 (L) 40.0 - 54.0 %   Hemoglobin   Result Value Ref Range    Hemoglobin 8.2 (L) 14.0 - 18.0 g/dL   Hematocrit   Result Value Ref Range    Hematocrit 24.5 (L) 40.0 - 54.0 %   CBC auto differential   Result Value Ref Range    WBC 18.49 (H) 3.90 - 12.70 K/uL    RBC 2.51 (L) 4.60 - 6.20 M/uL    Hemoglobin 7.8 (L) 14.0 - 18.0 g/dL    Hematocrit 23.9 (L) 40.0 - 54.0 %    MCV 95 82 - 98 fL    MCH 31.1 (H) 27.0 - 31.0 pg    MCHC 32.6 32.0 - 36.0 g/dL    RDW 15.7 (H) 11.5 - 14.5 %    Platelets 230 150 - 350 K/uL    MPV 10.8 9.2 - 12.9 fL    Gran # (ANC) 14.0 (H) 1.8 - 7.7 K/uL    Lymph # 1.9 1.0 - 4.8 K/uL    Mono # 2.4 (H) 0.3 - 1.0 K/uL    Eos # 0.1 0.0 - 0.5 K/uL    Baso # 0.02 0.00 - 0.20 K/uL    Gran% 77.0 (H) 38.0 - 73.0 %    Lymph% 10.5 (L) 18.0 - 48.0 %    Mono% 13.1 4.0 - 15.0 %    Eosinophil% 0.8 0.0 - 8.0 %    Basophil% 0.1 0.0 - 1.9 %    Platelet Estimate Appears normal     Differential Method Automated    Comprehensive metabolic panel   Result Value Ref Range    Sodium 138 136 - 145 mmol/L    Potassium 4.0 3.5 - 5.1 mmol/L    Chloride 103 95 - 110 mmol/L    CO2 27 23 - 29 mmol/L    Glucose 132 (H) 70 - 110 mg/dL    BUN, Bld 41 (H) 8 - 23 mg/dL    Creatinine 1.2 0.5 - 1.4 mg/dL    Calcium 7.5 (L) 8.7 - 10.5 mg/dL    Total Protein 5.5 (L) 6.0 - 8.4 g/dL    Albumin 2.1 (L) 3.5 - 5.2 g/dL    Total Bilirubin 0.9 0.1 - 1.0 mg/dL    Alkaline Phosphatase 86 55 - 135 U/L    AST 41 (H) 10 - 40 U/L     (H) 10 - 44 U/L    Anion Gap 8 8 - 16 mmol/L    eGFR if African American >60 >60 mL/min/1.73 m^2    eGFR if non African American 58 (A) >60 mL/min/1.73 m^2   Magnesium   Result Value Ref Range    Magnesium 2.1 1.6 - 2.6 mg/dL   Phosphorus   Result Value Ref Range    Phosphorus 2.4 (L) 2.7 - 4.5 mg/dL   Vancomycin, random   Result Value Ref Range    Vancomycin,  Random 16.5 Not established ug/mL   APTT   Result Value Ref Range    aPTT 31.5 21.0 - 32.0 sec   Protime-INR   Result Value Ref Range    Prothrombin Time 10.7 9.0 - 12.5 sec    INR 1.0 0.8 - 1.2   CBC auto differential   Result Value Ref Range    WBC 15.34 (H) 3.90 - 12.70 K/uL    RBC 2.48 (L) 4.60 - 6.20 M/uL    Hemoglobin 7.8 (L) 14.0 - 18.0 g/dL    Hematocrit 23.9 (L) 40.0 - 54.0 %    MCV 96 82 - 98 fL    MCH 31.5 (H) 27.0 - 31.0 pg    MCHC 32.6 32.0 - 36.0 g/dL    RDW 16.1 (H) 11.5 - 14.5 %    Platelets 215 150 - 350 K/uL    MPV 10.7 9.2 - 12.9 fL    Gran # (ANC) 11.3 (H) 1.8 - 7.7 K/uL    Lymph # 1.8 1.0 - 4.8 K/uL    Mono # 2.1 (H) 0.3 - 1.0 K/uL    Eos # 0.1 0.0 - 0.5 K/uL    Baso # 0.02 0.00 - 0.20 K/uL    Gran% 75.6 (H) 38.0 - 73.0 %    Lymph% 11.6 (L) 18.0 - 48.0 %    Mono% 13.8 4.0 - 15.0 %    Eosinophil% 0.7 0.0 - 8.0 %    Basophil% 0.1 0.0 - 1.9 %    Differential Method Automated    2D echo with color flow doppler   Result Value Ref Range    EF 50 55 - 65    Mitral Valve Regurgitation MILD     Diastolic Dysfunction No     Aortic Valve Stenosis MODERATE TO SEVERE (A)     Est. PA Systolic Pressure 56 (A)     Tricuspid Valve Regurgitation MILD TO MODERATE    Type & Screen   Result Value Ref Range    Group & Rh O POS     Indirect Raciel NEG    ISTAT PROCEDURE   Result Value Ref Range    POC PH 7.382 7.35 - 7.45    POC PCO2 44.4 35 - 45 mmHg    POC PO2 60 (L) 80 - 100 mmHg    POC HCO3 26.4 24 - 28 mmol/L    POC BE 1 -2 to 2 mmol/L    POC SATURATED O2 90 (L) 95 - 100 %    Rate 16     Sample ARTERIAL     Site LR     Allens Test Pass     DelSys CPAP/BiPAP     Mode BiPAP     FiO2 50     IP 10     EP 5    POCT glucose   Result Value Ref Range    POCT Glucose 164 (H) 70 - 110 mg/dL   POCT glucose   Result Value Ref Range    POCT Glucose 153 (H) 70 - 110 mg/dL   ISTAT PROCEDURE   Result Value Ref Range    POC PH 7.303 (L) 7.35 - 7.45    POC PCO2 49.9 (H) 35 - 45 mmHg    POC PO2 62 (L) 80 - 100 mmHg    POC HCO3 24.7  24 - 28 mmol/L    POC BE -2 -2 to 2 mmol/L    POC SATURATED O2 88 (L) 95 - 100 %    Rate 16     Sample ARTERIAL     Site RR     Allens Test Pass     DelSys CPAP/BiPAP     Mode BiPAP     FiO2 50     IP 12     EP 5    POCT glucose   Result Value Ref Range    POCT Glucose 126 (H) 70 - 110 mg/dL   POCT glucose   Result Value Ref Range    POCT Glucose 148 (H) 70 - 110 mg/dL   POCT glucose   Result Value Ref Range    POCT Glucose 182 (H) 70 - 110 mg/dL   ISTAT PROCEDURE   Result Value Ref Range    POC PH 7.296 (L) 7.35 - 7.45    POC PCO2 60.6 (HH) 35 - 45 mmHg    POC PO2 112 (H) 80 - 100 mmHg    POC HCO3 29.5 (H) 24 - 28 mmol/L    POC BE 3 -2 to 2 mmol/L    POC SATURATED O2 98 95 - 100 %    Rate 18     Sample ARTERIAL     Site RR     Allens Test Pass     DelSys Adult Vent     Mode PCV     Vt 450     PEEP 10     FiO2 70     IT .77    POCT glucose   Result Value Ref Range    POCT Glucose 122 (H) 70 - 110 mg/dL   POCT glucose   Result Value Ref Range    POCT Glucose 145 (H) 70 - 110 mg/dL   POCT glucose   Result Value Ref Range    POCT Glucose 173 (H) 70 - 110 mg/dL

## 2018-09-12 NOTE — PLAN OF CARE
Problem: Patient Care Overview  Goal: Plan of Care Review  Outcome: Ongoing (interventions implemented as appropriate)  PT HAS REQUIRED INTUBATION R/T LABORED BREATHING AND POOR ABG RESULTS. POST INTUBATION PT REQUIRED INCREASED SEDATION TO OBTAIN DESIRABLE RASS SCORE.  INCREASED SEDATION NEGATIVELY AFFECTED PT'S BLOOD PRESSURE AND LEVOPHED WAS STARTED.  PT HAS BEEN TURNED EVERY TWO HOURS HOWEVER UPON TURNING TO HIS RIGHT SIDE AT 1800 PT DID NOT RESPOND WELL AND DESATURATED TO THE LOW 80'S.  DESPITE SUCTIONING, INCREASING FI02 AND DECREASING SEDATION PT DID NOT RECOVER UNTIL HE WAS RETURNED TO THE BACK POSITION.  PT'S HEELS HAVE BEEN FLOATED AND FOAM DRESSING HAVE BEEN PLACED FOR PREVENTION.  PT'S DAUGHTER AND SPOUSE HAVE BEEN UPDATED TO POC AND ALL QUESTIONS HAVE BEEN ANSWERED.

## 2018-09-12 NOTE — SUBJECTIVE & OBJECTIVE
Review of Systems   Unable to perform ROS: Intubated       Objective:     Vital Signs (Most Recent):  Temp: 99.1 °F (37.3 °C) (09/12/18 0315)  Pulse: (!) 114 (09/12/18 1327)  Resp: 18 (09/12/18 1327)  BP: (!) 105/59 (09/12/18 0630)  SpO2: 98 % (09/12/18 1327) Vital Signs (24h Range):  Temp:  [98.5 °F (36.9 °C)-99.1 °F (37.3 °C)] 99.1 °F (37.3 °C)  Pulse:  [] 114  Resp:  [16-20] 18  SpO2:  [88 %-100 %] 98 %  BP: ()/(40-80) 105/59     Weight: 96.1 kg (211 lb 13.8 oz)  Body mass index is 30.4 kg/m².      Intake/Output Summary (Last 24 hours) at 9/12/2018 1505  Last data filed at 9/12/2018 0607  Gross per 24 hour   Intake 1768.77 ml   Output 2080 ml   Net -311.23 ml       Physical Exam   Constitutional: He appears well-developed and well-nourished. He is easily aroused.  Non-toxic appearance. He has a sickly appearance. He appears ill. No distress. He is sedated, intubated and restrained.   HENT:   Head: Normocephalic and atraumatic.   Mouth/Throat: Oropharynx is clear and moist and mucous membranes are normal.   Eyes: EOM and lids are normal. Pupils are equal, round, and reactive to light.   Neck: Trachea normal. Neck supple. Carotid bruit is not present.       Cardiovascular: Normal heart sounds. An irregularly irregular rhythm present. Tachycardia present.   Pulses:       Radial pulses are 2+ on the right side, and 2+ on the left side.        Dorsalis pedis pulses are 1+ on the right side, and 1+ on the left side.   Pulmonary/Chest: Effort normal. No accessory muscle usage. He is intubated. No respiratory distress. He has decreased breath sounds in the right lower field and the left lower field. He has rales.   Abdominal: Soft. He exhibits no distension. Bowel sounds are decreased. There is no tenderness.   Obese    Genitourinary: Penis normal.   Genitourinary Comments: Adams in place   Musculoskeletal: Normal range of motion.        Right foot: There is no deformity.        Left foot: There is no  deformity.   Trace edema   Lymphadenopathy:     He has no cervical adenopathy.   Neurological: He is easily aroused.   Sedated on vent and arouses to stimuli following commands intermittently then falls back to sleep.    Skin: Skin is warm, dry and intact. Capillary refill takes less than 2 seconds. No rash noted. No cyanosis.       Vents:  Vent Mode: A/C (09/12/18 1327)  Ventilator Initiated: Yes (09/11/18 1121)  Set Rate: 18 bmp (09/12/18 1327)  Vt Set: 450 mL (09/12/18 1327)  Pressure Support: 0 cmH20 (09/12/18 1327)  PEEP/CPAP: 10 cmH20 (09/12/18 1327)  Oxygen Concentration (%): (S) 45 (09/12/18 1327)  Peak Airway Pressure: 27 cmH2O (09/12/18 1327)  Plateau Pressure: 0 cmH20 (09/12/18 1327)  Total Ve: 8.72 mL (09/12/18 1327)  F/VT Ratio<105 (RSBI): (!) 37.04 (09/12/18 1327)    Lines/Drains/Airways     Central Venous Catheter Line                 Percutaneous Central Line Insertion/Assessment - triple lumen  09/11/18 1124 left internal jugular 1 day          Drain                 NG/OG Tube 09/11/18 1123 orogastric Center mouth 1 day         Urethral Catheter 09/11/18 1123 Double-lumen 1 day          Airway                 Airway - Non-Surgical 09/11/18 1110 Endotracheal Tube 1 day          Peripheral Intravenous Line                 Peripheral IV - Single Lumen 09/10/18 1949 Left Antecubital 1 day                Significant Labs:    CBC/Anemia Profile:  Recent Labs   Lab  09/11/18   1153   09/11/18   2256  09/12/18   0402  09/12/18   1100   WBC  17.00*   --    --   18.49*  15.34*   HGB  7.5*   < >  8.2*  7.8*  7.8*   HCT  22.6*   < >  24.5*  23.9*  23.9*   PLT  152   --    --   230  215   MCV  95   --    --   95  96   RDW  15.9*   --    --   15.7*  16.1*    < > = values in this interval not displayed.        Chemistries:  Recent Labs   Lab  09/10/18   2045  09/11/18   0627  09/11/18   1200  09/12/18   0402   NA  134*  136  136  138   K  4.1  4.1  3.9  4.0   CL  100  102  103  103   CO2  24  27  27  27   BUN  38*   42*  42*  41*   CREATININE  1.4  1.3  1.2  1.2   CALCIUM  7.6*  7.5*  7.1*  7.5*   ALBUMIN  2.4*  2.5*  2.2*  2.1*   PROT  5.6*  5.9*  5.2*  5.5*   BILITOT  1.7*  1.8*  1.6*  0.9   ALKPHOS  89  91  80  86   ALT  155*  150*  124*  105*   AST  77*  74*  55*  41*   MG  2.2  2.2   --   2.1   PHOS   --   1.9*   --   2.4*       ABGs:   Recent Labs   Lab  09/12/18   0439   PH  7.296*   PCO2  60.6*   HCO3  29.5*   POCSATURATED  98   BE  3     Coagulation:   Recent Labs   Lab  09/12/18   1100   INR  1.0   APTT  31.5     POCT Glucose:   Recent Labs   Lab  09/11/18   2308  09/12/18   0521  09/12/18   1112   POCTGLUCOSE  182*  122*  145*     All pertinent labs within the past 24 hours have been reviewed.    Significant Imaging:  CXR: I have reviewed all pertinent results/findings within the past 24 hours and my personal findings are:  no change.  Persistent bilat patchy infiltrates

## 2018-09-13 PROBLEM — Z99.11 ON MECHANICALLY ASSISTED VENTILATION: Status: ACTIVE | Noted: 2018-09-13

## 2018-09-13 LAB
ALBUMIN SERPL BCP-MCNC: 1.9 G/DL
ALLENS TEST: ABNORMAL
ALLENS TEST: ABNORMAL
ALP SERPL-CCNC: 107 U/L
ALT SERPL W/O P-5'-P-CCNC: 75 U/L
ANION GAP SERPL CALC-SCNC: 10 MMOL/L
APTT BLDCRRT: 37.1 SEC
APTT BLDCRRT: 45 SEC
AST SERPL-CCNC: 42 U/L
BASOPHILS # BLD AUTO: 0.02 K/UL
BASOPHILS NFR BLD: 0.1 %
BILIRUB SERPL-MCNC: 0.7 MG/DL
BLD PROD TYP BPU: NORMAL
BLOOD UNIT EXPIRATION DATE: NORMAL
BLOOD UNIT TYPE CODE: 5100
BLOOD UNIT TYPE: NORMAL
BUN SERPL-MCNC: 45 MG/DL
CALCIUM SERPL-MCNC: 7.7 MG/DL
CHLORIDE SERPL-SCNC: 101 MMOL/L
CO2 SERPL-SCNC: 26 MMOL/L
CODING SYSTEM: NORMAL
CREAT SERPL-MCNC: 1.2 MG/DL
DELSYS: ABNORMAL
DELSYS: ABNORMAL
DIFFERENTIAL METHOD: ABNORMAL
DISPENSE STATUS: NORMAL
EOSINOPHIL # BLD AUTO: 0.2 K/UL
EOSINOPHIL NFR BLD: 0.9 %
ERYTHROCYTE [DISTWIDTH] IN BLOOD BY AUTOMATED COUNT: 16.2 %
ERYTHROCYTE [SEDIMENTATION RATE] IN BLOOD BY WESTERGREN METHOD: 18 MM/H
EST. GFR  (AFRICAN AMERICAN): >60 ML/MIN/1.73 M^2
EST. GFR  (NON AFRICAN AMERICAN): 58 ML/MIN/1.73 M^2
FIO2: 40
FIO2: 40
GLUCOSE SERPL-MCNC: 193 MG/DL
HCO3 UR-SCNC: 29.8 MMOL/L (ref 24–28)
HCO3 UR-SCNC: 31.6 MMOL/L (ref 24–28)
HCT VFR BLD AUTO: 22.3 %
HGB BLD-MCNC: 7.3 G/DL
LYMPHOCYTES # BLD AUTO: 1.7 K/UL
LYMPHOCYTES NFR BLD: 10.7 %
MAGNESIUM SERPL-MCNC: 2.4 MG/DL
MCH RBC QN AUTO: 31.5 PG
MCHC RBC AUTO-ENTMCNC: 32.7 G/DL
MCV RBC AUTO: 96 FL
MODE: ABNORMAL
MODE: ABNORMAL
MONOCYTES # BLD AUTO: 1.5 K/UL
MONOCYTES NFR BLD: 9.4 %
NEUTROPHILS # BLD AUTO: 12.8 K/UL
NEUTROPHILS NFR BLD: 80.3 %
NUM UNITS TRANS PACKED RBC: NORMAL
PCO2 BLDA: 51.9 MMHG (ref 35–45)
PCO2 BLDA: 59.4 MMHG (ref 35–45)
PEEP: 10
PEEP: 5
PH SMN: 7.33 [PH] (ref 7.35–7.45)
PH SMN: 7.37 [PH] (ref 7.35–7.45)
PHOSPHATE SERPL-MCNC: 1.6 MG/DL
PLATELET # BLD AUTO: 250 K/UL
PMV BLD AUTO: 10.4 FL
PO2 BLDA: 63 MMHG (ref 80–100)
PO2 BLDA: 96 MMHG (ref 80–100)
POC BE: 4 MMOL/L
POC BE: 6 MMOL/L
POC SATURATED O2: 91 % (ref 95–100)
POC SATURATED O2: 97 % (ref 95–100)
POCT GLUCOSE: 122 MG/DL (ref 70–110)
POCT GLUCOSE: 136 MG/DL (ref 70–110)
POCT GLUCOSE: 153 MG/DL (ref 70–110)
POCT GLUCOSE: 175 MG/DL (ref 70–110)
POCT GLUCOSE: 185 MG/DL (ref 70–110)
POTASSIUM SERPL-SCNC: 3.9 MMOL/L
PROT SERPL-MCNC: 5.4 G/DL
RBC # BLD AUTO: 2.32 M/UL
SAMPLE: ABNORMAL
SAMPLE: ABNORMAL
SITE: ABNORMAL
SITE: ABNORMAL
SODIUM SERPL-SCNC: 137 MMOL/L
VANCOMYCIN TROUGH SERPL-MCNC: 13.3 UG/ML
VT: 450
VT: 450
WBC # BLD AUTO: 16.24 K/UL

## 2018-09-13 PROCEDURE — 20000000 HC ICU ROOM

## 2018-09-13 PROCEDURE — 99233 SBSQ HOSP IP/OBS HIGH 50: CPT | Mod: ,,, | Performed by: INTERNAL MEDICINE

## 2018-09-13 PROCEDURE — 25000003 PHARM REV CODE 250: Performed by: NURSE PRACTITIONER

## 2018-09-13 PROCEDURE — 85730 THROMBOPLASTIN TIME PARTIAL: CPT | Mod: 91

## 2018-09-13 PROCEDURE — 94003 VENT MGMT INPAT SUBQ DAY: CPT

## 2018-09-13 PROCEDURE — 63600175 PHARM REV CODE 636 W HCPCS: Performed by: INTERNAL MEDICINE

## 2018-09-13 PROCEDURE — 25000003 PHARM REV CODE 250: Performed by: INTERNAL MEDICINE

## 2018-09-13 PROCEDURE — 25000242 PHARM REV CODE 250 ALT 637 W/ HCPCS: Performed by: INTERNAL MEDICINE

## 2018-09-13 PROCEDURE — 84100 ASSAY OF PHOSPHORUS: CPT

## 2018-09-13 PROCEDURE — 85730 THROMBOPLASTIN TIME PARTIAL: CPT

## 2018-09-13 PROCEDURE — 83735 ASSAY OF MAGNESIUM: CPT

## 2018-09-13 PROCEDURE — 94640 AIRWAY INHALATION TREATMENT: CPT

## 2018-09-13 PROCEDURE — 80202 ASSAY OF VANCOMYCIN: CPT

## 2018-09-13 PROCEDURE — 63600175 PHARM REV CODE 636 W HCPCS: Performed by: FAMILY MEDICINE

## 2018-09-13 PROCEDURE — P9016 RBC LEUKOCYTES REDUCED: HCPCS

## 2018-09-13 PROCEDURE — 63600175 PHARM REV CODE 636 W HCPCS: Performed by: NURSE PRACTITIONER

## 2018-09-13 PROCEDURE — 99291 CRITICAL CARE FIRST HOUR: CPT | Mod: ,,, | Performed by: NURSE PRACTITIONER

## 2018-09-13 PROCEDURE — 85025 COMPLETE CBC W/AUTO DIFF WBC: CPT

## 2018-09-13 PROCEDURE — 99900026 HC AIRWAY MAINTENANCE (STAT)

## 2018-09-13 PROCEDURE — C9113 INJ PANTOPRAZOLE SODIUM, VIA: HCPCS | Performed by: INTERNAL MEDICINE

## 2018-09-13 PROCEDURE — 82803 BLOOD GASES ANY COMBINATION: CPT

## 2018-09-13 PROCEDURE — 36430 TRANSFUSION BLD/BLD COMPNT: CPT

## 2018-09-13 PROCEDURE — 80053 COMPREHEN METABOLIC PANEL: CPT

## 2018-09-13 PROCEDURE — 25000003 PHARM REV CODE 250: Performed by: FAMILY MEDICINE

## 2018-09-13 PROCEDURE — 36600 WITHDRAWAL OF ARTERIAL BLOOD: CPT

## 2018-09-13 PROCEDURE — 99900035 HC TECH TIME PER 15 MIN (STAT)

## 2018-09-13 RX ORDER — SODIUM CHLORIDE 0.9 % (FLUSH) 0.9 %
5 SYRINGE (ML) INJECTION
Status: DISCONTINUED | OUTPATIENT
Start: 2018-09-13 | End: 2018-09-21 | Stop reason: HOSPADM

## 2018-09-13 RX ORDER — HYDROCODONE BITARTRATE AND ACETAMINOPHEN 500; 5 MG/1; MG/1
TABLET ORAL
Status: DISCONTINUED | OUTPATIENT
Start: 2018-09-13 | End: 2018-09-15

## 2018-09-13 RX ORDER — INSULIN ASPART 100 [IU]/ML
1-10 INJECTION, SOLUTION INTRAVENOUS; SUBCUTANEOUS
Status: DISCONTINUED | OUTPATIENT
Start: 2018-09-13 | End: 2018-09-17

## 2018-09-13 RX ORDER — FENTANYL CITRAT/DEXTROSE 5%/PF 100 MCG/10
PATIENT CONTROLLED ANALGESIA SYRINGE INTRAVENOUS CONTINUOUS
Status: DISPENSED | OUTPATIENT
Start: 2018-09-13 | End: 2018-09-14

## 2018-09-13 RX ADMIN — DIGOXIN 0.12 MG: 125 TABLET ORAL at 08:09

## 2018-09-13 RX ADMIN — ARFORMOTEROL TARTRATE 15 MCG: 15 SOLUTION RESPIRATORY (INHALATION) at 08:09

## 2018-09-13 RX ADMIN — Medication 200 MCG/HR: at 01:09

## 2018-09-13 RX ADMIN — AMPICILLIN AND SULBACTAM 1.5 G: 1; .5 INJECTION, POWDER, FOR SOLUTION INTRAVENOUS at 11:09

## 2018-09-13 RX ADMIN — AMIODARONE HYDROCHLORIDE 0.5 MG/MIN: 1.8 INJECTION, SOLUTION INTRAVENOUS at 01:09

## 2018-09-13 RX ADMIN — AMPICILLIN AND SULBACTAM 1.5 G: 1; .5 INJECTION, POWDER, FOR SOLUTION INTRAVENOUS at 07:09

## 2018-09-13 RX ADMIN — AMIODARONE HYDROCHLORIDE 0.5 MG/MIN: 1.8 INJECTION, SOLUTION INTRAVENOUS at 02:09

## 2018-09-13 RX ADMIN — ONDANSETRON 4 MG: 2 INJECTION, SOLUTION INTRAMUSCULAR; INTRAVENOUS at 01:09

## 2018-09-13 RX ADMIN — CHLORHEXIDINE GLUCONATE 15 ML: 1.2 RINSE ORAL at 08:09

## 2018-09-13 RX ADMIN — Medication 0.1 MCG/KG/MIN: at 11:09

## 2018-09-13 RX ADMIN — AMPICILLIN AND SULBACTAM 1.5 G: 1; .5 INJECTION, POWDER, FOR SOLUTION INTRAVENOUS at 03:09

## 2018-09-13 RX ADMIN — PANTOPRAZOLE SODIUM 40 MG: 40 INJECTION, POWDER, LYOPHILIZED, FOR SOLUTION INTRAVENOUS at 08:09

## 2018-09-13 RX ADMIN — INSULIN ASPART 2 UNITS: 100 INJECTION, SOLUTION INTRAVENOUS; SUBCUTANEOUS at 07:09

## 2018-09-13 RX ADMIN — INSULIN ASPART 2 UNITS: 100 INJECTION, SOLUTION INTRAVENOUS; SUBCUTANEOUS at 05:09

## 2018-09-13 RX ADMIN — Medication 2500 MCG: at 01:09

## 2018-09-13 RX ADMIN — LORAZEPAM 2 MG: 2 INJECTION INTRAMUSCULAR; INTRAVENOUS at 01:09

## 2018-09-13 RX ADMIN — BUDESONIDE 0.5 MG: 0.5 SUSPENSION RESPIRATORY (INHALATION) at 08:09

## 2018-09-13 RX ADMIN — HEPARIN SODIUM AND DEXTROSE 14 UNITS/KG/HR: 10000; 5 INJECTION INTRAVENOUS at 01:09

## 2018-09-13 RX ADMIN — INSULIN ASPART 1 UNITS: 100 INJECTION, SOLUTION INTRAVENOUS; SUBCUTANEOUS at 08:09

## 2018-09-13 RX ADMIN — LORAZEPAM 2 MG: 2 INJECTION INTRAMUSCULAR; INTRAVENOUS at 06:09

## 2018-09-13 RX ADMIN — VANCOMYCIN HYDROCHLORIDE 1000 MG: 1 INJECTION, POWDER, LYOPHILIZED, FOR SOLUTION INTRAVENOUS at 05:09

## 2018-09-13 RX ADMIN — METOPROLOL TARTRATE 5 MG: 5 INJECTION, SOLUTION INTRAVENOUS at 12:09

## 2018-09-13 NOTE — ASSESSMENT & PLAN NOTE
Cont Dig and Amiodarone infusion with prn metoprolol as BP tolerates  Cards following  Cont Heparin infusion per Cards request

## 2018-09-13 NOTE — PROGRESS NOTES
Pt awake but calm on vent. Previously at goal RASS -1. Currently at max fentanyl dose of 200mcg/hr. eICU Dr. Roberts notified to modify max dose to 250mcg/hr. MD modified order. Will continue to monitor.

## 2018-09-13 NOTE — PROGRESS NOTES
[facility]  Cardiology  Progress Note    Patient Name: Eleazar Solo  MRN: [id number]  Admission Date: 9/10/2018  Hospital Length of Stay: 3 days  Code Status: Full Code   Attending Physician: Prachi Boyer MD   Primary Care Physician: Poly Fitch MD  Expected Discharge Date:   Principal Problem:Acute hypoxemic respiratory failure    Subjective:   HPI:  Mr. Solo is a 77yo male with a PMHx of permanent AF on Eliquis, HTN, HLD, COPD, DM II, and h/o CVA, who was transferred from [facility] per family request as patient is followed OP by [facility] physicians.  He was originally admitted to [facility] on 9/6 for acute upper GI bleed with blood loss anemia (Hb 12 > 7.4 > 7.9).  Patient received total of 2 units PRBC and Eliquis held (last dose 9/6 AM).  He underwent EGD on 9/6 that showed a clot at the GE junction which was clipped  and injected with epi.  There was concern for further bleeding, therefore, repeat EGD was performed on 9/7 which showed no evidence of active bleeding.  During admission, patient developed AFib with RVR and was placed on diltiazem drip and PO dig.  CXR on 9/8 showed left pneumonia, IV Rocephin started.  Prior to transfer, vital signs and labs stable.  Upon arrival to [facility] ICU, patient hypotensive (SBP 70-80's), now on pressors and was hypoxic requiring continuous BiPAP.  Remains in AFib with controlled rate.  Repeat CXR showed RUL pneumonia.  Repeat labs resulted WBC 16.5, H&H 8.4/24.6, plt 136, BUN 38, cr. 1.4, AST 77, . ABG with pH 7.382, pCO2 44, pO2 60, HCO3 26.  Patient c/o SOB which is resolved while on BiPAP. CXR today shows marked amount of alveolar consolidation in the lower 2/3 of both lungs.  The this represents an interval worsening of the appearance of the lungs and is characteristic of pneumonia. Decision made to intubate patient  Due to resp distress and wheezing.  H/H this morning 7.5/22.6    Hospital Course:   9/12/18- Remains  intubated and sedated. Still in A-fib. Rate 100-115 bpm this morning. Started on Amio gtt for rate control. H/H stable at 7.8/23.9. Continues abx for bilateral pneumonia. Patient being weaned from Dopamine gtt.Liver enzymes improved today. Echo shows normal LVF with Moderate to severe aortic stenosis and pulm HTN     9/13/18-Patient seen and examined today. No acute events overnight. Remains intubated. Still in afib, HR variable. H and H slightly worse, 7.3/22.3. Would benefit from transfusion.         Review of Systems   Unable to perform ROS: intubated     Objective:     Vital Signs (Most Recent):  Temp: 100 °F (37.8 °C) (09/13/18 1145)  Pulse: 104 (09/13/18 1243)  Resp: 14 (09/13/18 1243)  BP: 125/67 (09/13/18 1145)  SpO2: (!) 94 % (09/13/18 1243) Vital Signs (24h Range):  Temp:  [98.9 °F (37.2 °C)-101.6 °F (38.7 °C)] 100 °F (37.8 °C)  Pulse:  [] 104  Resp:  [14-21] 14  SpO2:  [93 %-99 %] 94 %  BP: ()/(37-78) 125/67     Weight: 93 kg (205 lb 0.4 oz)  Body mass index is 29.42 kg/m².     SpO2: (!) 94 %  O2 Device (Oxygen Therapy): ventilator      Intake/Output Summary (Last 24 hours) at 9/13/2018 1250  Last data filed at 9/13/2018 0736  Gross per 24 hour   Intake 3209.47 ml   Output 1020 ml   Net 2189.47 ml       Lines/Drains/Airways     Central Venous Catheter Line                 Percutaneous Central Line Insertion/Assessment - triple lumen  09/11/18 1124 left internal jugular 2 days          Drain                 NG/OG Tube 09/11/18 1123 orogastric Center mouth 2 days         Urethral Catheter 09/11/18 1123 Double-lumen 2 days          Airway                 Airway - Non-Surgical 09/11/18 1110 Endotracheal Tube 2 days                Physical Exam   Constitutional: No distress.   Appears ill, intubated, nods head to questions   HENT:   Head: Normocephalic and atraumatic.   Neck: Neck supple. No JVD present.   Cardiovascular: S1 normal and intact distal pulses. An irregularly irregular rhythm present.  Tachycardia present. Exam reveals no gallop, no S3, no S4 and no friction rub.   Murmur heard.   Harsh midsystolic murmur is present at the upper right sternal border radiating to the neck.  Pulmonary/Chest:   Coarse BS throughout   Abdominal: Soft. He exhibits no distension. There is no tenderness. There is no rebound.   Musculoskeletal: He exhibits no edema.   Neurological:   Nods head to questions   Skin: Skin is warm and dry. No erythema.   Nursing note and vitals reviewed.      Significant Labs:   CMP   Recent Labs   Lab  09/12/18   0402  09/13/18   0430   NA  138  137   K  4.0  3.9   CL  103  101   CO2  27  26   GLU  132*  193*   BUN  41*  45*   CREATININE  1.2  1.2   CALCIUM  7.5*  7.7*   PROT  5.5*  5.4*   ALBUMIN  2.1*  1.9*   BILITOT  0.9  0.7   ALKPHOS  86  107   AST  41*  42*   ALT  105*  75*   ANIONGAP  8  10   ESTGFRAFRICA  >60  >60   EGFRNONAA  58*  58*   , CBC   Recent Labs   Lab  09/12/18   0402  09/12/18   1100  09/13/18   0430   WBC  18.49*  15.34*  16.24*   HGB  7.8*  7.8*  7.3*   HCT  23.9*  23.9*  22.3*   PLT  230  215  250   , Troponin No results for input(s): TROPONINI in the last 48 hours. and All pertinent lab results from the last 24 hours have been reviewed.    Significant Imaging: Echocardiogram:   2D echo with color flow doppler:   Results for orders placed or performed during the hospital encounter of 09/10/18   2D echo with color flow doppler   Result Value Ref Range    EF 50 55 - 65    Mitral Valve Regurgitation MILD     Diastolic Dysfunction No     Aortic Valve Stenosis MODERATE TO SEVERE (A)     Est. PA Systolic Pressure 56 (A)     Tricuspid Valve Regurgitation MILD TO MODERATE    , EKG: Reviewed and X-Ray: CXR: X-Ray Chest 1 View (CXR):   Results for orders placed or performed during the hospital encounter of 09/10/18   X-Ray Chest 1 View    Narrative    EXAMINATION:  XR CHEST 1 VIEW    CLINICAL HISTORY:  f/u; respiratory distress    COMPARISON:  09/12/2018    FINDINGS:  Line and  tubes remain in place.  The size of the heart is normal.  There is a moderate amount of alveolar consolidation in the lower 2/3 of both lungs.  There is no pneumothorax.  There is opacification of the right costophrenic angle.  The left hemidiaphragm is obscured.      Impression    1. Line and tubes remain in place.  2. There is a moderate amount of alveolar consolidation in the lower 2/3 of both lungs.  This is characteristic of pneumonia.  3. There is opacification of the right costophrenic angle. The left hemidiaphragm is obscured.  This is characteristic of pleural effusions.  .      Electronically signed by: Eleazar Samuel MD  Date:    09/13/2018  Time:    08:39    and X-Ray Chest PA and Lateral (CXR): No results found for this visit on 09/10/18.    Assessment and Plan:   Patient who presents with acute upper GI bleed and septic shock. Stable CV wise. Needs transfusion. Continue heparin gtt for now with close monitoring of H and H. Continue abx and supportive care.    Nonrheumatic aortic valve stenosis    -Stable  -Moderate to severe by 2D echo  -Further workup as OP        Septic shock    -Mgmt as per primary team/ICU        Acute upper GI bleed with acute blood loss anemia    -H/H slightly lower today, 7/3/22.3  -Continue heparin gtt for now  -Needs transfusion  -Monitor H and H closely          Essential hypertension    -BP meds on hold due to hypotension/need for pressor support          Atrial fibrillation, permanent    -Remains in afib with variable rate  -Continue amio gtt, digoxin  -No BB given need for pressor support  -Anemia slightly worse on AM labs, would benefit from transfusion  -Continue heparin gtt for now; risks/benefits discussed. Closely monitor H and H               VTE Risk Mitigation (From admission, onward)        Ordered     heparin 25,000 units in dextrose 5% 250 mL (100 units/mL) infusion LOW INTENSITY nomogram - [facility]  Continuous      09/12/18 1048     heparin 25,000 units in dextrose  5% (100 units/ml) IV bolus from bag - ADDITIONAL PRN BOLUS - 60 units/kg  As needed (PRN)      09/12/18 1048     heparin 25,000 units in dextrose 5% (100 units/ml) IV bolus from bag - ADDITIONAL PRN BOLUS - 30 units/kg  As needed (PRN)      09/12/18 1048     IP VTE HIGH RISK PATIENT  Once      09/11/18 0425     Reason for No Pharmacological VTE Prophylaxis  Once      09/11/18 0425     Place sequential compression device  Until discontinued      09/11/18 0425          Christal Vazquez PA-C  Cardiology  [facility]     Chart reviewed. Dr. Belle examined patient and agrees with plan as outlined above.

## 2018-09-13 NOTE — SUBJECTIVE & OBJECTIVE
Review of Systems   Unable to perform ROS: Intubated       Objective:     Vital Signs (Most Recent):  Temp: 99.8 °F (37.7 °C) (09/13/18 0305)  Pulse: (!) 111 (09/13/18 0645)  Resp: 18 (09/13/18 0645)  BP: (!) 91/53 (09/13/18 0645)  SpO2: 99 % (09/13/18 0645) Vital Signs (24h Range):  Temp:  [98.9 °F (37.2 °C)-101.6 °F (38.7 °C)] 99.8 °F (37.7 °C)  Pulse:  [] 111  Resp:  [17-21] 18  SpO2:  [93 %-100 %] 99 %  BP: ()/(37-86) 91/53     Weight: 93 kg (205 lb 0.4 oz)  Body mass index is 29.42 kg/m².      Intake/Output Summary (Last 24 hours) at 9/13/2018 0719  Last data filed at 9/13/2018 0605  Gross per 24 hour   Intake 3640.24 ml   Output 1395 ml   Net 2245.24 ml       Physical Exam   Constitutional: He appears ill. He is sedated, intubated and restrained.   HENT:   Head: Atraumatic.   Eyes: Conjunctivae are normal. Pupils are equal, round, and reactive to light.   Neck: No JVD present. No tracheal deviation present.   Cardiovascular: Normal rate. An irregularly irregular rhythm present.   No murmur heard.  Pulses:       Radial pulses are 2+ on the right side, and 2+ on the left side.        Dorsalis pedis pulses are 1+ on the right side, and 1+ on the left side.   Pulmonary/Chest: He is intubated. He has decreased breath sounds.   Abdominal: Soft. Bowel sounds are normal. He exhibits no distension.   Skin: Skin is warm and dry. Capillary refill takes 2 to 3 seconds. No cyanosis.            Vents:  Vent Mode: A/C (09/13/18 0444)  Ventilator Initiated: Yes (09/11/18 1121)  Set Rate: 18 bmp (09/13/18 0444)  Vt Set: 450 mL (09/13/18 0444)  Pressure Support: 0 cmH20 (09/13/18 0444)  PEEP/CPAP: 10 cmH20 (09/13/18 0444)  Oxygen Concentration (%): 40 (09/13/18 0645)  Peak Airway Pressure: 27 cmH2O (09/13/18 0444)  Plateau Pressure: 0 cmH20 (09/13/18 0444)  Total Ve: 8.49 mL (09/13/18 0444)  F/VT Ratio<105 (RSBI): (!) 34.48 (09/13/18 0444)    Lines/Drains/Airways     Central Venous Catheter Line                  Percutaneous Central Line Insertion/Assessment - triple lumen  09/11/18 1124 left internal jugular 1 day          Drain                 NG/OG Tube 09/11/18 1123 orogastric Center mouth 1 day         Urethral Catheter 09/11/18 1123 Double-lumen 1 day          Airway                 Airway - Non-Surgical 09/11/18 1110 Endotracheal Tube 1 day                Significant Labs:    CBC/Anemia Profile:  Recent Labs   Lab  09/12/18   0402  09/12/18   1100  09/13/18   0430   WBC  18.49*  15.34*  16.24*   HGB  7.8*  7.8*  7.3*   HCT  23.9*  23.9*  22.3*   PLT  230  215  250   MCV  95  96  96   RDW  15.7*  16.1*  16.2*        Chemistries:  Recent Labs   Lab  09/11/18   1200  09/12/18   0402  09/13/18   0430   NA  136  138  137   K  3.9  4.0  3.9   CL  103  103  101   CO2  27  27  26   BUN  42*  41*  45*   CREATININE  1.2  1.2  1.2   CALCIUM  7.1*  7.5*  7.7*   ALBUMIN  2.2*  2.1*  1.9*   PROT  5.2*  5.5*  5.4*   BILITOT  1.6*  0.9  0.7   ALKPHOS  80  86  107   ALT  124*  105*  75*   AST  55*  41*  42*   MG   --   2.1  2.4   PHOS   --   2.4*  1.6*       All pertinent labs within the past 24 hours have been reviewed.  ABG  Recent Labs   Lab  09/13/18   0427   PH  7.334*   PO2  96   PCO2  59.4*   HCO3  31.6*   BE  6         Significant Imaging:  I have reviewed all pertinent imaging results/findings within the past 24 hours.

## 2018-09-13 NOTE — SUBJECTIVE & OBJECTIVE
Interval History: Resting on vent.  Cynthia TF.  No active bleeding        Review of Systems   Unable to perform ROS: Intubated     Objective:     Vital Signs (Most Recent):  Temp: 99.8 °F (37.7 °C) (09/13/18 1500)  Pulse: 108 (09/13/18 1725)  Resp: 18 (09/13/18 1725)  BP: (!) 92/59 (09/13/18 1700)  SpO2: 97 % (09/13/18 1725) Vital Signs (24h Range):  Temp:  [99 °F (37.2 °C)-101.6 °F (38.7 °C)] 99.8 °F (37.7 °C)  Pulse:  [] 108  Resp:  [14-22] 18  SpO2:  [91 %-100 %] 97 %  BP: ()/(38-78) 92/59     Weight: 93 kg (205 lb 0.4 oz)  Body mass index is 29.42 kg/m².    Intake/Output Summary (Last 24 hours) at 9/13/2018 1743  Last data filed at 9/13/2018 1705  Gross per 24 hour   Intake 3792.56 ml   Output 1500 ml   Net 2292.56 ml      Physical Exam   Constitutional: He appears well-developed and well-nourished. He is easily aroused.  Non-toxic appearance. He has a sickly appearance. He appears ill. No distress. He is sedated, intubated and restrained.   HENT:   Head: Normocephalic and atraumatic.   Cardiovascular: Normal rate and normal pulses. An irregularly irregular rhythm present.Murmur heard.  Pulmonary/Chest: Effort normal and breath sounds normal.  Genitourinary Comments: Adams in place   Musculoskeletal: Normal range of motion.        Right foot: There is no deformity.        Left foot: There is no deformity.   Pedal edema   Lymphadenopathy:     He has no cervical adenopathy.   Neurological: He is easily aroused.   Sedated on vent   Skin: Skin is warm, dry and intact. Capillary refill takes less than 2 seconds. No rash noted. No cyanosis.         Significant Labs:   CBC:   Recent Labs   Lab  09/12/18   0402  09/12/18   1100  09/13/18   0430   WBC  18.49*  15.34*  16.24*   HGB  7.8*  7.8*  7.3*   HCT  23.9*  23.9*  22.3*   PLT  230  215  250     CMP:   Recent Labs   Lab  09/12/18   0402  09/13/18   0430   NA  138  137   K  4.0  3.9   CL  103  101   CO2  27  26   GLU  132*  193*   BUN  41*  45*   CREATININE   1.2  1.2   CALCIUM  7.5*  7.7*   PROT  5.5*  5.4*   ALBUMIN  2.1*  1.9*   BILITOT  0.9  0.7   ALKPHOS  86  107   AST  41*  42*   ALT  105*  75*   ANIONGAP  8  10   EGFRNONAA  58*  58*       Significant Imaging: I have reviewed all pertinent imaging results/findings within the past 24 hours.   Results for orders placed or performed during the hospital encounter of 09/10/18   Blood culture   Result Value Ref Range    Blood Culture, Routine No Growth to date     Blood Culture, Routine No Growth to date     Blood Culture, Routine No Growth to date    Blood culture   Result Value Ref Range    Blood Culture, Routine No Growth to date     Blood Culture, Routine No Growth to date     Blood Culture, Routine No Growth to date    Culture, Respiratory with Gram Stain   Result Value Ref Range    Respiratory Culture BRISEIDA ALBICANS  Many       Gram Stain (Respiratory) <10 epithelial cells per low power field.     Gram Stain (Respiratory) Rare WBC's     Gram Stain (Respiratory) Rare yeast    CBC auto differential   Result Value Ref Range    WBC 16.46 (H) 3.90 - 12.70 K/uL    RBC 2.60 (L) 4.60 - 6.20 M/uL    Hemoglobin 8.4 (L) 14.0 - 18.0 g/dL    Hematocrit 24.6 (L) 40.0 - 54.0 %    MCV 95 82 - 98 fL    MCH 32.3 (H) 27.0 - 31.0 pg    MCHC 34.1 32.0 - 36.0 g/dL    RDW 15.8 (H) 11.5 - 14.5 %    Platelets 136 (L) 150 - 350 K/uL    MPV 11.1 9.2 - 12.9 fL    Gran # (ANC) 11.7 (H) 1.8 - 7.7 K/uL    Lymph # 1.3 1.0 - 4.8 K/uL    Mono # 3.5 (H) 0.3 - 1.0 K/uL    Eos # 0.0 0.0 - 0.5 K/uL    Baso # 0.01 0.00 - 0.20 K/uL    Gran% 71.4 38.0 - 73.0 %    Lymph% 7.8 (L) 18.0 - 48.0 %    Mono% 21.2 (H) 4.0 - 15.0 %    Eosinophil% 0.0 0.0 - 8.0 %    Basophil% 0.1 0.0 - 1.9 %    Poly Occasional     Differential Method Automated    Comprehensive metabolic panel   Result Value Ref Range    Sodium 134 (L) 136 - 145 mmol/L    Potassium 4.1 3.5 - 5.1 mmol/L    Chloride 100 95 - 110 mmol/L    CO2 24 23 - 29 mmol/L    Glucose 163 (H) 70 - 110 mg/dL     BUN, Bld 38 (H) 8 - 23 mg/dL    Creatinine 1.4 0.5 - 1.4 mg/dL    Calcium 7.6 (L) 8.7 - 10.5 mg/dL    Total Protein 5.6 (L) 6.0 - 8.4 g/dL    Albumin 2.4 (L) 3.5 - 5.2 g/dL    Total Bilirubin 1.7 (H) 0.1 - 1.0 mg/dL    Alkaline Phosphatase 89 55 - 135 U/L    AST 77 (H) 10 - 40 U/L     (H) 10 - 44 U/L    Anion Gap 10 8 - 16 mmol/L    eGFR if African American 56 (A) >60 mL/min/1.73 m^2    eGFR if non African American 48 (A) >60 mL/min/1.73 m^2   APTT   Result Value Ref Range    aPTT 32.7 (H) 21.0 - 32.0 sec   Protime-INR   Result Value Ref Range    Prothrombin Time 12.4 9.0 - 12.5 sec    INR 1.2 0.8 - 1.2   TSH   Result Value Ref Range    TSH 1.577 0.400 - 4.000 uIU/mL   Urinalysis   Result Value Ref Range    Specimen UA Urine, Clean Catch     Color, UA Maria Fernanda Yellow, Straw, Maria Fernanda    Appearance, UA Clear Clear    pH, UA SEE COMMENT 5.0 - 8.0    Specific Gravity, UA SEE COMMENT 1.005 - 1.030    Protein, UA SEE COMMENT Negative    Glucose, UA SEE COMMENT Negative    Ketones, UA SEE COMMENT Negative    Bilirubin (UA) SEE COMMENT Negative    Occult Blood UA SEE COMMENT Negative    Nitrite, UA SEE COMMENT Negative    Urobilinogen, UA SEE COMMENT <2.0 EU/dL    Leukocytes, UA SEE COMMENT Negative   Brain natriuretic peptide   Result Value Ref Range     (H) 0 - 99 pg/mL   Magnesium   Result Value Ref Range    Magnesium 2.2 1.6 - 2.6 mg/dL   Lactic acid, plasma   Result Value Ref Range    Lactate (Lactic Acid) 1.5 0.5 - 2.2 mmol/L   CBC auto differential   Result Value Ref Range    WBC 17.31 (H) 3.90 - 12.70 K/uL    RBC 2.58 (L) 4.60 - 6.20 M/uL    Hemoglobin 8.2 (L) 14.0 - 18.0 g/dL    Hematocrit 24.5 (L) 40.0 - 54.0 %    MCV 95 82 - 98 fL    MCH 31.8 (H) 27.0 - 31.0 pg    MCHC 33.5 32.0 - 36.0 g/dL    RDW 15.7 (H) 11.5 - 14.5 %    Platelets 147 (L) 150 - 350 K/uL    MPV 11.1 9.2 - 12.9 fL    Gran # (ANC) 13.3 (H) 1.8 - 7.7 K/uL    Lymph # 1.3 1.0 - 4.8 K/uL    Mono # 2.7 (H) 0.3 - 1.0 K/uL    Eos # 0.0 0.0 - 0.5  K/uL    Baso # 0.01 0.00 - 0.20 K/uL    Gran% 76.9 (H) 38.0 - 73.0 %    Lymph% 7.5 (L) 18.0 - 48.0 %    Mono% 15.4 (H) 4.0 - 15.0 %    Eosinophil% 0.1 0.0 - 8.0 %    Basophil% 0.1 0.0 - 1.9 %    Differential Method Automated    Comprehensive metabolic panel   Result Value Ref Range    Sodium 136 136 - 145 mmol/L    Potassium 4.1 3.5 - 5.1 mmol/L    Chloride 102 95 - 110 mmol/L    CO2 27 23 - 29 mmol/L    Glucose 139 (H) 70 - 110 mg/dL    BUN, Bld 42 (H) 8 - 23 mg/dL    Creatinine 1.3 0.5 - 1.4 mg/dL    Calcium 7.5 (L) 8.7 - 10.5 mg/dL    Total Protein 5.9 (L) 6.0 - 8.4 g/dL    Albumin 2.5 (L) 3.5 - 5.2 g/dL    Total Bilirubin 1.8 (H) 0.1 - 1.0 mg/dL    Alkaline Phosphatase 91 55 - 135 U/L    AST 74 (H) 10 - 40 U/L     (H) 10 - 44 U/L    Anion Gap 7 (L) 8 - 16 mmol/L    eGFR if African American >60 >60 mL/min/1.73 m^2    eGFR if non African American 53 (A) >60 mL/min/1.73 m^2   Magnesium   Result Value Ref Range    Magnesium 2.2 1.6 - 2.6 mg/dL   Phosphorus   Result Value Ref Range    Phosphorus 1.9 (L) 2.7 - 4.5 mg/dL   Hemoglobin   Result Value Ref Range    Hemoglobin 7.5 (L) 14.0 - 18.0 g/dL   Hemoglobin   Result Value Ref Range    Hemoglobin 8.2 (L) 14.0 - 18.0 g/dL   Hematocrit   Result Value Ref Range    Hematocrit 21.9 (L) 40.0 - 54.0 %   Hematocrit   Result Value Ref Range    Hematocrit 24.5 (L) 40.0 - 54.0 %   Urinalysis Microscopic   Result Value Ref Range    RBC, UA 1 0 - 4 /hpf    WBC, UA 1 0 - 5 /hpf    Bacteria, UA Rare None-Occ /hpf    Hyaline Casts, UA 20 (A) 0-1/lpf /lpf    Microscopic Comment SEE COMMENT    Lactic acid, plasma   Result Value Ref Range    Lactate (Lactic Acid) 1.4 0.5 - 2.2 mmol/L   APTT   Result Value Ref Range    aPTT 32.8 (H) 21.0 - 32.0 sec   CBC auto differential   Result Value Ref Range    WBC 17.00 (H) 3.90 - 12.70 K/uL    RBC 2.38 (L) 4.60 - 6.20 M/uL    Hemoglobin 7.5 (L) 14.0 - 18.0 g/dL    Hematocrit 22.6 (L) 40.0 - 54.0 %    MCV 95 82 - 98 fL    MCH 31.5 (H) 27.0 -  31.0 pg    MCHC 33.2 32.0 - 36.0 g/dL    RDW 15.9 (H) 11.5 - 14.5 %    Platelets 152 150 - 350 K/uL    MPV 11.2 9.2 - 12.9 fL    Gran # (ANC) 13.4 (H) 1.8 - 7.7 K/uL    Lymph # 1.2 1.0 - 4.8 K/uL    Mono # 2.4 (H) 0.3 - 1.0 K/uL    Eos # 0.0 0.0 - 0.5 K/uL    Baso # 0.01 0.00 - 0.20 K/uL    Gran% 79.2 (H) 38.0 - 73.0 %    Lymph% 6.9 (L) 18.0 - 48.0 %    Mono% 14.4 4.0 - 15.0 %    Eosinophil% 0.0 0.0 - 8.0 %    Basophil% 0.1 0.0 - 1.9 %    Differential Method Automated    Protime-INR   Result Value Ref Range    Prothrombin Time 12.2 9.0 - 12.5 sec    INR 1.2 0.8 - 1.2   Comprehensive metabolic panel   Result Value Ref Range    Sodium 136 136 - 145 mmol/L    Potassium 3.9 3.5 - 5.1 mmol/L    Chloride 103 95 - 110 mmol/L    CO2 27 23 - 29 mmol/L    Glucose 130 (H) 70 - 110 mg/dL    BUN, Bld 42 (H) 8 - 23 mg/dL    Creatinine 1.2 0.5 - 1.4 mg/dL    Calcium 7.1 (L) 8.7 - 10.5 mg/dL    Total Protein 5.2 (L) 6.0 - 8.4 g/dL    Albumin 2.2 (L) 3.5 - 5.2 g/dL    Total Bilirubin 1.6 (H) 0.1 - 1.0 mg/dL    Alkaline Phosphatase 80 55 - 135 U/L    AST 55 (H) 10 - 40 U/L     (H) 10 - 44 U/L    Anion Gap 6 (L) 8 - 16 mmol/L    eGFR if African American >60 >60 mL/min/1.73 m^2    eGFR if non African American 58 (A) >60 mL/min/1.73 m^2   Urinalysis   Result Value Ref Range    Specimen UA Urine, Catheterized     Color, UA Yellow Yellow, Straw, Maria Fernanda    Appearance, UA Clear Clear    pH, UA 6.0 5.0 - 8.0    Specific Gravity, UA 1.020 1.005 - 1.030    Protein, UA Trace (A) Negative    Glucose, UA Negative Negative    Ketones, UA Negative Negative    Bilirubin (UA) 1+ (A) Negative    Occult Blood UA Negative Negative    Nitrite, UA Negative Negative    Urobilinogen, UA 4.0-6.0 (A) <2.0 EU/dL    Leukocytes, UA Negative Negative   Vancomycin, random   Result Value Ref Range    Vancomycin, Random 5.0 Not established ug/mL   Hemoglobin   Result Value Ref Range    Hemoglobin 8.4 (L) 14.0 - 18.0 g/dL   Hematocrit   Result Value Ref Range     Hematocrit 25.3 (L) 40.0 - 54.0 %   Hemoglobin   Result Value Ref Range    Hemoglobin 8.2 (L) 14.0 - 18.0 g/dL   Hematocrit   Result Value Ref Range    Hematocrit 24.5 (L) 40.0 - 54.0 %   CBC auto differential   Result Value Ref Range    WBC 18.49 (H) 3.90 - 12.70 K/uL    RBC 2.51 (L) 4.60 - 6.20 M/uL    Hemoglobin 7.8 (L) 14.0 - 18.0 g/dL    Hematocrit 23.9 (L) 40.0 - 54.0 %    MCV 95 82 - 98 fL    MCH 31.1 (H) 27.0 - 31.0 pg    MCHC 32.6 32.0 - 36.0 g/dL    RDW 15.7 (H) 11.5 - 14.5 %    Platelets 230 150 - 350 K/uL    MPV 10.8 9.2 - 12.9 fL    Gran # (ANC) 14.0 (H) 1.8 - 7.7 K/uL    Lymph # 1.9 1.0 - 4.8 K/uL    Mono # 2.4 (H) 0.3 - 1.0 K/uL    Eos # 0.1 0.0 - 0.5 K/uL    Baso # 0.02 0.00 - 0.20 K/uL    Gran% 77.0 (H) 38.0 - 73.0 %    Lymph% 10.5 (L) 18.0 - 48.0 %    Mono% 13.1 4.0 - 15.0 %    Eosinophil% 0.8 0.0 - 8.0 %    Basophil% 0.1 0.0 - 1.9 %    Platelet Estimate Appears normal     Differential Method Automated    Comprehensive metabolic panel   Result Value Ref Range    Sodium 138 136 - 145 mmol/L    Potassium 4.0 3.5 - 5.1 mmol/L    Chloride 103 95 - 110 mmol/L    CO2 27 23 - 29 mmol/L    Glucose 132 (H) 70 - 110 mg/dL    BUN, Bld 41 (H) 8 - 23 mg/dL    Creatinine 1.2 0.5 - 1.4 mg/dL    Calcium 7.5 (L) 8.7 - 10.5 mg/dL    Total Protein 5.5 (L) 6.0 - 8.4 g/dL    Albumin 2.1 (L) 3.5 - 5.2 g/dL    Total Bilirubin 0.9 0.1 - 1.0 mg/dL    Alkaline Phosphatase 86 55 - 135 U/L    AST 41 (H) 10 - 40 U/L     (H) 10 - 44 U/L    Anion Gap 8 8 - 16 mmol/L    eGFR if African American >60 >60 mL/min/1.73 m^2    eGFR if non African American 58 (A) >60 mL/min/1.73 m^2   Magnesium   Result Value Ref Range    Magnesium 2.1 1.6 - 2.6 mg/dL   Phosphorus   Result Value Ref Range    Phosphorus 2.4 (L) 2.7 - 4.5 mg/dL   Vancomycin, random   Result Value Ref Range    Vancomycin, Random 16.5 Not established ug/mL   APTT   Result Value Ref Range    aPTT 31.5 21.0 - 32.0 sec   Protime-INR   Result Value Ref Range     Prothrombin Time 10.7 9.0 - 12.5 sec    INR 1.0 0.8 - 1.2   CBC auto differential   Result Value Ref Range    WBC 15.34 (H) 3.90 - 12.70 K/uL    RBC 2.48 (L) 4.60 - 6.20 M/uL    Hemoglobin 7.8 (L) 14.0 - 18.0 g/dL    Hematocrit 23.9 (L) 40.0 - 54.0 %    MCV 96 82 - 98 fL    MCH 31.5 (H) 27.0 - 31.0 pg    MCHC 32.6 32.0 - 36.0 g/dL    RDW 16.1 (H) 11.5 - 14.5 %    Platelets 215 150 - 350 K/uL    MPV 10.7 9.2 - 12.9 fL    Gran # (ANC) 11.3 (H) 1.8 - 7.7 K/uL    Lymph # 1.8 1.0 - 4.8 K/uL    Mono # 2.1 (H) 0.3 - 1.0 K/uL    Eos # 0.1 0.0 - 0.5 K/uL    Baso # 0.02 0.00 - 0.20 K/uL    Gran% 75.6 (H) 38.0 - 73.0 %    Lymph% 11.6 (L) 18.0 - 48.0 %    Mono% 13.8 4.0 - 15.0 %    Eosinophil% 0.7 0.0 - 8.0 %    Basophil% 0.1 0.0 - 1.9 %    Differential Method Automated    APTT   Result Value Ref Range    aPTT 42.7 (H) 21.0 - 32.0 sec   APTT   Result Value Ref Range    aPTT 37.1 (H) 21.0 - 32.0 sec   Comprehensive metabolic panel   Result Value Ref Range    Sodium 137 136 - 145 mmol/L    Potassium 3.9 3.5 - 5.1 mmol/L    Chloride 101 95 - 110 mmol/L    CO2 26 23 - 29 mmol/L    Glucose 193 (H) 70 - 110 mg/dL    BUN, Bld 45 (H) 8 - 23 mg/dL    Creatinine 1.2 0.5 - 1.4 mg/dL    Calcium 7.7 (L) 8.7 - 10.5 mg/dL    Total Protein 5.4 (L) 6.0 - 8.4 g/dL    Albumin 1.9 (L) 3.5 - 5.2 g/dL    Total Bilirubin 0.7 0.1 - 1.0 mg/dL    Alkaline Phosphatase 107 55 - 135 U/L    AST 42 (H) 10 - 40 U/L    ALT 75 (H) 10 - 44 U/L    Anion Gap 10 8 - 16 mmol/L    eGFR if African American >60 >60 mL/min/1.73 m^2    eGFR if non African American 58 (A) >60 mL/min/1.73 m^2   Magnesium   Result Value Ref Range    Magnesium 2.4 1.6 - 2.6 mg/dL   Phosphorus   Result Value Ref Range    Phosphorus 1.6 (L) 2.7 - 4.5 mg/dL   CBC auto differential   Result Value Ref Range    WBC 16.24 (H) 3.90 - 12.70 K/uL    RBC 2.32 (L) 4.60 - 6.20 M/uL    Hemoglobin 7.3 (L) 14.0 - 18.0 g/dL    Hematocrit 22.3 (L) 40.0 - 54.0 %    MCV 96 82 - 98 fL    MCH 31.5 (H) 27.0 - 31.0  pg    MCHC 32.7 32.0 - 36.0 g/dL    RDW 16.2 (H) 11.5 - 14.5 %    Platelets 250 150 - 350 K/uL    MPV 10.4 9.2 - 12.9 fL    Gran # (ANC) 12.8 (H) 1.8 - 7.7 K/uL    Lymph # 1.7 1.0 - 4.8 K/uL    Mono # 1.5 (H) 0.3 - 1.0 K/uL    Eos # 0.2 0.0 - 0.5 K/uL    Baso # 0.02 0.00 - 0.20 K/uL    Gran% 80.3 (H) 38.0 - 73.0 %    Lymph% 10.7 (L) 18.0 - 48.0 %    Mono% 9.4 4.0 - 15.0 %    Eosinophil% 0.9 0.0 - 8.0 %    Basophil% 0.1 0.0 - 1.9 %    Differential Method Automated    APTT   Result Value Ref Range    aPTT 45.0 (H) 21.0 - 32.0 sec   2D echo with color flow doppler   Result Value Ref Range    EF 50 55 - 65    Mitral Valve Regurgitation MILD     Diastolic Dysfunction No     Aortic Valve Stenosis MODERATE TO SEVERE (A)     Est. PA Systolic Pressure 56 (A)     Tricuspid Valve Regurgitation MILD TO MODERATE    Type & Screen   Result Value Ref Range    Group & Rh O POS     Indirect Raciel NEG    ISTAT PROCEDURE   Result Value Ref Range    POC PH 7.382 7.35 - 7.45    POC PCO2 44.4 35 - 45 mmHg    POC PO2 60 (L) 80 - 100 mmHg    POC HCO3 26.4 24 - 28 mmol/L    POC BE 1 -2 to 2 mmol/L    POC SATURATED O2 90 (L) 95 - 100 %    Rate 16     Sample ARTERIAL     Site LR     Allens Test Pass     DelSys CPAP/BiPAP     Mode BiPAP     FiO2 50     IP 10     EP 5    POCT glucose   Result Value Ref Range    POCT Glucose 164 (H) 70 - 110 mg/dL   POCT glucose   Result Value Ref Range    POCT Glucose 153 (H) 70 - 110 mg/dL   ISTAT PROCEDURE   Result Value Ref Range    POC PH 7.303 (L) 7.35 - 7.45    POC PCO2 49.9 (H) 35 - 45 mmHg    POC PO2 62 (L) 80 - 100 mmHg    POC HCO3 24.7 24 - 28 mmol/L    POC BE -2 -2 to 2 mmol/L    POC SATURATED O2 88 (L) 95 - 100 %    Rate 16     Sample ARTERIAL     Site RR     Allens Test Pass     DelSys CPAP/BiPAP     Mode BiPAP     FiO2 50     IP 12     EP 5    POCT glucose   Result Value Ref Range    POCT Glucose 126 (H) 70 - 110 mg/dL   POCT glucose   Result Value Ref Range    POCT Glucose 148 (H) 70 - 110 mg/dL    POCT glucose   Result Value Ref Range    POCT Glucose 182 (H) 70 - 110 mg/dL   ISTAT PROCEDURE   Result Value Ref Range    POC PH 7.296 (L) 7.35 - 7.45    POC PCO2 60.6 (HH) 35 - 45 mmHg    POC PO2 112 (H) 80 - 100 mmHg    POC HCO3 29.5 (H) 24 - 28 mmol/L    POC BE 3 -2 to 2 mmol/L    POC SATURATED O2 98 95 - 100 %    Rate 18     Sample ARTERIAL     Site RR     Allens Test Pass     DelSys Adult Vent     Mode PCV     Vt 450     PEEP 10     FiO2 70     IT .77    POCT glucose   Result Value Ref Range    POCT Glucose 122 (H) 70 - 110 mg/dL   POCT glucose   Result Value Ref Range    POCT Glucose 145 (H) 70 - 110 mg/dL   POCT glucose   Result Value Ref Range    POCT Glucose 173 (H) 70 - 110 mg/dL   POCT glucose   Result Value Ref Range    POCT Glucose 122 (H) 70 - 110 mg/dL   ISTAT PROCEDURE   Result Value Ref Range    POC PH 7.334 (L) 7.35 - 7.45    POC PCO2 59.4 (HH) 35 - 45 mmHg    POC PO2 96 80 - 100 mmHg    POC HCO3 31.6 (H) 24 - 28 mmol/L    POC BE 6 -2 to 2 mmol/L    POC SATURATED O2 97 95 - 100 %    Rate 18     Sample ARTERIAL     Site LR     Allens Test Pass     DelSys Adult Vent     Mode AC/PRVC     Vt 450     PEEP 10     FiO2 40    POCT glucose   Result Value Ref Range    POCT Glucose 153 (H) 70 - 110 mg/dL   POCT glucose   Result Value Ref Range    POCT Glucose 185 (H) 70 - 110 mg/dL   ISTAT PROCEDURE   Result Value Ref Range    POC PH 7.367 7.35 - 7.45    POC PCO2 51.9 (H) 35 - 45 mmHg    POC PO2 63 (L) 80 - 100 mmHg    POC HCO3 29.8 (H) 24 - 28 mmol/L    POC BE 4 -2 to 2 mmol/L    POC SATURATED O2 91 (L) 95 - 100 %    Sample ARTERIAL     Site RR     Allens Test Pass     DelSys Adult Vent     Mode AC/PRVC     Vt 450     PEEP 5     FiO2 40    POCT glucose   Result Value Ref Range    POCT Glucose 136 (H) 70 - 110 mg/dL   Prepare RBC 1 Unit   Result Value Ref Range    UNIT NUMBER [id number]     PRODUCT CODE [id number]     DISPENSE STATUS ISSUED     CODING SYSTEM [id number]     Unit Blood Type Code 5100     Unit  Blood Type O POS     Unit Expiration [id number]

## 2018-09-13 NOTE — PLAN OF CARE
Problem: Patient Care Overview  Goal: Plan of Care Review  Outcome: Ongoing (interventions implemented as appropriate)  Pt started on amiodarone gtt for a fib control, as well as on heparin gtt; remains on levo and fentanyl; vent weaned from 70% to 40%; VSS; NADN.

## 2018-09-13 NOTE — ASSESSMENT & PLAN NOTE
Likely acute blood loss anemia from recent GI bleed  on Heparin infusion  Transfuse 1 u PRBC  Monitor cbc

## 2018-09-13 NOTE — PLAN OF CARE
Problem: Patient Care Overview  Goal: Plan of Care Review  Outcome: Ongoing (interventions implemented as appropriate)  Pt passed SAT, but failed SBT after approx 2.75 hours; sats dropped; levo gtt off; remains on fentanyl, amio, and heparin gtts; VSS; NADN.

## 2018-09-13 NOTE — PROGRESS NOTES
[facility] Hospital Medicine  Progress Note    Patient Name: Eleazar Solo  MRN: [id number]  Patient Class: IP- Inpatient   Admission Date: 9/10/2018  Length of Stay: 3 days  Attending Physician: Prachi Boyer MD  Primary Care Provider: Poly Fitch MD        Subjective:     Principal Problem:Acute hypoxemic respiratory failure    HPI:  Mr. Solo is a 79yo male with a PMHx of permanent AF on Eliquis, HTN, HLD, COPD, DM II, and h/o CVA, who was transferred from [facility] per family request as patient is followed OP by [facility] physicians.  He was originally admitted to [facility] on 9/6 for acute upper GI bleed with blood loss anemia (Hb 12 > 7.4 > 7.9).  Patient received total of 2 units PRBC and Eliquis held (last dose 9/6 AM).  He underwent EGD on 9/6 that showed a clot at the GE junction which was clipped  and injected with epi.  There was concern for further bleeding, therefore, repeat EGD was performed on 9/7 which showed no evidence of active bleeding.  During admission, patient developed AFib with RVR and was placed on diltiazem drip and PO dig.  CXR on 9/8 showed left pneumonia, IV Rocephin started.  Prior to transfer, vital signs and labs stable.  Upon arrival to [facility] ICU, patient hypotensive (SBP 70-80's) and hypoxic requiring continuous BiPAP.  Remains in AFib with controlled rate.  Repeat CXR showed RUL pneumonia.  Repeat labs resulted WBC 16.5, H&H 8.4/24.6, plt 136, BUN 38, cr. 1.4, AST 77, . ABG with pH 7.382, pCO2 44, pO2 60, HCO3 26.  Patient c/o SOB which is resolved while on BiPAP.  Denies any CP, palpitations, orthopnea, PND, cough, edema, ABD pain, N/V/D, hematemesis, melena, hematochezia, dysuria, lightheadedness/dzziness, syncope, HA, AMS, focal deficits, weakness, fever, or chills.  Patient admitted to ICU under Hospital Medicine services.     Hospital Course:  Pt admitted and started on bipap, eventually requiring intubation on 9/11. Started  on levophed, heparin, Dig and Amiodarone infusion. Echo shows normal LVF with Moderate to severe aortic stenosis and pulm HTN. Remains intubated. Hb to 7.3. Transfuse 1 U PBRCs        Interval History: Resting on vent.  Cynthia TF.  No active bleeding        Review of Systems   Unable to perform ROS: Intubated     Objective:     Vital Signs (Most Recent):  Temp: 99.8 °F (37.7 °C) (09/13/18 1500)  Pulse: 108 (09/13/18 1725)  Resp: 18 (09/13/18 1725)  BP: (!) 92/59 (09/13/18 1700)  SpO2: 97 % (09/13/18 1725) Vital Signs (24h Range):  Temp:  [99 °F (37.2 °C)-101.6 °F (38.7 °C)] 99.8 °F (37.7 °C)  Pulse:  [] 108  Resp:  [14-22] 18  SpO2:  [91 %-100 %] 97 %  BP: ()/(38-78) 92/59     Weight: 93 kg (205 lb 0.4 oz)  Body mass index is 29.42 kg/m².    Intake/Output Summary (Last 24 hours) at 9/13/2018 1743  Last data filed at 9/13/2018 1705  Gross per 24 hour   Intake 3792.56 ml   Output 1500 ml   Net 2292.56 ml      Physical Exam   Constitutional: He appears well-developed and well-nourished. He is easily aroused.  Non-toxic appearance. He has a sickly appearance. He appears ill. No distress. He is sedated, intubated and restrained.   HENT:   Head: Normocephalic and atraumatic.   Cardiovascular: Normal rate and normal pulses. An irregularly irregular rhythm present.Murmur heard.  Pulmonary/Chest: Effort normal and breath sounds normal.  Genitourinary Comments: Adams in place   Musculoskeletal: Normal range of motion.        Right foot: There is no deformity.        Left foot: There is no deformity.   Pedal edema   Lymphadenopathy:     He has no cervical adenopathy.   Neurological: He is easily aroused.   Sedated on vent   Skin: Skin is warm, dry and intact. Capillary refill takes less than 2 seconds. No rash noted. No cyanosis.         Significant Labs:   CBC:   Recent Labs   Lab  09/12/18   0402  09/12/18   1100  09/13/18   0430   WBC  18.49*  15.34*  16.24*   HGB  7.8*  7.8*  7.3*   HCT  23.9*  23.9*  22.3*   PLT   230  215  250     CMP:   Recent Labs   Lab  09/12/18   0402  09/13/18   0430   NA  138  137   K  4.0  3.9   CL  103  101   CO2  27  26   GLU  132*  193*   BUN  41*  45*   CREATININE  1.2  1.2   CALCIUM  7.5*  7.7*   PROT  5.5*  5.4*   ALBUMIN  2.1*  1.9*   BILITOT  0.9  0.7   ALKPHOS  86  107   AST  41*  42*   ALT  105*  75*   ANIONGAP  8  10   EGFRNONAA  58*  58*       Significant Imaging: I have reviewed all pertinent imaging results/findings within the past 24 hours.   Results for orders placed or performed during the hospital encounter of 09/10/18   Blood culture   Result Value Ref Range    Blood Culture, Routine No Growth to date     Blood Culture, Routine No Growth to date     Blood Culture, Routine No Growth to date    Blood culture   Result Value Ref Range    Blood Culture, Routine No Growth to date     Blood Culture, Routine No Growth to date     Blood Culture, Routine No Growth to date    Culture, Respiratory with Gram Stain   Result Value Ref Range    Respiratory Culture BRISEIDA ALBICANS  Many       Gram Stain (Respiratory) <10 epithelial cells per low power field.     Gram Stain (Respiratory) Rare WBC's     Gram Stain (Respiratory) Rare yeast    CBC auto differential   Result Value Ref Range    WBC 16.46 (H) 3.90 - 12.70 K/uL    RBC 2.60 (L) 4.60 - 6.20 M/uL    Hemoglobin 8.4 (L) 14.0 - 18.0 g/dL    Hematocrit 24.6 (L) 40.0 - 54.0 %    MCV 95 82 - 98 fL    MCH 32.3 (H) 27.0 - 31.0 pg    MCHC 34.1 32.0 - 36.0 g/dL    RDW 15.8 (H) 11.5 - 14.5 %    Platelets 136 (L) 150 - 350 K/uL    MPV 11.1 9.2 - 12.9 fL    Gran # (ANC) 11.7 (H) 1.8 - 7.7 K/uL    Lymph # 1.3 1.0 - 4.8 K/uL    Mono # 3.5 (H) 0.3 - 1.0 K/uL    Eos # 0.0 0.0 - 0.5 K/uL    Baso # 0.01 0.00 - 0.20 K/uL    Gran% 71.4 38.0 - 73.0 %    Lymph% 7.8 (L) 18.0 - 48.0 %    Mono% 21.2 (H) 4.0 - 15.0 %    Eosinophil% 0.0 0.0 - 8.0 %    Basophil% 0.1 0.0 - 1.9 %    Poly Occasional     Differential Method Automated    Comprehensive metabolic panel   Result  Value Ref Range    Sodium 134 (L) 136 - 145 mmol/L    Potassium 4.1 3.5 - 5.1 mmol/L    Chloride 100 95 - 110 mmol/L    CO2 24 23 - 29 mmol/L    Glucose 163 (H) 70 - 110 mg/dL    BUN, Bld 38 (H) 8 - 23 mg/dL    Creatinine 1.4 0.5 - 1.4 mg/dL    Calcium 7.6 (L) 8.7 - 10.5 mg/dL    Total Protein 5.6 (L) 6.0 - 8.4 g/dL    Albumin 2.4 (L) 3.5 - 5.2 g/dL    Total Bilirubin 1.7 (H) 0.1 - 1.0 mg/dL    Alkaline Phosphatase 89 55 - 135 U/L    AST 77 (H) 10 - 40 U/L     (H) 10 - 44 U/L    Anion Gap 10 8 - 16 mmol/L    eGFR if African American 56 (A) >60 mL/min/1.73 m^2    eGFR if non African American 48 (A) >60 mL/min/1.73 m^2   APTT   Result Value Ref Range    aPTT 32.7 (H) 21.0 - 32.0 sec   Protime-INR   Result Value Ref Range    Prothrombin Time 12.4 9.0 - 12.5 sec    INR 1.2 0.8 - 1.2   TSH   Result Value Ref Range    TSH 1.577 0.400 - 4.000 uIU/mL   Urinalysis   Result Value Ref Range    Specimen UA Urine, Clean Catch     Color, UA Maria Fernanda Yellow, Straw, Maria Fernanda    Appearance, UA Clear Clear    pH, UA SEE COMMENT 5.0 - 8.0    Specific Gravity, UA SEE COMMENT 1.005 - 1.030    Protein, UA SEE COMMENT Negative    Glucose, UA SEE COMMENT Negative    Ketones, UA SEE COMMENT Negative    Bilirubin (UA) SEE COMMENT Negative    Occult Blood UA SEE COMMENT Negative    Nitrite, UA SEE COMMENT Negative    Urobilinogen, UA SEE COMMENT <2.0 EU/dL    Leukocytes, UA SEE COMMENT Negative   Brain natriuretic peptide   Result Value Ref Range     (H) 0 - 99 pg/mL   Magnesium   Result Value Ref Range    Magnesium 2.2 1.6 - 2.6 mg/dL   Lactic acid, plasma   Result Value Ref Range    Lactate (Lactic Acid) 1.5 0.5 - 2.2 mmol/L   CBC auto differential   Result Value Ref Range    WBC 17.31 (H) 3.90 - 12.70 K/uL    RBC 2.58 (L) 4.60 - 6.20 M/uL    Hemoglobin 8.2 (L) 14.0 - 18.0 g/dL    Hematocrit 24.5 (L) 40.0 - 54.0 %    MCV 95 82 - 98 fL    MCH 31.8 (H) 27.0 - 31.0 pg    MCHC 33.5 32.0 - 36.0 g/dL    RDW 15.7 (H) 11.5 - 14.5 %     Platelets 147 (L) 150 - 350 K/uL    MPV 11.1 9.2 - 12.9 fL    Gran # (ANC) 13.3 (H) 1.8 - 7.7 K/uL    Lymph # 1.3 1.0 - 4.8 K/uL    Mono # 2.7 (H) 0.3 - 1.0 K/uL    Eos # 0.0 0.0 - 0.5 K/uL    Baso # 0.01 0.00 - 0.20 K/uL    Gran% 76.9 (H) 38.0 - 73.0 %    Lymph% 7.5 (L) 18.0 - 48.0 %    Mono% 15.4 (H) 4.0 - 15.0 %    Eosinophil% 0.1 0.0 - 8.0 %    Basophil% 0.1 0.0 - 1.9 %    Differential Method Automated    Comprehensive metabolic panel   Result Value Ref Range    Sodium 136 136 - 145 mmol/L    Potassium 4.1 3.5 - 5.1 mmol/L    Chloride 102 95 - 110 mmol/L    CO2 27 23 - 29 mmol/L    Glucose 139 (H) 70 - 110 mg/dL    BUN, Bld 42 (H) 8 - 23 mg/dL    Creatinine 1.3 0.5 - 1.4 mg/dL    Calcium 7.5 (L) 8.7 - 10.5 mg/dL    Total Protein 5.9 (L) 6.0 - 8.4 g/dL    Albumin 2.5 (L) 3.5 - 5.2 g/dL    Total Bilirubin 1.8 (H) 0.1 - 1.0 mg/dL    Alkaline Phosphatase 91 55 - 135 U/L    AST 74 (H) 10 - 40 U/L     (H) 10 - 44 U/L    Anion Gap 7 (L) 8 - 16 mmol/L    eGFR if African American >60 >60 mL/min/1.73 m^2    eGFR if non African American 53 (A) >60 mL/min/1.73 m^2   Magnesium   Result Value Ref Range    Magnesium 2.2 1.6 - 2.6 mg/dL   Phosphorus   Result Value Ref Range    Phosphorus 1.9 (L) 2.7 - 4.5 mg/dL   Hemoglobin   Result Value Ref Range    Hemoglobin 7.5 (L) 14.0 - 18.0 g/dL   Hemoglobin   Result Value Ref Range    Hemoglobin 8.2 (L) 14.0 - 18.0 g/dL   Hematocrit   Result Value Ref Range    Hematocrit 21.9 (L) 40.0 - 54.0 %   Hematocrit   Result Value Ref Range    Hematocrit 24.5 (L) 40.0 - 54.0 %   Urinalysis Microscopic   Result Value Ref Range    RBC, UA 1 0 - 4 /hpf    WBC, UA 1 0 - 5 /hpf    Bacteria, UA Rare None-Occ /hpf    Hyaline Casts, UA 20 (A) 0-1/lpf /lpf    Microscopic Comment SEE COMMENT    Lactic acid, plasma   Result Value Ref Range    Lactate (Lactic Acid) 1.4 0.5 - 2.2 mmol/L   APTT   Result Value Ref Range    aPTT 32.8 (H) 21.0 - 32.0 sec   CBC auto differential   Result Value Ref Range     WBC 17.00 (H) 3.90 - 12.70 K/uL    RBC 2.38 (L) 4.60 - 6.20 M/uL    Hemoglobin 7.5 (L) 14.0 - 18.0 g/dL    Hematocrit 22.6 (L) 40.0 - 54.0 %    MCV 95 82 - 98 fL    MCH 31.5 (H) 27.0 - 31.0 pg    MCHC 33.2 32.0 - 36.0 g/dL    RDW 15.9 (H) 11.5 - 14.5 %    Platelets 152 150 - 350 K/uL    MPV 11.2 9.2 - 12.9 fL    Gran # (ANC) 13.4 (H) 1.8 - 7.7 K/uL    Lymph # 1.2 1.0 - 4.8 K/uL    Mono # 2.4 (H) 0.3 - 1.0 K/uL    Eos # 0.0 0.0 - 0.5 K/uL    Baso # 0.01 0.00 - 0.20 K/uL    Gran% 79.2 (H) 38.0 - 73.0 %    Lymph% 6.9 (L) 18.0 - 48.0 %    Mono% 14.4 4.0 - 15.0 %    Eosinophil% 0.0 0.0 - 8.0 %    Basophil% 0.1 0.0 - 1.9 %    Differential Method Automated    Protime-INR   Result Value Ref Range    Prothrombin Time 12.2 9.0 - 12.5 sec    INR 1.2 0.8 - 1.2   Comprehensive metabolic panel   Result Value Ref Range    Sodium 136 136 - 145 mmol/L    Potassium 3.9 3.5 - 5.1 mmol/L    Chloride 103 95 - 110 mmol/L    CO2 27 23 - 29 mmol/L    Glucose 130 (H) 70 - 110 mg/dL    BUN, Bld 42 (H) 8 - 23 mg/dL    Creatinine 1.2 0.5 - 1.4 mg/dL    Calcium 7.1 (L) 8.7 - 10.5 mg/dL    Total Protein 5.2 (L) 6.0 - 8.4 g/dL    Albumin 2.2 (L) 3.5 - 5.2 g/dL    Total Bilirubin 1.6 (H) 0.1 - 1.0 mg/dL    Alkaline Phosphatase 80 55 - 135 U/L    AST 55 (H) 10 - 40 U/L     (H) 10 - 44 U/L    Anion Gap 6 (L) 8 - 16 mmol/L    eGFR if African American >60 >60 mL/min/1.73 m^2    eGFR if non African American 58 (A) >60 mL/min/1.73 m^2   Urinalysis   Result Value Ref Range    Specimen UA Urine, Catheterized     Color, UA Yellow Yellow, Straw, Maria Fernanda    Appearance, UA Clear Clear    pH, UA 6.0 5.0 - 8.0    Specific Gravity, UA 1.020 1.005 - 1.030    Protein, UA Trace (A) Negative    Glucose, UA Negative Negative    Ketones, UA Negative Negative    Bilirubin (UA) 1+ (A) Negative    Occult Blood UA Negative Negative    Nitrite, UA Negative Negative    Urobilinogen, UA 4.0-6.0 (A) <2.0 EU/dL    Leukocytes, UA Negative Negative   Vancomycin, random    Result Value Ref Range    Vancomycin, Random 5.0 Not established ug/mL   Hemoglobin   Result Value Ref Range    Hemoglobin 8.4 (L) 14.0 - 18.0 g/dL   Hematocrit   Result Value Ref Range    Hematocrit 25.3 (L) 40.0 - 54.0 %   Hemoglobin   Result Value Ref Range    Hemoglobin 8.2 (L) 14.0 - 18.0 g/dL   Hematocrit   Result Value Ref Range    Hematocrit 24.5 (L) 40.0 - 54.0 %   CBC auto differential   Result Value Ref Range    WBC 18.49 (H) 3.90 - 12.70 K/uL    RBC 2.51 (L) 4.60 - 6.20 M/uL    Hemoglobin 7.8 (L) 14.0 - 18.0 g/dL    Hematocrit 23.9 (L) 40.0 - 54.0 %    MCV 95 82 - 98 fL    MCH 31.1 (H) 27.0 - 31.0 pg    MCHC 32.6 32.0 - 36.0 g/dL    RDW 15.7 (H) 11.5 - 14.5 %    Platelets 230 150 - 350 K/uL    MPV 10.8 9.2 - 12.9 fL    Gran # (ANC) 14.0 (H) 1.8 - 7.7 K/uL    Lymph # 1.9 1.0 - 4.8 K/uL    Mono # 2.4 (H) 0.3 - 1.0 K/uL    Eos # 0.1 0.0 - 0.5 K/uL    Baso # 0.02 0.00 - 0.20 K/uL    Gran% 77.0 (H) 38.0 - 73.0 %    Lymph% 10.5 (L) 18.0 - 48.0 %    Mono% 13.1 4.0 - 15.0 %    Eosinophil% 0.8 0.0 - 8.0 %    Basophil% 0.1 0.0 - 1.9 %    Platelet Estimate Appears normal     Differential Method Automated    Comprehensive metabolic panel   Result Value Ref Range    Sodium 138 136 - 145 mmol/L    Potassium 4.0 3.5 - 5.1 mmol/L    Chloride 103 95 - 110 mmol/L    CO2 27 23 - 29 mmol/L    Glucose 132 (H) 70 - 110 mg/dL    BUN, Bld 41 (H) 8 - 23 mg/dL    Creatinine 1.2 0.5 - 1.4 mg/dL    Calcium 7.5 (L) 8.7 - 10.5 mg/dL    Total Protein 5.5 (L) 6.0 - 8.4 g/dL    Albumin 2.1 (L) 3.5 - 5.2 g/dL    Total Bilirubin 0.9 0.1 - 1.0 mg/dL    Alkaline Phosphatase 86 55 - 135 U/L    AST 41 (H) 10 - 40 U/L     (H) 10 - 44 U/L    Anion Gap 8 8 - 16 mmol/L    eGFR if African American >60 >60 mL/min/1.73 m^2    eGFR if non African American 58 (A) >60 mL/min/1.73 m^2   Magnesium   Result Value Ref Range    Magnesium 2.1 1.6 - 2.6 mg/dL   Phosphorus   Result Value Ref Range    Phosphorus 2.4 (L) 2.7 - 4.5 mg/dL   Vancomycin,  random   Result Value Ref Range    Vancomycin, Random 16.5 Not established ug/mL   APTT   Result Value Ref Range    aPTT 31.5 21.0 - 32.0 sec   Protime-INR   Result Value Ref Range    Prothrombin Time 10.7 9.0 - 12.5 sec    INR 1.0 0.8 - 1.2   CBC auto differential   Result Value Ref Range    WBC 15.34 (H) 3.90 - 12.70 K/uL    RBC 2.48 (L) 4.60 - 6.20 M/uL    Hemoglobin 7.8 (L) 14.0 - 18.0 g/dL    Hematocrit 23.9 (L) 40.0 - 54.0 %    MCV 96 82 - 98 fL    MCH 31.5 (H) 27.0 - 31.0 pg    MCHC 32.6 32.0 - 36.0 g/dL    RDW 16.1 (H) 11.5 - 14.5 %    Platelets 215 150 - 350 K/uL    MPV 10.7 9.2 - 12.9 fL    Gran # (ANC) 11.3 (H) 1.8 - 7.7 K/uL    Lymph # 1.8 1.0 - 4.8 K/uL    Mono # 2.1 (H) 0.3 - 1.0 K/uL    Eos # 0.1 0.0 - 0.5 K/uL    Baso # 0.02 0.00 - 0.20 K/uL    Gran% 75.6 (H) 38.0 - 73.0 %    Lymph% 11.6 (L) 18.0 - 48.0 %    Mono% 13.8 4.0 - 15.0 %    Eosinophil% 0.7 0.0 - 8.0 %    Basophil% 0.1 0.0 - 1.9 %    Differential Method Automated    APTT   Result Value Ref Range    aPTT 42.7 (H) 21.0 - 32.0 sec   APTT   Result Value Ref Range    aPTT 37.1 (H) 21.0 - 32.0 sec   Comprehensive metabolic panel   Result Value Ref Range    Sodium 137 136 - 145 mmol/L    Potassium 3.9 3.5 - 5.1 mmol/L    Chloride 101 95 - 110 mmol/L    CO2 26 23 - 29 mmol/L    Glucose 193 (H) 70 - 110 mg/dL    BUN, Bld 45 (H) 8 - 23 mg/dL    Creatinine 1.2 0.5 - 1.4 mg/dL    Calcium 7.7 (L) 8.7 - 10.5 mg/dL    Total Protein 5.4 (L) 6.0 - 8.4 g/dL    Albumin 1.9 (L) 3.5 - 5.2 g/dL    Total Bilirubin 0.7 0.1 - 1.0 mg/dL    Alkaline Phosphatase 107 55 - 135 U/L    AST 42 (H) 10 - 40 U/L    ALT 75 (H) 10 - 44 U/L    Anion Gap 10 8 - 16 mmol/L    eGFR if African American >60 >60 mL/min/1.73 m^2    eGFR if non African American 58 (A) >60 mL/min/1.73 m^2   Magnesium   Result Value Ref Range    Magnesium 2.4 1.6 - 2.6 mg/dL   Phosphorus   Result Value Ref Range    Phosphorus 1.6 (L) 2.7 - 4.5 mg/dL   CBC auto differential   Result Value Ref Range    WBC  16.24 (H) 3.90 - 12.70 K/uL    RBC 2.32 (L) 4.60 - 6.20 M/uL    Hemoglobin 7.3 (L) 14.0 - 18.0 g/dL    Hematocrit 22.3 (L) 40.0 - 54.0 %    MCV 96 82 - 98 fL    MCH 31.5 (H) 27.0 - 31.0 pg    MCHC 32.7 32.0 - 36.0 g/dL    RDW 16.2 (H) 11.5 - 14.5 %    Platelets 250 150 - 350 K/uL    MPV 10.4 9.2 - 12.9 fL    Gran # (ANC) 12.8 (H) 1.8 - 7.7 K/uL    Lymph # 1.7 1.0 - 4.8 K/uL    Mono # 1.5 (H) 0.3 - 1.0 K/uL    Eos # 0.2 0.0 - 0.5 K/uL    Baso # 0.02 0.00 - 0.20 K/uL    Gran% 80.3 (H) 38.0 - 73.0 %    Lymph% 10.7 (L) 18.0 - 48.0 %    Mono% 9.4 4.0 - 15.0 %    Eosinophil% 0.9 0.0 - 8.0 %    Basophil% 0.1 0.0 - 1.9 %    Differential Method Automated    APTT   Result Value Ref Range    aPTT 45.0 (H) 21.0 - 32.0 sec   2D echo with color flow doppler   Result Value Ref Range    EF 50 55 - 65    Mitral Valve Regurgitation MILD     Diastolic Dysfunction No     Aortic Valve Stenosis MODERATE TO SEVERE (A)     Est. PA Systolic Pressure 56 (A)     Tricuspid Valve Regurgitation MILD TO MODERATE    Type & Screen   Result Value Ref Range    Group & Rh O POS     Indirect Raciel NEG    ISTAT PROCEDURE   Result Value Ref Range    POC PH 7.382 7.35 - 7.45    POC PCO2 44.4 35 - 45 mmHg    POC PO2 60 (L) 80 - 100 mmHg    POC HCO3 26.4 24 - 28 mmol/L    POC BE 1 -2 to 2 mmol/L    POC SATURATED O2 90 (L) 95 - 100 %    Rate 16     Sample ARTERIAL     Site LR     Allens Test Pass     DelSys CPAP/BiPAP     Mode BiPAP     FiO2 50     IP 10     EP 5    POCT glucose   Result Value Ref Range    POCT Glucose 164 (H) 70 - 110 mg/dL   POCT glucose   Result Value Ref Range    POCT Glucose 153 (H) 70 - 110 mg/dL   ISTAT PROCEDURE   Result Value Ref Range    POC PH 7.303 (L) 7.35 - 7.45    POC PCO2 49.9 (H) 35 - 45 mmHg    POC PO2 62 (L) 80 - 100 mmHg    POC HCO3 24.7 24 - 28 mmol/L    POC BE -2 -2 to 2 mmol/L    POC SATURATED O2 88 (L) 95 - 100 %    Rate 16     Sample ARTERIAL     Site RR     Allens Test Pass     DelSys CPAP/BiPAP     Mode BiPAP      FiO2 50     IP 12     EP 5    POCT glucose   Result Value Ref Range    POCT Glucose 126 (H) 70 - 110 mg/dL   POCT glucose   Result Value Ref Range    POCT Glucose 148 (H) 70 - 110 mg/dL   POCT glucose   Result Value Ref Range    POCT Glucose 182 (H) 70 - 110 mg/dL   ISTAT PROCEDURE   Result Value Ref Range    POC PH 7.296 (L) 7.35 - 7.45    POC PCO2 60.6 (HH) 35 - 45 mmHg    POC PO2 112 (H) 80 - 100 mmHg    POC HCO3 29.5 (H) 24 - 28 mmol/L    POC BE 3 -2 to 2 mmol/L    POC SATURATED O2 98 95 - 100 %    Rate 18     Sample ARTERIAL     Site RR     Allens Test Pass     DelSys Adult Vent     Mode PCV     Vt 450     PEEP 10     FiO2 70     IT .77    POCT glucose   Result Value Ref Range    POCT Glucose 122 (H) 70 - 110 mg/dL   POCT glucose   Result Value Ref Range    POCT Glucose 145 (H) 70 - 110 mg/dL   POCT glucose   Result Value Ref Range    POCT Glucose 173 (H) 70 - 110 mg/dL   POCT glucose   Result Value Ref Range    POCT Glucose 122 (H) 70 - 110 mg/dL   ISTAT PROCEDURE   Result Value Ref Range    POC PH 7.334 (L) 7.35 - 7.45    POC PCO2 59.4 (HH) 35 - 45 mmHg    POC PO2 96 80 - 100 mmHg    POC HCO3 31.6 (H) 24 - 28 mmol/L    POC BE 6 -2 to 2 mmol/L    POC SATURATED O2 97 95 - 100 %    Rate 18     Sample ARTERIAL     Site LR     Allens Test Pass     DelSys Adult Vent     Mode AC/PRVC     Vt 450     PEEP 10     FiO2 40    POCT glucose   Result Value Ref Range    POCT Glucose 153 (H) 70 - 110 mg/dL   POCT glucose   Result Value Ref Range    POCT Glucose 185 (H) 70 - 110 mg/dL   ISTAT PROCEDURE   Result Value Ref Range    POC PH 7.367 7.35 - 7.45    POC PCO2 51.9 (H) 35 - 45 mmHg    POC PO2 63 (L) 80 - 100 mmHg    POC HCO3 29.8 (H) 24 - 28 mmol/L    POC BE 4 -2 to 2 mmol/L    POC SATURATED O2 91 (L) 95 - 100 %    Sample ARTERIAL     Site RR     Allens Test Pass     DelSys Adult Vent     Mode AC/PRVC     Vt 450     PEEP 5     FiO2 40    POCT glucose   Result Value Ref Range    POCT Glucose 136 (H) 70 - 110 mg/dL    Prepare RBC 1 Unit   Result Value Ref Range    UNIT NUMBER [id number]     PRODUCT CODE [id number]     DISPENSE STATUS ISSUED     CODING SYSTEM [id number]     Unit Blood Type Code 5100     Unit Blood Type O POS     Unit Expiration [id number]      Assessment/Plan:      * Acute hypoxemic respiratory failure on mechanical ventilation    intubated          Septic shock    - CXR shows right upper lobe infiltrate, new since previous imaging on 9/8.  WBC 16.5, RR >20, SBP <90.  - sputum and blood cultures pending  - Empiric IV Unasyn and vanc, pharmacy consult for vanc dosing.    - Continue LABA + ICS.  - Pulmonology/ICU team following, appreciate assistance.  -intubated         Type 2 diabetes mellitus without complication, without long-term current use of insulin    - Hold Amaryl, will resume at DC.  - Accuchecks with low SSI.          Acute upper GI bleed with acute blood loss anemia    - EGD 9/6 with clot at GE junction s/p clipping and epi injection.  Repeat EGD 9/7 negative for active bleeding.  - Follow serial H&H and transfuse as needed.  - Continue IV Protonix daily.  - Hold Eliquis.  - NPO, IV hydration.  - GI consult in AM.        Chronic kidney disease, stage 3    - Cr. Stable at 1.4 (baseline 1.3)  - Avoid nephrotoxic agents.trict I&O's.  - Follow daily BMP.        Chronic obstructive pulmonary disease with acute lower respiratory infection    - Plan as above.  - Pulmonology following.        Essential hypertension    - Patient hypotensive and on pressor  - Hold CCB, ARB, and diuretics.  Follow BP trends and resume as needed.        Atrial fibrillation, permanent    - Rate controlled at present.  Monitor telemetry.  -heparin and amio drip  - Cardiology on board          VTE Risk Mitigation (From admission, onward)        Ordered     heparin 25,000 units in dextrose 5% 250 mL (100 units/mL) infusion LOW INTENSITY nomogram - [facility]  Continuous      09/12/18 1048     heparin 25,000 units in dextrose 5% (100  units/ml) IV bolus from bag - ADDITIONAL PRN BOLUS - 60 units/kg  As needed (PRN)      09/12/18 1048     heparin 25,000 units in dextrose 5% (100 units/ml) IV bolus from bag - ADDITIONAL PRN BOLUS - 30 units/kg  As needed (PRN)      09/12/18 1048     IP VTE HIGH RISK PATIENT  Once      09/11/18 0425     Reason for No Pharmacological VTE Prophylaxis  Once      09/11/18 0425     Place sequential compression device  Until discontinued      09/11/18 0425          Critical care time spent on the evaluation and treatment of severe organ dysfunction, review of pertinent labs and imaging studies, discussions with consulting providers and discussions with patient/family: >30 minutes.    Prachi Boyer MD  Department of Hospital Medicine   [facility] -

## 2018-09-13 NOTE — ASSESSMENT & PLAN NOTE
-Remains in afib with variable rate  -Continue amio gtt, digoxin  -No BB given need for pressor support  -Anemia slightly worse on AM labs, would benefit from transfusion  -Continue heparin gtt for now; risks/benefits discussed. Closely monitor H and H

## 2018-09-13 NOTE — PROGRESS NOTES
[facility] -   Critical Care Medicine  Progress Note    Patient Name: Eleazar Solo  MRN: [id number]  Admission Date: 9/10/2018  Hospital Length of Stay: 3 days  Code Status: Full Code  Attending Provider: Prachi Boyer MD  Primary Care Provider: Poly Fitch MD   Principal Problem: Acute hypoxemic respiratory failure    Subjective:     HPI:  78 year old male admitted to MICU, transfer from [facility].  I have reviewed the patient's medical history in detail and updated the computerized patient record.  Upper GI bleeding, Scoped x 2 and clipped  Developed Af RVR, Known chr A fib on elliquis on hold  Treated with Digoxin, Amiodarone and cardizem GTT  Family requested transfer  Known COPD FEV1:1.65L ( 53%)  ANORO ellipta, not on oxygen  Presented to MICU needed BIPAP for WOB  CXR shows RML infiltrate  Overnight, anxious, WOB  Last echo EF 60%      Hospital/ICU Course:  09/11: CXR shows melanie infiltrate: edema.  . Lopressor 2.5mg and lasix 20 mg given  9/12 - intubated yesterday and required pressor with sedation.  Resting on vent.  Cynthia TF.  No active bleeding  9/13 - remains intubated, sedated on mechanical ventilation, pO2 trending up with high PEEP ventilation    Review of Systems   Unable to perform ROS: Intubated       Objective:     Vital Signs (Most Recent):  Temp: 99.8 °F (37.7 °C) (09/13/18 0305)  Pulse: (!) 111 (09/13/18 0645)  Resp: 18 (09/13/18 0645)  BP: (!) 91/53 (09/13/18 0645)  SpO2: 99 % (09/13/18 0645) Vital Signs (24h Range):  Temp:  [98.9 °F (37.2 °C)-101.6 °F (38.7 °C)] 99.8 °F (37.7 °C)  Pulse:  [] 111  Resp:  [17-21] 18  SpO2:  [93 %-100 %] 99 %  BP: ()/(37-86) 91/53     Weight: 93 kg (205 lb 0.4 oz)  Body mass index is 29.42 kg/m².      Intake/Output Summary (Last 24 hours) at 9/13/2018 0719  Last data filed at 9/13/2018 0605  Gross per 24 hour   Intake 3640.24 ml   Output 1395 ml   Net 2245.24 ml       Physical Exam   Constitutional: He appears ill. He is  sedated, intubated and restrained.   HENT:   Head: Atraumatic.   Eyes: Conjunctivae are normal. Pupils are equal, round, and reactive to light.   Neck: No JVD present. No tracheal deviation present.   Cardiovascular: Normal rate. An irregularly irregular rhythm present.   No murmur heard.  Pulses:       Radial pulses are 2+ on the right side, and 2+ on the left side.        Dorsalis pedis pulses are 1+ on the right side, and 1+ on the left side.   Pulmonary/Chest: He is intubated. He has decreased breath sounds.   Abdominal: Soft. Bowel sounds are normal. He exhibits no distension.   Skin: Skin is warm and dry. Capillary refill takes 2 to 3 seconds. No cyanosis.            Vents:  Vent Mode: A/C (09/13/18 0444)  Ventilator Initiated: Yes (09/11/18 1121)  Set Rate: 18 bmp (09/13/18 0444)  Vt Set: 450 mL (09/13/18 0444)  Pressure Support: 0 cmH20 (09/13/18 0444)  PEEP/CPAP: 10 cmH20 (09/13/18 0444)  Oxygen Concentration (%): 40 (09/13/18 0645)  Peak Airway Pressure: 27 cmH2O (09/13/18 0444)  Plateau Pressure: 0 cmH20 (09/13/18 0444)  Total Ve: 8.49 mL (09/13/18 0444)  F/VT Ratio<105 (RSBI): (!) 34.48 (09/13/18 0444)    Lines/Drains/Airways     Central Venous Catheter Line                 Percutaneous Central Line Insertion/Assessment - triple lumen  09/11/18 1124 left internal jugular 1 day          Drain                 NG/OG Tube 09/11/18 1123 orogastric Center mouth 1 day         Urethral Catheter 09/11/18 1123 Double-lumen 1 day          Airway                 Airway - Non-Surgical 09/11/18 1110 Endotracheal Tube 1 day                Significant Labs:    CBC/Anemia Profile:  Recent Labs   Lab  09/12/18   0402  09/12/18   1100  09/13/18   0430   WBC  18.49*  15.34*  16.24*   HGB  7.8*  7.8*  7.3*   HCT  23.9*  23.9*  22.3*   PLT  230  215  250   MCV  95  96  96   RDW  15.7*  16.1*  16.2*        Chemistries:  Recent Labs   Lab  09/11/18   1200  09/12/18   0402  09/13/18   0430   NA  136  138  137   K  3.9  4.0  3.9    CL  103  103  101   CO2  27  27  26   BUN  42*  41*  45*   CREATININE  1.2  1.2  1.2   CALCIUM  7.1*  7.5*  7.7*   ALBUMIN  2.2*  2.1*  1.9*   PROT  5.2*  5.5*  5.4*   BILITOT  1.6*  0.9  0.7   ALKPHOS  80  86  107   ALT  124*  105*  75*   AST  55*  41*  42*   MG   --   2.1  2.4   PHOS   --   2.4*  1.6*       All pertinent labs within the past 24 hours have been reviewed.  ABG  Recent Labs   Lab  09/13/18   0427   PH  7.334*   PO2  96   PCO2  59.4*   HCO3  31.6*   BE  6         Significant Imaging:  I have reviewed all pertinent imaging results/findings within the past 24 hours.      Assessment/Plan:     Pulmonary   * Acute hypoxemic respiratory failure on mechanical ventilation    Vent settings adjusted per abg  Will trial SAT/SBT  Continue VAP prophylaxis        Chronic obstructive pulmonary disease with acute lower respiratory infection    Moderate severe COPD  Cont Formeterol and Budesonide  Duonebs PRN  Continue abx day 4        Cardiac/Vascular   Atrial fibrillation, permanent    Cont Dig and Amiodarone infusion with prn metoprolol as BP tolerates  Cards following  Cont Heparin infusion per Cards request        Renal/   Chronic kidney disease, stage 3    Maintain MAP > 65  Accurate I/Os  Monitor creatinine        ID   Septic shock    Wean pressor as able for MAP > 65  Cont IVAB  Monitor temp, wbc data          Oncology   Anemia, unspecified    Likely acute blood loss anemia from recent GI bleed  on Heparin infusion  Transfuse 1 u PRBC  Monitor cbc        Endocrine   Type 2 diabetes mellitus without complication, without long-term current use of insulin    Continue SSI prn with monitoring for glucose control and prevention of insulin toxicity        GI   Acute upper GI bleed with acute blood loss anemia    Recent clipping of ulcer at GE junction at outside hospital  Cont PPI and TF             Critical Care Daily Checklist:    A: Awake: RASS Goal/Actual Goal: RASS Goal: -1-->drowsy  Actual: Blas  Agitation Sedation Scale (RASS): Drowsy   B: Spontaneous Breathing Trial Performed? Spon. Breathing Trial Initiated?: Initiated (09/13/18 1136)   C: SAT & SBT Coordinated?  yes                      D: Delirium: CAM-ICU Overall CAM-ICU: Positive   E: Early Mobility Performed? Yes   F: Feeding Goal: Goals: Meet > 85 % EEN/EPN while admitted  Status: Nutrition Goal Status: new   Current Diet Order   Procedures    Diet NPO Except for: Medication     Order Specific Question:   Except for     Answer:   Medication      AS: Analgesia/Sedation Fentanyl, propofol   T: Thromboembolic Prophylaxis Heparin infusion   H: HOB > 300 Yes   U: Stress Ulcer Prophylaxis (if needed) PPI   G: Glucose Control SSI   B: Bowel Function Stool Occurrence: 0   I: Indwelling Catheter (Lines & Adams) Necessity reviewed   D: De-escalation of Antimicrobials/Pharmacotherapies reviewed    Plan for the day/ETD SBT; wean vent, pressor as able    Code Status:  Family/Goals of Care: Full Code  Home on discharge   I have discussed case and plan of care in detail with Dr Cosme; Status and plan of care were discussed with team on multidisciplinary rounds.    Critical Care Time: 70 minutes  Critical secondary to respiratory failure on mechanical ventilation   Critical care was time spent personally by me on the following activities: development of treatment plan with patient or surrogate and bedside caregivers, discussions with consultants, evaluation of patient's response to treatment, examination of patient, ordering and performing treatments and interventions, ordering and review of laboratory studies, ordering and review of radiographic studies, pulse oximetry, re-evaluation of patient's condition. This critical care time did not overlap with that of any other provider or involve time for any procedures.     Selma Wallace, Acute Care NP-BC  Critical Care Medicine  [facility] [place]

## 2018-09-13 NOTE — PLAN OF CARE
Problem: Patient Care Overview  Goal: Plan of Care Review  Outcome: Ongoing (interventions implemented as appropriate)  Pt remains on vent. Fent gtt for sedation, max dose inc to 200mcg/hr to maintain RASS -1. PTN Ativan x1. Tmax 101.6; PRN Tylenol x 1, pt with temps 99.3-100.1 for remainder of night. Afib on monitor, HR 110s-130s when febrile, now 90s-110s. Amio gtt infusing. BP controlled w/levo gtt. Heparin gtt inc to 14units/hr; next aPTT 0730.   Good UOP per cook. Q2 turns w/wedge, heels floated, no breakdown noted. Armando soft wrist restraints for pt safety.

## 2018-09-13 NOTE — ASSESSMENT & PLAN NOTE
-H/H slightly lower today, 7/3/22.3  -Continue heparin gtt for now  -Needs transfusion  -Monitor H and H closely

## 2018-09-14 LAB
ALBUMIN SERPL BCP-MCNC: 2 G/DL
ALLENS TEST: ABNORMAL
ALP SERPL-CCNC: 112 U/L
ALT SERPL W/O P-5'-P-CCNC: 72 U/L
ANION GAP SERPL CALC-SCNC: 10 MMOL/L
APTT BLDCRRT: 32.2 SEC
APTT BLDCRRT: 33.5 SEC
APTT BLDCRRT: 40.8 SEC
AST SERPL-CCNC: 96 U/L
BACTERIA SPEC AEROBE CULT: NORMAL
BASOPHILS # BLD AUTO: 0.04 K/UL
BASOPHILS NFR BLD: 0.2 %
BILIRUB SERPL-MCNC: 0.7 MG/DL
BUN SERPL-MCNC: 37 MG/DL
CALCIUM SERPL-MCNC: 7.7 MG/DL
CHLORIDE SERPL-SCNC: 103 MMOL/L
CO2 SERPL-SCNC: 28 MMOL/L
CREAT SERPL-MCNC: 1.1 MG/DL
DELSYS: ABNORMAL
DIFFERENTIAL METHOD: ABNORMAL
EOSINOPHIL # BLD AUTO: 0.5 K/UL
EOSINOPHIL NFR BLD: 2.6 %
ERYTHROCYTE [DISTWIDTH] IN BLOOD BY AUTOMATED COUNT: 17.4 %
ERYTHROCYTE [SEDIMENTATION RATE] IN BLOOD BY WESTERGREN METHOD: 18 MM/H
EST. GFR  (AFRICAN AMERICAN): >60 ML/MIN/1.73 M^2
EST. GFR  (NON AFRICAN AMERICAN): >60 ML/MIN/1.73 M^2
FIO2: 45
GLUCOSE SERPL-MCNC: 169 MG/DL
GRAM STN SPEC: NORMAL
HCO3 UR-SCNC: 32.9 MMOL/L (ref 24–28)
HCT VFR BLD AUTO: 26.6 %
HGB BLD-MCNC: 8.9 G/DL
LYMPHOCYTES # BLD AUTO: 2.3 K/UL
LYMPHOCYTES NFR BLD: 12.9 %
MAGNESIUM SERPL-MCNC: 2.3 MG/DL
MCH RBC QN AUTO: 31.4 PG
MCHC RBC AUTO-ENTMCNC: 33.5 G/DL
MCV RBC AUTO: 94 FL
MODE: ABNORMAL
MONOCYTES # BLD AUTO: 2.3 K/UL
MONOCYTES NFR BLD: 12.5 %
NEUTROPHILS # BLD AUTO: 12.9 K/UL
NEUTROPHILS NFR BLD: 74.5 %
PCO2 BLDA: 58.9 MMHG (ref 35–45)
PEEP: 5
PH SMN: 7.36 [PH] (ref 7.35–7.45)
PHOSPHATE SERPL-MCNC: 1.8 MG/DL
PLATELET # BLD AUTO: 308 K/UL
PMV BLD AUTO: 9.9 FL
PO2 BLDA: 101 MMHG (ref 80–100)
POC BE: 7 MMOL/L
POC SATURATED O2: 97 % (ref 95–100)
POCT GLUCOSE: 116 MG/DL (ref 70–110)
POCT GLUCOSE: 159 MG/DL (ref 70–110)
POCT GLUCOSE: 167 MG/DL (ref 70–110)
POCT GLUCOSE: 173 MG/DL (ref 70–110)
POCT GLUCOSE: 177 MG/DL (ref 70–110)
POCT GLUCOSE: 182 MG/DL (ref 70–110)
POCT GLUCOSE: 207 MG/DL (ref 70–110)
POTASSIUM SERPL-SCNC: 3.9 MMOL/L
PROT SERPL-MCNC: 5.6 G/DL
RBC # BLD AUTO: 2.83 M/UL
SAMPLE: ABNORMAL
SITE: ABNORMAL
SODIUM SERPL-SCNC: 141 MMOL/L
VT: 450
WBC # BLD AUTO: 18 K/UL

## 2018-09-14 PROCEDURE — 25000003 PHARM REV CODE 250: Performed by: NURSE PRACTITIONER

## 2018-09-14 PROCEDURE — 25000003 PHARM REV CODE 250: Performed by: FAMILY MEDICINE

## 2018-09-14 PROCEDURE — 36600 WITHDRAWAL OF ARTERIAL BLOOD: CPT

## 2018-09-14 PROCEDURE — 85025 COMPLETE CBC W/AUTO DIFF WBC: CPT

## 2018-09-14 PROCEDURE — 25000003 PHARM REV CODE 250: Performed by: STUDENT IN AN ORGANIZED HEALTH CARE EDUCATION/TRAINING PROGRAM

## 2018-09-14 PROCEDURE — 94003 VENT MGMT INPAT SUBQ DAY: CPT

## 2018-09-14 PROCEDURE — 99233 SBSQ HOSP IP/OBS HIGH 50: CPT | Mod: ,,, | Performed by: INTERNAL MEDICINE

## 2018-09-14 PROCEDURE — 85730 THROMBOPLASTIN TIME PARTIAL: CPT | Mod: 91

## 2018-09-14 PROCEDURE — C9113 INJ PANTOPRAZOLE SODIUM, VIA: HCPCS | Performed by: INTERNAL MEDICINE

## 2018-09-14 PROCEDURE — 82803 BLOOD GASES ANY COMBINATION: CPT

## 2018-09-14 PROCEDURE — 97803 MED NUTRITION INDIV SUBSEQ: CPT

## 2018-09-14 PROCEDURE — 83735 ASSAY OF MAGNESIUM: CPT

## 2018-09-14 PROCEDURE — 94640 AIRWAY INHALATION TREATMENT: CPT

## 2018-09-14 PROCEDURE — 25000003 PHARM REV CODE 250: Performed by: INTERNAL MEDICINE

## 2018-09-14 PROCEDURE — 20000000 HC ICU ROOM

## 2018-09-14 PROCEDURE — 63600175 PHARM REV CODE 636 W HCPCS: Performed by: NURSE PRACTITIONER

## 2018-09-14 PROCEDURE — 25000242 PHARM REV CODE 250 ALT 637 W/ HCPCS: Performed by: INTERNAL MEDICINE

## 2018-09-14 PROCEDURE — 63600175 PHARM REV CODE 636 W HCPCS: Performed by: INTERNAL MEDICINE

## 2018-09-14 PROCEDURE — 99900035 HC TECH TIME PER 15 MIN (STAT)

## 2018-09-14 PROCEDURE — 84100 ASSAY OF PHOSPHORUS: CPT

## 2018-09-14 PROCEDURE — 63600175 PHARM REV CODE 636 W HCPCS: Performed by: FAMILY MEDICINE

## 2018-09-14 PROCEDURE — 99291 CRITICAL CARE FIRST HOUR: CPT | Mod: ,,, | Performed by: NURSE PRACTITIONER

## 2018-09-14 PROCEDURE — 80053 COMPREHEN METABOLIC PANEL: CPT

## 2018-09-14 RX ORDER — METOPROLOL TARTRATE 1 MG/ML
5 INJECTION, SOLUTION INTRAVENOUS
Status: DISCONTINUED | OUTPATIENT
Start: 2018-09-14 | End: 2018-09-16

## 2018-09-14 RX ORDER — MOXIFLOXACIN HYDROCHLORIDE 400 MG/1
400 TABLET ORAL DAILY
Status: DISCONTINUED | OUTPATIENT
Start: 2018-09-14 | End: 2018-09-19

## 2018-09-14 RX ORDER — FUROSEMIDE 10 MG/ML
20 INJECTION INTRAMUSCULAR; INTRAVENOUS ONCE
Status: COMPLETED | OUTPATIENT
Start: 2018-09-14 | End: 2018-09-14

## 2018-09-14 RX ORDER — FENTANYL CITRAT/DEXTROSE 5%/PF 100 MCG/10
PATIENT CONTROLLED ANALGESIA SYRINGE INTRAVENOUS CONTINUOUS
Status: DISCONTINUED | OUTPATIENT
Start: 2018-09-14 | End: 2018-09-14

## 2018-09-14 RX ORDER — FLUCONAZOLE 100 MG/1
200 TABLET ORAL DAILY
Status: DISCONTINUED | OUTPATIENT
Start: 2018-09-15 | End: 2018-09-19

## 2018-09-14 RX ORDER — FLUCONAZOLE 2 MG/ML
200 INJECTION, SOLUTION INTRAVENOUS ONCE
Status: COMPLETED | OUTPATIENT
Start: 2018-09-14 | End: 2018-09-14

## 2018-09-14 RX ORDER — OLANZAPINE 10 MG/1
10 TABLET, ORALLY DISINTEGRATING ORAL NIGHTLY PRN
Status: DISCONTINUED | OUTPATIENT
Start: 2018-09-14 | End: 2018-09-18

## 2018-09-14 RX ORDER — PANTOPRAZOLE SODIUM 40 MG/1
40 FOR SUSPENSION ORAL DAILY
Status: DISCONTINUED | OUTPATIENT
Start: 2018-09-15 | End: 2018-09-18

## 2018-09-14 RX ORDER — METOPROLOL TARTRATE 1 MG/ML
2.5 INJECTION, SOLUTION INTRAVENOUS ONCE
Status: COMPLETED | OUTPATIENT
Start: 2018-09-14 | End: 2018-09-14

## 2018-09-14 RX ADMIN — BUDESONIDE 0.5 MG: 0.5 SUSPENSION RESPIRATORY (INHALATION) at 09:09

## 2018-09-14 RX ADMIN — AMPICILLIN AND SULBACTAM 1.5 G: 1; .5 INJECTION, POWDER, FOR SOLUTION INTRAVENOUS at 06:09

## 2018-09-14 RX ADMIN — DIGOXIN 0.12 MG: 125 TABLET ORAL at 10:09

## 2018-09-14 RX ADMIN — OLANZAPINE 10 MG: 5 TABLET, ORALLY DISINTEGRATING ORAL at 08:09

## 2018-09-14 RX ADMIN — INSULIN ASPART 2 UNITS: 100 INJECTION, SOLUTION INTRAVENOUS; SUBCUTANEOUS at 08:09

## 2018-09-14 RX ADMIN — INSULIN ASPART 2 UNITS: 100 INJECTION, SOLUTION INTRAVENOUS; SUBCUTANEOUS at 03:09

## 2018-09-14 RX ADMIN — METOPROLOL TARTRATE 5 MG: 5 INJECTION, SOLUTION INTRAVENOUS at 10:09

## 2018-09-14 RX ADMIN — BUDESONIDE 0.5 MG: 0.5 SUSPENSION RESPIRATORY (INHALATION) at 07:09

## 2018-09-14 RX ADMIN — AMIODARONE HYDROCHLORIDE 0.5 MG/MIN: 1.8 INJECTION, SOLUTION INTRAVENOUS at 03:09

## 2018-09-14 RX ADMIN — HEPARIN SODIUM AND DEXTROSE 16 UNITS/KG/HR: 10000; 5 INJECTION INTRAVENOUS at 07:09

## 2018-09-14 RX ADMIN — IPRATROPIUM BROMIDE AND ALBUTEROL SULFATE 3 ML: .5; 3 SOLUTION RESPIRATORY (INHALATION) at 07:09

## 2018-09-14 RX ADMIN — METOPROLOL TARTRATE 5 MG: 5 INJECTION, SOLUTION INTRAVENOUS at 09:09

## 2018-09-14 RX ADMIN — FLUCONAZOLE IN SODIUM CHLORIDE 200 MG: 2 INJECTION, SOLUTION INTRAVENOUS at 11:09

## 2018-09-14 RX ADMIN — HEPARIN SODIUM AND DEXTROSE 16 UNITS/KG/HR: 10000; 5 INJECTION INTRAVENOUS at 05:09

## 2018-09-14 RX ADMIN — PANTOPRAZOLE SODIUM 40 MG: 40 INJECTION, POWDER, LYOPHILIZED, FOR SOLUTION INTRAVENOUS at 10:09

## 2018-09-14 RX ADMIN — CHLORHEXIDINE GLUCONATE 15 ML: 1.2 RINSE ORAL at 08:09

## 2018-09-14 RX ADMIN — Medication 200 MCG/HR: at 01:09

## 2018-09-14 RX ADMIN — INSULIN ASPART 1 UNITS: 100 INJECTION, SOLUTION INTRAVENOUS; SUBCUTANEOUS at 12:09

## 2018-09-14 RX ADMIN — INSULIN ASPART 2 UNITS: 100 INJECTION, SOLUTION INTRAVENOUS; SUBCUTANEOUS at 10:09

## 2018-09-14 RX ADMIN — MOXIFLOXACIN HYDROCHLORIDE 400 MG: 400 TABLET, FILM COATED ORAL at 10:09

## 2018-09-14 RX ADMIN — METOPROLOL TARTRATE 5 MG: 5 INJECTION, SOLUTION INTRAVENOUS at 05:09

## 2018-09-14 RX ADMIN — ARFORMOTEROL TARTRATE 15 MCG: 15 SOLUTION RESPIRATORY (INHALATION) at 09:09

## 2018-09-14 RX ADMIN — METOPROLOL TARTRATE 2.5 MG: 5 INJECTION, SOLUTION INTRAVENOUS at 03:09

## 2018-09-14 RX ADMIN — CHLORHEXIDINE GLUCONATE 15 ML: 1.2 RINSE ORAL at 10:09

## 2018-09-14 RX ADMIN — FUROSEMIDE 20 MG: 10 INJECTION, SOLUTION INTRAMUSCULAR; INTRAVENOUS at 09:09

## 2018-09-14 RX ADMIN — ARFORMOTEROL TARTRATE 15 MCG: 15 SOLUTION RESPIRATORY (INHALATION) at 07:09

## 2018-09-14 RX ADMIN — VANCOMYCIN HYDROCHLORIDE 1000 MG: 1 INJECTION, POWDER, LYOPHILIZED, FOR SOLUTION INTRAVENOUS at 12:09

## 2018-09-14 NOTE — EICU
Bedside nurse called to get fentanyl drip reorder , since order is about to . Informed Dr. Alvarez.

## 2018-09-14 NOTE — SUBJECTIVE & OBJECTIVE
Review of Systems   Unable to perform ROS: intubated     Objective:     Vital Signs (Most Recent):  Temp: 98.5 °F (36.9 °C) (09/14/18 0305)  Pulse: (!) 124 (09/14/18 1210)  Resp: 14 (09/14/18 1210)  BP: (!) 125/54 (09/14/18 0645)  SpO2: 99 % (09/14/18 1210) Vital Signs (24h Range):  Temp:  [98.3 °F (36.8 °C)-99.8 °F (37.7 °C)] 98.5 °F (36.9 °C)  Pulse:  [] 124  Resp:  [14-20] 14  SpO2:  [93 %-100 %] 99 %  BP: ()/(35-79) 125/54     Weight: 96 kg (211 lb 10.3 oz)  Body mass index is 30.37 kg/m².     SpO2: 99 %  O2 Device (Oxygen Therapy): ventilator      Intake/Output Summary (Last 24 hours) at 9/14/2018 1254  Last data filed at 9/14/2018 0606  Gross per 24 hour   Intake 2946.5 ml   Output 1210 ml   Net 1736.5 ml       Lines/Drains/Airways     Central Venous Catheter Line                 Percutaneous Central Line Insertion/Assessment - triple lumen  09/11/18 1124 left internal jugular 3 days          Drain                 NG/OG Tube 09/11/18 1123 orogastric Center mouth 3 days         Urethral Catheter 09/11/18 1123 Double-lumen 3 days          Pressure Ulcer                 Pressure Injury 09/13/18 1905 Left Buttocks Deep tissue injury less than 1 day                Physical Exam   Constitutional: No distress.   Appears ill  Intubated       HENT:   Head: Normocephalic and atraumatic.   Eyes: Pupils are equal, round, and reactive to light. Right eye exhibits no discharge. Left eye exhibits no discharge.   Neck: Neck supple. No JVD present.   Cardiovascular: S1 normal and S2 normal. An irregularly irregular rhythm present. Tachycardia present.   No murmur heard.  Pulmonary/Chest:   Coarse and diminished BS bilaterally   Abdominal: Soft. Bowel sounds are normal. He exhibits no distension. There is no tenderness. There is no rebound.   Neurological: He is alert.   Nods head to questions   Skin: Skin is warm and dry. He is not diaphoretic.   Nursing note and vitals reviewed.      Significant Labs:   CMP    Recent Labs   Lab  09/13/18   0430  09/14/18   0400   NA  137  141   K  3.9  3.9   CL  101  103   CO2  26  28   GLU  193*  169*   BUN  45*  37*   CREATININE  1.2  1.1   CALCIUM  7.7*  7.7*   PROT  5.4*  5.6*   ALBUMIN  1.9*  2.0*   BILITOT  0.7  0.7   ALKPHOS  107  112   AST  42*  96*   ALT  75*  72*   ANIONGAP  10  10   ESTGFRAFRICA  >60  >60   EGFRNONAA  58*  >60   , CBC   Recent Labs   Lab  09/13/18   0430  09/14/18   0400   WBC  16.24*  18.00*   HGB  7.3*  8.9*   HCT  22.3*  26.6*   PLT  250  308   , Troponin No results for input(s): TROPONINI in the last 48 hours. and All pertinent lab results from the last 24 hours have been reviewed.    Significant Imaging: Echocardiogram:   2D echo with color flow doppler:   Results for orders placed or performed during the hospital encounter of 09/10/18   2D echo with color flow doppler   Result Value Ref Range    EF 50 55 - 65    Mitral Valve Regurgitation MILD     Diastolic Dysfunction No     Aortic Valve Stenosis MODERATE TO SEVERE (A)     Est. PA Systolic Pressure 56 (A)     Tricuspid Valve Regurgitation MILD TO MODERATE     and X-Ray: CXR: X-Ray Chest 1 View (CXR):   Results for orders placed or performed during the hospital encounter of 09/10/18   X-Ray Chest 1 View    Narrative    EXAMINATION:  XR CHEST 1 VIEW    CLINICAL HISTORY:  respiratory failure;    TECHNIQUE:  Single frontal view of the chest was performed.    COMPARISON:  09/13/2018    FINDINGS:  Tubes and lines are stable.   In comparison to the prior study, there is no adverse interval changes.      Impression    In comparison to the prior study, there is no adverse interval changes      Electronically signed by: Mark Garcia MD  Date:    09/14/2018  Time:    07:27    and X-Ray Chest PA and Lateral (CXR): No results found for this visit on 09/10/18.

## 2018-09-14 NOTE — ASSESSMENT & PLAN NOTE
- EGD 9/6 with clot at GE junction s/p clipping and epi injection.  Repeat EGD 9/7 negative for active bleeding.  - Follow serial H&H and transfuse as needed.  - Continue IV Protonix daily.  - Heparin drip

## 2018-09-14 NOTE — SUBJECTIVE & OBJECTIVE
Review of Systems   Unable to perform ROS: Intubated       Objective:     Vital Signs (Most Recent):  Temp: 98.5 °F (36.9 °C) (09/14/18 0305)  Pulse: (!) 128 (09/14/18 1010)  Resp: 18 (09/14/18 1010)  BP: (!) 125/54 (09/14/18 0645)  SpO2: 99 % (09/14/18 1010) Vital Signs (24h Range):  Temp:  [98.3 °F (36.8 °C)-100 °F (37.8 °C)] 98.5 °F (36.9 °C)  Pulse:  [] 128  Resp:  [14-22] 18  SpO2:  [91 %-100 %] 99 %  BP: ()/(35-79) 125/54     Weight: 96 kg (211 lb 10.3 oz)  Body mass index is 30.37 kg/m².      Intake/Output Summary (Last 24 hours) at 9/14/2018 1103  Last data filed at 9/14/2018 0606  Gross per 24 hour   Intake 3312.39 ml   Output 1335 ml   Net 1977.39 ml       Physical Exam   Constitutional: He appears ill. He is sedated, intubated and restrained.   HENT:   Head: Atraumatic.   Eyes: Conjunctivae are normal. Pupils are equal, round, and reactive to light.   Neck: No JVD present. No tracheal deviation present.   Cardiovascular: An irregularly irregular rhythm present. Tachycardia present.   No murmur heard.  Pulses:       Radial pulses are 2+ on the right side, and 2+ on the left side.        Dorsalis pedis pulses are 1+ on the right side, and 1+ on the left side.   Pulmonary/Chest: He is intubated. He has decreased breath sounds.   Abdominal: Soft. Bowel sounds are normal. He exhibits no distension.   Skin: Skin is warm and dry. Capillary refill takes 2 to 3 seconds. No cyanosis.            Vents:  Vent Mode: A/C (09/14/18 1010)  Ventilator Initiated: Yes (09/11/18 1121)  Set Rate: 18 bmp (09/14/18 1010)  Vt Set: 450 mL (09/14/18 1010)  Pressure Support: 0 cmH20 (09/14/18 1010)  PEEP/CPAP: 5 cmH20 (09/14/18 1010)  Oxygen Concentration (%): 45 (09/14/18 1010)  Peak Airway Pressure: 24 cmH2O (09/14/18 1010)  Plateau Pressure: 16 cmH20 (09/14/18 1010)  Total Ve: 9 mL (09/14/18 1010)  F/VT Ratio<105 (RSBI): (!) 38.46 (09/14/18 1010)    Lines/Drains/Airways     Central Venous Catheter Line                  Percutaneous Central Line Insertion/Assessment - triple lumen  09/11/18 1124 left internal jugular 2 days          Drain                 NG/OG Tube 09/11/18 1123 orogastric Center mouth 2 days         Urethral Catheter 09/11/18 1123 Double-lumen 2 days          Airway                 Airway - Non-Surgical 09/11/18 1110 Endotracheal Tube 2 days          Pressure Ulcer                 Pressure Injury 09/13/18 1905 Left Buttocks Deep tissue injury less than 1 day                Significant Labs:    CBC/Anemia Profile:  Recent Labs   Lab  09/13/18   0430  09/14/18   0400   WBC  16.24*  18.00*   HGB  7.3*  8.9*   HCT  22.3*  26.6*   PLT  250  308   MCV  96  94   RDW  16.2*  17.4*        Chemistries:  Recent Labs   Lab  09/13/18   0430  09/14/18   0400   NA  137  141   K  3.9  3.9   CL  101  103   CO2  26  28   BUN  45*  37*   CREATININE  1.2  1.1   CALCIUM  7.7*  7.7*   ALBUMIN  1.9*  2.0*   PROT  5.4*  5.6*   BILITOT  0.7  0.7   ALKPHOS  107  112   ALT  75*  72*   AST  42*  96*   MG  2.4  2.3   PHOS  1.6*  1.8*       All pertinent labs within the past 24 hours have been reviewed.  ABG  Recent Labs   Lab  09/14/18   0435   PH  7.355   PO2  101*   PCO2  58.9*   HCO3  32.9*   BE  7         Significant Imaging:  I have reviewed all pertinent imaging results/findings within the past 24 hours.

## 2018-09-14 NOTE — PROGRESS NOTES
[facility] Hospital Medicine  Progress Note    Patient Name: Eleazar Solo  MRN: [id number]  Patient Class: IP- Inpatient   Admission Date: 9/10/2018  Length of Stay: 4 days  Attending Physician: Prachi Boyer MD  Primary Care Provider: Poly Fitch MD        Subjective:     Principal Problem:Acute hypoxemic respiratory failure    HPI:  Mr. Solo is a 77yo male with a PMHx of permanent AF on Eliquis, HTN, HLD, COPD, DM II, and h/o CVA, who was transferred from [facility] per family request as patient is followed OP by [facility] physicians.  He was originally admitted to [facility] on 9/6 for acute upper GI bleed with blood loss anemia (Hb 12 > 7.4 > 7.9).  Patient received total of 2 units PRBC and Eliquis held (last dose 9/6 AM).  He underwent EGD on 9/6 that showed a clot at the GE junction which was clipped  and injected with epi.  There was concern for further bleeding, therefore, repeat EGD was performed on 9/7 which showed no evidence of active bleeding.  During admission, patient developed AFib with RVR and was placed on diltiazem drip and PO dig.  CXR on 9/8 showed left pneumonia, IV Rocephin started.  Prior to transfer, vital signs and labs stable.  Upon arrival to [facility] ICU, patient hypotensive (SBP 70-80's) and hypoxic requiring continuous BiPAP.  Remains in AFib with controlled rate.  Repeat CXR showed RUL pneumonia.  Repeat labs resulted WBC 16.5, H&H 8.4/24.6, plt 136, BUN 38, cr. 1.4, AST 77, . ABG with pH 7.382, pCO2 44, pO2 60, HCO3 26.  Patient c/o SOB which is resolved while on BiPAP.  Denies any CP, palpitations, orthopnea, PND, cough, edema, ABD pain, N/V/D, hematemesis, melena, hematochezia, dysuria, lightheadedness/dzziness, syncope, HA, AMS, focal deficits, weakness, fever, or chills.  Patient admitted to ICU under Hospital Medicine services.     Hospital Course:  Pt admitted and started on bipap, eventually requiring intubation on 9/11. Started  on levophed, heparin, Dig and Amiodarone infusion. Echo shows normal LVF with Moderate to severe aortic stenosis and pulm HTN. Remains intubated. Hb to 7.3. Transfuse 1 U PBRCs. Pt continued atrial fib with RVR despite amiodarone infusion. Cards will add BB, Cont dig, and amio d/c'd.  . Respiratory cx pending candida. Abx changed to avelox and diflucan.      Interval History: Resting on vent.  Cynthia TF.  No active bleeding        Review of Systems   Unable to perform ROS: Intubated     Objective:     Vital Signs (Most Recent):  Temp: 98.3 °F (36.8 °C) (09/14/18 1505)  Pulse: (!) 128 (09/14/18 1520)  Resp: (!) 21 (09/14/18 1520)  BP: 123/66 (09/14/18 1520)  SpO2: 95 % (09/14/18 1520) Vital Signs (24h Range):  Temp:  [98.3 °F (36.8 °C)-99.8 °F (37.7 °C)] 98.3 °F (36.8 °C)  Pulse:  [] 128  Resp:  [10-21] 21  SpO2:  [91 %-100 %] 95 %  BP: ()/() 123/66     Weight: 96 kg (211 lb 10.3 oz)  Body mass index is 30.37 kg/m².    Intake/Output Summary (Last 24 hours) at 9/14/2018 1755  Last data filed at 9/14/2018 1513  Gross per 24 hour   Intake 2744.65 ml   Output 1995 ml   Net 749.65 ml      Physical Exam   Constitutional: He appears well-developed and well-nourished. He is easily aroused.  Non-toxic appearance. He has a sickly appearance. He appears ill. No distress. He is sedated, intubated and restrained.   HENT:   Head: Normocephalic and atraumatic.   Cardiovascular: Normal rate and normal pulses. An irregularly irregular rhythm present.Murmur heard.  Pulmonary/Chest: Effort normal and breath sounds normal.  Genitourinary Comments: Adams in place   Musculoskeletal: Normal range of motion.        Right foot: There is no deformity.        Left foot: There is no deformity.   Pedal edema   Lymphadenopathy:     He has no cervical adenopathy.   Neurological: He is easily aroused.   Sedated on vent   Skin: Skin is warm, dry and intact. Capillary refill takes less than 2 seconds. No rash noted. No cyanosis.          Significant Labs:   CBC:   Recent Labs   Lab  09/13/18   0430  09/14/18   0400   WBC  16.24*  18.00*   HGB  7.3*  8.9*   HCT  22.3*  26.6*   PLT  250  308     CMP:   Recent Labs   Lab  09/13/18   0430  09/14/18   0400   NA  137  141   K  3.9  3.9   CL  101  103   CO2  26  28   GLU  193*  169*   BUN  45*  37*   CREATININE  1.2  1.1   CALCIUM  7.7*  7.7*   PROT  5.4*  5.6*   ALBUMIN  1.9*  2.0*   BILITOT  0.7  0.7   ALKPHOS  107  112   AST  42*  96*   ALT  75*  72*   ANIONGAP  10  10   EGFRNONAA  58*  >60       Significant Imaging: I have reviewed all pertinent imaging results/findings within the past 24 hours.   Results for orders placed or performed during the hospital encounter of 09/10/18   Blood culture   Result Value Ref Range    Blood Culture, Routine No Growth to date     Blood Culture, Routine No Growth to date     Blood Culture, Routine No Growth to date     Blood Culture, Routine No Growth to date    Blood culture   Result Value Ref Range    Blood Culture, Routine No Growth to date     Blood Culture, Routine No Growth to date     Blood Culture, Routine No Growth to date     Blood Culture, Routine No Growth to date    Culture, Respiratory with Gram Stain   Result Value Ref Range    Respiratory Culture BRISEIDA ALBICANS  Many       Gram Stain (Respiratory) <10 epithelial cells per low power field.     Gram Stain (Respiratory) Rare WBC's     Gram Stain (Respiratory) Rare yeast    CBC auto differential   Result Value Ref Range    WBC 16.46 (H) 3.90 - 12.70 K/uL    RBC 2.60 (L) 4.60 - 6.20 M/uL    Hemoglobin 8.4 (L) 14.0 - 18.0 g/dL    Hematocrit 24.6 (L) 40.0 - 54.0 %    MCV 95 82 - 98 fL    MCH 32.3 (H) 27.0 - 31.0 pg    MCHC 34.1 32.0 - 36.0 g/dL    RDW 15.8 (H) 11.5 - 14.5 %    Platelets 136 (L) 150 - 350 K/uL    MPV 11.1 9.2 - 12.9 fL    Gran # (ANC) 11.7 (H) 1.8 - 7.7 K/uL    Lymph # 1.3 1.0 - 4.8 K/uL    Mono # 3.5 (H) 0.3 - 1.0 K/uL    Eos # 0.0 0.0 - 0.5 K/uL    Baso # 0.01 0.00 - 0.20 K/uL     Gran% 71.4 38.0 - 73.0 %    Lymph% 7.8 (L) 18.0 - 48.0 %    Mono% 21.2 (H) 4.0 - 15.0 %    Eosinophil% 0.0 0.0 - 8.0 %    Basophil% 0.1 0.0 - 1.9 %    Poly Occasional     Differential Method Automated    Comprehensive metabolic panel   Result Value Ref Range    Sodium 134 (L) 136 - 145 mmol/L    Potassium 4.1 3.5 - 5.1 mmol/L    Chloride 100 95 - 110 mmol/L    CO2 24 23 - 29 mmol/L    Glucose 163 (H) 70 - 110 mg/dL    BUN, Bld 38 (H) 8 - 23 mg/dL    Creatinine 1.4 0.5 - 1.4 mg/dL    Calcium 7.6 (L) 8.7 - 10.5 mg/dL    Total Protein 5.6 (L) 6.0 - 8.4 g/dL    Albumin 2.4 (L) 3.5 - 5.2 g/dL    Total Bilirubin 1.7 (H) 0.1 - 1.0 mg/dL    Alkaline Phosphatase 89 55 - 135 U/L    AST 77 (H) 10 - 40 U/L     (H) 10 - 44 U/L    Anion Gap 10 8 - 16 mmol/L    eGFR if African American 56 (A) >60 mL/min/1.73 m^2    eGFR if non African American 48 (A) >60 mL/min/1.73 m^2   APTT   Result Value Ref Range    aPTT 32.7 (H) 21.0 - 32.0 sec   Protime-INR   Result Value Ref Range    Prothrombin Time 12.4 9.0 - 12.5 sec    INR 1.2 0.8 - 1.2   TSH   Result Value Ref Range    TSH 1.577 0.400 - 4.000 uIU/mL   Urinalysis   Result Value Ref Range    Specimen UA Urine, Clean Catch     Color, UA Maria Fernanda Yellow, Straw, Maria Fernanda    Appearance, UA Clear Clear    pH, UA SEE COMMENT 5.0 - 8.0    Specific Gravity, UA SEE COMMENT 1.005 - 1.030    Protein, UA SEE COMMENT Negative    Glucose, UA SEE COMMENT Negative    Ketones, UA SEE COMMENT Negative    Bilirubin (UA) SEE COMMENT Negative    Occult Blood UA SEE COMMENT Negative    Nitrite, UA SEE COMMENT Negative    Urobilinogen, UA SEE COMMENT <2.0 EU/dL    Leukocytes, UA SEE COMMENT Negative   Brain natriuretic peptide   Result Value Ref Range     (H) 0 - 99 pg/mL   Magnesium   Result Value Ref Range    Magnesium 2.2 1.6 - 2.6 mg/dL   Lactic acid, plasma   Result Value Ref Range    Lactate (Lactic Acid) 1.5 0.5 - 2.2 mmol/L   CBC auto differential   Result Value Ref Range    WBC 17.31 (H) 3.90  - 12.70 K/uL    RBC 2.58 (L) 4.60 - 6.20 M/uL    Hemoglobin 8.2 (L) 14.0 - 18.0 g/dL    Hematocrit 24.5 (L) 40.0 - 54.0 %    MCV 95 82 - 98 fL    MCH 31.8 (H) 27.0 - 31.0 pg    MCHC 33.5 32.0 - 36.0 g/dL    RDW 15.7 (H) 11.5 - 14.5 %    Platelets 147 (L) 150 - 350 K/uL    MPV 11.1 9.2 - 12.9 fL    Gran # (ANC) 13.3 (H) 1.8 - 7.7 K/uL    Lymph # 1.3 1.0 - 4.8 K/uL    Mono # 2.7 (H) 0.3 - 1.0 K/uL    Eos # 0.0 0.0 - 0.5 K/uL    Baso # 0.01 0.00 - 0.20 K/uL    Gran% 76.9 (H) 38.0 - 73.0 %    Lymph% 7.5 (L) 18.0 - 48.0 %    Mono% 15.4 (H) 4.0 - 15.0 %    Eosinophil% 0.1 0.0 - 8.0 %    Basophil% 0.1 0.0 - 1.9 %    Differential Method Automated    Comprehensive metabolic panel   Result Value Ref Range    Sodium 136 136 - 145 mmol/L    Potassium 4.1 3.5 - 5.1 mmol/L    Chloride 102 95 - 110 mmol/L    CO2 27 23 - 29 mmol/L    Glucose 139 (H) 70 - 110 mg/dL    BUN, Bld 42 (H) 8 - 23 mg/dL    Creatinine 1.3 0.5 - 1.4 mg/dL    Calcium 7.5 (L) 8.7 - 10.5 mg/dL    Total Protein 5.9 (L) 6.0 - 8.4 g/dL    Albumin 2.5 (L) 3.5 - 5.2 g/dL    Total Bilirubin 1.8 (H) 0.1 - 1.0 mg/dL    Alkaline Phosphatase 91 55 - 135 U/L    AST 74 (H) 10 - 40 U/L     (H) 10 - 44 U/L    Anion Gap 7 (L) 8 - 16 mmol/L    eGFR if African American >60 >60 mL/min/1.73 m^2    eGFR if non African American 53 (A) >60 mL/min/1.73 m^2   Magnesium   Result Value Ref Range    Magnesium 2.2 1.6 - 2.6 mg/dL   Phosphorus   Result Value Ref Range    Phosphorus 1.9 (L) 2.7 - 4.5 mg/dL   Hemoglobin   Result Value Ref Range    Hemoglobin 7.5 (L) 14.0 - 18.0 g/dL   Hemoglobin   Result Value Ref Range    Hemoglobin 8.2 (L) 14.0 - 18.0 g/dL   Hematocrit   Result Value Ref Range    Hematocrit 21.9 (L) 40.0 - 54.0 %   Hematocrit   Result Value Ref Range    Hematocrit 24.5 (L) 40.0 - 54.0 %   Urinalysis Microscopic   Result Value Ref Range    RBC, UA 1 0 - 4 /hpf    WBC, UA 1 0 - 5 /hpf    Bacteria, UA Rare None-Occ /hpf    Hyaline Casts, UA 20 (A) 0-1/lpf /lpf     Microscopic Comment SEE COMMENT    Lactic acid, plasma   Result Value Ref Range    Lactate (Lactic Acid) 1.4 0.5 - 2.2 mmol/L   APTT   Result Value Ref Range    aPTT 32.8 (H) 21.0 - 32.0 sec   CBC auto differential   Result Value Ref Range    WBC 17.00 (H) 3.90 - 12.70 K/uL    RBC 2.38 (L) 4.60 - 6.20 M/uL    Hemoglobin 7.5 (L) 14.0 - 18.0 g/dL    Hematocrit 22.6 (L) 40.0 - 54.0 %    MCV 95 82 - 98 fL    MCH 31.5 (H) 27.0 - 31.0 pg    MCHC 33.2 32.0 - 36.0 g/dL    RDW 15.9 (H) 11.5 - 14.5 %    Platelets 152 150 - 350 K/uL    MPV 11.2 9.2 - 12.9 fL    Gran # (ANC) 13.4 (H) 1.8 - 7.7 K/uL    Lymph # 1.2 1.0 - 4.8 K/uL    Mono # 2.4 (H) 0.3 - 1.0 K/uL    Eos # 0.0 0.0 - 0.5 K/uL    Baso # 0.01 0.00 - 0.20 K/uL    Gran% 79.2 (H) 38.0 - 73.0 %    Lymph% 6.9 (L) 18.0 - 48.0 %    Mono% 14.4 4.0 - 15.0 %    Eosinophil% 0.0 0.0 - 8.0 %    Basophil% 0.1 0.0 - 1.9 %    Differential Method Automated    Protime-INR   Result Value Ref Range    Prothrombin Time 12.2 9.0 - 12.5 sec    INR 1.2 0.8 - 1.2   Comprehensive metabolic panel   Result Value Ref Range    Sodium 136 136 - 145 mmol/L    Potassium 3.9 3.5 - 5.1 mmol/L    Chloride 103 95 - 110 mmol/L    CO2 27 23 - 29 mmol/L    Glucose 130 (H) 70 - 110 mg/dL    BUN, Bld 42 (H) 8 - 23 mg/dL    Creatinine 1.2 0.5 - 1.4 mg/dL    Calcium 7.1 (L) 8.7 - 10.5 mg/dL    Total Protein 5.2 (L) 6.0 - 8.4 g/dL    Albumin 2.2 (L) 3.5 - 5.2 g/dL    Total Bilirubin 1.6 (H) 0.1 - 1.0 mg/dL    Alkaline Phosphatase 80 55 - 135 U/L    AST 55 (H) 10 - 40 U/L     (H) 10 - 44 U/L    Anion Gap 6 (L) 8 - 16 mmol/L    eGFR if African American >60 >60 mL/min/1.73 m^2    eGFR if non African American 58 (A) >60 mL/min/1.73 m^2   Urinalysis   Result Value Ref Range    Specimen UA Urine, Catheterized     Color, UA Yellow Yellow, Straw, Maria Fernanda    Appearance, UA Clear Clear    pH, UA 6.0 5.0 - 8.0    Specific Gravity, UA 1.020 1.005 - 1.030    Protein, UA Trace (A) Negative    Glucose, UA Negative  Negative    Ketones, UA Negative Negative    Bilirubin (UA) 1+ (A) Negative    Occult Blood UA Negative Negative    Nitrite, UA Negative Negative    Urobilinogen, UA 4.0-6.0 (A) <2.0 EU/dL    Leukocytes, UA Negative Negative   Vancomycin, random   Result Value Ref Range    Vancomycin, Random 5.0 Not established ug/mL   Hemoglobin   Result Value Ref Range    Hemoglobin 8.4 (L) 14.0 - 18.0 g/dL   Hematocrit   Result Value Ref Range    Hematocrit 25.3 (L) 40.0 - 54.0 %   Hemoglobin   Result Value Ref Range    Hemoglobin 8.2 (L) 14.0 - 18.0 g/dL   Hematocrit   Result Value Ref Range    Hematocrit 24.5 (L) 40.0 - 54.0 %   CBC auto differential   Result Value Ref Range    WBC 18.49 (H) 3.90 - 12.70 K/uL    RBC 2.51 (L) 4.60 - 6.20 M/uL    Hemoglobin 7.8 (L) 14.0 - 18.0 g/dL    Hematocrit 23.9 (L) 40.0 - 54.0 %    MCV 95 82 - 98 fL    MCH 31.1 (H) 27.0 - 31.0 pg    MCHC 32.6 32.0 - 36.0 g/dL    RDW 15.7 (H) 11.5 - 14.5 %    Platelets 230 150 - 350 K/uL    MPV 10.8 9.2 - 12.9 fL    Gran # (ANC) 14.0 (H) 1.8 - 7.7 K/uL    Lymph # 1.9 1.0 - 4.8 K/uL    Mono # 2.4 (H) 0.3 - 1.0 K/uL    Eos # 0.1 0.0 - 0.5 K/uL    Baso # 0.02 0.00 - 0.20 K/uL    Gran% 77.0 (H) 38.0 - 73.0 %    Lymph% 10.5 (L) 18.0 - 48.0 %    Mono% 13.1 4.0 - 15.0 %    Eosinophil% 0.8 0.0 - 8.0 %    Basophil% 0.1 0.0 - 1.9 %    Platelet Estimate Appears normal     Differential Method Automated    Comprehensive metabolic panel   Result Value Ref Range    Sodium 138 136 - 145 mmol/L    Potassium 4.0 3.5 - 5.1 mmol/L    Chloride 103 95 - 110 mmol/L    CO2 27 23 - 29 mmol/L    Glucose 132 (H) 70 - 110 mg/dL    BUN, Bld 41 (H) 8 - 23 mg/dL    Creatinine 1.2 0.5 - 1.4 mg/dL    Calcium 7.5 (L) 8.7 - 10.5 mg/dL    Total Protein 5.5 (L) 6.0 - 8.4 g/dL    Albumin 2.1 (L) 3.5 - 5.2 g/dL    Total Bilirubin 0.9 0.1 - 1.0 mg/dL    Alkaline Phosphatase 86 55 - 135 U/L    AST 41 (H) 10 - 40 U/L     (H) 10 - 44 U/L    Anion Gap 8 8 - 16 mmol/L    eGFR if African American  >60 >60 mL/min/1.73 m^2    eGFR if non African American 58 (A) >60 mL/min/1.73 m^2   Magnesium   Result Value Ref Range    Magnesium 2.1 1.6 - 2.6 mg/dL   Phosphorus   Result Value Ref Range    Phosphorus 2.4 (L) 2.7 - 4.5 mg/dL   Vancomycin, random   Result Value Ref Range    Vancomycin, Random 16.5 Not established ug/mL   APTT   Result Value Ref Range    aPTT 31.5 21.0 - 32.0 sec   Protime-INR   Result Value Ref Range    Prothrombin Time 10.7 9.0 - 12.5 sec    INR 1.0 0.8 - 1.2   CBC auto differential   Result Value Ref Range    WBC 15.34 (H) 3.90 - 12.70 K/uL    RBC 2.48 (L) 4.60 - 6.20 M/uL    Hemoglobin 7.8 (L) 14.0 - 18.0 g/dL    Hematocrit 23.9 (L) 40.0 - 54.0 %    MCV 96 82 - 98 fL    MCH 31.5 (H) 27.0 - 31.0 pg    MCHC 32.6 32.0 - 36.0 g/dL    RDW 16.1 (H) 11.5 - 14.5 %    Platelets 215 150 - 350 K/uL    MPV 10.7 9.2 - 12.9 fL    Gran # (ANC) 11.3 (H) 1.8 - 7.7 K/uL    Lymph # 1.8 1.0 - 4.8 K/uL    Mono # 2.1 (H) 0.3 - 1.0 K/uL    Eos # 0.1 0.0 - 0.5 K/uL    Baso # 0.02 0.00 - 0.20 K/uL    Gran% 75.6 (H) 38.0 - 73.0 %    Lymph% 11.6 (L) 18.0 - 48.0 %    Mono% 13.8 4.0 - 15.0 %    Eosinophil% 0.7 0.0 - 8.0 %    Basophil% 0.1 0.0 - 1.9 %    Differential Method Automated    APTT   Result Value Ref Range    aPTT 42.7 (H) 21.0 - 32.0 sec   APTT   Result Value Ref Range    aPTT 37.1 (H) 21.0 - 32.0 sec   Comprehensive metabolic panel   Result Value Ref Range    Sodium 137 136 - 145 mmol/L    Potassium 3.9 3.5 - 5.1 mmol/L    Chloride 101 95 - 110 mmol/L    CO2 26 23 - 29 mmol/L    Glucose 193 (H) 70 - 110 mg/dL    BUN, Bld 45 (H) 8 - 23 mg/dL    Creatinine 1.2 0.5 - 1.4 mg/dL    Calcium 7.7 (L) 8.7 - 10.5 mg/dL    Total Protein 5.4 (L) 6.0 - 8.4 g/dL    Albumin 1.9 (L) 3.5 - 5.2 g/dL    Total Bilirubin 0.7 0.1 - 1.0 mg/dL    Alkaline Phosphatase 107 55 - 135 U/L    AST 42 (H) 10 - 40 U/L    ALT 75 (H) 10 - 44 U/L    Anion Gap 10 8 - 16 mmol/L    eGFR if African American >60 >60 mL/min/1.73 m^2    eGFR if non   58 (A) >60 mL/min/1.73 m^2   Magnesium   Result Value Ref Range    Magnesium 2.4 1.6 - 2.6 mg/dL   Phosphorus   Result Value Ref Range    Phosphorus 1.6 (L) 2.7 - 4.5 mg/dL   CBC auto differential   Result Value Ref Range    WBC 16.24 (H) 3.90 - 12.70 K/uL    RBC 2.32 (L) 4.60 - 6.20 M/uL    Hemoglobin 7.3 (L) 14.0 - 18.0 g/dL    Hematocrit 22.3 (L) 40.0 - 54.0 %    MCV 96 82 - 98 fL    MCH 31.5 (H) 27.0 - 31.0 pg    MCHC 32.7 32.0 - 36.0 g/dL    RDW 16.2 (H) 11.5 - 14.5 %    Platelets 250 150 - 350 K/uL    MPV 10.4 9.2 - 12.9 fL    Gran # (ANC) 12.8 (H) 1.8 - 7.7 K/uL    Lymph # 1.7 1.0 - 4.8 K/uL    Mono # 1.5 (H) 0.3 - 1.0 K/uL    Eos # 0.2 0.0 - 0.5 K/uL    Baso # 0.02 0.00 - 0.20 K/uL    Gran% 80.3 (H) 38.0 - 73.0 %    Lymph% 10.7 (L) 18.0 - 48.0 %    Mono% 9.4 4.0 - 15.0 %    Eosinophil% 0.9 0.0 - 8.0 %    Basophil% 0.1 0.0 - 1.9 %    Differential Method Automated    APTT   Result Value Ref Range    aPTT 45.0 (H) 21.0 - 32.0 sec   VANCOMYCIN, TROUGH before 3rd dose   Result Value Ref Range    Vancomycin-Trough 13.3 10.0 - 22.0 ug/mL   Comprehensive metabolic panel   Result Value Ref Range    Sodium 141 136 - 145 mmol/L    Potassium 3.9 3.5 - 5.1 mmol/L    Chloride 103 95 - 110 mmol/L    CO2 28 23 - 29 mmol/L    Glucose 169 (H) 70 - 110 mg/dL    BUN, Bld 37 (H) 8 - 23 mg/dL    Creatinine 1.1 0.5 - 1.4 mg/dL    Calcium 7.7 (L) 8.7 - 10.5 mg/dL    Total Protein 5.6 (L) 6.0 - 8.4 g/dL    Albumin 2.0 (L) 3.5 - 5.2 g/dL    Total Bilirubin 0.7 0.1 - 1.0 mg/dL    Alkaline Phosphatase 112 55 - 135 U/L    AST 96 (H) 10 - 40 U/L    ALT 72 (H) 10 - 44 U/L    Anion Gap 10 8 - 16 mmol/L    eGFR if African American >60 >60 mL/min/1.73 m^2    eGFR if non African American >60 >60 mL/min/1.73 m^2   Magnesium   Result Value Ref Range    Magnesium 2.3 1.6 - 2.6 mg/dL   Phosphorus   Result Value Ref Range    Phosphorus 1.8 (L) 2.7 - 4.5 mg/dL   CBC auto differential   Result Value Ref Range    WBC 18.00 (H) 3.90 -  12.70 K/uL    RBC 2.83 (L) 4.60 - 6.20 M/uL    Hemoglobin 8.9 (L) 14.0 - 18.0 g/dL    Hematocrit 26.6 (L) 40.0 - 54.0 %    MCV 94 82 - 98 fL    MCH 31.4 (H) 27.0 - 31.0 pg    MCHC 33.5 32.0 - 36.0 g/dL    RDW 17.4 (H) 11.5 - 14.5 %    Platelets 308 150 - 350 K/uL    MPV 9.9 9.2 - 12.9 fL    Gran # (ANC) 12.9 (H) 1.8 - 7.7 K/uL    Lymph # 2.3 1.0 - 4.8 K/uL    Mono # 2.3 (H) 0.3 - 1.0 K/uL    Eos # 0.5 0.0 - 0.5 K/uL    Baso # 0.04 0.00 - 0.20 K/uL    Gran% 74.5 (H) 38.0 - 73.0 %    Lymph% 12.9 (L) 18.0 - 48.0 %    Mono% 12.5 4.0 - 15.0 %    Eosinophil% 2.6 0.0 - 8.0 %    Basophil% 0.2 0.0 - 1.9 %    Differential Method Automated    APTT   Result Value Ref Range    aPTT 33.5 (H) 21.0 - 32.0 sec   APTT   Result Value Ref Range    aPTT 32.2 (H) 21.0 - 32.0 sec   2D echo with color flow doppler   Result Value Ref Range    EF 50 55 - 65    Mitral Valve Regurgitation MILD     Diastolic Dysfunction No     Aortic Valve Stenosis MODERATE TO SEVERE (A)     Est. PA Systolic Pressure 56 (A)     Tricuspid Valve Regurgitation MILD TO MODERATE    Type & Screen   Result Value Ref Range    Group & Rh O POS     Indirect Raciel NEG    ISTAT PROCEDURE   Result Value Ref Range    POC PH 7.382 7.35 - 7.45    POC PCO2 44.4 35 - 45 mmHg    POC PO2 60 (L) 80 - 100 mmHg    POC HCO3 26.4 24 - 28 mmol/L    POC BE 1 -2 to 2 mmol/L    POC SATURATED O2 90 (L) 95 - 100 %    Rate 16     Sample ARTERIAL     Site LR     Allens Test Pass     DelSys CPAP/BiPAP     Mode BiPAP     FiO2 50     IP 10     EP 5    POCT glucose   Result Value Ref Range    POCT Glucose 164 (H) 70 - 110 mg/dL   POCT glucose   Result Value Ref Range    POCT Glucose 153 (H) 70 - 110 mg/dL   ISTAT PROCEDURE   Result Value Ref Range    POC PH 7.303 (L) 7.35 - 7.45    POC PCO2 49.9 (H) 35 - 45 mmHg    POC PO2 62 (L) 80 - 100 mmHg    POC HCO3 24.7 24 - 28 mmol/L    POC BE -2 -2 to 2 mmol/L    POC SATURATED O2 88 (L) 95 - 100 %    Rate 16     Sample ARTERIAL     Site RR     Allens  Test Pass     DelSys CPAP/BiPAP     Mode BiPAP     FiO2 50     IP 12     EP 5    POCT glucose   Result Value Ref Range    POCT Glucose 126 (H) 70 - 110 mg/dL   POCT glucose   Result Value Ref Range    POCT Glucose 148 (H) 70 - 110 mg/dL   POCT glucose   Result Value Ref Range    POCT Glucose 182 (H) 70 - 110 mg/dL   ISTAT PROCEDURE   Result Value Ref Range    POC PH 7.296 (L) 7.35 - 7.45    POC PCO2 60.6 (HH) 35 - 45 mmHg    POC PO2 112 (H) 80 - 100 mmHg    POC HCO3 29.5 (H) 24 - 28 mmol/L    POC BE 3 -2 to 2 mmol/L    POC SATURATED O2 98 95 - 100 %    Rate 18     Sample ARTERIAL     Site RR     Allens Test Pass     DelSys Adult Vent     Mode PCV     Vt 450     PEEP 10     FiO2 70     IT .77    POCT glucose   Result Value Ref Range    POCT Glucose 122 (H) 70 - 110 mg/dL   POCT glucose   Result Value Ref Range    POCT Glucose 145 (H) 70 - 110 mg/dL   POCT glucose   Result Value Ref Range    POCT Glucose 173 (H) 70 - 110 mg/dL   POCT glucose   Result Value Ref Range    POCT Glucose 122 (H) 70 - 110 mg/dL   ISTAT PROCEDURE   Result Value Ref Range    POC PH 7.334 (L) 7.35 - 7.45    POC PCO2 59.4 (HH) 35 - 45 mmHg    POC PO2 96 80 - 100 mmHg    POC HCO3 31.6 (H) 24 - 28 mmol/L    POC BE 6 -2 to 2 mmol/L    POC SATURATED O2 97 95 - 100 %    Rate 18     Sample ARTERIAL     Site LR     Allens Test Pass     DelSys Adult Vent     Mode AC/PRVC     Vt 450     PEEP 10     FiO2 40    POCT glucose   Result Value Ref Range    POCT Glucose 153 (H) 70 - 110 mg/dL   POCT glucose   Result Value Ref Range    POCT Glucose 185 (H) 70 - 110 mg/dL   ISTAT PROCEDURE   Result Value Ref Range    POC PH 7.367 7.35 - 7.45    POC PCO2 51.9 (H) 35 - 45 mmHg    POC PO2 63 (L) 80 - 100 mmHg    POC HCO3 29.8 (H) 24 - 28 mmol/L    POC BE 4 -2 to 2 mmol/L    POC SATURATED O2 91 (L) 95 - 100 %    Sample ARTERIAL     Site RR     Allens Test Pass     DelSys Adult Vent     Mode AC/PRVC     Vt 450     PEEP 5     FiO2 40    POCT glucose   Result Value Ref  Range    POCT Glucose 136 (H) 70 - 110 mg/dL   POCT glucose   Result Value Ref Range    POCT Glucose 175 (H) 70 - 110 mg/dL   POCT glucose   Result Value Ref Range    POCT Glucose 182 (H) 70 - 110 mg/dL   POCT glucose   Result Value Ref Range    POCT Glucose 177 (H) 70 - 110 mg/dL   ISTAT PROCEDURE   Result Value Ref Range    POC PH 7.355 7.35 - 7.45    POC PCO2 58.9 (HH) 35 - 45 mmHg    POC PO2 101 (H) 80 - 100 mmHg    POC HCO3 32.9 (H) 24 - 28 mmol/L    POC BE 7 -2 to 2 mmol/L    POC SATURATED O2 97 95 - 100 %    Rate 18     Sample ARTERIAL     Site RR     Allens Test Pass     DelSys Adult Vent     Mode AC/PRVC     Vt 450     PEEP 5     FiO2 45    POCT glucose   Result Value Ref Range    POCT Glucose 173 (H) 70 - 110 mg/dL   POCT glucose   Result Value Ref Range    POCT Glucose 167 (H) 70 - 110 mg/dL   POCT glucose   Result Value Ref Range    POCT Glucose 159 (H) 70 - 110 mg/dL   Prepare RBC 1 Unit   Result Value Ref Range    UNIT NUMBER [id number]     PRODUCT CODE [id number]     DISPENSE STATUS TRANSFUSED     CODING SYSTEM [id number]     Unit Blood Type Code 5100     Unit Blood Type O POS     Unit Expiration [id number]      Assessment/Plan:      * Acute hypoxemic respiratory failure on mechanical ventilation    intubated  pulm on board        Septic shock    - CXR shows right upper lobe infiltrate, new since previous imaging on 9/8.  WBC 16.5, RR >20, SBP <90.  - sputum pending candida  -Diflucan    blood cultures pending  - Empiric IV Unasyn and vanc descalated to avelox  - Continue LABA + ICS.  - Pulmonology/ICU team following, appreciate assistance.  -intubated         Type 2 diabetes mellitus without complication, without long-term current use of insulin    - Hold Amaryl, will resume at DC.  - Accuchecks with low SSI.          Acute upper GI bleed with acute blood loss anemia    - EGD 9/6 with clot at GE junction s/p clipping and epi injection.  Repeat EGD 9/7 negative for active bleeding.  - Follow serial  H&H and transfuse as needed.  - Continue IV Protonix daily.  - Heparin drip        Chronic kidney disease, stage 3    - Cr. Stable at 1.4 (baseline 1.3)  - Avoid nephrotoxic agents.trict I&O's.  - Follow daily BMP.        Chronic obstructive pulmonary disease with acute lower respiratory infection    - Plan as above.  - Pulmonology following.        Essential hypertension    - Patient hypotensive and on pressor  - Hold CCB, ARB, and diuretics.  Follow BP trends and resume as needed.        Atrial fibrillation, permanent      -heparin   -dig  -BB ?  - Cardiology on board          VTE Risk Mitigation (From admission, onward)        Ordered     heparin 25,000 units in dextrose 5% 250 mL (100 units/mL) infusion LOW INTENSITY nomogram - [facility]  Continuous      09/12/18 1048     heparin 25,000 units in dextrose 5% (100 units/ml) IV bolus from bag - ADDITIONAL PRN BOLUS - 60 units/kg  As needed (PRN)      09/12/18 1048     heparin 25,000 units in dextrose 5% (100 units/ml) IV bolus from bag - ADDITIONAL PRN BOLUS - 30 units/kg  As needed (PRN)      09/12/18 1048     IP VTE HIGH RISK PATIENT  Once      09/11/18 0425     Reason for No Pharmacological VTE Prophylaxis  Once      09/11/18 0425     Place sequential compression device  Until discontinued      09/11/18 0425          Critical care time spent on the evaluation and treatment of severe organ dysfunction, review of pertinent labs and imaging studies, discussions with consulting providers and discussions with patient/family: >30 minutes.    Prachi Boyer MD  Department of Hospital Medicine   [facility] -

## 2018-09-14 NOTE — PROGRESS NOTES
[facility] -   Adult Nutrition  Consult Note    SUMMARY     Recommendations    Recommendation/Intervention:   1. Continue current TF regimen  2. Will continue to monitor.      Goals: Meet > 85 % EEN/EPN while admitted  Nutrition Goal Status: goal met, continues  Communication of RD Recs: POC review, discussed on rounds    Reason for Assessment    Reason for Assessment: RD f/u  Dx:  1. Acute upper GI bleed    2. Atrial fibrillation    3. Aspiration pneumonia of right upper lobe, unspecified aspiration pneumonia type    4. Atrial fibrillation, permanent    5. Chronic obstructive pulmonary disease with acute lower respiratory infection    6. Type 2 diabetes mellitus without complication, without long-term current use of insulin    7. Acute hypoxemic respiratory failure    8. Anemia, unspecified type    9. Chronic kidney disease, stage 3    10. Pneumonia of both lungs due to infectious organism, unspecified part of lung    11. Septic shock    12. On mechanically assisted ventilation      Past Medical History:   Diagnosis Date    *Atrial fibrillation     Acute hypoxemic respiratory failure     Acute on chronic diastolic congestive heart failure 9/12/2018    Anemia, unspecified 9/12/2018    Atrial fibrillation     Bilateral carotid artery disease 8/10/2015    Chronic obstructive pulmonary disease with acute lower respiratory infection     COPD (chronic obstructive pulmonary disease)     Diabetes mellitus     Gout     Hyperlipidemia     Hypertension     Low testosterone     Pneumonia     Stroke 1/3/15 L occiput    Unspecified disorder of kidney and ureter        Interdisciplinary Rounds: attended  General Information Comments: Pt remains sedated, intubated in ICU. TF running @ goal rate, no intolerances reported per ICU rounds  Nutrition Discharge Planning: pending medical course    Nutrition Risk Screen    Nutrition Risk Screen: no indicators present    Nutrition/Diet History    Do you have  "any cultural, spiritual, Jewish conflicts, given your current situation?: None    Anthropometrics    Temp: 98.5 °F (36.9 °C)  Height Method: Stated  Height: 5' 10" (177.8 cm)  Height (inches): 70 in  Weight Method: Bed Scale  Weight: 96 kg (211 lb 10.3 oz)  Weight (lb): 211.64 lb  Ideal Body Weight (IBW), Male: 166 lb  % Ideal Body Weight, Male (lb): 126.17 lb  BMI (Calculated): 30.1  BMI Grade: 30 - 34.9- obesity - grade I       Lab/Procedures/Meds    Pertinent Labs Reviewed: reviewed  BMP  Lab Results   Component Value Date     09/14/2018    K 3.9 09/14/2018     09/14/2018    CO2 28 09/14/2018    BUN 37 (H) 09/14/2018    CREATININE 1.1 09/14/2018    CALCIUM 7.7 (L) 09/14/2018    ANIONGAP 10 09/14/2018    ESTGFRAFRICA >60 09/14/2018    EGFRNONAA >60 09/14/2018     Lab Results   Component Value Date    CALCIUM 7.7 (L) 09/14/2018    PHOS 1.8 (L) 09/14/2018     Lab Results   Component Value Date    ALBUMIN 2.0 (L) 09/14/2018     Recent Labs   Lab  09/14/18   1144   POCTGLUCOSE  167*     Lab Results   Component Value Date    HGBA1C 5.6 03/14/2018       Pertinent Medications Reviewed: reviewed    Physical Findings/Assessment    Overall Physical Appearance: on ventilator support  Tubes: (OG)  Oral/Mouth Cavity: tooth/teeth missing  Skin: (Lawson score 15)    Estimated/Assessed Needs    Weight Used For Calorie Calculations: 95 kg (209 lb 7 oz)  Energy Calorie Requirements (kcal): 2077  Energy Need Method: [place] (modified)  Protein Requirements: 150-188 g  Weight Used For Protein Calculations: 75.3 kg (166 lb)(IBW - Obese ICU)     Fluid Need Method: RDA Method(or per MD)  RDA Method (mL): 2077  CHO Requirement: 50 % EEN      Nutrition Prescription Ordered    Current Diet Order: NPO    Evaluation of Received Nutrient/Fluid Intake    Intake/Output Summary (Last 24 hours) at 9/14/2018 1315  Last data filed at 9/14/2018 0606  Gross per 24 hour   Intake 2918.3 ml   Output 1145 ml   Net 1773.3 ml          % " Intake of Estimated Energy Needs: 75 - 100 %  % Meal Intake: NPO    Nutrition Risk      2xweekly    Assessment and Plan      Nutrition Problem  Inadequate oral intake     Related to (etiology):   Mechanical ventilation     Signs and Symptoms (as evidenced by):   NPO status     Interventions/Recommendations (treatment strategy):  See above     Nutrition Diagnosis Status:   Continues        Monitor and Evaluation    Food and Nutrient Intake: energy intake, enteral nutrition intake  Food and Nutrient Adminstration: enteral and parenteral nutrition administration  Anthropometric Measurements: weight  Biochemical Data, Medical Tests and Procedures: electrolyte and renal panel, glucose/endocrine profile  Nutrition-Focused Physical Findings: overall appearance     Nutrition Follow-Up    RD Follow-up?: Yes(2xweekly)

## 2018-09-14 NOTE — PROGRESS NOTES
Pharmacist Intervention IV to PO Note    Eleazar Solo is a 78 y.o. male being treated with IV medication pantoprazole    Patient Data:    Vital Signs (Most Recent):  Temp: 98.5 °F (36.9 °C) (09/14/18 0305)  Pulse: (!) 128 (09/14/18 1010)  Resp: 18 (09/14/18 1010)  BP: (!) 125/54 (09/14/18 0645)  SpO2: 99 % (09/14/18 1010)   Vital Signs (72h Range):  Temp:  [98.1 °F (36.7 °C)-101.6 °F (38.7 °C)]   Pulse:  []   Resp:  [14-29]   BP: ()/(17-86)   SpO2:  [80 %-100 %]      CBC:  Recent Labs   Lab  09/12/18   1100  09/13/18   0430  09/14/18   0400   WBC  15.34*  16.24*  18.00*   RBC  2.48*  2.32*  2.83*   HGB  7.8*  7.3*  8.9*   HCT  23.9*  22.3*  26.6*   PLT  215  250  308   MCV  96  96  94   MCH  31.5*  31.5*  31.4*   MCHC  32.6  32.7  33.5     CMP:     Recent Labs   Lab  09/12/18   0402  09/13/18   0430  09/14/18   0400   GLU  132*  193*  169*   CALCIUM  7.5*  7.7*  7.7*   ALBUMIN  2.1*  1.9*  2.0*   PROT  5.5*  5.4*  5.6*   NA  138  137  141   K  4.0  3.9  3.9   CO2  27  26  28   CL  103  101  103   BUN  41*  45*  37*   CREATININE  1.2  1.2  1.1   ALKPHOS  86  107  112   ALT  105*  75*  72*   AST  41*  42*  96*   BILITOT  0.9  0.7  0.7       Dietary Orders:  Diet Orders            Tube Feedings/Formulas [facility]; Peptamen Intense VHP; 80; 1,920; OG; Ready to Hang Liter: Tube Feeding (I) starting at 09/11 1921    Diet NPO Except for: Medication: NPO starting at 09/11 1000            Based on the following criteria, this patient qualifies for intravenous to oral conversion:  [x] The patients gastrointestinal tract is functioning (tolerating medications via oral or enteral route for 24 hours and tolerating food or enteral feeds for 24 hours).  [x] The patient is hemodynamically stable for 24 hours (heart rate <100 beats per minute, systolic blood pressure >99 mm Hg, and respiratory rate <20 breaths per minute).  [x] The patient shows clinical improvement (afebrile for at least 24 hours and white  blood cell count downtrending or normalized). Additionally, the patient must be non-neutropenic (absolute neutrophil count >500 cells/mm3).  [x] For antimicrobials, the patient has received IV therapy for at least 24 hours.    IV medication Protonix will be changed to oral medication Protonix    Pharmacist's Name: [place] ERNST  Pharmacist's Extension: [phone]

## 2018-09-14 NOTE — ASSESSMENT & PLAN NOTE
-Remains in afib with RVR in setting of sepsis/PNA/anemia  -Continue amio gtt, digoxin  -Attempt to wean pressor and add low dose BB as BP tolerated  -No BB given need for pressor support  -Continue heparin gtt for now; risks/benefits discussed. Closely monitor H and H; improved s/p transfusion yesterday

## 2018-09-14 NOTE — PROGRESS NOTES
[facility] -   Critical Care Medicine  Progress Note    Patient Name: Eleazar Solo  MRN: [id number]  Admission Date: 9/10/2018  Hospital Length of Stay: 4 days  Code Status: Full Code  Attending Provider: Prachi Boyer MD  Primary Care Provider: Poly Fitch MD   Principal Problem: Acute hypoxemic respiratory failure    Subjective:     HPI:  78 year old male admitted to MICU, transfer from [facility].  I have reviewed the patient's medical history in detail and updated the computerized patient record.  Upper GI bleeding, Scoped x 2 and clipped  Developed Af RVR, Known chr A fib on elliquis on hold  Treated with Digoxin, Amiodarone and cardizem GTT  Family requested transfer  Known COPD FEV1:1.65L ( 53%)  ANORO ellipta, not on oxygen  Presented to MICU needed BIPAP for WOB  CXR shows RML infiltrate  Overnight, anxious, WOB  Last echo EF 60%      Hospital/ICU Course:  09/11: CXR shows melanie infiltrate: edema.  . Lopressor 2.5mg and lasix 20 mg given  9/12 - intubated yesterday and required pressor with sedation.  Resting on vent.  Cynthia TF.  No active bleeding  9/13 - remains intubated, sedated on mechanical ventilation, pO2 trending up with high PEEP ventilation  9/14 - remains intubated on mechanical ventilation with oxygen/PEEP demand decreased; continued atrial fib with RVR despite amiodarone infusion, some improvement with metoprolol yesterday    Review of Systems   Unable to perform ROS: Intubated       Objective:     Vital Signs (Most Recent):  Temp: 98.5 °F (36.9 °C) (09/14/18 0305)  Pulse: (!) 128 (09/14/18 1010)  Resp: 18 (09/14/18 1010)  BP: (!) 125/54 (09/14/18 0645)  SpO2: 99 % (09/14/18 1010) Vital Signs (24h Range):  Temp:  [98.3 °F (36.8 °C)-100 °F (37.8 °C)] 98.5 °F (36.9 °C)  Pulse:  [] 128  Resp:  [14-22] 18  SpO2:  [91 %-100 %] 99 %  BP: ()/(35-79) 125/54     Weight: 96 kg (211 lb 10.3 oz)  Body mass index is 30.37 kg/m².      Intake/Output Summary (Last 24  hours) at 9/14/2018 1103  Last data filed at 9/14/2018 0606  Gross per 24 hour   Intake 3312.39 ml   Output 1335 ml   Net 1977.39 ml       Physical Exam   Constitutional: He appears ill. He is sedated, intubated and restrained.   HENT:   Head: Atraumatic.   Eyes: Conjunctivae are normal. Pupils are equal, round, and reactive to light.   Neck: No JVD present. No tracheal deviation present.   Cardiovascular: An irregularly irregular rhythm present. Tachycardia present.   No murmur heard.  Pulses:       Radial pulses are 2+ on the right side, and 2+ on the left side.        Dorsalis pedis pulses are 1+ on the right side, and 1+ on the left side.   Pulmonary/Chest: He is intubated. He has decreased breath sounds.   Abdominal: Soft. Bowel sounds are normal. He exhibits no distension.   Skin: Skin is warm and dry. Capillary refill takes 2 to 3 seconds. No cyanosis.            Vents:  Vent Mode: A/C (09/14/18 1010)  Ventilator Initiated: Yes (09/11/18 1121)  Set Rate: 18 bmp (09/14/18 1010)  Vt Set: 450 mL (09/14/18 1010)  Pressure Support: 0 cmH20 (09/14/18 1010)  PEEP/CPAP: 5 cmH20 (09/14/18 1010)  Oxygen Concentration (%): 45 (09/14/18 1010)  Peak Airway Pressure: 24 cmH2O (09/14/18 1010)  Plateau Pressure: 16 cmH20 (09/14/18 1010)  Total Ve: 9 mL (09/14/18 1010)  F/VT Ratio<105 (RSBI): (!) 38.46 (09/14/18 1010)    Lines/Drains/Airways     Central Venous Catheter Line                 Percutaneous Central Line Insertion/Assessment - triple lumen  09/11/18 1124 left internal jugular 2 days          Drain                 NG/OG Tube 09/11/18 1123 orogastric Center mouth 2 days         Urethral Catheter 09/11/18 1123 Double-lumen 2 days          Airway                 Airway - Non-Surgical 09/11/18 1110 Endotracheal Tube 2 days          Pressure Ulcer                 Pressure Injury 09/13/18 1905 Left Buttocks Deep tissue injury less than 1 day                Significant Labs:    CBC/Anemia Profile:  Recent Labs   Lab   09/13/18   0430  09/14/18   0400   WBC  16.24*  18.00*   HGB  7.3*  8.9*   HCT  22.3*  26.6*   PLT  250  308   MCV  96  94   RDW  16.2*  17.4*        Chemistries:  Recent Labs   Lab  09/13/18   0430  09/14/18   0400   NA  137  141   K  3.9  3.9   CL  101  103   CO2  26  28   BUN  45*  37*   CREATININE  1.2  1.1   CALCIUM  7.7*  7.7*   ALBUMIN  1.9*  2.0*   PROT  5.4*  5.6*   BILITOT  0.7  0.7   ALKPHOS  107  112   ALT  75*  72*   AST  42*  96*   MG  2.4  2.3   PHOS  1.6*  1.8*       All pertinent labs within the past 24 hours have been reviewed.  ABG  Recent Labs   Lab  09/14/18   0435   PH  7.355   PO2  101*   PCO2  58.9*   HCO3  32.9*   BE  7         Significant Imaging:  I have reviewed all pertinent imaging results/findings within the past 24 hours.      Assessment/Plan:     Pulmonary   * Acute hypoxemic respiratory failure on mechanical ventilation    SAT/SBT in progress; trial extubation if passes  Continue VAP prophylaxis        Chronic obstructive pulmonary disease with acute lower respiratory infection    Moderate severe COPD  Cont Formeterol and Budesonide  Duonebs PRN  ?candidal component, wbc not responding to unasyn/vanco; Antimicrobials adjusted, diflucan added        Cardiac/Vascular   Atrial fibrillation, permanent    Remains RVR despite amio infusion  Cont Dig   Schedule metoprolol as if BP tolerates  Cards following on heparin infusion        Renal/   Chronic kidney disease, stage 3    Accurate I/Os  Monitor creatinine        ID   Septic shock    BP improved with rate control yesterday, plan low dose metoprolol and wean pressor as able for MAP > 65  Cont IVAB  Monitor temp, wbc data          Oncology   Anemia, unspecified    Responded appropriately to transfusion  No s/s of active bleeding  Monitor cbc        Endocrine   Type 2 diabetes mellitus without complication, without long-term current use of insulin    Continue SSI prn with monitoring for glucose control and prevention of insulin  toxicity        GI   Acute upper GI bleed with acute blood loss anemia    Recent clipping of ulcer at GE junction at outside hospital  Cont PPI and TF             Critical Care Daily Checklist:    A: Awake: RASS Goal/Actual Goal: RASS Goal: -1-->drowsy  Actual: [place]nell Agitation Sedation Scale (RASS): Drowsy   B: Spontaneous Breathing Trial Performed? Spon. Breathing Trial Initiated?: Initiated (09/13/18 6836)   C: SAT & SBT Coordinated?  yes                      D: Delirium: CAM-ICU Overall CAM-ICU: Positive   E: Early Mobility Performed? ROM   F: Feeding Goal: Goals: Meet > 85 % EEN/EPN while admitted  Status: Nutrition Goal Status: new   Current Diet Order   Procedures    Diet NPO Except for: Medication     Order Specific Question:   Except for     Answer:   Medication      AS: Analgesia/Sedation SAT daily; propofol, fentanyl   T: Thromboembolic Prophylaxis heparin   H: HOB > 300 Yes   U: Stress Ulcer Prophylaxis (if needed) PPI   G: Glucose Control SSi prn   B: Bowel Function Stool Occurrence: 0   I: Indwelling Catheter (Lines & Adams) Necessity reviewed   D: De-escalation of Antimicrobials/Pharmacotherapies reviewed    Plan for the day/ETD Anticipate extubation    Code Status:  Family/Goals of Care: Full Code  Home on discharge   I have discussed case and plan of care in detail with Dr Cosem and Dr Boyer; Status and plan of care were discussed with team on multidisciplinary rounds.    Critical Care Time: 80 minutes  Critical secondary to mechanical ventilation, Patient is currently on drug therapy requiring intensive monitoring for toxicity: critical infusions   Critical care was time spent personally by me on the following activities: development of treatment plan with patient or surrogate and bedside caregivers, discussions with consultants, evaluation of patient's response to treatment, examination of patient, ordering and performing treatments and interventions, ordering and review of laboratory  studies, ordering and review of radiographic studies, pulse oximetry, re-evaluation of patient's condition. This critical care time did not overlap with that of any other provider or involve time for any procedures.     Selma Wallace, Acute Care NP-BC  Critical Care Medicine  [facility] [place]

## 2018-09-14 NOTE — ASSESSMENT & PLAN NOTE
- CXR shows right upper lobe infiltrate, new since previous imaging on 9/8.  WBC 16.5, RR >20, SBP <90.  - sputum pending candida  -Diflucan    blood cultures pending  - Empiric IV Unasyn and vanc descalated to avelox  - Continue LABA + ICS.  - Pulmonology/ICU team following, appreciate assistance.  -intubated

## 2018-09-14 NOTE — ASSESSMENT & PLAN NOTE
BP improved with rate control yesterday, plan low dose metoprolol and wean pressor as able for MAP > 65  Cont IVAB  Monitor temp, wbc data

## 2018-09-14 NOTE — PLAN OF CARE
Problem: Patient Care Overview  Goal: Plan of Care Review  Outcome: Ongoing (interventions implemented as appropriate)  Patient currently resting quietly in bed. Patient A. Fib on monitor at this time. Patient currently receiving oxygen via ventilator. Patient currently on fentanyl drip for sedation. Patient also on levophed, amiodarone, and heparin drips at this time. Patient turned by speciality bed every 2 hours to avoid skin breakdown to back side. No sign of wound development or skin breakdown noted. Patient bilateral wrist restraints maintained at this time. No restraint related injuries noted at this time. Plan of care updated with family. There are no further questions after updated on plan of care at this time. Will continue to monitor.

## 2018-09-14 NOTE — SUBJECTIVE & OBJECTIVE
Interval History: Resting on vent.  Cynthia TF.  No active bleeding        Review of Systems   Unable to perform ROS: Intubated     Objective:     Vital Signs (Most Recent):  Temp: 98.3 °F (36.8 °C) (09/14/18 1505)  Pulse: (!) 128 (09/14/18 1520)  Resp: (!) 21 (09/14/18 1520)  BP: 123/66 (09/14/18 1520)  SpO2: 95 % (09/14/18 1520) Vital Signs (24h Range):  Temp:  [98.3 °F (36.8 °C)-99.8 °F (37.7 °C)] 98.3 °F (36.8 °C)  Pulse:  [] 128  Resp:  [10-21] 21  SpO2:  [91 %-100 %] 95 %  BP: ()/() 123/66     Weight: 96 kg (211 lb 10.3 oz)  Body mass index is 30.37 kg/m².    Intake/Output Summary (Last 24 hours) at 9/14/2018 1755  Last data filed at 9/14/2018 1513  Gross per 24 hour   Intake 2744.65 ml   Output 1995 ml   Net 749.65 ml      Physical Exam   Constitutional: He appears well-developed and well-nourished. He is easily aroused.  Non-toxic appearance. He has a sickly appearance. He appears ill. No distress. He is sedated, intubated and restrained.   HENT:   Head: Normocephalic and atraumatic.   Cardiovascular: Normal rate and normal pulses. An irregularly irregular rhythm present.Murmur heard.  Pulmonary/Chest: Effort normal and breath sounds normal.  Genitourinary Comments: Adams in place   Musculoskeletal: Normal range of motion.        Right foot: There is no deformity.        Left foot: There is no deformity.   Pedal edema   Lymphadenopathy:     He has no cervical adenopathy.   Neurological: He is easily aroused.   Sedated on vent   Skin: Skin is warm, dry and intact. Capillary refill takes less than 2 seconds. No rash noted. No cyanosis.         Significant Labs:   CBC:   Recent Labs   Lab  09/13/18   0430  09/14/18   0400   WBC  16.24*  18.00*   HGB  7.3*  8.9*   HCT  22.3*  26.6*   PLT  250  308     CMP:   Recent Labs   Lab  09/13/18   0430  09/14/18   0400   NA  137  141   K  3.9  3.9   CL  101  103   CO2  26  28   GLU  193*  169*   BUN  45*  37*   CREATININE  1.2  1.1   CALCIUM  7.7*  7.7*    PROT  5.4*  5.6*   ALBUMIN  1.9*  2.0*   BILITOT  0.7  0.7   ALKPHOS  107  112   AST  42*  96*   ALT  75*  72*   ANIONGAP  10  10   EGFRNONAA  58*  >60       Significant Imaging: I have reviewed all pertinent imaging results/findings within the past 24 hours.   Results for orders placed or performed during the hospital encounter of 09/10/18   Blood culture   Result Value Ref Range    Blood Culture, Routine No Growth to date     Blood Culture, Routine No Growth to date     Blood Culture, Routine No Growth to date     Blood Culture, Routine No Growth to date    Blood culture   Result Value Ref Range    Blood Culture, Routine No Growth to date     Blood Culture, Routine No Growth to date     Blood Culture, Routine No Growth to date     Blood Culture, Routine No Growth to date    Culture, Respiratory with Gram Stain   Result Value Ref Range    Respiratory Culture BRISEIDA ALBICANS  Many       Gram Stain (Respiratory) <10 epithelial cells per low power field.     Gram Stain (Respiratory) Rare WBC's     Gram Stain (Respiratory) Rare yeast    CBC auto differential   Result Value Ref Range    WBC 16.46 (H) 3.90 - 12.70 K/uL    RBC 2.60 (L) 4.60 - 6.20 M/uL    Hemoglobin 8.4 (L) 14.0 - 18.0 g/dL    Hematocrit 24.6 (L) 40.0 - 54.0 %    MCV 95 82 - 98 fL    MCH 32.3 (H) 27.0 - 31.0 pg    MCHC 34.1 32.0 - 36.0 g/dL    RDW 15.8 (H) 11.5 - 14.5 %    Platelets 136 (L) 150 - 350 K/uL    MPV 11.1 9.2 - 12.9 fL    Gran # (ANC) 11.7 (H) 1.8 - 7.7 K/uL    Lymph # 1.3 1.0 - 4.8 K/uL    Mono # 3.5 (H) 0.3 - 1.0 K/uL    Eos # 0.0 0.0 - 0.5 K/uL    Baso # 0.01 0.00 - 0.20 K/uL    Gran% 71.4 38.0 - 73.0 %    Lymph% 7.8 (L) 18.0 - 48.0 %    Mono% 21.2 (H) 4.0 - 15.0 %    Eosinophil% 0.0 0.0 - 8.0 %    Basophil% 0.1 0.0 - 1.9 %    Poly Occasional     Differential Method Automated    Comprehensive metabolic panel   Result Value Ref Range    Sodium 134 (L) 136 - 145 mmol/L    Potassium 4.1 3.5 - 5.1 mmol/L    Chloride 100 95 - 110 mmol/L     CO2 24 23 - 29 mmol/L    Glucose 163 (H) 70 - 110 mg/dL    BUN, Bld 38 (H) 8 - 23 mg/dL    Creatinine 1.4 0.5 - 1.4 mg/dL    Calcium 7.6 (L) 8.7 - 10.5 mg/dL    Total Protein 5.6 (L) 6.0 - 8.4 g/dL    Albumin 2.4 (L) 3.5 - 5.2 g/dL    Total Bilirubin 1.7 (H) 0.1 - 1.0 mg/dL    Alkaline Phosphatase 89 55 - 135 U/L    AST 77 (H) 10 - 40 U/L     (H) 10 - 44 U/L    Anion Gap 10 8 - 16 mmol/L    eGFR if African American 56 (A) >60 mL/min/1.73 m^2    eGFR if non African American 48 (A) >60 mL/min/1.73 m^2   APTT   Result Value Ref Range    aPTT 32.7 (H) 21.0 - 32.0 sec   Protime-INR   Result Value Ref Range    Prothrombin Time 12.4 9.0 - 12.5 sec    INR 1.2 0.8 - 1.2   TSH   Result Value Ref Range    TSH 1.577 0.400 - 4.000 uIU/mL   Urinalysis   Result Value Ref Range    Specimen UA Urine, Clean Catch     Color, UA Maria Fernanda Yellow, Straw, Maria Fernanda    Appearance, UA Clear Clear    pH, UA SEE COMMENT 5.0 - 8.0    Specific Gravity, UA SEE COMMENT 1.005 - 1.030    Protein, UA SEE COMMENT Negative    Glucose, UA SEE COMMENT Negative    Ketones, UA SEE COMMENT Negative    Bilirubin (UA) SEE COMMENT Negative    Occult Blood UA SEE COMMENT Negative    Nitrite, UA SEE COMMENT Negative    Urobilinogen, UA SEE COMMENT <2.0 EU/dL    Leukocytes, UA SEE COMMENT Negative   Brain natriuretic peptide   Result Value Ref Range     (H) 0 - 99 pg/mL   Magnesium   Result Value Ref Range    Magnesium 2.2 1.6 - 2.6 mg/dL   Lactic acid, plasma   Result Value Ref Range    Lactate (Lactic Acid) 1.5 0.5 - 2.2 mmol/L   CBC auto differential   Result Value Ref Range    WBC 17.31 (H) 3.90 - 12.70 K/uL    RBC 2.58 (L) 4.60 - 6.20 M/uL    Hemoglobin 8.2 (L) 14.0 - 18.0 g/dL    Hematocrit 24.5 (L) 40.0 - 54.0 %    MCV 95 82 - 98 fL    MCH 31.8 (H) 27.0 - 31.0 pg    MCHC 33.5 32.0 - 36.0 g/dL    RDW 15.7 (H) 11.5 - 14.5 %    Platelets 147 (L) 150 - 350 K/uL    MPV 11.1 9.2 - 12.9 fL    Gran # (ANC) 13.3 (H) 1.8 - 7.7 K/uL    Lymph # 1.3 1.0 - 4.8  K/uL    Mono # 2.7 (H) 0.3 - 1.0 K/uL    Eos # 0.0 0.0 - 0.5 K/uL    Baso # 0.01 0.00 - 0.20 K/uL    Gran% 76.9 (H) 38.0 - 73.0 %    Lymph% 7.5 (L) 18.0 - 48.0 %    Mono% 15.4 (H) 4.0 - 15.0 %    Eosinophil% 0.1 0.0 - 8.0 %    Basophil% 0.1 0.0 - 1.9 %    Differential Method Automated    Comprehensive metabolic panel   Result Value Ref Range    Sodium 136 136 - 145 mmol/L    Potassium 4.1 3.5 - 5.1 mmol/L    Chloride 102 95 - 110 mmol/L    CO2 27 23 - 29 mmol/L    Glucose 139 (H) 70 - 110 mg/dL    BUN, Bld 42 (H) 8 - 23 mg/dL    Creatinine 1.3 0.5 - 1.4 mg/dL    Calcium 7.5 (L) 8.7 - 10.5 mg/dL    Total Protein 5.9 (L) 6.0 - 8.4 g/dL    Albumin 2.5 (L) 3.5 - 5.2 g/dL    Total Bilirubin 1.8 (H) 0.1 - 1.0 mg/dL    Alkaline Phosphatase 91 55 - 135 U/L    AST 74 (H) 10 - 40 U/L     (H) 10 - 44 U/L    Anion Gap 7 (L) 8 - 16 mmol/L    eGFR if African American >60 >60 mL/min/1.73 m^2    eGFR if non African American 53 (A) >60 mL/min/1.73 m^2   Magnesium   Result Value Ref Range    Magnesium 2.2 1.6 - 2.6 mg/dL   Phosphorus   Result Value Ref Range    Phosphorus 1.9 (L) 2.7 - 4.5 mg/dL   Hemoglobin   Result Value Ref Range    Hemoglobin 7.5 (L) 14.0 - 18.0 g/dL   Hemoglobin   Result Value Ref Range    Hemoglobin 8.2 (L) 14.0 - 18.0 g/dL   Hematocrit   Result Value Ref Range    Hematocrit 21.9 (L) 40.0 - 54.0 %   Hematocrit   Result Value Ref Range    Hematocrit 24.5 (L) 40.0 - 54.0 %   Urinalysis Microscopic   Result Value Ref Range    RBC, UA 1 0 - 4 /hpf    WBC, UA 1 0 - 5 /hpf    Bacteria, UA Rare None-Occ /hpf    Hyaline Casts, UA 20 (A) 0-1/lpf /lpf    Microscopic Comment SEE COMMENT    Lactic acid, plasma   Result Value Ref Range    Lactate (Lactic Acid) 1.4 0.5 - 2.2 mmol/L   APTT   Result Value Ref Range    aPTT 32.8 (H) 21.0 - 32.0 sec   CBC auto differential   Result Value Ref Range    WBC 17.00 (H) 3.90 - 12.70 K/uL    RBC 2.38 (L) 4.60 - 6.20 M/uL    Hemoglobin 7.5 (L) 14.0 - 18.0 g/dL    Hematocrit 22.6  (L) 40.0 - 54.0 %    MCV 95 82 - 98 fL    MCH 31.5 (H) 27.0 - 31.0 pg    MCHC 33.2 32.0 - 36.0 g/dL    RDW 15.9 (H) 11.5 - 14.5 %    Platelets 152 150 - 350 K/uL    MPV 11.2 9.2 - 12.9 fL    Gran # (ANC) 13.4 (H) 1.8 - 7.7 K/uL    Lymph # 1.2 1.0 - 4.8 K/uL    Mono # 2.4 (H) 0.3 - 1.0 K/uL    Eos # 0.0 0.0 - 0.5 K/uL    Baso # 0.01 0.00 - 0.20 K/uL    Gran% 79.2 (H) 38.0 - 73.0 %    Lymph% 6.9 (L) 18.0 - 48.0 %    Mono% 14.4 4.0 - 15.0 %    Eosinophil% 0.0 0.0 - 8.0 %    Basophil% 0.1 0.0 - 1.9 %    Differential Method Automated    Protime-INR   Result Value Ref Range    Prothrombin Time 12.2 9.0 - 12.5 sec    INR 1.2 0.8 - 1.2   Comprehensive metabolic panel   Result Value Ref Range    Sodium 136 136 - 145 mmol/L    Potassium 3.9 3.5 - 5.1 mmol/L    Chloride 103 95 - 110 mmol/L    CO2 27 23 - 29 mmol/L    Glucose 130 (H) 70 - 110 mg/dL    BUN, Bld 42 (H) 8 - 23 mg/dL    Creatinine 1.2 0.5 - 1.4 mg/dL    Calcium 7.1 (L) 8.7 - 10.5 mg/dL    Total Protein 5.2 (L) 6.0 - 8.4 g/dL    Albumin 2.2 (L) 3.5 - 5.2 g/dL    Total Bilirubin 1.6 (H) 0.1 - 1.0 mg/dL    Alkaline Phosphatase 80 55 - 135 U/L    AST 55 (H) 10 - 40 U/L     (H) 10 - 44 U/L    Anion Gap 6 (L) 8 - 16 mmol/L    eGFR if African American >60 >60 mL/min/1.73 m^2    eGFR if non African American 58 (A) >60 mL/min/1.73 m^2   Urinalysis   Result Value Ref Range    Specimen UA Urine, Catheterized     Color, UA Yellow Yellow, Straw, Maria Fernanda    Appearance, UA Clear Clear    pH, UA 6.0 5.0 - 8.0    Specific Gravity, UA 1.020 1.005 - 1.030    Protein, UA Trace (A) Negative    Glucose, UA Negative Negative    Ketones, UA Negative Negative    Bilirubin (UA) 1+ (A) Negative    Occult Blood UA Negative Negative    Nitrite, UA Negative Negative    Urobilinogen, UA 4.0-6.0 (A) <2.0 EU/dL    Leukocytes, UA Negative Negative   Vancomycin, random   Result Value Ref Range    Vancomycin, Random 5.0 Not established ug/mL   Hemoglobin   Result Value Ref Range    Hemoglobin  8.4 (L) 14.0 - 18.0 g/dL   Hematocrit   Result Value Ref Range    Hematocrit 25.3 (L) 40.0 - 54.0 %   Hemoglobin   Result Value Ref Range    Hemoglobin 8.2 (L) 14.0 - 18.0 g/dL   Hematocrit   Result Value Ref Range    Hematocrit 24.5 (L) 40.0 - 54.0 %   CBC auto differential   Result Value Ref Range    WBC 18.49 (H) 3.90 - 12.70 K/uL    RBC 2.51 (L) 4.60 - 6.20 M/uL    Hemoglobin 7.8 (L) 14.0 - 18.0 g/dL    Hematocrit 23.9 (L) 40.0 - 54.0 %    MCV 95 82 - 98 fL    MCH 31.1 (H) 27.0 - 31.0 pg    MCHC 32.6 32.0 - 36.0 g/dL    RDW 15.7 (H) 11.5 - 14.5 %    Platelets 230 150 - 350 K/uL    MPV 10.8 9.2 - 12.9 fL    Gran # (ANC) 14.0 (H) 1.8 - 7.7 K/uL    Lymph # 1.9 1.0 - 4.8 K/uL    Mono # 2.4 (H) 0.3 - 1.0 K/uL    Eos # 0.1 0.0 - 0.5 K/uL    Baso # 0.02 0.00 - 0.20 K/uL    Gran% 77.0 (H) 38.0 - 73.0 %    Lymph% 10.5 (L) 18.0 - 48.0 %    Mono% 13.1 4.0 - 15.0 %    Eosinophil% 0.8 0.0 - 8.0 %    Basophil% 0.1 0.0 - 1.9 %    Platelet Estimate Appears normal     Differential Method Automated    Comprehensive metabolic panel   Result Value Ref Range    Sodium 138 136 - 145 mmol/L    Potassium 4.0 3.5 - 5.1 mmol/L    Chloride 103 95 - 110 mmol/L    CO2 27 23 - 29 mmol/L    Glucose 132 (H) 70 - 110 mg/dL    BUN, Bld 41 (H) 8 - 23 mg/dL    Creatinine 1.2 0.5 - 1.4 mg/dL    Calcium 7.5 (L) 8.7 - 10.5 mg/dL    Total Protein 5.5 (L) 6.0 - 8.4 g/dL    Albumin 2.1 (L) 3.5 - 5.2 g/dL    Total Bilirubin 0.9 0.1 - 1.0 mg/dL    Alkaline Phosphatase 86 55 - 135 U/L    AST 41 (H) 10 - 40 U/L     (H) 10 - 44 U/L    Anion Gap 8 8 - 16 mmol/L    eGFR if African American >60 >60 mL/min/1.73 m^2    eGFR if non African American 58 (A) >60 mL/min/1.73 m^2   Magnesium   Result Value Ref Range    Magnesium 2.1 1.6 - 2.6 mg/dL   Phosphorus   Result Value Ref Range    Phosphorus 2.4 (L) 2.7 - 4.5 mg/dL   Vancomycin, random   Result Value Ref Range    Vancomycin, Random 16.5 Not established ug/mL   APTT   Result Value Ref Range    aPTT 31.5  21.0 - 32.0 sec   Protime-INR   Result Value Ref Range    Prothrombin Time 10.7 9.0 - 12.5 sec    INR 1.0 0.8 - 1.2   CBC auto differential   Result Value Ref Range    WBC 15.34 (H) 3.90 - 12.70 K/uL    RBC 2.48 (L) 4.60 - 6.20 M/uL    Hemoglobin 7.8 (L) 14.0 - 18.0 g/dL    Hematocrit 23.9 (L) 40.0 - 54.0 %    MCV 96 82 - 98 fL    MCH 31.5 (H) 27.0 - 31.0 pg    MCHC 32.6 32.0 - 36.0 g/dL    RDW 16.1 (H) 11.5 - 14.5 %    Platelets 215 150 - 350 K/uL    MPV 10.7 9.2 - 12.9 fL    Gran # (ANC) 11.3 (H) 1.8 - 7.7 K/uL    Lymph # 1.8 1.0 - 4.8 K/uL    Mono # 2.1 (H) 0.3 - 1.0 K/uL    Eos # 0.1 0.0 - 0.5 K/uL    Baso # 0.02 0.00 - 0.20 K/uL    Gran% 75.6 (H) 38.0 - 73.0 %    Lymph% 11.6 (L) 18.0 - 48.0 %    Mono% 13.8 4.0 - 15.0 %    Eosinophil% 0.7 0.0 - 8.0 %    Basophil% 0.1 0.0 - 1.9 %    Differential Method Automated    APTT   Result Value Ref Range    aPTT 42.7 (H) 21.0 - 32.0 sec   APTT   Result Value Ref Range    aPTT 37.1 (H) 21.0 - 32.0 sec   Comprehensive metabolic panel   Result Value Ref Range    Sodium 137 136 - 145 mmol/L    Potassium 3.9 3.5 - 5.1 mmol/L    Chloride 101 95 - 110 mmol/L    CO2 26 23 - 29 mmol/L    Glucose 193 (H) 70 - 110 mg/dL    BUN, Bld 45 (H) 8 - 23 mg/dL    Creatinine 1.2 0.5 - 1.4 mg/dL    Calcium 7.7 (L) 8.7 - 10.5 mg/dL    Total Protein 5.4 (L) 6.0 - 8.4 g/dL    Albumin 1.9 (L) 3.5 - 5.2 g/dL    Total Bilirubin 0.7 0.1 - 1.0 mg/dL    Alkaline Phosphatase 107 55 - 135 U/L    AST 42 (H) 10 - 40 U/L    ALT 75 (H) 10 - 44 U/L    Anion Gap 10 8 - 16 mmol/L    eGFR if African American >60 >60 mL/min/1.73 m^2    eGFR if non African American 58 (A) >60 mL/min/1.73 m^2   Magnesium   Result Value Ref Range    Magnesium 2.4 1.6 - 2.6 mg/dL   Phosphorus   Result Value Ref Range    Phosphorus 1.6 (L) 2.7 - 4.5 mg/dL   CBC auto differential   Result Value Ref Range    WBC 16.24 (H) 3.90 - 12.70 K/uL    RBC 2.32 (L) 4.60 - 6.20 M/uL    Hemoglobin 7.3 (L) 14.0 - 18.0 g/dL    Hematocrit 22.3 (L) 40.0  - 54.0 %    MCV 96 82 - 98 fL    MCH 31.5 (H) 27.0 - 31.0 pg    MCHC 32.7 32.0 - 36.0 g/dL    RDW 16.2 (H) 11.5 - 14.5 %    Platelets 250 150 - 350 K/uL    MPV 10.4 9.2 - 12.9 fL    Gran # (ANC) 12.8 (H) 1.8 - 7.7 K/uL    Lymph # 1.7 1.0 - 4.8 K/uL    Mono # 1.5 (H) 0.3 - 1.0 K/uL    Eos # 0.2 0.0 - 0.5 K/uL    Baso # 0.02 0.00 - 0.20 K/uL    Gran% 80.3 (H) 38.0 - 73.0 %    Lymph% 10.7 (L) 18.0 - 48.0 %    Mono% 9.4 4.0 - 15.0 %    Eosinophil% 0.9 0.0 - 8.0 %    Basophil% 0.1 0.0 - 1.9 %    Differential Method Automated    APTT   Result Value Ref Range    aPTT 45.0 (H) 21.0 - 32.0 sec   VANCOMYCIN, TROUGH before 3rd dose   Result Value Ref Range    Vancomycin-Trough 13.3 10.0 - 22.0 ug/mL   Comprehensive metabolic panel   Result Value Ref Range    Sodium 141 136 - 145 mmol/L    Potassium 3.9 3.5 - 5.1 mmol/L    Chloride 103 95 - 110 mmol/L    CO2 28 23 - 29 mmol/L    Glucose 169 (H) 70 - 110 mg/dL    BUN, Bld 37 (H) 8 - 23 mg/dL    Creatinine 1.1 0.5 - 1.4 mg/dL    Calcium 7.7 (L) 8.7 - 10.5 mg/dL    Total Protein 5.6 (L) 6.0 - 8.4 g/dL    Albumin 2.0 (L) 3.5 - 5.2 g/dL    Total Bilirubin 0.7 0.1 - 1.0 mg/dL    Alkaline Phosphatase 112 55 - 135 U/L    AST 96 (H) 10 - 40 U/L    ALT 72 (H) 10 - 44 U/L    Anion Gap 10 8 - 16 mmol/L    eGFR if African American >60 >60 mL/min/1.73 m^2    eGFR if non African American >60 >60 mL/min/1.73 m^2   Magnesium   Result Value Ref Range    Magnesium 2.3 1.6 - 2.6 mg/dL   Phosphorus   Result Value Ref Range    Phosphorus 1.8 (L) 2.7 - 4.5 mg/dL   CBC auto differential   Result Value Ref Range    WBC 18.00 (H) 3.90 - 12.70 K/uL    RBC 2.83 (L) 4.60 - 6.20 M/uL    Hemoglobin 8.9 (L) 14.0 - 18.0 g/dL    Hematocrit 26.6 (L) 40.0 - 54.0 %    MCV 94 82 - 98 fL    MCH 31.4 (H) 27.0 - 31.0 pg    MCHC 33.5 32.0 - 36.0 g/dL    RDW 17.4 (H) 11.5 - 14.5 %    Platelets 308 150 - 350 K/uL    MPV 9.9 9.2 - 12.9 fL    Gran # (ANC) 12.9 (H) 1.8 - 7.7 K/uL    Lymph # 2.3 1.0 - 4.8 K/uL    Mono # 2.3  (H) 0.3 - 1.0 K/uL    Eos # 0.5 0.0 - 0.5 K/uL    Baso # 0.04 0.00 - 0.20 K/uL    Gran% 74.5 (H) 38.0 - 73.0 %    Lymph% 12.9 (L) 18.0 - 48.0 %    Mono% 12.5 4.0 - 15.0 %    Eosinophil% 2.6 0.0 - 8.0 %    Basophil% 0.2 0.0 - 1.9 %    Differential Method Automated    APTT   Result Value Ref Range    aPTT 33.5 (H) 21.0 - 32.0 sec   APTT   Result Value Ref Range    aPTT 32.2 (H) 21.0 - 32.0 sec   2D echo with color flow doppler   Result Value Ref Range    EF 50 55 - 65    Mitral Valve Regurgitation MILD     Diastolic Dysfunction No     Aortic Valve Stenosis MODERATE TO SEVERE (A)     Est. PA Systolic Pressure 56 (A)     Tricuspid Valve Regurgitation MILD TO MODERATE    Type & Screen   Result Value Ref Range    Group & Rh O POS     Indirect Raciel NEG    ISTAT PROCEDURE   Result Value Ref Range    POC PH 7.382 7.35 - 7.45    POC PCO2 44.4 35 - 45 mmHg    POC PO2 60 (L) 80 - 100 mmHg    POC HCO3 26.4 24 - 28 mmol/L    POC BE 1 -2 to 2 mmol/L    POC SATURATED O2 90 (L) 95 - 100 %    Rate 16     Sample ARTERIAL     Site LR     Allens Test Pass     DelSys CPAP/BiPAP     Mode BiPAP     FiO2 50     IP 10     EP 5    POCT glucose   Result Value Ref Range    POCT Glucose 164 (H) 70 - 110 mg/dL   POCT glucose   Result Value Ref Range    POCT Glucose 153 (H) 70 - 110 mg/dL   ISTAT PROCEDURE   Result Value Ref Range    POC PH 7.303 (L) 7.35 - 7.45    POC PCO2 49.9 (H) 35 - 45 mmHg    POC PO2 62 (L) 80 - 100 mmHg    POC HCO3 24.7 24 - 28 mmol/L    POC BE -2 -2 to 2 mmol/L    POC SATURATED O2 88 (L) 95 - 100 %    Rate 16     Sample ARTERIAL     Site RR     Allens Test Pass     DelSys CPAP/BiPAP     Mode BiPAP     FiO2 50     IP 12     EP 5    POCT glucose   Result Value Ref Range    POCT Glucose 126 (H) 70 - 110 mg/dL   POCT glucose   Result Value Ref Range    POCT Glucose 148 (H) 70 - 110 mg/dL   POCT glucose   Result Value Ref Range    POCT Glucose 182 (H) 70 - 110 mg/dL   ISTAT PROCEDURE   Result Value Ref Range    POC PH 7.296  (L) 7.35 - 7.45    POC PCO2 60.6 (HH) 35 - 45 mmHg    POC PO2 112 (H) 80 - 100 mmHg    POC HCO3 29.5 (H) 24 - 28 mmol/L    POC BE 3 -2 to 2 mmol/L    POC SATURATED O2 98 95 - 100 %    Rate 18     Sample ARTERIAL     Site RR     Allens Test Pass     DelSys Adult Vent     Mode PCV     Vt 450     PEEP 10     FiO2 70     IT .77    POCT glucose   Result Value Ref Range    POCT Glucose 122 (H) 70 - 110 mg/dL   POCT glucose   Result Value Ref Range    POCT Glucose 145 (H) 70 - 110 mg/dL   POCT glucose   Result Value Ref Range    POCT Glucose 173 (H) 70 - 110 mg/dL   POCT glucose   Result Value Ref Range    POCT Glucose 122 (H) 70 - 110 mg/dL   ISTAT PROCEDURE   Result Value Ref Range    POC PH 7.334 (L) 7.35 - 7.45    POC PCO2 59.4 (HH) 35 - 45 mmHg    POC PO2 96 80 - 100 mmHg    POC HCO3 31.6 (H) 24 - 28 mmol/L    POC BE 6 -2 to 2 mmol/L    POC SATURATED O2 97 95 - 100 %    Rate 18     Sample ARTERIAL     Site LR     Allens Test Pass     DelSys Adult Vent     Mode AC/PRVC     Vt 450     PEEP 10     FiO2 40    POCT glucose   Result Value Ref Range    POCT Glucose 153 (H) 70 - 110 mg/dL   POCT glucose   Result Value Ref Range    POCT Glucose 185 (H) 70 - 110 mg/dL   ISTAT PROCEDURE   Result Value Ref Range    POC PH 7.367 7.35 - 7.45    POC PCO2 51.9 (H) 35 - 45 mmHg    POC PO2 63 (L) 80 - 100 mmHg    POC HCO3 29.8 (H) 24 - 28 mmol/L    POC BE 4 -2 to 2 mmol/L    POC SATURATED O2 91 (L) 95 - 100 %    Sample ARTERIAL     Site RR     Allens Test Pass     DelSys Adult Vent     Mode AC/PRVC     Vt 450     PEEP 5     FiO2 40    POCT glucose   Result Value Ref Range    POCT Glucose 136 (H) 70 - 110 mg/dL   POCT glucose   Result Value Ref Range    POCT Glucose 175 (H) 70 - 110 mg/dL   POCT glucose   Result Value Ref Range    POCT Glucose 182 (H) 70 - 110 mg/dL   POCT glucose   Result Value Ref Range    POCT Glucose 177 (H) 70 - 110 mg/dL   ISTAT PROCEDURE   Result Value Ref Range    POC PH 7.355 7.35 - 7.45    POC PCO2 58.9 (HH)  35 - 45 mmHg    POC PO2 101 (H) 80 - 100 mmHg    POC HCO3 32.9 (H) 24 - 28 mmol/L    POC BE 7 -2 to 2 mmol/L    POC SATURATED O2 97 95 - 100 %    Rate 18     Sample ARTERIAL     Site RR     Allens Test Pass     DelSys Adult Vent     Mode AC/PRVC     Vt 450     PEEP 5     FiO2 45    POCT glucose   Result Value Ref Range    POCT Glucose 173 (H) 70 - 110 mg/dL   POCT glucose   Result Value Ref Range    POCT Glucose 167 (H) 70 - 110 mg/dL   POCT glucose   Result Value Ref Range    POCT Glucose 159 (H) 70 - 110 mg/dL   Prepare RBC 1 Unit   Result Value Ref Range    UNIT NUMBER [id number]     PRODUCT CODE [id number]     DISPENSE STATUS TRANSFUSED     CODING SYSTEM [id number]     Unit Blood Type Code 5100     Unit Blood Type O POS     Unit Expiration [id number]

## 2018-09-14 NOTE — PROGRESS NOTES
[facility]  Cardiology  Progress Note    Patient Name: Eleazar Solo  MRN: [id number]  Admission Date: 9/10/2018  Hospital Length of Stay: 4 days  Code Status: Full Code   Attending Physician: Prachi Boyer MD   Primary Care Physician: Poly Fitch MD  Expected Discharge Date:   Principal Problem:Acute hypoxemic respiratory failure    Subjective:   HPI:  Mr. Solo is a 79yo male with a PMHx of permanent AF on Eliquis, HTN, HLD, COPD, DM II, and h/o CVA, who was transferred from [facility] per family request as patient is followed OP by [facility] physicians.  He was originally admitted to [facility] on 9/6 for acute upper GI bleed with blood loss anemia (Hb 12 > 7.4 > 7.9).  Patient received total of 2 units PRBC and Eliquis held (last dose 9/6 AM).  He underwent EGD on 9/6 that showed a clot at the GE junction which was clipped  and injected with epi.  There was concern for further bleeding, therefore, repeat EGD was performed on 9/7 which showed no evidence of active bleeding.  During admission, patient developed AFib with RVR and was placed on diltiazem drip and PO dig.  CXR on 9/8 showed left pneumonia, IV Rocephin started.  Prior to transfer, vital signs and labs stable.  Upon arrival to [facility] ICU, patient hypotensive (SBP 70-80's), now on pressors and was hypoxic requiring continuous BiPAP.  Remains in AFib with controlled rate.  Repeat CXR showed RUL pneumonia.  Repeat labs resulted WBC 16.5, H&H 8.4/24.6, plt 136, BUN 38, cr. 1.4, AST 77, . ABG with pH 7.382, pCO2 44, pO2 60, HCO3 26.  Patient c/o SOB which is resolved while on BiPAP. CXR today shows marked amount of alveolar consolidation in the lower 2/3 of both lungs.  The this represents an interval worsening of the appearance of the lungs and is characteristic of pneumonia. Decision made to intubate patient  Due to resp distress and wheezing.  H/H this morning 7.5/22.6    Hospital Course:   9/12/18- Remains  intubated and sedated. Still in A-fib. Rate 100-115 bpm this morning. Started on Amio gtt for rate control. H/H stable at 7.8/23.9. Continues abx for bilateral pneumonia. Patient being weaned from Dopamine gtt.Liver enzymes improved today. Echo shows normal LVF with Moderate to severe aortic stenosis and pulm HTN     9/13/18-Patient seen and examined today. No acute events overnight. Remains intubated. Still in afib, HR variable. H and H slightly worse, 7.3/22.3. Would benefit from transfusion.     9/14/18-Patient seen and examined today. Pressor re-started overnight. Still intubated. Remains in afib with rapid rate. H and H improved s/p transfusion.         Review of Systems   Unable to perform ROS: intubated     Objective:     Vital Signs (Most Recent):  Temp: 98.5 °F (36.9 °C) (09/14/18 0305)  Pulse: (!) 124 (09/14/18 1210)  Resp: 14 (09/14/18 1210)  BP: (!) 125/54 (09/14/18 0645)  SpO2: 99 % (09/14/18 1210) Vital Signs (24h Range):  Temp:  [98.3 °F (36.8 °C)-99.8 °F (37.7 °C)] 98.5 °F (36.9 °C)  Pulse:  [] 124  Resp:  [14-20] 14  SpO2:  [93 %-100 %] 99 %  BP: ()/(35-79) 125/54     Weight: 96 kg (211 lb 10.3 oz)  Body mass index is 30.37 kg/m².     SpO2: 99 %  O2 Device (Oxygen Therapy): ventilator      Intake/Output Summary (Last 24 hours) at 9/14/2018 1254  Last data filed at 9/14/2018 0606  Gross per 24 hour   Intake 2946.5 ml   Output 1210 ml   Net 1736.5 ml       Lines/Drains/Airways     Central Venous Catheter Line                 Percutaneous Central Line Insertion/Assessment - triple lumen  09/11/18 1124 left internal jugular 3 days          Drain                 NG/OG Tube 09/11/18 1123 orogastric Center mouth 3 days         Urethral Catheter 09/11/18 1123 Double-lumen 3 days          Pressure Ulcer                 Pressure Injury 09/13/18 1905 Left Buttocks Deep tissue injury less than 1 day                Physical Exam   Constitutional: No distress.   Appears ill  Intubated       HENT:    Head: Normocephalic and atraumatic.   Eyes: Pupils are equal, round, and reactive to light. Right eye exhibits no discharge. Left eye exhibits no discharge.   Neck: Neck supple. No JVD present.   Cardiovascular: S1 normal and S2 normal. An irregularly irregular rhythm present. Tachycardia present.   No murmur heard.  Pulmonary/Chest:   Coarse and diminished BS bilaterally   Abdominal: Soft. Bowel sounds are normal. He exhibits no distension. There is no tenderness. There is no rebound.   Neurological: He is alert.   Nods head to questions   Skin: Skin is warm and dry. He is not diaphoretic.   Nursing note and vitals reviewed.      Significant Labs:   CMP   Recent Labs   Lab  09/13/18   0430  09/14/18   0400   NA  137  141   K  3.9  3.9   CL  101  103   CO2  26  28   GLU  193*  169*   BUN  45*  37*   CREATININE  1.2  1.1   CALCIUM  7.7*  7.7*   PROT  5.4*  5.6*   ALBUMIN  1.9*  2.0*   BILITOT  0.7  0.7   ALKPHOS  107  112   AST  42*  96*   ALT  75*  72*   ANIONGAP  10  10   ESTGFRAFRICA  >60  >60   EGFRNONAA  58*  >60   , CBC   Recent Labs   Lab  09/13/18   0430  09/14/18   0400   WBC  16.24*  18.00*   HGB  7.3*  8.9*   HCT  22.3*  26.6*   PLT  250  308   , Troponin No results for input(s): TROPONINI in the last 48 hours. and All pertinent lab results from the last 24 hours have been reviewed.    Significant Imaging: Echocardiogram:   2D echo with color flow doppler:   Results for orders placed or performed during the hospital encounter of 09/10/18   2D echo with color flow doppler   Result Value Ref Range    EF 50 55 - 65    Mitral Valve Regurgitation MILD     Diastolic Dysfunction No     Aortic Valve Stenosis MODERATE TO SEVERE (A)     Est. PA Systolic Pressure 56 (A)     Tricuspid Valve Regurgitation MILD TO MODERATE     and X-Ray: CXR: X-Ray Chest 1 View (CXR):   Results for orders placed or performed during the hospital encounter of 09/10/18   X-Ray Chest 1 View    Narrative    EXAMINATION:  XR CHEST 1  VIEW    CLINICAL HISTORY:  respiratory failure;    TECHNIQUE:  Single frontal view of the chest was performed.    COMPARISON:  09/13/2018    FINDINGS:  Tubes and lines are stable.   In comparison to the prior study, there is no adverse interval changes.      Impression    In comparison to the prior study, there is no adverse interval changes      Electronically signed by: Mark Garcia MD  Date:    09/14/2018  Time:    07:27    and X-Ray Chest PA and Lateral (CXR): No results found for this visit on 09/10/18.    Assessment and Plan:   Patient who presents with afib with RVR in setting of upper GI bleed, anemia, and sepsis/PNA. Stable CV wise. Continue same mgmt. Add low dose BB as BP will tolerate. Attempt to wean pressor support.     Nonrheumatic aortic valve stenosis    -Stable  -Moderate to severe by 2D echo  -Further workup as OP        Septic shock    -Mgmt as per primary team/ICU  -On abx        Acute upper GI bleed with acute blood loss anemia    -H/H improved s/p transfusion  -Continue heparin gtt for now  -Monitor H and H closely          Essential hypertension    -BP meds on hold due to hypotension/need for pressor support          Atrial fibrillation, permanent    -Remains in afib with RVR in setting of sepsis/PNA/anemia  -Continue amio gtt, digoxin  -Attempt to wean pressor and add low dose BB as BP tolerated  -No BB given need for pressor support  -Continue heparin gtt for now; risks/benefits discussed. Closely monitor H and H; improved s/p transfusion yesterday              VTE Risk Mitigation (From admission, onward)        Ordered     heparin 25,000 units in dextrose 5% 250 mL (100 units/mL) infusion LOW INTENSITY nomogram - [facility]  Continuous      09/12/18 1048     heparin 25,000 units in dextrose 5% (100 units/ml) IV bolus from bag - ADDITIONAL PRN BOLUS - 60 units/kg  As needed (PRN)      09/12/18 1048     heparin 25,000 units in dextrose 5% (100 units/ml) IV bolus from bag - ADDITIONAL PRN BOLUS -  30 units/kg  As needed (PRN)      09/12/18 1048     IP VTE HIGH RISK PATIENT  Once      09/11/18 0425     Reason for No Pharmacological VTE Prophylaxis  Once      09/11/18 0425     Place sequential compression device  Until discontinued      09/11/18 0425          Christal Vazquez PA-C  Cardiology  [facility]     Chart reviewed. Dr. Belle examined patient and agrees with plan as outlined above.

## 2018-09-14 NOTE — ASSESSMENT & PLAN NOTE
Moderate severe COPD  Cont Formeterol and Budesonide  Duonebs PRN  ?candidal component, wbc not responding to unasyn/vanco; Antimicrobials adjusted, diflucan added

## 2018-09-14 NOTE — CONSULTS
09/14/18 1105   Pain/Comfort Assessments   Pain Assessment Performed Yes       Number Scale   Presence of Pain non-verbal indicators absent   Skin   Skin WDL ex   Skin Temperature warm   Skin Moisture Dry   Skin Elasticity Brisk   Specialty Bed/Overlay Low air loss;Alternating pressure   Bed Support Surface Assessed   Lawson Risk Assessment   Sensory Perception 2-->very limited   Moisture 3-->occasionally moist   Activity 1-->bedfast   Mobility 1-->completely immobile   Nutrition 3-->adequate   Friction and Shear 2-->potential problem   Lawson Score 12       Pressure Injury 09/13/18 1905 Left Buttocks Deep tissue injury   Date First Assessed/Time First Assessed: 09/13/18 1905   Pressure Injury Present on Admission: no  Side: Left  Location: Buttocks  Is this injury device related?: No  Staging: Deep tissue injury   Wound Image    Staging D   Dressing Appearance No dressing   Drainage Amount None   Appearance Purple;Maroon   Periwound Area Dry;Intact   Wound Length (cm) 8 cm   Wound Width (cm) 5 cm   Care Cleansed with:;Sterile normal saline;Applied:;Skin Barrier   Dressing Applied;Foam   Dressing Change Due 09/18/18   Skin Interventions   Device Skin Pressure Protection absorbent pad utilized/changed;adhesive use limited;positioning supports utilized;pressure points protected   Pressure Reduction Devices Specialty bed utilized;Foam padding utilized   Pressure Reduction Techniques frequent weight shift encouraged;heels elevated off bed;positioned off wounds;pressure points protected   Skin Protection Adhesive use limited;Incontinence pads;Foam dressing;Skin sealant/moisture barrier     Verbal consult revieved on this 77 y/o M patient due to DTI to left buttock. Patient admitted 9/10/18 due to acute upper GI bleed and sepsis. He has PMH significant for HTN, AF, HLP, COPD, DM, CVA.  He is on a specialty WOODY pulsate bed and pressure injury prevention plan. Per nursing reports, after admission he was only able to turn  to left and supine positions due to decreased sats with right sided turn.  Today, he is waking up from sedation with plans to attempt extubation.  Patient turned to right side per SANAM. DON Gonzalez.  Left buttock DTI noted measuring 8x5cm with maroon and purple discoloration. Painted with cavilon and foam dressing applied. Patient needs to be turned to right side and supine and stay off left side as much as possible. Due to nature of DTI, I expect this will evolve into full thickness wound. Patient to remain on WOODY pulsate bed, and turn to right side and supine with foam wedge as tolerated.  Bilateral heels and elbows intact with no redness or breakdown noted.  Please see below for wound care recommendations:    Left buttock DTI:  1. Cleanse with saline  2. Pat dry  3. Paint with cavilon  4. Apply sacral foam dressing  5. Change twice weekly (Tu/Fr)and prn  6. Notify wound care if wound opens and begins to evolve.

## 2018-09-14 NOTE — PLAN OF CARE
Problem: Patient Care Overview  Goal: Plan of Care Review  Outcome: Ongoing (interventions implemented as appropriate)  Recommendations    Recommendation/Intervention:   1. Continue current TF regimen  2. Will continue to monitor.      Goals: Meet > 85 % EEN/EPN while admitted  Nutrition Goal Status: goal met, continues  Communication of RD Recs: POC review, discussed on rounds

## 2018-09-14 NOTE — ASSESSMENT & PLAN NOTE
Remains RVR despite amio infusion  Cont Dig   Schedule metoprolol as if BP tolerates  Cards following on heparin infusion

## 2018-09-15 PROBLEM — A41.9 SEPTIC SHOCK: Status: RESOLVED | Noted: 2018-09-10 | Resolved: 2018-09-15

## 2018-09-15 PROBLEM — R65.21 SEPTIC SHOCK: Status: RESOLVED | Noted: 2018-09-10 | Resolved: 2018-09-15

## 2018-09-15 LAB
ALBUMIN SERPL BCP-MCNC: 2 G/DL
ALP SERPL-CCNC: 96 U/L
ALT SERPL W/O P-5'-P-CCNC: 60 U/L
ANION GAP SERPL CALC-SCNC: 9 MMOL/L
APTT BLDCRRT: 43.1 SEC
AST SERPL-CCNC: 78 U/L
BASOPHILS # BLD AUTO: 0.02 K/UL
BASOPHILS NFR BLD: 0.1 %
BILIRUB SERPL-MCNC: 0.8 MG/DL
BUN SERPL-MCNC: 25 MG/DL
CALCIUM SERPL-MCNC: 7.8 MG/DL
CHLORIDE SERPL-SCNC: 103 MMOL/L
CO2 SERPL-SCNC: 34 MMOL/L
CREAT SERPL-MCNC: 1 MG/DL
DIFFERENTIAL METHOD: ABNORMAL
DIGOXIN SERPL-MCNC: 0.5 NG/ML
EOSINOPHIL # BLD AUTO: 0.4 K/UL
EOSINOPHIL NFR BLD: 2.7 %
ERYTHROCYTE [DISTWIDTH] IN BLOOD BY AUTOMATED COUNT: 17.2 %
EST. GFR  (AFRICAN AMERICAN): >60 ML/MIN/1.73 M^2
EST. GFR  (NON AFRICAN AMERICAN): >60 ML/MIN/1.73 M^2
GLUCOSE SERPL-MCNC: 112 MG/DL
HCT VFR BLD AUTO: 26.2 %
HGB BLD-MCNC: 8.3 G/DL
LYMPHOCYTES # BLD AUTO: 2 K/UL
LYMPHOCYTES NFR BLD: 12.8 %
MAGNESIUM SERPL-MCNC: 2 MG/DL
MCH RBC QN AUTO: 30.6 PG
MCHC RBC AUTO-ENTMCNC: 31.7 G/DL
MCV RBC AUTO: 97 FL
MONOCYTES # BLD AUTO: 1.7 K/UL
MONOCYTES NFR BLD: 11.1 %
NEUTROPHILS # BLD AUTO: 11.2 K/UL
NEUTROPHILS NFR BLD: 75.6 %
PHOSPHATE SERPL-MCNC: 2.4 MG/DL
PLATELET # BLD AUTO: 270 K/UL
PMV BLD AUTO: 9.9 FL
POCT GLUCOSE: 109 MG/DL (ref 70–110)
POCT GLUCOSE: 111 MG/DL (ref 70–110)
POCT GLUCOSE: 118 MG/DL (ref 70–110)
POCT GLUCOSE: 120 MG/DL (ref 70–110)
POCT GLUCOSE: 134 MG/DL (ref 70–110)
POTASSIUM SERPL-SCNC: 4 MMOL/L
PROT SERPL-MCNC: 5.4 G/DL
RBC # BLD AUTO: 2.71 M/UL
SODIUM SERPL-SCNC: 146 MMOL/L
WBC # BLD AUTO: 15.29 K/UL

## 2018-09-15 PROCEDURE — 25000003 PHARM REV CODE 250: Performed by: NURSE PRACTITIONER

## 2018-09-15 PROCEDURE — 63600175 PHARM REV CODE 636 W HCPCS: Performed by: INTERNAL MEDICINE

## 2018-09-15 PROCEDURE — 99291 CRITICAL CARE FIRST HOUR: CPT | Mod: ,,, | Performed by: NURSE PRACTITIONER

## 2018-09-15 PROCEDURE — 94760 N-INVAS EAR/PLS OXIMETRY 1: CPT

## 2018-09-15 PROCEDURE — 85730 THROMBOPLASTIN TIME PARTIAL: CPT

## 2018-09-15 PROCEDURE — 27100092 HC HIGH FLOW DELIVERY CANNULA

## 2018-09-15 PROCEDURE — 94664 DEMO&/EVAL PT USE INHALER: CPT

## 2018-09-15 PROCEDURE — 97110 THERAPEUTIC EXERCISES: CPT

## 2018-09-15 PROCEDURE — 99233 SBSQ HOSP IP/OBS HIGH 50: CPT | Mod: ,,, | Performed by: INTERNAL MEDICINE

## 2018-09-15 PROCEDURE — 85025 COMPLETE CBC W/AUTO DIFF WBC: CPT

## 2018-09-15 PROCEDURE — G8979 MOBILITY GOAL STATUS: HCPCS | Mod: CL

## 2018-09-15 PROCEDURE — 27000221 HC OXYGEN, UP TO 24 HOURS

## 2018-09-15 PROCEDURE — 27100171 HC OXYGEN HIGH FLOW UP TO 24 HOURS

## 2018-09-15 PROCEDURE — 63600175 PHARM REV CODE 636 W HCPCS: Performed by: NURSE PRACTITIONER

## 2018-09-15 PROCEDURE — 97530 THERAPEUTIC ACTIVITIES: CPT

## 2018-09-15 PROCEDURE — 99900035 HC TECH TIME PER 15 MIN (STAT)

## 2018-09-15 PROCEDURE — 25000242 PHARM REV CODE 250 ALT 637 W/ HCPCS: Performed by: NURSE PRACTITIONER

## 2018-09-15 PROCEDURE — 84100 ASSAY OF PHOSPHORUS: CPT

## 2018-09-15 PROCEDURE — 25000242 PHARM REV CODE 250 ALT 637 W/ HCPCS: Performed by: INTERNAL MEDICINE

## 2018-09-15 PROCEDURE — 80162 ASSAY OF DIGOXIN TOTAL: CPT

## 2018-09-15 PROCEDURE — 27000646 HC AEROBIKA DEVICE

## 2018-09-15 PROCEDURE — G8988 SELF CARE GOAL STATUS: HCPCS | Mod: CL

## 2018-09-15 PROCEDURE — 25000003 PHARM REV CODE 250: Performed by: FAMILY MEDICINE

## 2018-09-15 PROCEDURE — 94640 AIRWAY INHALATION TREATMENT: CPT

## 2018-09-15 PROCEDURE — 20000000 HC ICU ROOM

## 2018-09-15 PROCEDURE — 97167 OT EVAL HIGH COMPLEX 60 MIN: CPT

## 2018-09-15 PROCEDURE — G8978 MOBILITY CURRENT STATUS: HCPCS | Mod: CM

## 2018-09-15 PROCEDURE — 25000003 PHARM REV CODE 250: Performed by: INTERNAL MEDICINE

## 2018-09-15 PROCEDURE — 92610 EVALUATE SWALLOWING FUNCTION: CPT

## 2018-09-15 PROCEDURE — G8987 SELF CARE CURRENT STATUS: HCPCS | Mod: CN

## 2018-09-15 PROCEDURE — 97163 PT EVAL HIGH COMPLEX 45 MIN: CPT

## 2018-09-15 PROCEDURE — 83735 ASSAY OF MAGNESIUM: CPT

## 2018-09-15 PROCEDURE — 80053 COMPREHEN METABOLIC PANEL: CPT

## 2018-09-15 RX ORDER — FUROSEMIDE 10 MG/ML
20 INJECTION INTRAMUSCULAR; INTRAVENOUS ONCE
Status: COMPLETED | OUTPATIENT
Start: 2018-09-15 | End: 2018-09-15

## 2018-09-15 RX ORDER — POLYETHYLENE GLYCOL 3350 17 G/17G
17 POWDER, FOR SOLUTION ORAL NIGHTLY
Status: DISCONTINUED | OUTPATIENT
Start: 2018-09-15 | End: 2018-09-18

## 2018-09-15 RX ORDER — DILTIAZEM HYDROCHLORIDE 30 MG/1
30 TABLET, FILM COATED ORAL EVERY 6 HOURS
Status: DISCONTINUED | OUTPATIENT
Start: 2018-09-15 | End: 2018-09-18

## 2018-09-15 RX ORDER — LEVALBUTEROL INHALATION SOLUTION 0.63 MG/3ML
0.63 SOLUTION RESPIRATORY (INHALATION) EVERY 12 HOURS
Status: DISCONTINUED | OUTPATIENT
Start: 2018-09-15 | End: 2018-09-21 | Stop reason: HOSPADM

## 2018-09-15 RX ADMIN — BUDESONIDE 0.5 MG: 0.5 SUSPENSION RESPIRATORY (INHALATION) at 08:09

## 2018-09-15 RX ADMIN — APIXABAN 5 MG: 2.5 TABLET, FILM COATED ORAL at 11:09

## 2018-09-15 RX ADMIN — OLANZAPINE 10 MG: 5 TABLET, ORALLY DISINTEGRATING ORAL at 08:09

## 2018-09-15 RX ADMIN — CHLORHEXIDINE GLUCONATE 15 ML: 1.2 RINSE ORAL at 09:09

## 2018-09-15 RX ADMIN — DILTIAZEM HYDROCHLORIDE 30 MG: 30 TABLET, FILM COATED ORAL at 11:09

## 2018-09-15 RX ADMIN — POLYETHYLENE GLYCOL 3350 17 G: 17 POWDER, FOR SOLUTION ORAL at 05:09

## 2018-09-15 RX ADMIN — METOPROLOL TARTRATE 5 MG: 5 INJECTION, SOLUTION INTRAVENOUS at 04:09

## 2018-09-15 RX ADMIN — ARFORMOTEROL TARTRATE 15 MCG: 15 SOLUTION RESPIRATORY (INHALATION) at 08:09

## 2018-09-15 RX ADMIN — DIGOXIN 0.12 MG: 125 TABLET ORAL at 09:09

## 2018-09-15 RX ADMIN — FLUCONAZOLE 200 MG: 100 TABLET ORAL at 11:09

## 2018-09-15 RX ADMIN — METOPROLOL TARTRATE 5 MG: 5 INJECTION, SOLUTION INTRAVENOUS at 10:09

## 2018-09-15 RX ADMIN — LEVALBUTEROL 0.63 MG: 0.63 SOLUTION RESPIRATORY (INHALATION) at 07:09

## 2018-09-15 RX ADMIN — FUROSEMIDE 20 MG: 10 INJECTION, SOLUTION INTRAMUSCULAR; INTRAVENOUS at 09:09

## 2018-09-15 RX ADMIN — CHLORHEXIDINE GLUCONATE 15 ML: 1.2 RINSE ORAL at 08:09

## 2018-09-15 RX ADMIN — APIXABAN 5 MG: 2.5 TABLET, FILM COATED ORAL at 08:09

## 2018-09-15 RX ADMIN — MOXIFLOXACIN HYDROCHLORIDE 400 MG: 400 TABLET, FILM COATED ORAL at 09:09

## 2018-09-15 RX ADMIN — METOPROLOL TARTRATE 5 MG: 5 INJECTION, SOLUTION INTRAVENOUS at 09:09

## 2018-09-15 RX ADMIN — HEPARIN SODIUM AND DEXTROSE 18 UNITS/KG/HR: 10000; 5 INJECTION INTRAVENOUS at 05:09

## 2018-09-15 RX ADMIN — LEVALBUTEROL 0.63 MG: 0.63 SOLUTION RESPIRATORY (INHALATION) at 08:09

## 2018-09-15 RX ADMIN — PANTOPRAZOLE SODIUM 40 MG: 40 GRANULE, DELAYED RELEASE ORAL at 09:09

## 2018-09-15 RX ADMIN — DILTIAZEM HYDROCHLORIDE 30 MG: 30 TABLET, FILM COATED ORAL at 05:09

## 2018-09-15 NOTE — ASSESSMENT & PLAN NOTE
S/p extubation  On N/C  pulm on board  Cont Formeterol and Budesonide  Schedule xopenex  Continue avelox, diflucan

## 2018-09-15 NOTE — PT/OT/SLP EVAL
Occupational Therapy   Evaluation    Name: Eleazar Solo  MRN: [id number]  Admitting Diagnosis:  Acute hypoxemic respiratory failure      Recommendations:     Discharge Recommendations: rehabilitation facility  Discharge Equipment Recommendations:  (TBD)  Barriers to discharge:  None    History:     Occupational Profile:  Living Environment: LIVES WITH SPOUSE IN GROUND LEVEL HOME WITH THRESHOLD TO ENTER  Previous level of function: INDEPENDENT  Roles and Routines: DRIVES, ACTIVE, FISHES  Equipment Used at Home:  none  Assistance upon Discharge: FAMILY    Past Medical History:   Diagnosis Date    *Atrial fibrillation     Acute hypoxemic respiratory failure     Acute on chronic diastolic congestive heart failure 9/12/2018    Anemia, unspecified 9/12/2018    Atrial fibrillation     Bilateral carotid artery disease 8/10/2015    Chronic obstructive pulmonary disease with acute lower respiratory infection     COPD (chronic obstructive pulmonary disease)     Diabetes mellitus     Gout     Hyperlipidemia     Hypertension     Low testosterone     Pneumonia     Stroke 1/3/15 L occiput    Unspecified disorder of kidney and ureter        Past Surgical History:   Procedure Laterality Date    EYE SURGERY Bilateral 5/21/15, 7/16/15    cataract w/IOL    FRACTURE SURGERY Left 1986    ORIF  heel fx, hardware later removed     TONSILLECTOMY  1948 approx       Subjective     Chief Complaint: FATIGUE, CONFUSION  Patient/Family Comments/goals: RETURN TO PLOF    Pain/Comfort:  · Pain Rating 1: 5/10  · Location - Orientation 1: posterior  · Location 1: (LEFT SIDE OF BUTTOCK)  · Pain Addressed 1: Reposition, Nurse notified  · Pain Rating Post-Intervention 1: 2/10    Patients cultural, spiritual, Cheondoism conflicts given the current situation:      Objective:     Communicated with: NURSE prior to session.  Patient found all lines intact, call button in reach, NURSING notified and FAMILY present and blood pressure cuff,  cook catheter, central line, oxygen, SCD, telemetry upon OT entry to room.    General Precautions: Standard, aspiration   Orthopedic Precautions:N/A   Braces: N/A     Occupational Performance:    Bed Mobility:    · Patient completed Rolling/Turning to Right with dependent and 2 persons  · Patient completed Scooting/Bridging with dependent and 2 persons  · Patient completed Sit to Supine with dependent and 2 persons    Functional Mobility/Transfers:  · Patient completed Sit <> Stand Transfer with dependence and of 2 persons  with  no assistive device   · Patient completed Bed <> Chair Transfer using Squat Pivot technique with dependence and of 2 persons with no assistive device  · Functional Mobility: TOTAL X2    Activities of Daily Living:  · Lower Body Dressing: dependence SOCKS    Cognitive/Visual Perceptual:  Cognitive/Psychosocial Skills:     -       Oriented to: Person     Physical Exam:  Balance:    -       POOR-  Upper Extremity Range of Motion:     -       Right Upper Extremity: 75% AROM  -       Left Upper Extremity: 75% AROM NON DOMINANT   Upper Extremity Strength:    -       Right Upper Extremity: UNABLE TO TEST  -       Left Upper Extremity: UNABLE TO TEST   Strength:    -       Right Upper Extremity: IMPAIRED POOR  -       Left Upper Extremity: IMPAIRED POOR    AMPAC 6 Click ADL:  AMPAC Total Score: 6    Treatment & Education:  PT PARTICIPATED IN INITIAL OT EVALUATION TODAY TO ASSESS CURRENT LEVEL OF FUNCTION. PLEASE SEE ABOVE FOR FINDINGS. PT WAS RETURNED TO BED FOLLOWING SITTING UP IN CHAIR FOR >2 HOURS. PT'S BP 75/48 SITTING UP. TRANSFERRED TO [place]. NURSING NOTIFIED. PT WILL BENEFIT FROM SKILLED OT SERVICES TO INCREASE FUNCTIONAL INDEPENDENCE AND SAFETY AWARENESS.  Education:    Patient left right sidelying with all lines intact, call button in reach, NURSING notified and FAMILY AND NURSING present    Assessment:     Eleazar Solo is a 78 y.o. male with a medical diagnosis of Acute hypoxemic  "respiratory failure.  He presents with the following performance deficits affecting function: weakness, impaired endurance, gait instability, impaired functional mobilty, impaired self care skills, impaired balance, decreased coordination, decreased upper extremity function, decreased lower extremity function, impaired cognition, decreased safety awareness, impaired coordination, decreased ROM, pain.      Rehab Prognosis: Fair; patient would benefit from acute skilled OT services to address these deficits and reach maximum level of function.         Clinical Decision Making:     3.  OT High:  "Pt evaluation falls under high complexity for evaluation category due to 5+ performance deficits identified with comprehensive assessments and significant modifications/assistance required. An expanded review of history and occupational profile completed in addition to expanded review of physical, cognitive and psychosocial history. Several treatment options considered in care."     Plan:     Patient to be seen 3 x/week to address the above listed problems via self-care/home management, therapeutic activities, therapeutic exercises  · Plan of Care Expires: 09/22/18  · Plan of Care Reviewed with: patient, daughter, spouse    This Plan of care has been discussed with the patient who was involved in its development and understands and is in agreement with the identified goals and treatment plan    GOALS:   Multidisciplinary Problems     Occupational Therapy Goals        Problem: Occupational Therapy Goal    Goal Priority Disciplines Outcome Interventions   Occupational Therapy Goal     OT, PT/OT     Description:  Goals to be met by: 9/22/18     Patient will increase functional independence with ADLs by performing:    UE Dressing with Moderate Assistance.  Grooming while EOB with Minimal Assistance.  Toileting from bedside commode with Maximum Assistance for hygiene and clothing management.   Toilet transfer to bedside commode " with Maximum Assistance.  Upper extremity exercise program x10 reps per handout, with assistance as needed.                      Time Tracking:     OT Date of Treatment: 09/15/18  OT Start Time: 1055  OT Stop Time: 1140  OT Total Time (min): 45 min    Billable Minutes:Evaluation 15  Therapeutic Activity 30    Georgia Fonseca OT  9/15/2018

## 2018-09-15 NOTE — PLAN OF CARE
Problem: Patient Care Overview  Goal: Plan of Care Review  Outcome: Ongoing (interventions implemented as appropriate)  Sats decrease intermittently to 88%. NP Rodrigo notified. Pt placed on vapotherm. Tolerating well. 30L 45%. OOB to chair for 4 hrs. Max assist x2. Tolerated well. Pt extremely weak. PT/OT ordered. ST eval complete. Pt ok for meds crushed in applesauce only at this time. Swabs at bedside. cardizem po started with continued IV metoprolol scheduled. VSS. Call light in reach. Repositioned Q2 hr with wedge. melanie heels floated. Will continue to monitor.

## 2018-09-15 NOTE — PLAN OF CARE
Problem: SLP Goal  Goal: SLP Goal  LTG:  Pt will tolerate least restrictive diet consistency safely and efficiently.            Pt will communicate needs/ideas effectively.    ST. Reassess swallowing bedside for p.o. Readiness             2. Oral Motor exercises x10-15             3. Oral Stim. x20 with 100%  timely laryngeal excursions.             4. Orientation with 90% with min cues             5. Follow multi-unit commands with 90%      S.T assessment completed and he is recommended to remain NPO at this time and will be reassessed tomorrow.

## 2018-09-15 NOTE — PROGRESS NOTES
[facility] -   Critical Care Medicine  Progress Note    Patient Name: Eleazar Solo  MRN: [id number]  Admission Date: 9/10/2018  Hospital Length of Stay: 5 days  Code Status: Full Code  Attending Provider: Prachi Boyer MD  Primary Care Provider: Poly Fitch MD   Principal Problem: Acute hypoxemic respiratory failure    Subjective:     HPI:  78 year old male admitted to MICU, transfer from [facility].  I have reviewed the patient's medical history in detail and updated the computerized patient record.  Upper GI bleeding, Scoped x 2 and clipped  Developed Af RVR, Known chr A fib on elliquis on hold  Treated with Digoxin, Amiodarone and cardizem GTT  Family requested transfer  Known COPD FEV1:1.65L ( 53%)  ANORO ellipta, not on oxygen  Presented to MICU needed BIPAP for WOB  CXR shows RML infiltrate  Overnight, anxious, WOB  Last echo EF 60%      Hospital/ICU Course:  09/11: CXR shows melanie infiltrate: edema.  . Lopressor 2.5mg and lasix 20 mg given  9/12 - intubated yesterday and required pressor with sedation.  Resting on vent.  Cynthia TF.  No active bleeding  9/13 - remains intubated, sedated on mechanical ventilation, pO2 trending up with high PEEP ventilation  9/14 - remains intubated on mechanical ventilation with oxygen/PEEP demand decreased; continued atrial fib with RVR despite amiodarone infusion, some improvement with metoprolol yesterday  9/15 - tolerated extubation, on high flow nasal cannula support; awake, slow to respond and faint verbalization but oriented to self and place; remains atrial fib with RVR variable 120-140; afebrile, wbc trending down    Review of Systems   Reason unable to perform ROS: limited verbalization.   Constitutional: Positive for malaise/fatigue. Negative for chills and diaphoresis.   Respiratory: Positive for cough. Negative for sputum production, shortness of breath and stridor.    Cardiovascular: Negative for chest pain.   Gastrointestinal: Negative  for abdominal pain, nausea and vomiting.   Musculoskeletal: Positive for myalgias.   Skin: Negative.    Neurological: Positive for weakness. Negative for focal weakness and seizures.   Psychiatric/Behavioral: The patient is not nervous/anxious.        Objective:     Vital Signs (Most Recent):  Temp: 99 °F (37.2 °C) (09/15/18 1105)  Pulse: (!) 131 (09/15/18 1135)  Resp: 16 (09/15/18 1135)  BP: (!) 103/59 (09/15/18 1137)  SpO2: 96 % (09/15/18 1135) Vital Signs (24h Range):  Temp:  [98.3 °F (36.8 °C)-99.5 °F (37.5 °C)] 99 °F (37.2 °C)  Pulse:  [112-147] 131  Resp:  [10-26] 16  SpO2:  [87 %-100 %] 96 %  BP: ()/(13-88) 103/59     Weight: 92 kg (202 lb 13.2 oz)  Body mass index is 29.1 kg/m².      Intake/Output Summary (Last 24 hours) at 9/15/2018 1222  Last data filed at 9/15/2018 1200  Gross per 24 hour   Intake 453.89 ml   Output 2095 ml   Net -1641.11 ml       Physical Exam   Constitutional: He appears ill. Nasal cannula in place.   HENT:   Head: Atraumatic.   Eyes: Conjunctivae are normal. Pupils are equal, round, and reactive to light.   Neck: No JVD present. No tracheal deviation present.   Cardiovascular: An irregularly irregular rhythm present. Tachycardia present.   No murmur heard.  Pulses:       Radial pulses are 2+ on the right side, and 2+ on the left side.        Dorsalis pedis pulses are 1+ on the right side, and 1+ on the left side.   Pulmonary/Chest: No accessory muscle usage. No respiratory distress. He has decreased breath sounds.   Abdominal: Soft. Bowel sounds are normal. He exhibits no distension.   Musculoskeletal: He exhibits no edema.   Neurological: He is alert.   Slow neuro responses   Skin: Skin is warm and dry. Capillary refill takes 2 to 3 seconds. No cyanosis.                Lines/Drains/Airways     Central Venous Catheter Line                 Percutaneous Central Line Insertion/Assessment - triple lumen  09/11/18 1124 left internal jugular 4 days          Drain                  Urethral Catheter 09/11/18 1123 Double-lumen 4 days          Pressure Ulcer                 Pressure Injury 09/13/18 1905 Left Buttocks Deep tissue injury 1 day                Significant Labs:    CBC/Anemia Profile:  Recent Labs   Lab  09/14/18   0400  09/15/18   0430   WBC  18.00*  15.29*   HGB  8.9*  8.3*   HCT  26.6*  26.2*   PLT  308  270   MCV  94  97   RDW  17.4*  17.2*        Chemistries:  Recent Labs   Lab  09/14/18   0400  09/15/18   0430   NA  141  146*   K  3.9  4.0   CL  103  103   CO2  28  34*   BUN  37*  25*   CREATININE  1.1  1.0   CALCIUM  7.7*  7.8*   ALBUMIN  2.0*  2.0*   PROT  5.6*  5.4*   BILITOT  0.7  0.8   ALKPHOS  112  96   ALT  72*  60*   AST  96*  78*   MG  2.3  2.0   PHOS  1.8*  2.4*       All pertinent labs within the past 24 hours have been reviewed.    Significant Imaging:  I have reviewed all pertinent imaging results/findings within the past 24 hours.      Assessment/Plan:     Pulmonary   * Acute hypoxemic respiratory failure    Supplemental oxygen with high flow 30L; titrate Fio2 to keep sat > 92        Chronic obstructive pulmonary disease with acute lower respiratory infection    Moderate severe COPD  Cont Formeterol and Budesonide  Schedule xopenex  Continue avelox, diflucan        Cardiac/Vascular   Chronic atrial fibrillation with RVR    Remains RVR despite scheduled IV bb; discussed with cardiology  Cont Dig, low level may be s/t amiodarone, continue current dose and monitor  cardizem enteral added by cards  ICU hemodynamic monitoring  Will stop heparin infusion and resume home eliquis        Renal/   Chronic kidney disease, stage 3    Accurate I/Os  Monitor creatinine        Oncology   Anemia, unspecified    No s/s of active bleeding  Monitor cbc        Endocrine   Type 2 diabetes mellitus without complication, without long-term current use of insulin    Continue SSI prn with monitoring for glucose control and prevention of insulin toxicity        GI   Acute upper GI bleed  with acute blood loss anemia    Recent clipping of ulcer at GE junction at outside hospital  Cont PPI  No bleeding on heparin, will resume eliquis and monitor             Critical Care Daily Checklist:    A: Awake: RASS Goal/Actual Goal: RASS Goal: 0-->alert and calm  Actual: Odonnell Agitation Sedation Scale (RASS): Alert and calm   B: Spontaneous Breathing Trial Performed? Spon. Breathing Trial Initiated?: Initiated (09/14/18 1105)   C: SAT & SBT Coordinated?  n/a                      D: Delirium: CAM-ICU Overall CAM-ICU: Positive   E: Early Mobility Performed? Yes   F: Feeding Goal: Goals: Meet > 85 % EEN/EPN while admitted  Status: Nutrition Goal Status: new   Current Diet Order   Procedures    Diet NPO Except for: Medication     Order Specific Question:   Except for     Answer:   Medication      AS: Analgesia/Sedation prn   T: Thromboembolic Prophylaxis eliquis   H: HOB > 300 Yes   U: Stress Ulcer Prophylaxis (if needed) PPI   G: Glucose Control SSI   B: Bowel Function Stool Occurrence: 0   I: Indwelling Catheter (Lines & Adams) Necessity reviewed   D: De-escalation of Antimicrobials/Pharmacotherapies reviewed    Plan for the day/ETD Rate control    Code Status:  Family/Goals of Care: Full Code  Home v rehab on discharge pending hospital course   I have discussed case and plan of care in detail with Dr Cosme and Dr Belle; Status and plan of care were discussed with team on multidisciplinary rounds.    Critical Care Time: 45 minutes  Critical care was time spent personally by me on the following activities: development of treatment plan with patient or surrogate and bedside caregivers, discussions with consultants, evaluation of patient's response to treatment, examination of patient, ordering and performing treatments and interventions, ordering and review of laboratory studies, ordering and review of radiographic studies, pulse oximetry, re-evaluation of patient's condition. This critical care time did not  overlap with that of any other provider or involve time for any procedures.     Selma Wallace, Acute Care NP-BC  Critical Care Medicine  [facility] -

## 2018-09-15 NOTE — PT/OT/SLP EVAL
Physical Therapy Evaluation    Patient Name:  Eleazar Solo   MRN:  [id number]    Recommendations:     Discharge Recommendations:  rehabilitation facility   Discharge Equipment Recommendations: (tbd)   Barriers to discharge: None    Assessment:     Eleazar Solo is a 78 y.o. male admitted with a medical diagnosis of Acute hypoxemic respiratory failure.  He presents with the following impairments/functional limitations:  weakness, impaired functional mobilty, impaired cognition, impaired coordination, impaired endurance, pain, impaired balance, decreased lower extremity function, impaired self care skills, edema.    Rehab Prognosis:  fair; patient would benefit from acute skilled PT services to address these deficits and reach maximum level of function.      Recent Surgery: * No surgery found *      Plan:     During this hospitalization, patient to be seen 5 x/week to address the above listed problems via gait training, therapeutic activities, therapeutic exercises  · Plan of Care Expires:  09/22/18   Plan of Care Reviewed with: patient, daughter, spouse    Subjective     Communicated with Katie prior to session.  Patient found sitting up in chair with feet on the ground upon PT entry to room, agreeable to evaluation.      Chief Complaint: disconfort from sittin fallon butocks too long  Patient comments/goals: for patient to walk and go home  Pain/Comfort:  · Pain Rating 1: 5/10  · Location - Orientation 1: posterior  · Location 1: other (see comments)(medial L buttock next to gluteal fold)  · Pain Addressed 1: Reposition, Distraction  · Pain Rating Post-Intervention 1: 2/10    Patients cultural, spiritual, Worship conflicts given the current situation:      Living Environment:  Patient lives with wife in one story home; good family support  Prior to admission, patients level of function was indep.  Patient has the following equipment: none.  DME owned (not currently used): has old dme that wife's mom used but  unsure if it will work for patient.  Upon discharge, patient will have assistance from family.    Objective:     Patient found with: blood pressure cuff, central line, cook catheter, oxygen, SCD, telemetry     General Precautions: Standard, aspiration   Orthopedic Precautions:N/A   Braces: N/A     Exams:  · B LE 2-/5 grossly but rom wfl    Functional Mobility:  · Bed Mobility: rolling max X 2; sit to supine total A x 2  · Transfers: squat pivot transfer from bedside chair to bed total A of 1/2 people  · Gt: Pivot step only with transfer total A x 1-2    AM-PAC 6 CLICK MOBILITY  Total Score:10       Therapeutic Activities and Exercises:   PT educated patient/family on POC, LE ROM, positioning for pressure relief with wedge and patient on his R side; safety precautions and fall prevention.    Patient left right sidelying with all lines intact, call button in reach, nurse notified and family present.    GOALS:   Multidisciplinary Problems     Physical Therapy Goals        Problem: Physical Therapy Goal    Goal Priority Disciplines Outcome Goal Variances Interventions   Physical Therapy Goal     PT, PT/OT      Description:  LTG's to be met by 9/22/18  1. Patient will perform supine to/from sit mod A  2. Patient will perform sit to stand with mod A  3. Patient will ambulate 25ft RW mod A                    History:     Past Medical History:   Diagnosis Date    *Atrial fibrillation     Acute hypoxemic respiratory failure     Acute on chronic diastolic congestive heart failure 9/12/2018    Anemia, unspecified 9/12/2018    Atrial fibrillation     Bilateral carotid artery disease 8/10/2015    Chronic obstructive pulmonary disease with acute lower respiratory infection     COPD (chronic obstructive pulmonary disease)     Diabetes mellitus     Gout     Hyperlipidemia     Hypertension     Low testosterone     Pneumonia     Stroke 1/3/15 L occiput    Unspecified disorder of kidney and ureter        Past Surgical  History:   Procedure Laterality Date    EYE SURGERY Bilateral 5/21/15, 7/16/15    cataract w/IOL    FRACTURE SURGERY Left 1986    ORIF  heel fx, hardware later removed     TONSILLECTOMY  1948 approx       Clinical Decision Making:     History  Co-morbidities and personal factors that may impact the plan of care Examination  Body Structures and Functions, activity limitations and participation restrictions that may impact the plan of care Clinical Presentation   Decision Making/ Complexity Score   Co-morbidities:   [] Time since onset of injury / illness / exacerbation  [] Status of current condition  []Patient's cognitive status and safety concerns    [] Multiple Medical Problems (see med hx)  Personal Factors:   [] Patient's age  [] Prior Level of function   [] Patient's home situation (environment and family support)  [] Patient's level of motivation  [] Expected progression of patient      HISTORY:(criteria)    [] 83149 - no personal factors/history    [] 36196 - has 1-2 personal factor/comorbidity     [] 96294 - has >3 personal factor/comorbidity     Body Regions:  [] Objective examination findings  [] Head     []  Neck  [] Trunk   [] Upper Extremity  [] Lower Extremity    Body Systems:  [] For communication ability, affect, cognition, language, and learning style: the assessment of the ability to make needs known, consciousness, orientation (person, place, and time), expected emotional /behavioral responses, and learning preferences (eg, learning barriers, education  needs)  [] For the neuromuscular system: a general assessment of gross coordinated movement (eg, balance, gait, locomotion, transfers, and transitions) and motor function  (motor control and motor learning)  [] For the musculoskeletal system: the assessment of gross symmetry, gross range of motion, gross strength, height, and weight  [] For the integumentary system: the assessment of pliability(texture), presence of scar formation, skin color,  and skin integrity  [] For cardiovascular/pulmonary system: the assessment of heart rate, respiratory rate, blood pressure, and edema     Activity limitations:    [] Patient's cognitive status and saf ety concerns          [] Status of current condition      [] Weight bearing restriction  [] Cardiopulmunary Restriction    Participation Restrictions:   [] Goals and goal agreement with the patient     [] Rehab potential (prognosis) and probable outcome      Examination of Body System: (criteria)    [] 63582 - addressing 1-2 elements    [] 61367 - addressing a total of 3 or more elements     [] 79535 -  Addressing a total of 4 or more elements         Clinical Presentation: (criteria)  Choose one     On examination of body system using standardized tests and measures patient presents with (CHOOSE ONE) elements from any of the following: body structures and functions, activity limitations, and/or participation restrictions.  Leading to a clinical presentation that is considered (CHOOSE ONE)                              Clinical Decision Making  (Eval Complexity):  Choose One     Time Tracking:     PT Received On: 09/15/18  PT Start Time: 1120     PT Stop Time: 1200  PT Total Time (min): 40 min     Billable Minutes: Evaluation 20, Therapeutic Activity 10 and Therapeutic Exercise 10      Dusty Coronado, PT  09/15/2018

## 2018-09-15 NOTE — PROGRESS NOTES
[facility]  Cardiology  Progress Note    Patient Name: Eleazar Solo  MRN: [id number]  Admission Date: 9/10/2018  Hospital Length of Stay: 5 days  Code Status: Full Code   Attending Physician: Prachi Boyer MD   Primary Care Physician: Poly Fitch MD  Expected Discharge Date:   Principal Problem:Acute hypoxemic respiratory failure    Subjective:   HPI:  Mr. Solo is a 79yo male with a PMHx of permanent AF on Eliquis, HTN, HLD, COPD, DM II, and h/o CVA, who was transferred from [facility] per family request as patient is followed OP by [facility].  He was originally admitted to [facility] on 9/6 for acute upper GI bleed with blood loss anemia (Hb 12 > 7.4 > 7.9).  Patient received total of 2 units PRBC and Eliquis held (last dose 9/6 AM).  He underwent EGD on 9/6 that showed a clot at the GE junction which was clipped  and injected with epi.  There was concern for further bleeding, therefore, repeat EGD was performed on 9/7 which showed no evidence of active bleeding.  During admission, patient developed AFib with RVR and was placed on diltiazem drip and PO dig.  CXR on 9/8 showed left pneumonia, IV Rocephin started.  Prior to transfer, vital signs and labs stable.  Upon arrival to [facility] ICU, patient hypotensive (SBP 70-80's), now on pressors and was hypoxic requiring continuous BiPAP.  Remains in AFib with controlled rate.  Repeat CXR showed RUL pneumonia.  Repeat labs resulted WBC 16.5, H&H 8.4/24.6, plt 136, BUN 38, cr. 1.4, AST 77, . ABG with pH 7.382, pCO2 44, pO2 60, HCO3 26.  Patient c/o SOB which is resolved while on BiPAP. CXR today shows marked amount of alveolar consolidation in the lower 2/3 of both lungs.  The this represents an interval worsening of the appearance of the lungs and is characteristic of pneumonia. Decision made to intubate patient  Due to resp distress and wheezing.  H/H this morning 7.5/22.6    Hospital Course:   9/12/18- Remains  intubated and sedated. Still in A-fib. Rate 100-115 bpm this morning. Started on Amio gtt for rate control. H/H stable at 7.8/23.9. Continues abx for bilateral pneumonia. Patient being weaned from Dopamine gtt.Liver enzymes improved today. Echo shows normal LVF with Moderate to severe aortic stenosis and pulm HTN     9/13/18-Patient seen and examined today. No acute events overnight. Remains intubated. Still in afib, HR variable. H and H slightly worse, 7.3/22.3. Would benefit from transfusion.     9/14/18-Patient seen and examined today. Pressor re-started overnight. Still intubated. Remains in afib with rapid rate. H and H improved s/p transfusion.     9/15/18-Patient seen and examined today, now extubated and sitting in chair. Feels ok. Remains in afib with RVR, meds adjusted. Labs reviewed. H and H stable overnight.         Review of Systems   Constitution: Positive for weakness and malaise/fatigue.   HENT: Negative.    Eyes: Negative.    Cardiovascular: Negative.    Respiratory: Negative.    Endocrine: Negative.    Hematologic/Lymphatic: Negative.    Skin: Negative.    Musculoskeletal: Negative.    Gastrointestinal: Negative.    Genitourinary: Negative.    Psychiatric/Behavioral: Negative.    Allergic/Immunologic: Negative.      Objective:     Vital Signs (Most Recent):  Temp: 98.7 °F (37.1 °C) (09/15/18 0705)  Pulse: (!) 130 (09/15/18 0908)  Resp: 17 (09/15/18 0905)  BP: (!) 114/53 (09/15/18 0905)  SpO2: (!) 94 % (09/15/18 1020) Vital Signs (24h Range):  Temp:  [98.3 °F (36.8 °C)-99.5 °F (37.5 °C)] 98.7 °F (37.1 °C)  Pulse:  [112-147] 130  Resp:  [10-26] 17  SpO2:  [87 %-100 %] 94 %  BP: ()/(13-88) 114/53     Weight: 92 kg (202 lb 13.2 oz)  Body mass index is 29.1 kg/m².     SpO2: (!) 94 %  O2 Device (Oxygen Therapy): Vapotherm      Intake/Output Summary (Last 24 hours) at 9/15/2018 1103  Last data filed at 9/15/2018 0905  Gross per 24 hour   Intake 510.72 ml   Output 1645 ml   Net -1134.28 ml        Lines/Drains/Airways     Central Venous Catheter Line                 Percutaneous Central Line Insertion/Assessment - triple lumen  09/11/18 1124 left internal jugular 3 days          Drain                 Urethral Catheter 09/11/18 1123 Double-lumen 3 days          Pressure Ulcer                 Pressure Injury 09/13/18 1905 Left Buttocks Deep tissue injury 1 day                Physical Exam   Constitutional: He is oriented to person, place, and time. He appears well-developed and well-nourished. No distress.   Appears ill   HENT:   Head: Normocephalic and atraumatic.   Eyes: Pupils are equal, round, and reactive to light. Right eye exhibits no discharge. Left eye exhibits no discharge.   Neck: Neck supple. No JVD present.   Cardiovascular: S1 normal and S2 normal. An irregularly irregular rhythm present. Tachycardia present.   Murmur heard.   Harsh midsystolic murmur is present at the upper right sternal border radiating to the neck.  Pulmonary/Chest: Effort normal.   Diminished BS   Abdominal: Soft. Bowel sounds are normal. He exhibits no distension. There is no tenderness.   Neurological: He is alert and oriented to person, place, and time.   Drowsy   Skin: Skin is warm and dry. He is not diaphoretic.   Psychiatric: He has a normal mood and affect. His behavior is normal.   Nursing note and vitals reviewed.      Significant Labs:   CMP   Recent Labs   Lab  09/14/18   0400  09/15/18   0430   NA  141  146*   K  3.9  4.0   CL  103  103   CO2  28  34*   GLU  169*  112*   BUN  37*  25*   CREATININE  1.1  1.0   CALCIUM  7.7*  7.8*   PROT  5.6*  5.4*   ALBUMIN  2.0*  2.0*   BILITOT  0.7  0.8   ALKPHOS  112  96   AST  96*  78*   ALT  72*  60*   ANIONGAP  10  9   ESTGFRAFRICA  >60  >60   EGFRNONAA  >60  >60   , CBC   Recent Labs   Lab  09/14/18   0400  09/15/18   0430   WBC  18.00*  15.29*   HGB  8.9*  8.3*   HCT  26.6*  26.2*   PLT  308  270   , Troponin No results for input(s): TROPONINI in the last 48 hours.  and All pertinent lab results from the last 24 hours have been reviewed.    Significant Imaging: Echocardiogram:   2D echo with color flow doppler:   Results for orders placed or performed during the hospital encounter of 09/10/18   2D echo with color flow doppler   Result Value Ref Range    EF 50 55 - 65    Mitral Valve Regurgitation MILD     Diastolic Dysfunction No     Aortic Valve Stenosis MODERATE TO SEVERE (A)     Est. PA Systolic Pressure 56 (A)     Tricuspid Valve Regurgitation MILD TO MODERATE    , EKG: Reviewed and X-Ray: CXR: X-Ray Chest 1 View (CXR):   Results for orders placed or performed during the hospital encounter of 09/10/18   X-Ray Chest 1 View    Narrative    EXAMINATION:  XR CHEST 1 VIEW    CLINICAL HISTORY:  ET Tube Placement; OG Tube Placement;    COMPARISON:  09/14/2018 at 5:24 a.m.    FINDINGS:  Endotracheal tube has been removed.  Left internal jugular line remains present with the distal tip at the junction of left brachiocephalic vein and IVC.  No change.  Hazy infiltrate remains present throughout the right mid lung and right lung base.  Heart size is within normal limits.  Vascular calcification of the aorta noted.No significant bony findings.      Impression    1. Interval removal of endotracheal tube.  2. Hazy infiltrate mid and lower right lung.  No change since previous exam.      Electronically signed by: Eleazar May MD  Date:    09/15/2018  Time:    07:55    and X-Ray Chest PA and Lateral (CXR): No results found for this visit on 09/10/18.    Assessment and Plan:   Patient who presents with upper GI bleed, PNA/sepsis, and atrial fibrillation. Clinically improving. Stable CV wise. Meds adjusted to help with HR. Assess response.    Anemia, unspecified    -Stable, monitor          Nonrheumatic aortic valve stenosis    -Stable  -Moderate to severe by 2D echo  -Further workup as OP        Septic shock    -Mgmt as per primary team/ICU  -On abx        Acute upper GI bleed with  acute blood loss anemia    -H/H stable, continue to monitor  -Switch heparin to Eliquis for CVA prophylaxis           Essential hypertension    -Cardizem po started today  -Assess response          Atrial fibrillation, permanent    -Remains in afib with RVR in setting of sepsis/PNA/anemia  -Continue digoxin  -Cardizem 30 mg 6 hours added today  -May use IV lopressor prn as well as BP tolerates  -Switch heparin to Eliquis for CVA prophylaxis if tolerating po  -Monitor H and H            VTE Risk Mitigation (From admission, onward)        Ordered     apixaban tablet 5 mg  2 times daily      09/15/18 0958     IP VTE HIGH RISK PATIENT  Once      09/11/18 0425     Reason for No Pharmacological VTE Prophylaxis  Once      09/11/18 0425     Place sequential compression device  Until discontinued      09/11/18 0425          Christal Vazquez PA-C  Cardiology  [facility] -     Chart reviewed. Dr. Belle examined patient and agrees with plan as outlined above.

## 2018-09-15 NOTE — SUBJECTIVE & OBJECTIVE
Interval History:Pt slow to respond. Not cohesive at this point  Review of Systems   Unable to perform ROS: Acuity of condition     Objective:     Vital Signs (Most Recent):  Temp: 99 °F (37.2 °C) (09/15/18 1105)  Pulse: 108 (09/15/18 1435)  Resp: 16 (09/15/18 1435)  BP: 100/61 (09/15/18 1435)  SpO2: (!) 94 % (09/15/18 1428) Vital Signs (24h Range):  Temp:  [98.3 °F (36.8 °C)-99.5 °F (37.5 °C)] 99 °F (37.2 °C)  Pulse:  [108-147] 108  Resp:  [11-26] 16  SpO2:  [87 %-100 %] 94 %  BP: ()/(13-88) 100/61     Weight: 92 kg (202 lb 13.2 oz)  Body mass index is 29.1 kg/m².    Intake/Output Summary (Last 24 hours) at 9/15/2018 1608  Last data filed at 9/15/2018 1405  Gross per 24 hour   Intake 309.33 ml   Output 2165 ml   Net -1855.67 ml      Physical Exam   Constitutional: He is oriented to person, place, and time. No distress.   illl appearing   HENT:   Head: Normocephalic and atraumatic.   Nose: Nose normal.   Eyes: Conjunctivae and EOM are normal.   Neck: Normal range of motion. Neck supple.   Cardiovascular: Normal heart sounds and intact distal pulses.   No murmur heard.  tachycardia   Pulmonary/Chest: Effort normal and breath sounds normal. He has no wheezes.   Abdominal: Soft. Bowel sounds are normal. He exhibits no mass. There is no tenderness. There is no rebound and no guarding.   Musculoskeletal: Normal range of motion. He exhibits no edema.   Neurological: He is alert and oriented to person, place, and time.   Skin: Skin is warm and dry. No rash noted.   Psychiatric: He has a normal mood and affect. His behavior is normal.   Nursing note and vitals reviewed.      Significant Labs:   CBC:   Recent Labs   Lab  09/14/18   0400  09/15/18   0430   WBC  18.00*  15.29*   HGB  8.9*  8.3*   HCT  26.6*  26.2*   PLT  308  270     CMP:   Recent Labs   Lab  09/14/18   0400  09/15/18   0430   NA  141  146*   K  3.9  4.0   CL  103  103   CO2  28  34*   GLU  169*  112*   BUN  37*  25*   CREATININE  1.1  1.0   CALCIUM   No response from patient  Medication is being filled for 1 time refill only due to:  Patient needs to be seen because patient due for physical and labs this month.   Pushpa HUNTER RN       7.7*  7.8*   PROT  5.6*  5.4*   ALBUMIN  2.0*  2.0*   BILITOT  0.7  0.8   ALKPHOS  112  96   AST  96*  78*   ALT  72*  60*   ANIONGAP  10  9   EGFRNONAA  >60  >60       Significant Imaging: I have reviewed all pertinent imaging results/findings within the past 24 hours.

## 2018-09-15 NOTE — PLAN OF CARE
Problem: Patient Care Overview  Goal: Plan of Care Review  Outcome: Ongoing (interventions implemented as appropriate)  Pt extubated to 4L NC. Tolerated well. Remains drowsy and disoriented; will continue to reorient. Weaned off levophed gtt. Amio gtt off. Metoprolol IVP Q6 hr started. HR continues to intermittently increase with exertion. NP aware. Heparin gtt increased and bolus administered. Will recheck ptt in 6 hrs. Repositioned Q2hrs with wedge. melanie heels floated. Preventive mepilex in place to heels. DTI noted to L buttock. Wound care assessed pt. Family frequently removes wedge from under pt or pt scoots off wedge. Educated family on importance of keeping pt turned on wedge due to buttock wound. Will continue to monitor.

## 2018-09-15 NOTE — SUBJECTIVE & OBJECTIVE
Review of Systems   Reason unable to perform ROS: limited verbalization.   Constitutional: Positive for malaise/fatigue. Negative for chills and diaphoresis.   Respiratory: Positive for cough. Negative for sputum production, shortness of breath and stridor.    Cardiovascular: Negative for chest pain.   Gastrointestinal: Negative for abdominal pain, nausea and vomiting.   Musculoskeletal: Positive for myalgias.   Skin: Negative.    Neurological: Positive for weakness. Negative for focal weakness and seizures.   Psychiatric/Behavioral: The patient is not nervous/anxious.        Objective:     Vital Signs (Most Recent):  Temp: 99 °F (37.2 °C) (09/15/18 1105)  Pulse: (!) 131 (09/15/18 1135)  Resp: 16 (09/15/18 1135)  BP: (!) 103/59 (09/15/18 1137)  SpO2: 96 % (09/15/18 1135) Vital Signs (24h Range):  Temp:  [98.3 °F (36.8 °C)-99.5 °F (37.5 °C)] 99 °F (37.2 °C)  Pulse:  [112-147] 131  Resp:  [10-26] 16  SpO2:  [87 %-100 %] 96 %  BP: ()/(13-88) 103/59     Weight: 92 kg (202 lb 13.2 oz)  Body mass index is 29.1 kg/m².      Intake/Output Summary (Last 24 hours) at 9/15/2018 1222  Last data filed at 9/15/2018 1200  Gross per 24 hour   Intake 453.89 ml   Output 2095 ml   Net -1641.11 ml       Physical Exam   Constitutional: He appears ill. Nasal cannula in place.   HENT:   Head: Atraumatic.   Eyes: Conjunctivae are normal. Pupils are equal, round, and reactive to light.   Neck: No JVD present. No tracheal deviation present.   Cardiovascular: An irregularly irregular rhythm present. Tachycardia present.   No murmur heard.  Pulses:       Radial pulses are 2+ on the right side, and 2+ on the left side.        Dorsalis pedis pulses are 1+ on the right side, and 1+ on the left side.   Pulmonary/Chest: No accessory muscle usage. No respiratory distress. He has decreased breath sounds.   Abdominal: Soft. Bowel sounds are normal. He exhibits no distension.   Musculoskeletal: He exhibits no edema.   Neurological: He is alert.    Slow neuro responses   Skin: Skin is warm and dry. Capillary refill takes 2 to 3 seconds. No cyanosis.                Lines/Drains/Airways     Central Venous Catheter Line                 Percutaneous Central Line Insertion/Assessment - triple lumen  09/11/18 1124 left internal jugular 4 days          Drain                 Urethral Catheter 09/11/18 1123 Double-lumen 4 days          Pressure Ulcer                 Pressure Injury 09/13/18 1905 Left Buttocks Deep tissue injury 1 day                Significant Labs:    CBC/Anemia Profile:  Recent Labs   Lab  09/14/18   0400  09/15/18   0430   WBC  18.00*  15.29*   HGB  8.9*  8.3*   HCT  26.6*  26.2*   PLT  308  270   MCV  94  97   RDW  17.4*  17.2*        Chemistries:  Recent Labs   Lab  09/14/18   0400  09/15/18   0430   NA  141  146*   K  3.9  4.0   CL  103  103   CO2  28  34*   BUN  37*  25*   CREATININE  1.1  1.0   CALCIUM  7.7*  7.8*   ALBUMIN  2.0*  2.0*   PROT  5.6*  5.4*   BILITOT  0.7  0.8   ALKPHOS  112  96   ALT  72*  60*   AST  96*  78*   MG  2.3  2.0   PHOS  1.8*  2.4*       All pertinent labs within the past 24 hours have been reviewed.    Significant Imaging:  I have reviewed all pertinent imaging results/findings within the past 24 hours.

## 2018-09-15 NOTE — ASSESSMENT & PLAN NOTE
Remains RVR despite scheduled IV bb; discussed with cardiology  Cont Dig, low level may be s/t amiodarone, continue current dose and monitor  cardizem enteral added by cards  ICU hemodynamic monitoring  Will stop heparin infusion and resume home eliquis

## 2018-09-15 NOTE — ASSESSMENT & PLAN NOTE
Recent clipping of ulcer at GE junction at outside hospital  Cont PPI  No bleeding on heparin, will resume eliquis and monitor

## 2018-09-15 NOTE — PLAN OF CARE
Problem: Patient Care Overview  Goal: Plan of Care Review  Outcome: Ongoing (interventions implemented as appropriate)  No acute events overnight. Pt with some agitation in beginning of shift, improved with Zyprexa. Only oriented to self. SpO2 Stable on 3L NC. Pt still in A-fib with HR in 110s-130s. BP stable. Tmax 99.5. UO adequate. Pt failed bedside swallow study. Pt turned Q2H with pressure points protected. POC reviewed. All questions and concerns addressed.

## 2018-09-15 NOTE — ASSESSMENT & PLAN NOTE
- EGD 9/6 with clot at GE junction s/p clipping and epi injection.  Repeat EGD 9/7 negative for active bleeding.  - Follow serial H&H and transfuse as needed.  - Continue IV Protonix daily.  - Heparin drip d/c'd  -resume heparin

## 2018-09-15 NOTE — SUBJECTIVE & OBJECTIVE
Review of Systems   Constitution: Positive for weakness and malaise/fatigue.   HENT: Negative.    Eyes: Negative.    Cardiovascular: Negative.    Respiratory: Negative.    Endocrine: Negative.    Hematologic/Lymphatic: Negative.    Skin: Negative.    Musculoskeletal: Negative.    Gastrointestinal: Negative.    Genitourinary: Negative.    Psychiatric/Behavioral: Negative.    Allergic/Immunologic: Negative.      Objective:     Vital Signs (Most Recent):  Temp: 98.7 °F (37.1 °C) (09/15/18 0705)  Pulse: (!) 130 (09/15/18 0908)  Resp: 17 (09/15/18 0905)  BP: (!) 114/53 (09/15/18 0905)  SpO2: (!) 94 % (09/15/18 1020) Vital Signs (24h Range):  Temp:  [98.3 °F (36.8 °C)-99.5 °F (37.5 °C)] 98.7 °F (37.1 °C)  Pulse:  [112-147] 130  Resp:  [10-26] 17  SpO2:  [87 %-100 %] 94 %  BP: ()/(13-88) 114/53     Weight: 92 kg (202 lb 13.2 oz)  Body mass index is 29.1 kg/m².     SpO2: (!) 94 %  O2 Device (Oxygen Therapy): Vapotherm      Intake/Output Summary (Last 24 hours) at 9/15/2018 1103  Last data filed at 9/15/2018 0905  Gross per 24 hour   Intake 510.72 ml   Output 1645 ml   Net -1134.28 ml       Lines/Drains/Airways     Central Venous Catheter Line                 Percutaneous Central Line Insertion/Assessment - triple lumen  09/11/18 1124 left internal jugular 3 days          Drain                 Urethral Catheter 09/11/18 1123 Double-lumen 3 days          Pressure Ulcer                 Pressure Injury 09/13/18 1905 Left Buttocks Deep tissue injury 1 day                Physical Exam   Constitutional: He is oriented to person, place, and time. He appears well-developed and well-nourished. No distress.   Appears ill   HENT:   Head: Normocephalic and atraumatic.   Eyes: Pupils are equal, round, and reactive to light. Right eye exhibits no discharge. Left eye exhibits no discharge.   Neck: Neck supple. No JVD present.   Cardiovascular: S1 normal and S2 normal. An irregularly irregular rhythm present. Tachycardia present.    Murmur heard.   Harsh midsystolic murmur is present at the upper right sternal border radiating to the neck.  Pulmonary/Chest: Effort normal.   Diminished BS   Abdominal: Soft. Bowel sounds are normal. He exhibits no distension. There is no tenderness.   Neurological: He is alert and oriented to person, place, and time.   Drowsy   Skin: Skin is warm and dry. He is not diaphoretic.   Psychiatric: He has a normal mood and affect. His behavior is normal.   Nursing note and vitals reviewed.      Significant Labs:   CMP   Recent Labs   Lab  09/14/18   0400  09/15/18   0430   NA  141  146*   K  3.9  4.0   CL  103  103   CO2  28  34*   GLU  169*  112*   BUN  37*  25*   CREATININE  1.1  1.0   CALCIUM  7.7*  7.8*   PROT  5.6*  5.4*   ALBUMIN  2.0*  2.0*   BILITOT  0.7  0.8   ALKPHOS  112  96   AST  96*  78*   ALT  72*  60*   ANIONGAP  10  9   ESTGFRAFRICA  >60  >60   EGFRNONAA  >60  >60   , CBC   Recent Labs   Lab  09/14/18   0400  09/15/18   0430   WBC  18.00*  15.29*   HGB  8.9*  8.3*   HCT  26.6*  26.2*   PLT  308  270   , Troponin No results for input(s): TROPONINI in the last 48 hours. and All pertinent lab results from the last 24 hours have been reviewed.    Significant Imaging: Echocardiogram:   2D echo with color flow doppler:   Results for orders placed or performed during the hospital encounter of 09/10/18   2D echo with color flow doppler   Result Value Ref Range    EF 50 55 - 65    Mitral Valve Regurgitation MILD     Diastolic Dysfunction No     Aortic Valve Stenosis MODERATE TO SEVERE (A)     Est. PA Systolic Pressure 56 (A)     Tricuspid Valve Regurgitation MILD TO MODERATE    , EKG: Reviewed and X-Ray: CXR: X-Ray Chest 1 View (CXR):   Results for orders placed or performed during the hospital encounter of 09/10/18   X-Ray Chest 1 View    Narrative    EXAMINATION:  XR CHEST 1 VIEW    CLINICAL HISTORY:  ET Tube Placement; OG Tube Placement;    COMPARISON:  09/14/2018 at 5:24 a.m.    FINDINGS:  Endotracheal  tube has been removed.  Left internal jugular line remains present with the distal tip at the junction of left brachiocephalic vein and IVC.  No change.  Hazy infiltrate remains present throughout the right mid lung and right lung base.  Heart size is within normal limits.  Vascular calcification of the aorta noted.No significant bony findings.      Impression    1. Interval removal of endotracheal tube.  2. Hazy infiltrate mid and lower right lung.  No change since previous exam.      Electronically signed by: Eleazar May MD  Date:    09/15/2018  Time:    07:55    and X-Ray Chest PA and Lateral (CXR): No results found for this visit on 09/10/18.

## 2018-09-15 NOTE — PROGRESS NOTES
[facility] Hospital Medicine  Progress Note    Patient Name: Eleazar Solo  MRN: [id number]  Patient Class: IP- Inpatient   Admission Date: 9/10/2018  Length of Stay: 5 days  Attending Physician: Prachi Boyer MD  Primary Care Provider: Poly Fitch MD        Subjective:     Principal Problem:Acute hypoxemic respiratory failure    HPI:  Mr. Solo is a 77yo male with a PMHx of permanent AF on Eliquis, HTN, HLD, COPD, DM II, and h/o CVA, who was transferred from [facility] per family request as patient is followed OP by [facility] physicians.  He was originally admitted to [facility] on 9/6 for acute upper GI bleed with blood loss anemia (Hb 12 > 7.4 > 7.9).  Patient received total of 2 units PRBC and Eliquis held (last dose 9/6 AM).  He underwent EGD on 9/6 that showed a clot at the GE junction which was clipped  and injected with epi.  There was concern for further bleeding, therefore, repeat EGD was performed on 9/7 which showed no evidence of active bleeding.  During admission, patient developed AFib with RVR and was placed on diltiazem drip and PO dig.  CXR on 9/8 showed left pneumonia, IV Rocephin started.  Prior to transfer, vital signs and labs stable.  Upon arrival to [facility] ICU, patient hypotensive (SBP 70-80's) and hypoxic requiring continuous BiPAP.  Remains in AFib with controlled rate.  Repeat CXR showed RUL pneumonia.  Repeat labs resulted WBC 16.5, H&H 8.4/24.6, plt 136, BUN 38, cr. 1.4, AST 77, . ABG with pH 7.382, pCO2 44, pO2 60, HCO3 26.  Patient c/o SOB which is resolved while on BiPAP.  Denies any CP, palpitations, orthopnea, PND, cough, edema, ABD pain, N/V/D, hematemesis, melena, hematochezia, dysuria, lightheadedness/dzziness, syncope, HA, AMS, focal deficits, weakness, fever, or chills.  Patient admitted to ICU under Hospital Medicine services.     Hospital Course:  Pt admitted and started on bipap, eventually requiring intubation on 9/11. Started  on levophed, heparin, Dig and Amiodarone infusion. Echo shows normal LVF with Moderate to severe aortic stenosis and pulm HTN. Remains intubated. Hb to 7.3. Transfuse 1 U PBRCs. Pt continued atrial fib with RVR despite amiodarone infusion. Cards will add BB, Cont dig, and amio d/c'd.  . Respiratory cx pending candida. Abx changed to avelox and diflucan. Pt extubated on 9/14. Doing well s/p extubation but not back to baseline in mentaion. Limited verbalization. Cards will add small dose of CCB      Interval History:Pt slow to respond. Not cohesive at this point  Review of Systems   Unable to perform ROS: Acuity of condition     Objective:     Vital Signs (Most Recent):  Temp: 99 °F (37.2 °C) (09/15/18 1105)  Pulse: 108 (09/15/18 1435)  Resp: 16 (09/15/18 1435)  BP: 100/61 (09/15/18 1435)  SpO2: (!) 94 % (09/15/18 1428) Vital Signs (24h Range):  Temp:  [98.3 °F (36.8 °C)-99.5 °F (37.5 °C)] 99 °F (37.2 °C)  Pulse:  [108-147] 108  Resp:  [11-26] 16  SpO2:  [87 %-100 %] 94 %  BP: ()/(13-88) 100/61     Weight: 92 kg (202 lb 13.2 oz)  Body mass index is 29.1 kg/m².    Intake/Output Summary (Last 24 hours) at 9/15/2018 1608  Last data filed at 9/15/2018 1405  Gross per 24 hour   Intake 309.33 ml   Output 2165 ml   Net -1855.67 ml      Physical Exam   Constitutional: He is oriented to person, place, and time. No distress.   illl appearing   HENT:   Head: Normocephalic and atraumatic.   Nose: Nose normal.   Eyes: Conjunctivae and EOM are normal.   Neck: Normal range of motion. Neck supple.   Cardiovascular: Normal heart sounds and intact distal pulses.   No murmur heard.  tachycardia   Pulmonary/Chest: Effort normal and breath sounds normal. He has no wheezes.   Abdominal: Soft. Bowel sounds are normal. He exhibits no mass. There is no tenderness. There is no rebound and no guarding.   Musculoskeletal: Normal range of motion. He exhibits no edema.   Neurological: He is alert and oriented to person, place, and time.    Skin: Skin is warm and dry. No rash noted.   Psychiatric: He has a normal mood and affect. His behavior is normal.   Nursing note and vitals reviewed.      Significant Labs:   CBC:   Recent Labs   Lab  09/14/18 0400  09/15/18   0430   WBC  18.00*  15.29*   HGB  8.9*  8.3*   HCT  26.6*  26.2*   PLT  308  270     CMP:   Recent Labs   Lab  09/14/18   0400  09/15/18   0430   NA  141  146*   K  3.9  4.0   CL  103  103   CO2  28  34*   GLU  169*  112*   BUN  37*  25*   CREATININE  1.1  1.0   CALCIUM  7.7*  7.8*   PROT  5.6*  5.4*   ALBUMIN  2.0*  2.0*   BILITOT  0.7  0.8   ALKPHOS  112  96   AST  96*  78*   ALT  72*  60*   ANIONGAP  10  9   EGFRNONAA  >60  >60       Significant Imaging: I have reviewed all pertinent imaging results/findings within the past 24 hours.             Assessment/Plan:      * Acute hypoxemic respiratory failure on mechanical ventilation    S/p extubation  On N/C  pulm on board  Cont Formeterol and Budesonide  Schedule xopenex  Continue avelox, diflucan        Pneumonia of both lungs due to infectious organism    Cont avelox and diflucan    sputum pending candida  -Diflucan    blood cultures pending        Type 2 diabetes mellitus without complication, without long-term current use of insulin    - Hold Amaryl, will resume at DC.  - Accuchecks with low SSI.          Acute upper GI bleed with acute blood loss anemia    - EGD 9/6 with clot at GE junction s/p clipping and epi injection.  Repeat EGD 9/7 negative for active bleeding.  - Follow serial H&H and transfuse as needed.  - Continue IV Protonix daily.  - Heparin drip d/c'd  -resume heparin        Chronic kidney disease, stage 3    - Cr. Stable at 1.4 (baseline 1.3)  - Avoid nephrotoxic agents.trict I&O's.  - Follow daily BMP.        Chronic obstructive pulmonary disease with acute lower respiratory infection    - Plan as above.  - Pulmonology following.        Essential hypertension    - Patient hypotensive and on pressor  - Hold CCB, ARB,  and diuretics.  Follow BP trends and resume as needed.        Chronic atrial fibrillation with RVR      -heparin changed to eliquis  -dig  -BB   -add CCB  - Cardiology on board          VTE Risk Mitigation (From admission, onward)        Ordered     apixaban tablet 5 mg  2 times daily      09/15/18 0958     IP VTE HIGH RISK PATIENT  Once      09/11/18 0425     Reason for No Pharmacological VTE Prophylaxis  Once      09/11/18 0425     Place sequential compression device  Until discontinued      09/11/18 0425          Critical care time spent on the evaluation and treatment of severe organ dysfunction, review of pertinent labs and imaging studies, discussions with consulting providers and discussions with patient/family: >30 minutes.    Prachi Boyer MD  Department of Hospital Medicine   [facility]

## 2018-09-15 NOTE — PT/OT/SLP EVAL
Speech Language Pathology Evaluation  Bedside Swallow    Patient Name:  Eleazar Solo   MRN:  [id number]  Admitting Diagnosis: Acute hypoxemic respiratory failure    Recommendations:                 General Recommendations:  ASPIRATION PRECAUTIONS  Diet recommendations:  NPO, NPO   Aspiration Precautions: In Place  General Precautions: Standard, aspiration  Communication strategies:  Verbal;  Decreased intelligibility    Pt is a transfer from [facility]- admitted there 9/6 with diagnosis of  Acute Upper GI Bleed and then diagnosed with A-Fib. RVR.  Pt transferred for continued medical management.  Once here at [facility],  Pt with worsening RUL pneumonia and was intubated with extubation 9/14/18 - CXR showed consolidation of lower 2/3 of lungs.     S.T. Ordered to assess swallowing status at this time.   History:     Past Medical History:   Diagnosis Date    *Atrial fibrillation     Acute hypoxemic respiratory failure     Acute on chronic diastolic congestive heart failure 9/12/2018    Anemia, unspecified 9/12/2018    Atrial fibrillation     Bilateral carotid artery disease 8/10/2015    Chronic obstructive pulmonary disease with acute lower respiratory infection     COPD (chronic obstructive pulmonary disease)     Diabetes mellitus     Gout     Hyperlipidemia     Hypertension     Low testosterone     Pneumonia     Stroke 1/3/15 L occiput    Unspecified disorder of kidney and ureter        Past Surgical History:   Procedure Laterality Date    EYE SURGERY Bilateral 5/21/15, 7/16/15    cataract w/IOL    FRACTURE SURGERY Left 1986    ORIF  heel fx, hardware later removed     TONSILLECTOMY  1948 approx     **Family report no history of swallowing or speech difficulties prior to this admit.    Social History: Patient lives with his wife and has been independent with all daily living skills.    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    Subjective     Pt. Sitting in the chair;   He is currently on 3 liters of Vapotherm    Patient goals: Wife reported that she wanted pt to get back to his baseline.    Pain/Comfort:  · Pain Rating 1: 0/10    Objective:     Oral Musculature Evaluation  · Oral Musculature: general weakness  · Dentition: upper dentures    Bedside Swallow Eval:   Consistencies Assessed:  · Thin liquids,  Thick Liquids,  Pureed,     Oral Phase:   · Pt with minimal oral delays with all consistencies    Pharyngeal Phase:   · Pt with immediate coughing with thin liquids;  No coughing the thick liquids or pureed consistencies but swallow delays.    Compensatory Strategies  · Basic compensatory swallow precautions recommended.    Treatment: See above    Assessment:     Eleazar Solo is a 78 y.o. male with an SLP diagnosis of Dysphagia.    He presents with decreased oral motor skills; overall weakness of lingual, labial, jaw musculature.   Bedside swallow assessment completed with risks of aspiration of aspiration on all consistencies and is recommended to remain NPO at this time with reassessment tomorrow.    Goals reviewed with patient, family and nursing.    Goals:   Multidisciplinary Problems     SLP Goals        Problem: SLP Goal    Goal Priority Disciplines Outcome   SLP Goal     SLP    Description:  LTG:  Pt will tolerate least restrictive diet consistency safely and efficiently.            Pt will communicate needs/ideas effectively.    ST. Reassess swallowing bedside for p.o. Readiness             2. Oral Motor exercises x10-15             3. Oral Stim. x20 with 100%  timely laryngeal excursions.             4. Orientation with 90% with min cues             5. Follow multi-unit commands with 90%                    Plan:     · Patient to be seen:  5 x/week   · Plan of Care expires:     · Plan of Care reviewed  with:    pt, family and nursing staff  · SLP Follow-Up:  Yes       Discharge recommendations:    Determine closer to D/C  Barriers to Discharge:      Time Tracking:     SLP Treatment Date:   09/15/18  Speech Start Time:  1020  Speech Stop Time:  1055   Speech Total Time (min):  40 minutes    Billable Minutes: 40 minutes    Tyesha Luevano CCC-SLP  09/15/2018

## 2018-09-16 LAB
ALBUMIN SERPL BCP-MCNC: 2.2 G/DL
ALLENS TEST: ABNORMAL
ALP SERPL-CCNC: 85 U/L
ALT SERPL W/O P-5'-P-CCNC: 68 U/L
ANION GAP SERPL CALC-SCNC: 10 MMOL/L
AST SERPL-CCNC: 84 U/L
BACTERIA BLD CULT: NORMAL
BACTERIA BLD CULT: NORMAL
BASOPHILS # BLD AUTO: 0.03 K/UL
BASOPHILS NFR BLD: 0.2 %
BILIRUB SERPL-MCNC: 0.9 MG/DL
BUN SERPL-MCNC: 22 MG/DL
CALCIUM SERPL-MCNC: 8.3 MG/DL
CHLORIDE SERPL-SCNC: 101 MMOL/L
CO2 SERPL-SCNC: 36 MMOL/L
CREAT SERPL-MCNC: 0.9 MG/DL
DELSYS: ABNORMAL
DIFFERENTIAL METHOD: ABNORMAL
EOSINOPHIL # BLD AUTO: 0.3 K/UL
EOSINOPHIL NFR BLD: 2.2 %
ERYTHROCYTE [DISTWIDTH] IN BLOOD BY AUTOMATED COUNT: 17 %
EST. GFR  (AFRICAN AMERICAN): >60 ML/MIN/1.73 M^2
EST. GFR  (NON AFRICAN AMERICAN): >60 ML/MIN/1.73 M^2
FIO2: 45
FLOW: 30
GLUCOSE SERPL-MCNC: 116 MG/DL
HCO3 UR-SCNC: 39.7 MMOL/L (ref 24–28)
HCT VFR BLD AUTO: 28 %
HGB BLD-MCNC: 8.7 G/DL
LYMPHOCYTES # BLD AUTO: 1.4 K/UL
LYMPHOCYTES NFR BLD: 9.6 %
MAGNESIUM SERPL-MCNC: 2.8 MG/DL
MCH RBC QN AUTO: 30.6 PG
MCHC RBC AUTO-ENTMCNC: 31.1 G/DL
MCV RBC AUTO: 99 FL
MODE: ABNORMAL
MONOCYTES # BLD AUTO: 1.6 K/UL
MONOCYTES NFR BLD: 11 %
NEUTROPHILS # BLD AUTO: 11.2 K/UL
NEUTROPHILS NFR BLD: 77 %
PCO2 BLDA: 57.8 MMHG (ref 35–45)
PH SMN: 7.44 [PH] (ref 7.35–7.45)
PHOSPHATE SERPL-MCNC: 2.7 MG/DL
PLATELET # BLD AUTO: 309 K/UL
PMV BLD AUTO: 10.1 FL
PO2 BLDA: 75 MMHG (ref 80–100)
POC BE: 16 MMOL/L
POC SATURATED O2: 95 % (ref 95–100)
POCT GLUCOSE: 100 MG/DL (ref 70–110)
POCT GLUCOSE: 103 MG/DL (ref 70–110)
POCT GLUCOSE: 116 MG/DL (ref 70–110)
POCT GLUCOSE: 121 MG/DL (ref 70–110)
POCT GLUCOSE: 131 MG/DL (ref 70–110)
POCT GLUCOSE: 133 MG/DL (ref 70–110)
POCT GLUCOSE: 137 MG/DL (ref 70–110)
POTASSIUM SERPL-SCNC: 3.8 MMOL/L
PROT SERPL-MCNC: 5.9 G/DL
RBC # BLD AUTO: 2.84 M/UL
SAMPLE: ABNORMAL
SITE: ABNORMAL
SODIUM SERPL-SCNC: 147 MMOL/L
WBC # BLD AUTO: 14.55 K/UL

## 2018-09-16 PROCEDURE — 83735 ASSAY OF MAGNESIUM: CPT

## 2018-09-16 PROCEDURE — 27100092 HC HIGH FLOW DELIVERY CANNULA

## 2018-09-16 PROCEDURE — 85025 COMPLETE CBC W/AUTO DIFF WBC: CPT

## 2018-09-16 PROCEDURE — 25000003 PHARM REV CODE 250: Performed by: FAMILY MEDICINE

## 2018-09-16 PROCEDURE — 27000221 HC OXYGEN, UP TO 24 HOURS

## 2018-09-16 PROCEDURE — 27000646 HC AEROBIKA DEVICE

## 2018-09-16 PROCEDURE — 80053 COMPREHEN METABOLIC PANEL: CPT

## 2018-09-16 PROCEDURE — 97530 THERAPEUTIC ACTIVITIES: CPT

## 2018-09-16 PROCEDURE — 25000003 PHARM REV CODE 250: Performed by: NURSE PRACTITIONER

## 2018-09-16 PROCEDURE — 99900035 HC TECH TIME PER 15 MIN (STAT)

## 2018-09-16 PROCEDURE — 84100 ASSAY OF PHOSPHORUS: CPT

## 2018-09-16 PROCEDURE — 27100171 HC OXYGEN HIGH FLOW UP TO 24 HOURS

## 2018-09-16 PROCEDURE — 63600175 PHARM REV CODE 636 W HCPCS: Performed by: NURSE PRACTITIONER

## 2018-09-16 PROCEDURE — 25000242 PHARM REV CODE 250 ALT 637 W/ HCPCS: Performed by: NURSE PRACTITIONER

## 2018-09-16 PROCEDURE — 25000003 PHARM REV CODE 250: Performed by: INTERNAL MEDICINE

## 2018-09-16 PROCEDURE — 25000242 PHARM REV CODE 250 ALT 637 W/ HCPCS: Performed by: INTERNAL MEDICINE

## 2018-09-16 PROCEDURE — 94664 DEMO&/EVAL PT USE INHALER: CPT

## 2018-09-16 PROCEDURE — 82803 BLOOD GASES ANY COMBINATION: CPT

## 2018-09-16 PROCEDURE — 92526 ORAL FUNCTION THERAPY: CPT

## 2018-09-16 PROCEDURE — 94640 AIRWAY INHALATION TREATMENT: CPT

## 2018-09-16 PROCEDURE — 99291 CRITICAL CARE FIRST HOUR: CPT | Mod: ,,, | Performed by: NURSE PRACTITIONER

## 2018-09-16 PROCEDURE — 36600 WITHDRAWAL OF ARTERIAL BLOOD: CPT

## 2018-09-16 PROCEDURE — 20000000 HC ICU ROOM

## 2018-09-16 PROCEDURE — 99233 SBSQ HOSP IP/OBS HIGH 50: CPT | Mod: ,,, | Performed by: INTERNAL MEDICINE

## 2018-09-16 RX ORDER — BISACODYL 10 MG
10 SUPPOSITORY, RECTAL RECTAL DAILY
Status: DISCONTINUED | OUTPATIENT
Start: 2018-09-16 | End: 2018-09-18

## 2018-09-16 RX ORDER — DIPHENHYDRAMINE HYDROCHLORIDE 50 MG/ML
12.5 INJECTION INTRAMUSCULAR; INTRAVENOUS EVERY 6 HOURS PRN
Status: DISCONTINUED | OUTPATIENT
Start: 2018-09-16 | End: 2018-09-18

## 2018-09-16 RX ORDER — METOPROLOL TARTRATE 25 MG/1
25 TABLET, FILM COATED ORAL 2 TIMES DAILY
Status: DISCONTINUED | OUTPATIENT
Start: 2018-09-16 | End: 2018-09-17

## 2018-09-16 RX ADMIN — CHLORHEXIDINE GLUCONATE 15 ML: 1.2 RINSE ORAL at 08:09

## 2018-09-16 RX ADMIN — FLUCONAZOLE 200 MG: 100 TABLET ORAL at 11:09

## 2018-09-16 RX ADMIN — BUDESONIDE 0.5 MG: 0.5 SUSPENSION RESPIRATORY (INHALATION) at 08:09

## 2018-09-16 RX ADMIN — METOPROLOL TARTRATE 5 MG: 5 INJECTION, SOLUTION INTRAVENOUS at 04:09

## 2018-09-16 RX ADMIN — METOPROLOL TARTRATE 25 MG: 25 TABLET, FILM COATED ORAL at 09:09

## 2018-09-16 RX ADMIN — DILTIAZEM HYDROCHLORIDE 30 MG: 30 TABLET, FILM COATED ORAL at 05:09

## 2018-09-16 RX ADMIN — METOPROLOL TARTRATE 25 MG: 25 TABLET, FILM COATED ORAL at 08:09

## 2018-09-16 RX ADMIN — DILTIAZEM HYDROCHLORIDE 30 MG: 30 TABLET, FILM COATED ORAL at 11:09

## 2018-09-16 RX ADMIN — ARFORMOTEROL TARTRATE 15 MCG: 15 SOLUTION RESPIRATORY (INHALATION) at 07:09

## 2018-09-16 RX ADMIN — LEVALBUTEROL 0.63 MG: 0.63 SOLUTION RESPIRATORY (INHALATION) at 07:09

## 2018-09-16 RX ADMIN — DIGOXIN 0.12 MG: 125 TABLET ORAL at 09:09

## 2018-09-16 RX ADMIN — CHLORHEXIDINE GLUCONATE 15 ML: 1.2 RINSE ORAL at 09:09

## 2018-09-16 RX ADMIN — IPRATROPIUM BROMIDE AND ALBUTEROL SULFATE 3 ML: .5; 3 SOLUTION RESPIRATORY (INHALATION) at 02:09

## 2018-09-16 RX ADMIN — DILTIAZEM HYDROCHLORIDE 30 MG: 30 TABLET, FILM COATED ORAL at 06:09

## 2018-09-16 RX ADMIN — BUDESONIDE 0.5 MG: 0.5 SUSPENSION RESPIRATORY (INHALATION) at 07:09

## 2018-09-16 RX ADMIN — APIXABAN 5 MG: 2.5 TABLET, FILM COATED ORAL at 08:09

## 2018-09-16 RX ADMIN — DILTIAZEM HYDROCHLORIDE 30 MG: 30 TABLET, FILM COATED ORAL at 12:09

## 2018-09-16 RX ADMIN — MOXIFLOXACIN HYDROCHLORIDE 400 MG: 400 TABLET, FILM COATED ORAL at 09:09

## 2018-09-16 RX ADMIN — DIPHENHYDRAMINE HYDROCHLORIDE 12.5 MG: 50 INJECTION, SOLUTION INTRAMUSCULAR; INTRAVENOUS at 01:09

## 2018-09-16 RX ADMIN — LORAZEPAM 2 MG: 2 INJECTION INTRAMUSCULAR; INTRAVENOUS at 02:09

## 2018-09-16 RX ADMIN — BISACODYL 10 MG: 10 SUPPOSITORY RECTAL at 12:09

## 2018-09-16 RX ADMIN — APIXABAN 5 MG: 2.5 TABLET, FILM COATED ORAL at 09:09

## 2018-09-16 RX ADMIN — PANTOPRAZOLE SODIUM 40 MG: 40 GRANULE, DELAYED RELEASE ORAL at 09:09

## 2018-09-16 RX ADMIN — POLYETHYLENE GLYCOL 3350 17 G: 17 POWDER, FOR SOLUTION ORAL at 08:09

## 2018-09-16 NOTE — PLAN OF CARE
Problem: Patient Care Overview  Goal: Plan of Care Review  Outcome: Ongoing (interventions implemented as appropriate)  Pt requires MAX of 2 for sit to stand with a RW. Unable to ambulate at this time.

## 2018-09-16 NOTE — EICU
eICU Note :    Called by the [facility]:    Problem: ABG 7.44/57.8/75/16/39.7/95% on Vapotherm 30 L and 45%    Pertinent History and labs reviewed :78 -year-old male admitted to MICU transfer from [facility] With acute hypoxemic respiratory failure on high flow oxygen.  Patient has a history of moderate to severe COPD chronic atrial fibrillation, CK D stage III,Diabetes mellitus, upper GI bleed    .      Treatment /Intervention given: Continue current treatment        Karla Sharon MCCABE  eICU Physician

## 2018-09-16 NOTE — PLAN OF CARE
Problem: Patient Care Overview  Goal: Plan of Care Review  Outcome: Ongoing (interventions implemented as appropriate)  Patient more lucid today. Oriented to person and place and able to answer questions appropriately. Patient continues to be occasionally confused & anxious. Continue weaning vapotherm. Currently at 30L 35%. Start Metoprolol 25 mg BID. No change from previous CXR. No bowel movement since 9/11. Ducolax 10mg suppositories started daily. Patient tolerated swallow test per SLP. Purred diet recommended. Plan to transfer pt to the floor tomorrow.

## 2018-09-16 NOTE — PLAN OF CARE
Problem: SLP Goal  Goal: SLP Goal  LTG:  Pt will tolerate least restrictive diet consistency safely and efficiently.            Pt will communicate needs/ideas effectively.    ST. Reassess swallowing bedside for p.o. Readiness-  Pt looked much better today(alert, following commands); tolerated thin liquid, thick liquids and pureed consistencies without observable signs of swallowing difficulties. He fatigued with cracker.   He is recommended to start a pureed consistency diet and thin liquids with swallow precautions which were reviewed with patient, family and staff.  **S.T. Recommended to reassess swallowing tomorrow with further goals as appropriate.  **Consider Modified Barium Swallowing Study if pt exhibits any swallowing difficulties and/or lung status does not continue to improve.               2. Oral Motor exercises x10-15             3. Oral Stim. x20 with 100%  timely laryngeal excursions.             4. Orientation with 90% with min cues             5. Follow multi-unit commands with 90%       Pt seen for S.T./Swallowing today.   Please see note above.

## 2018-09-16 NOTE — PLAN OF CARE
Problem: Patient Care Overview  Goal: Plan of Care Review  Outcome: Ongoing (interventions implemented as appropriate)  No acute events overnight. Pt still confused and only oriented to self. Pt desats quickly when off vapotherm (30L, 45%). Pt still in A-fib with HR up to the 130s. HR decreases to <100 bpm immediately after metoprolol IVP. BP stable. Afebrile. UO adequate but diminishing. Pt able to take PO meds crushed in applesauce. Pt turned Q2H with pressure points protected. POC reviewed. All questions and concerns addressed.

## 2018-09-16 NOTE — SUBJECTIVE & OBJECTIVE
Review of Systems   Constitution: Positive for weakness.   HENT: Negative.    Eyes: Negative.    Cardiovascular: Negative.    Respiratory: Positive for shortness of breath (improving).    Endocrine: Negative.    Hematologic/Lymphatic: Negative.    Skin: Negative.    Musculoskeletal: Negative.    Genitourinary: Negative.    Psychiatric/Behavioral: Negative.    Allergic/Immunologic: Negative.      Objective:     Vital Signs (Most Recent):  Temp: 98.5 °F (36.9 °C) (09/16/18 1000)  Pulse: 95 (09/16/18 1000)  Resp: 16 (09/16/18 1000)  BP: (!) 109/57 (09/16/18 1105)  SpO2: 97 % (09/16/18 1000) Vital Signs (24h Range):  Temp:  [98.5 °F (36.9 °C)-98.9 °F (37.2 °C)] 98.5 °F (36.9 °C)  Pulse:  [] 95  Resp:  [13-27] 16  SpO2:  [87 %-99 %] 97 %  BP: ()/(42-95) 109/57     Weight: 86 kg (189 lb 9.5 oz)  Body mass index is 27.2 kg/m².     SpO2: 97 %  O2 Device (Oxygen Therapy): Vapotherm      Intake/Output Summary (Last 24 hours) at 9/16/2018 1137  Last data filed at 9/16/2018 1100  Gross per 24 hour   Intake 283.17 ml   Output 1571 ml   Net -1287.83 ml       Lines/Drains/Airways     Central Venous Catheter Line                 Percutaneous Central Line Insertion/Assessment - triple lumen  09/11/18 1124 left internal jugular 5 days          Drain                 Urethral Catheter 09/11/18 1123 Double-lumen 5 days          Pressure Ulcer                 Pressure Injury 09/13/18 1905 Left Buttocks Deep tissue injury 2 days                Physical Exam   Constitutional: He is oriented to person, place, and time. He appears well-developed and well-nourished. No distress.   HENT:   Head: Normocephalic and atraumatic.   Eyes: Pupils are equal, round, and reactive to light. Right eye exhibits no discharge. Left eye exhibits no discharge.   Neck: Neck supple. No JVD present.   Cardiovascular: Normal rate, S1 normal and S2 normal. An irregularly irregular rhythm present.   Murmur heard.   Harsh midsystolic murmur is present  at the upper right sternal border radiating to the neck.  Pulmonary/Chest: Effort normal.   Diminished BS   Abdominal: Soft. He exhibits no distension. There is no tenderness. There is no rebound.   Musculoskeletal: He exhibits no edema.   Neurological: He is alert and oriented to person, place, and time.   Skin: Skin is warm and dry. He is not diaphoretic. No erythema.   Psychiatric: He has a normal mood and affect. His behavior is normal. Thought content normal.   Nursing note and vitals reviewed.      Significant Labs:   CMP   Recent Labs   Lab  09/15/18   0430  09/16/18   0410   NA  146*  147*   K  4.0  3.8   CL  103  101   CO2  34*  36*   GLU  112*  116*   BUN  25*  22   CREATININE  1.0  0.9   CALCIUM  7.8*  8.3*   PROT  5.4*  5.9*   ALBUMIN  2.0*  2.2*   BILITOT  0.8  0.9   ALKPHOS  96  85   AST  78*  84*   ALT  60*  68*   ANIONGAP  9  10   ESTGFRAFRICA  >60  >60   EGFRNONAA  >60  >60   , CBC   Recent Labs   Lab  09/15/18   0430  09/16/18   0410   WBC  15.29*  14.55*   HGB  8.3*  8.7*   HCT  26.2*  28.0*   PLT  270  309   , Troponin No results for input(s): TROPONINI in the last 48 hours. and All pertinent lab results from the last 24 hours have been reviewed.    Significant Imaging: Echocardiogram:   2D echo with color flow doppler:   Results for orders placed or performed during the hospital encounter of 09/10/18   2D echo with color flow doppler   Result Value Ref Range    EF 50 55 - 65    Mitral Valve Regurgitation MILD     Diastolic Dysfunction No     Aortic Valve Stenosis MODERATE TO SEVERE (A)     Est. PA Systolic Pressure 56 (A)     Tricuspid Valve Regurgitation MILD TO MODERATE    , EKG: Reviewed and X-Ray: CXR: X-Ray Chest 1 View (CXR):   Results for orders placed or performed during the hospital encounter of 09/10/18   X-Ray Chest 1 View    Narrative    EXAMINATION:  XR CHEST 1 VIEW    CLINICAL HISTORY:  ET Tube Placement; OG Tube Placement;    COMPARISON:  09/15/2018.    FINDINGS:  Left internal  jugular line remains in place.  Distal tip overlies the junction of the left brachiocephalic vein and SVC.  No change.  Mild hazy infiltrate right midlung and right lung base slightly improved.  Minimal hazy infiltrate at the left heart border is slightly worse.  Cardiac silhouette within normal limits.  Vascular calcifications again noted.No significant bony findings.      Impression    1. Hazy right mid and lower lung infiltrate.  No significant change since 09/15/2018.  2. Mild hazy atelectasis or infiltrate at the left heart border, slightly worse.      Electronically signed by: Eleazar May MD  Date:    09/16/2018  Time:    07:43    and X-Ray Chest PA and Lateral (CXR): No results found for this visit on 09/10/18.

## 2018-09-16 NOTE — NURSING
Pt agitated, restless, and still confused. [facility] contacted for ABG. ABG results reviewed by MD. Pt now less agitated. No new orders at this time

## 2018-09-16 NOTE — EICU
Bedside nurse called for md to check to see if pt is dehydrated, since he is npo and has no ivf's going.   Last cr/bun-1/25 on 9/15 . Dr. Farley  will wait for  am labs

## 2018-09-16 NOTE — PROGRESS NOTES
[facility]  Cardiology  Progress Note    Patient Name: Eleazar Solo  MRN: [id number]  Admission Date: 9/10/2018  Hospital Length of Stay: 6 days  Code Status: Full Code   Attending Physician: Prachi Boyer MD   Primary Care Physician: Poly Fitch MD  Expected Discharge Date:   Principal Problem:Acute hypoxemic respiratory failure    Subjective:   HPI:  Mr. Solo is a 77yo male with a PMHx of permanent AF on Eliquis, HTN, HLD, COPD, DM II, and h/o CVA, who was transferred from [facility] per family request as patient is followed OP by [facility] physicians.  He was originally admitted to [facility] on 9/6 for acute upper GI bleed with blood loss anemia (Hb 12 > 7.4 > 7.9).  Patient received total of 2 units PRBC and Eliquis held (last dose 9/6 AM).  He underwent EGD on 9/6 that showed a clot at the GE junction which was clipped  and injected with epi.  There was concern for further bleeding, therefore, repeat EGD was performed on 9/7 which showed no evidence of active bleeding.  During admission, patient developed AFib with RVR and was placed on diltiazem drip and PO dig.  CXR on 9/8 showed left pneumonia, IV Rocephin started.  Prior to transfer, vital signs and labs stable.  Upon arrival to [facility] ICU, patient hypotensive (SBP 70-80's), now on pressors and was hypoxic requiring continuous BiPAP.  Remains in AFib with controlled rate.  Repeat CXR showed RUL pneumonia.  Repeat labs resulted WBC 16.5, H&H 8.4/24.6, plt 136, BUN 38, cr. 1.4, AST 77, . ABG with pH 7.382, pCO2 44, pO2 60, HCO3 26.  Patient c/o SOB which is resolved while on BiPAP. CXR today shows marked amount of alveolar consolidation in the lower 2/3 of both lungs.  The this represents an interval worsening of the appearance of the lungs and is characteristic of pneumonia. Decision made to intubate patient  Due to resp distress and wheezing.  H/H this morning 7.5/22.6    Hospital Course:   9/12/18- Remains  intubated and sedated. Still in A-fib. Rate 100-115 bpm this morning. Started on Amio gtt for rate control. H/H stable at 7.8/23.9. Continues abx for bilateral pneumonia. Patient being weaned from Dopamine gtt.Liver enzymes improved today. Echo shows normal LVF with Moderate to severe aortic stenosis and pulm HTN     9/13/18-Patient seen and examined today. No acute events overnight. Remains intubated. Still in afib, HR variable. H and H slightly worse, 7.3/22.3. Would benefit from transfusion.     9/14/18-Patient seen and examined today. Pressor re-started overnight. Still intubated. Remains in afib with rapid rate. H and H improved s/p transfusion.     9/15/18-Patient seen and examined today, now extubated and sitting in chair. Feels ok. Remains in afib with RVR, meds adjusted. Labs reviewed. H and H stable overnight.     9/16/18-Patient seen and examined today. Looks and feels much better. More awake and alert. SOB improving. Remains in afib, HR better controlled. Labs stable.      Review of Systems   Constitution: Positive for weakness.   HENT: Negative.    Eyes: Negative.    Cardiovascular: Negative.    Respiratory: Positive for shortness of breath (improving).    Endocrine: Negative.    Hematologic/Lymphatic: Negative.    Skin: Negative.    Musculoskeletal: Negative.    Genitourinary: Negative.    Psychiatric/Behavioral: Negative.    Allergic/Immunologic: Negative.      Objective:     Vital Signs (Most Recent):  Temp: 98.5 °F (36.9 °C) (09/16/18 1000)  Pulse: 95 (09/16/18 1000)  Resp: 16 (09/16/18 1000)  BP: (!) 109/57 (09/16/18 1105)  SpO2: 97 % (09/16/18 1000) Vital Signs (24h Range):  Temp:  [98.5 °F (36.9 °C)-98.9 °F (37.2 °C)] 98.5 °F (36.9 °C)  Pulse:  [] 95  Resp:  [13-27] 16  SpO2:  [87 %-99 %] 97 %  BP: ()/(42-95) 109/57     Weight: 86 kg (189 lb 9.5 oz)  Body mass index is 27.2 kg/m².     SpO2: 97 %  O2 Device (Oxygen Therapy): Vapotherm      Intake/Output Summary (Last 24 hours) at  9/16/2018 1137  Last data filed at 9/16/2018 1100  Gross per 24 hour   Intake 283.17 ml   Output 1571 ml   Net -1287.83 ml       Lines/Drains/Airways     Central Venous Catheter Line                 Percutaneous Central Line Insertion/Assessment - triple lumen  09/11/18 1124 left internal jugular 5 days          Drain                 Urethral Catheter 09/11/18 1123 Double-lumen 5 days          Pressure Ulcer                 Pressure Injury 09/13/18 1905 Left Buttocks Deep tissue injury 2 days                Physical Exam   Constitutional: He is oriented to person, place, and time. He appears well-developed and well-nourished. No distress.   HENT:   Head: Normocephalic and atraumatic.   Eyes: Pupils are equal, round, and reactive to light. Right eye exhibits no discharge. Left eye exhibits no discharge.   Neck: Neck supple. No JVD present.   Cardiovascular: Normal rate, S1 normal and S2 normal. An irregularly irregular rhythm present.   Murmur heard.   Harsh midsystolic murmur is present at the upper right sternal border radiating to the neck.  Pulmonary/Chest: Effort normal.   Diminished BS   Abdominal: Soft. He exhibits no distension. There is no tenderness. There is no rebound.   Musculoskeletal: He exhibits no edema.   Neurological: He is alert and oriented to person, place, and time.   Skin: Skin is warm and dry. He is not diaphoretic. No erythema.   Psychiatric: He has a normal mood and affect. His behavior is normal. Thought content normal.   Nursing note and vitals reviewed.      Significant Labs:   CMP   Recent Labs   Lab  09/15/18   0430  09/16/18   0410   NA  146*  147*   K  4.0  3.8   CL  103  101   CO2  34*  36*   GLU  112*  116*   BUN  25*  22   CREATININE  1.0  0.9   CALCIUM  7.8*  8.3*   PROT  5.4*  5.9*   ALBUMIN  2.0*  2.2*   BILITOT  0.8  0.9   ALKPHOS  96  85   AST  78*  84*   ALT  60*  68*   ANIONGAP  9  10   ESTGFRAFRICA  >60  >60   EGFRNONAA  >60  >60   , CBC   Recent Labs   Lab  09/15/18    0430  09/16/18   0410   WBC  15.29*  14.55*   HGB  8.3*  8.7*   HCT  26.2*  28.0*   PLT  270  309   , Troponin No results for input(s): TROPONINI in the last 48 hours. and All pertinent lab results from the last 24 hours have been reviewed.    Significant Imaging: Echocardiogram:   2D echo with color flow doppler:   Results for orders placed or performed during the hospital encounter of 09/10/18   2D echo with color flow doppler   Result Value Ref Range    EF 50 55 - 65    Mitral Valve Regurgitation MILD     Diastolic Dysfunction No     Aortic Valve Stenosis MODERATE TO SEVERE (A)     Est. PA Systolic Pressure 56 (A)     Tricuspid Valve Regurgitation MILD TO MODERATE    , EKG: Reviewed and X-Ray: CXR: X-Ray Chest 1 View (CXR):   Results for orders placed or performed during the hospital encounter of 09/10/18   X-Ray Chest 1 View    Narrative    EXAMINATION:  XR CHEST 1 VIEW    CLINICAL HISTORY:  ET Tube Placement; OG Tube Placement;    COMPARISON:  09/15/2018.    FINDINGS:  Left internal jugular line remains in place.  Distal tip overlies the junction of the left brachiocephalic vein and SVC.  No change.  Mild hazy infiltrate right midlung and right lung base slightly improved.  Minimal hazy infiltrate at the left heart border is slightly worse.  Cardiac silhouette within normal limits.  Vascular calcifications again noted.No significant bony findings.      Impression    1. Hazy right mid and lower lung infiltrate.  No significant change since 09/15/2018.  2. Mild hazy atelectasis or infiltrate at the left heart border, slightly worse.      Electronically signed by: Eleazar May MD  Date:    09/16/2018  Time:    07:43    and X-Ray Chest PA and Lateral (CXR): No results found for this visit on 09/10/18.    Assessment and Plan:   Patient who presents with afib with RVR in setting of acute GI bleed/PNA/anemia. Clinically improving. Stable CV wise. Continue same mgmt.    Pneumonia of both lungs due to infectious  organism    -Mgmt as per primary team        Anemia, unspecified    -Stable, monitor          Nonrheumatic aortic valve stenosis    -Stable  -Moderate to severe by 2D echo  -Further workup as OP        Acute upper GI bleed with acute blood loss anemia    -H/H stable, continue to monitor  -Continue Eliquis for CVA prophylaxis         Essential hypertension    -BP controlled  -Continue same meds          Chronic atrial fibrillation with RVR    -Remains in afib with RVR in setting of sepsis/PNA/anemia  -HR better controlled  -Continue digoxin, cardizem, metoprolol  -Continue Eliquis for CVA prophylaxis             VTE Risk Mitigation (From admission, onward)        Ordered     apixaban tablet 5 mg  2 times daily      09/15/18 0958     IP VTE HIGH RISK PATIENT  Once      09/11/18 0425     Reason for No Pharmacological VTE Prophylaxis  Once      09/11/18 0425     Place sequential compression device  Until discontinued      09/11/18 0425          Christal Vazquez PA-C  Cardiology  [facility]    Chart reviewed. Dr. Belle examined patient and agrees with plan as outlined above.

## 2018-09-16 NOTE — PROGRESS NOTES
Patient looks much better today. Continues to be on the vapotherm , I will be weaning today as tolerated

## 2018-09-16 NOTE — PLAN OF CARE
Problem: Pressure Ulcer Risk (Lawson Scale) (Adult,Obstetrics,Pediatric)  Goal: Identify Related Risk Factors and Signs and Symptoms  Related risk factors and signs and symptoms are identified upon initiation of Human Response Clinical Practice Guideline (CPG)  Outcome: Ongoing (interventions implemented as appropriate)   09/16/18 0810   Pressure Ulcer Risk (Lawson Scale)   Related Risk Factors (Pressure Ulcer Risk (Lawson Scale)) cognitive impairment;critical care admission;fluid intake inadequate;mobility impaired

## 2018-09-16 NOTE — ASSESSMENT & PLAN NOTE
-Remains in afib with RVR in setting of sepsis/PNA/anemia  -HR better controlled  -Continue digoxin, cardizem, metoprolol  -Continue Eliquis for CVA prophylaxis

## 2018-09-16 NOTE — NURSING
Pt removing vapotherm causing desat and worsening confusion. Pt became diaphoretic, sats 88%-90%, tripod breathing, RR 40, & complaining of uncontrollable anxiety. 1mg Ativan administered IVP. Notified LINDSEY Wallace.

## 2018-09-16 NOTE — PROGRESS NOTES
[facility] Hospital Medicine  Progress Note    Patient Name: Eleazar Solo  MRN: [id number]  Patient Class: IP- Inpatient   Admission Date: 9/10/2018  Length of Stay: 6 days  Attending Physician: Prachi Boyer MD  Primary Care Provider: Poly Fitch MD        Subjective:     Principal Problem:Acute hypoxemic respiratory failure    HPI:  Mr. Solo is a 79yo male with a PMHx of permanent AF on Eliquis, HTN, HLD, COPD, DM II, and h/o CVA, who was transferred from [facility] per family request as patient is followed OP by [facility].  He was originally admitted to [facility] on 9/6 for acute upper GI bleed with blood loss anemia (Hb 12 > 7.4 > 7.9).  Patient received total of 2 units PRBC and Eliquis held (last dose 9/6 AM).  He underwent EGD on 9/6 that showed a clot at the GE junction which was clipped  and injected with epi.  There was concern for further bleeding, therefore, repeat EGD was performed on 9/7 which showed no evidence of active bleeding.  During admission, patient developed AFib with RVR and was placed on diltiazem drip and PO dig.  CXR on 9/8 showed left pneumonia, IV Rocephin started.  Prior to transfer, vital signs and labs stable.  Upon arrival to [facility] ICU, patient hypotensive (SBP 70-80's) and hypoxic requiring continuous BiPAP.  Remains in AFib with controlled rate.  Repeat CXR showed RUL pneumonia.  Repeat labs resulted WBC 16.5, H&H 8.4/24.6, plt 136, BUN 38, cr. 1.4, AST 77, . ABG with pH 7.382, pCO2 44, pO2 60, HCO3 26.  Patient c/o SOB which is resolved while on BiPAP.  Denies any CP, palpitations, orthopnea, PND, cough, edema, ABD pain, N/V/D, hematemesis, melena, hematochezia, dysuria, lightheadedness/dzziness, syncope, HA, AMS, focal deficits, weakness, fever, or chills.  Patient admitted to ICU under Hospital Medicine services.     Hospital Course:  Pt admitted and started on bipap, eventually requiring intubation on 9/11. Started  on levophed, heparin, Dig and Amiodarone infusion. Echo shows normal LVF with Moderate to severe aortic stenosis and pulm HTN. Remains intubated. Hb to 7.3. Transfuse 1 U PBRCs. Pt continued atrial fib with RVR despite amiodarone infusion. Cards will add BB, Cont dig, and amio d/c'd.  . Respiratory cx pending candida. Abx changed to avelox and diflucan. Pt extubated on 9/14. Doing well s/p extubation.  Cards will add small dose of CCB. IV Lopressor transitioned to po. Heparin drip to eliquis. More alert and oriented as progression      Interval History: Pt more alert and oriented this am    Review of Systems   All other systems reviewed and are negative.    Objective:     Vital Signs (Most Recent):  Temp: 98.6 °F (37 °C) (09/16/18 1205)  Pulse: (!) 139 (09/16/18 1456)  Resp: (!) 22 (09/16/18 1456)  BP: 137/68 (09/16/18 1305)  SpO2: (!) 93 % (09/16/18 1515) Vital Signs (24h Range):  Temp:  [98.5 °F (36.9 °C)-98.9 °F (37.2 °C)] 98.6 °F (37 °C)  Pulse:  [] 139  Resp:  [15-28] 22  SpO2:  [92 %-100 %] 93 %  BP: ()/(46-95) 137/68     Weight: 86 kg (189 lb 9.5 oz)  Body mass index is 27.2 kg/m².    Intake/Output Summary (Last 24 hours) at 9/16/2018 1553  Last data filed at 9/16/2018 1305  Gross per 24 hour   Intake 240 ml   Output 1011 ml   Net -771 ml      Physical Exam Constitutional: He is oriented to person, place, and time. No distress.   illl appearing   HENT:   Head: Normocephalic and atraumatic.   Nose: Nose normal.   Eyes: Conjunctivae and EOM are normal.   Neck: Normal range of motion. Neck supple.   Cardiovascular: Normal heart sounds and intact distal pulses.   No murmur heard.  tachycardia   Pulmonary/Chest: Effort normal and breath sounds normal. He has no wheezes.   Abdominal: Soft. Bowel sounds are normal. He exhibits no mass. There is no tenderness. There is no rebound and no guarding.   Musculoskeletal: Normal range of motion. He exhibits no edema.   Neurological: He is alert and oriented to  person, place, and time.   Skin: Skin is warm and dry. No rash noted.   Psychiatric: He has a normal mood and affect. His behavior is normal.   Nursing note and vitals reviewed.        Significant Labs:   CBC:   Recent Labs   Lab  09/15/18   0430  09/16/18   0410   WBC  15.29*  14.55*   HGB  8.3*  8.7*   HCT  26.2*  28.0*   PLT  270  309     CMP:   Recent Labs   Lab  09/15/18   0430  09/16/18   0410   NA  146*  147*   K  4.0  3.8   CL  103  101   CO2  34*  36*   GLU  112*  116*   BUN  25*  22   CREATININE  1.0  0.9   CALCIUM  7.8*  8.3*   PROT  5.4*  5.9*   ALBUMIN  2.0*  2.2*   BILITOT  0.8  0.9   ALKPHOS  96  85   AST  78*  84*   ALT  60*  68*   ANIONGAP  9  10   EGFRNONAA  >60  >60       Significant Imaging: I have reviewed all pertinent imaging results/findings within the past 24 hours.    Assessment/Plan:      * Acute hypoxemic respiratory failure on mechanical ventilation    S/p extubation  On N/C  pulm on board  Cont Formeterol and Budesonide  Schedule xopenex  Continue avelox, diflucan        Pneumonia of both lungs due to infectious organism    Cont avelox and diflucan    sputum pending candida  -Diflucan    blood cultures pending        Type 2 diabetes mellitus without complication, without long-term current use of insulin    - Hold Amaryl, will resume at DC.  - Accuchecks with low SSI.          Acute upper GI bleed with acute blood loss anemia    - EGD 9/6 with clot at GE junction s/p clipping and epi injection.  Repeat EGD 9/7 negative for active bleeding.  - Follow serial H&H and transfuse as needed.  - Continue IV Protonix daily.  - Heparin drip d/c'd  -resume eliquis        Chronic kidney disease, stage 3    - Cr. Stable at 1.4 (baseline 1.3)  - Avoid nephrotoxic agents.trict I&O's.  - Follow daily BMP.        Chronic obstructive pulmonary disease with acute lower respiratory infection    - Plan as above.  - Pulmonology following.        Essential hypertension    - Patient hypotensive and on  pressor  - resume as appropriate        Chronic atrial fibrillation with RVR      -heparin changed to eliquis  -dig  -BB po  -CCB po  - Cardiology on board          VTE Risk Mitigation (From admission, onward)        Ordered     apixaban tablet 5 mg  2 times daily      09/15/18 0958     IP VTE HIGH RISK PATIENT  Once      09/11/18 0425     Reason for No Pharmacological VTE Prophylaxis  Once      09/11/18 0425     Place sequential compression device  Until discontinued      09/11/18 0425          Critical care time spent on the evaluation and treatment of severe organ dysfunction, review of pertinent labs and imaging studies, discussions with consulting providers and discussions with patient/family: >30 minutes.    Prachi Boyer MD  Department of Hospital Medicine   [facility]

## 2018-09-16 NOTE — PT/OT/SLP PROGRESS
Physical Therapy Treatment    Patient Name:  Eleazar Solo   MRN:  [id number]    Recommendations:     Discharge Recommendations:  rehabilitation facility   Discharge Equipment Recommendations: (tbd)   Barriers to discharge: None    Assessment:     Eleazar Solo is a 78 y.o. male admitted with a medical diagnosis of Acute hypoxemic respiratory failure.  He presents with the following impairments/functional limitations:  weakness, impaired self care skills, impaired balance, decreased coordination, decreased safety awareness, impaired endurance, impaired functional mobilty, decreased upper extremity function, impaired coordination, gait instability, impaired cognition, decreased lower extremity function .      Pt continues with some confusion .Continues to require two person assist for all mobility but is beginning to move more. Limited by ICU line and the vapotherm.  Rehab Prognosis:  fair; patient would benefit from acute skilled PT services to address these deficits and reach maximum level of function.      Recent Surgery: * No surgery found *      Plan:     During this hospitalization, patient to be seen 5 x/week to address the above listed problems via gait training, therapeutic activities, therapeutic exercises  · Plan of Care Expires:  09/22/18   Plan of Care Reviewed with: patient    Subjective     Communicated with epic and nurses prior to session.  Patient found oob in the recliner.Daughter and wife in room. (stepped out during tx. ) upon PT entry to room, agreeable to treatment.      Chief Complaint: none  Patient comments/goals: none stated.  Pain/Comfort:  · Pain Rating 1: 0/10    Patients cultural, spiritual, L[employer]-day conflicts given the current situation:      Objective:     Patient found with: cook catheter, oxygen, peripheral IV, pulse ox (continuous), telemetry(VAPOTHERM )     General Precautions: Standard, (PER NURSE- NO LONGER ON ASPIRATION PRECAUTIONS. )   Orthopedic Precautions:N/A   Braces:        Functional Mobility:  · Transfers:     · Sit to Stand:  maximal assistance and of 2 persons with rolling walker   · Performed two sit to stand trials with MAX OF 2. Needed assistance to extend knees and hips. MAX OF 2 to maintain standing ~ 8-10 sec. Oxygen sats were inconsistent.      AM-PAC 6 CLICK MOBILITY  Turning over in bed (including adjusting bedclothes, sheets and blankets)?: 2  Sitting down on and standing up from a chair with arms (e.g., wheelchair, bedside commode, etc.): 2  Moving from lying on back to sitting on the side of the bed?: 2  Moving to and from a bed to a chair (including a wheelchair)?: 2  Need to walk in hospital room?: 1  Climbing 3-5 steps with a railing?: 1  Basic Mobility Total Score: 10       Therapeutic Activities and Exercises:   HF x 10 reps AROM>     Patient left up in recliner.  with all lines intact and RN present..    GOALS:   Multidisciplinary Problems     Physical Therapy Goals        Problem: Physical Therapy Goal    Goal Priority Disciplines Outcome Goal Variances Interventions   Physical Therapy Goal     PT, PT/OT      Description:  LTG's to be met by 9/22/18  1. Patient will perform supine to/from sit mod A  2. Patient will perform sit to stand with mod A  3. Patient will ambulate 25ft RW mod A                    Time Tracking:     PT Received On: 09/16/18  PT Start Time: 1045     PT Stop Time: 1100  PT Total Time (min): 15 min     Billable Minutes: Therapeutic Activity 15    Treatment Type: Treatment  PT/PTA: PTA     PTA Visit Number: 1     Lore Vicente PTA  09/16/2018

## 2018-09-16 NOTE — ASSESSMENT & PLAN NOTE
- EGD 9/6 with clot at GE junction s/p clipping and epi injection.  Repeat EGD 9/7 negative for active bleeding.  - Follow serial H&H and transfuse as needed.  - Continue IV Protonix daily.  - Heparin drip d/c'd  -resume eliquis

## 2018-09-16 NOTE — PT/OT/SLP PROGRESS
Speech Language Pathology Treatment    Patient Name:  Eleazar Solo   MRN:  [id number]  Admitting Diagnosis: Acute hypoxemic respiratory failure;  GI Bleed    Recommendations:                 General Recommendations:  SWALLOWING PRECAUTIONS  Diet recommendations:  Puree, Liquid Diet Level: Thin   Aspiration Precautions: IN PLACE,  Swallowing sign placed in room  General Precautions: Standard, aspiration  Communication strategies:  Verbal    Subjective     Pt more awake and alert and following commands  Patient goals: Pt requesting to eat    Pain/Comfort:  · Pain Rating 1: 0/10    Objective:     Has the patient been evaluated by SLP for swallowing?   Yes  Keep patient NPO? No   Current Respiratory Status:    Pt continues on Vapotherm this morning but tolerating well. No shortness of breath noted during session.      Pt seen to reevaluate for swallowing this morning.    He was assessed with thin liquids, thick liquids, pureed and cracker consistencies.   He exhibited no signs of swallowing difficulties - No swallow delays, coughing or changes in vocal quality.  He did fatigue with chewing the cracker.   Pt.currently on Vapotherm 2.5 liters with sats running between 96-99% during session with no shortness of breath noted.    Pt sitting up in chair with improved tolerance but did fatigue some.       Pt's completed 10 reps of oral motor tasks with improved endurance.  He was oriented to person, place and time today.    Assessment:     Eleazar Solo is a 78 y.o. male with who was seen today by BANG.   Pt exhibited improvement with alertness level and ability to follow commands.  He also exhibited improved endurance for session.     He was able to tolerate thin liquids, thick liquids, pureed and solids without any signs of swallowing difficulties.    He is recommended to start a pureed consistency diet and thin liquids with basic swallowing precautions(sit upright, small bites/sips, alternate liquids solids) which were  reviewed with pt, family and nurse.    S.T. Recommended to continue for swallowing and speech/cognition.   Education completed with pt, family and nursing on diet recommended and swallow precautions.    Goals:   Multidisciplinary Problems     SLP Goals        Problem: SLP Goal    Goal Priority Disciplines Outcome   SLP Goal     SLP    Description:  LTG:  Pt will tolerate least restrictive diet consistency safely and efficiently.            Pt will communicate needs/ideas effectively.    ST. Reassess swallowing bedside for p.o. Readiness-  Pt looked much better today(alert, following commands); tolerated thin liquid, thick liquids and pureed consistencies without observable signs of swallowing difficulties. He fatigued with cracker.   He is recommended to start a pureed consistency diet and thin liquids with swallow precautions which were reviewed with patient, family and staff.  **S.T. Recommended to reassess swallowing tomorrow with further goals as appropriate.  **Consider Modified Barium Swallowing Study if pt exhibits any swallowing difficulties and/or lung status does not continue to improve.               2. Oral Motor exercises x10-15             3. Oral Stim. x20 with 100%  timely laryngeal excursions.             4. Orientation with 90% with min cues             5. Follow multi-unit commands with 90%                     Plan:     · Patient to be seen:  5 x/week   · Plan of Care expires:     · Plan of Care reviewed with:  patient, spouse, daughter   · SLP Follow-Up:  Yes       Discharge recommendations:    Pt will require continued S.T. Upon discharge  Barriers to Discharge:      Time Tracking:     SLP Treatment Date:   18  Speech Start Time:  815  Speech Stop Time:  915     Speech Total Time (min):  60 min    Billable Minutes: 60 minutes    Tyesha Luevano CCC-SLP  2018

## 2018-09-16 NOTE — NURSING
Pt became increasingly delirious along with hallucinations. Administered 12.5mg Benadryl IVP. Notified LINDSEY Wallace of neuro change.

## 2018-09-16 NOTE — SUBJECTIVE & OBJECTIVE
Interval History: Pt more alert and oriented this am    Review of Systems   All other systems reviewed and are negative.    Objective:     Vital Signs (Most Recent):  Temp: 98.6 °F (37 °C) (09/16/18 1205)  Pulse: (!) 139 (09/16/18 1456)  Resp: (!) 22 (09/16/18 1456)  BP: 137/68 (09/16/18 1305)  SpO2: (!) 93 % (09/16/18 1515) Vital Signs (24h Range):  Temp:  [98.5 °F (36.9 °C)-98.9 °F (37.2 °C)] 98.6 °F (37 °C)  Pulse:  [] 139  Resp:  [15-28] 22  SpO2:  [92 %-100 %] 93 %  BP: ()/(46-95) 137/68     Weight: 86 kg (189 lb 9.5 oz)  Body mass index is 27.2 kg/m².    Intake/Output Summary (Last 24 hours) at 9/16/2018 1553  Last data filed at 9/16/2018 1305  Gross per 24 hour   Intake 240 ml   Output 1011 ml   Net -771 ml      Physical Exam Constitutional: He is oriented to person, place, and time. No distress.   illl appearing   HENT:   Head: Normocephalic and atraumatic.   Nose: Nose normal.   Eyes: Conjunctivae and EOM are normal.   Neck: Normal range of motion. Neck supple.   Cardiovascular: Normal heart sounds and intact distal pulses.   No murmur heard.  tachycardia   Pulmonary/Chest: Effort normal and breath sounds normal. He has no wheezes.   Abdominal: Soft. Bowel sounds are normal. He exhibits no mass. There is no tenderness. There is no rebound and no guarding.   Musculoskeletal: Normal range of motion. He exhibits no edema.   Neurological: He is alert and oriented to person, place, and time.   Skin: Skin is warm and dry. No rash noted.   Psychiatric: He has a normal mood and affect. His behavior is normal.   Nursing note and vitals reviewed.        Significant Labs:   CBC:   Recent Labs   Lab  09/15/18   0430  09/16/18   0410   WBC  15.29*  14.55*   HGB  8.3*  8.7*   HCT  26.2*  28.0*   PLT  270  309     CMP:   Recent Labs   Lab  09/15/18   0430  09/16/18   0410   NA  146*  147*   K  4.0  3.8   CL  103  101   CO2  34*  36*   GLU  112*  116*   BUN  25*  22   CREATININE  1.0  0.9   CALCIUM  7.8*  8.3*    PROT  5.4*  5.9*   ALBUMIN  2.0*  2.2*   BILITOT  0.8  0.9   ALKPHOS  96  85   AST  78*  84*   ALT  60*  68*   ANIONGAP  9  10   EGFRNONAA  >60  >60       Significant Imaging: I have reviewed all pertinent imaging results/findings within the past 24 hours.

## 2018-09-16 NOTE — PROGRESS NOTES
After x ray showing ET was too far at 26cm . I moved ET to 24 at the lip per order of np cassandra holder

## 2018-09-17 PROBLEM — J96.01 ACUTE RESPIRATORY FAILURE WITH HYPOXEMIA: Status: ACTIVE | Noted: 2018-09-17

## 2018-09-17 LAB
ALBUMIN SERPL BCP-MCNC: 2.2 G/DL
ALLENS TEST: ABNORMAL
ALP SERPL-CCNC: 82 U/L
ALT SERPL W/O P-5'-P-CCNC: 64 U/L
ANION GAP SERPL CALC-SCNC: 12 MMOL/L
AST SERPL-CCNC: 69 U/L
BASOPHILS # BLD AUTO: 0.01 K/UL
BASOPHILS NFR BLD: 0.1 %
BILIRUB SERPL-MCNC: 0.9 MG/DL
BUN SERPL-MCNC: 22 MG/DL
CALCIUM SERPL-MCNC: 8.2 MG/DL
CHLORIDE SERPL-SCNC: 102 MMOL/L
CO2 SERPL-SCNC: 34 MMOL/L
CREAT SERPL-MCNC: 1 MG/DL
DELSYS: ABNORMAL
DIFFERENTIAL METHOD: ABNORMAL
EOSINOPHIL # BLD AUTO: 0.2 K/UL
EOSINOPHIL NFR BLD: 1.6 %
ERYTHROCYTE [DISTWIDTH] IN BLOOD BY AUTOMATED COUNT: 16.8 %
EST. GFR  (AFRICAN AMERICAN): >60 ML/MIN/1.73 M^2
EST. GFR  (NON AFRICAN AMERICAN): >60 ML/MIN/1.73 M^2
FIO2: 35
FLOW: 25
GLUCOSE SERPL-MCNC: 140 MG/DL
HCO3 UR-SCNC: 38.8 MMOL/L (ref 24–28)
HCT VFR BLD AUTO: 27.1 %
HGB BLD-MCNC: 8.4 G/DL
LYMPHOCYTES # BLD AUTO: 1.1 K/UL
LYMPHOCYTES NFR BLD: 7.7 %
MAGNESIUM SERPL-MCNC: 2.1 MG/DL
MCH RBC QN AUTO: 30.5 PG
MCHC RBC AUTO-ENTMCNC: 31 G/DL
MCV RBC AUTO: 99 FL
MODE: ABNORMAL
MONOCYTES # BLD AUTO: 1.6 K/UL
MONOCYTES NFR BLD: 11.7 %
NEUTROPHILS # BLD AUTO: 11 K/UL
NEUTROPHILS NFR BLD: 80.6 %
PCO2 BLDA: 52.7 MMHG (ref 35–45)
PH SMN: 7.48 [PH] (ref 7.35–7.45)
PHOSPHATE SERPL-MCNC: 3.5 MG/DL
PLATELET # BLD AUTO: 300 K/UL
PMV BLD AUTO: 9.5 FL
PO2 BLDA: 61 MMHG (ref 80–100)
POC BE: 15 MMOL/L
POC SATURATED O2: 92 % (ref 95–100)
POCT GLUCOSE: 134 MG/DL (ref 70–110)
POCT GLUCOSE: 180 MG/DL (ref 70–110)
POTASSIUM SERPL-SCNC: 3.8 MMOL/L
PROT SERPL-MCNC: 5.8 G/DL
RBC # BLD AUTO: 2.75 M/UL
SAMPLE: ABNORMAL
SITE: ABNORMAL
SODIUM SERPL-SCNC: 148 MMOL/L
WBC # BLD AUTO: 13.95 K/UL

## 2018-09-17 PROCEDURE — 97530 THERAPEUTIC ACTIVITIES: CPT

## 2018-09-17 PROCEDURE — 25000003 PHARM REV CODE 250: Performed by: NURSE PRACTITIONER

## 2018-09-17 PROCEDURE — 84100 ASSAY OF PHOSPHORUS: CPT

## 2018-09-17 PROCEDURE — 82803 BLOOD GASES ANY COMBINATION: CPT

## 2018-09-17 PROCEDURE — 94664 DEMO&/EVAL PT USE INHALER: CPT

## 2018-09-17 PROCEDURE — 25000242 PHARM REV CODE 250 ALT 637 W/ HCPCS: Performed by: INTERNAL MEDICINE

## 2018-09-17 PROCEDURE — 27000190 HC CPAP FULL FACE MASK W/VALVE

## 2018-09-17 PROCEDURE — 25000003 PHARM REV CODE 250: Performed by: FAMILY MEDICINE

## 2018-09-17 PROCEDURE — 27100171 HC OXYGEN HIGH FLOW UP TO 24 HOURS

## 2018-09-17 PROCEDURE — 63600175 PHARM REV CODE 636 W HCPCS: Performed by: NURSE PRACTITIONER

## 2018-09-17 PROCEDURE — 20000000 HC ICU ROOM

## 2018-09-17 PROCEDURE — 36600 WITHDRAWAL OF ARTERIAL BLOOD: CPT

## 2018-09-17 PROCEDURE — 25000003 PHARM REV CODE 250: Performed by: INTERNAL MEDICINE

## 2018-09-17 PROCEDURE — 99292 CRITICAL CARE ADDL 30 MIN: CPT | Mod: ,,, | Performed by: NURSE PRACTITIONER

## 2018-09-17 PROCEDURE — 63600175 PHARM REV CODE 636 W HCPCS: Performed by: INTERNAL MEDICINE

## 2018-09-17 PROCEDURE — 83735 ASSAY OF MAGNESIUM: CPT

## 2018-09-17 PROCEDURE — 25000242 PHARM REV CODE 250 ALT 637 W/ HCPCS: Performed by: NURSE PRACTITIONER

## 2018-09-17 PROCEDURE — 99232 SBSQ HOSP IP/OBS MODERATE 35: CPT | Mod: ,,, | Performed by: INTERNAL MEDICINE

## 2018-09-17 PROCEDURE — 94640 AIRWAY INHALATION TREATMENT: CPT

## 2018-09-17 PROCEDURE — 99900035 HC TECH TIME PER 15 MIN (STAT)

## 2018-09-17 PROCEDURE — 85025 COMPLETE CBC W/AUTO DIFF WBC: CPT

## 2018-09-17 PROCEDURE — 27100092 HC HIGH FLOW DELIVERY CANNULA

## 2018-09-17 PROCEDURE — G8988 SELF CARE GOAL STATUS: HCPCS | Mod: CL

## 2018-09-17 PROCEDURE — 99291 CRITICAL CARE FIRST HOUR: CPT | Mod: ,,, | Performed by: NURSE PRACTITIONER

## 2018-09-17 PROCEDURE — G8987 SELF CARE CURRENT STATUS: HCPCS | Mod: CN

## 2018-09-17 PROCEDURE — 92526 ORAL FUNCTION THERAPY: CPT

## 2018-09-17 PROCEDURE — 80053 COMPREHEN METABOLIC PANEL: CPT

## 2018-09-17 RX ORDER — POTASSIUM CHLORIDE 14.9 MG/ML
20 INJECTION INTRAVENOUS ONCE
Status: COMPLETED | OUTPATIENT
Start: 2018-09-17 | End: 2018-09-17

## 2018-09-17 RX ORDER — METOPROLOL TARTRATE 50 MG/1
50 TABLET ORAL 2 TIMES DAILY
Status: DISCONTINUED | OUTPATIENT
Start: 2018-09-17 | End: 2018-09-19

## 2018-09-17 RX ORDER — METOPROLOL TARTRATE 1 MG/ML
5 INJECTION, SOLUTION INTRAVENOUS ONCE
Status: COMPLETED | OUTPATIENT
Start: 2018-09-17 | End: 2018-09-17

## 2018-09-17 RX ADMIN — CHLORHEXIDINE GLUCONATE 15 ML: 1.2 RINSE ORAL at 08:09

## 2018-09-17 RX ADMIN — BUDESONIDE 0.5 MG: 0.5 SUSPENSION RESPIRATORY (INHALATION) at 07:09

## 2018-09-17 RX ADMIN — PANTOPRAZOLE SODIUM 40 MG: 40 GRANULE, DELAYED RELEASE ORAL at 08:09

## 2018-09-17 RX ADMIN — LORAZEPAM 1 MG: 2 INJECTION INTRAMUSCULAR; INTRAVENOUS at 04:09

## 2018-09-17 RX ADMIN — POTASSIUM CHLORIDE 20 MEQ: 200 INJECTION, SOLUTION INTRAVENOUS at 08:09

## 2018-09-17 RX ADMIN — DILTIAZEM HYDROCHLORIDE 30 MG: 30 TABLET, FILM COATED ORAL at 07:09

## 2018-09-17 RX ADMIN — FLUCONAZOLE 200 MG: 100 TABLET ORAL at 11:09

## 2018-09-17 RX ADMIN — ARFORMOTEROL TARTRATE 15 MCG: 15 SOLUTION RESPIRATORY (INHALATION) at 08:09

## 2018-09-17 RX ADMIN — DIPHENHYDRAMINE HYDROCHLORIDE 12.5 MG: 50 INJECTION, SOLUTION INTRAMUSCULAR; INTRAVENOUS at 08:09

## 2018-09-17 RX ADMIN — IPRATROPIUM BROMIDE AND ALBUTEROL SULFATE 3 ML: .5; 3 SOLUTION RESPIRATORY (INHALATION) at 03:09

## 2018-09-17 RX ADMIN — DILTIAZEM HYDROCHLORIDE 30 MG: 30 TABLET, FILM COATED ORAL at 02:09

## 2018-09-17 RX ADMIN — DIPHENHYDRAMINE HYDROCHLORIDE 12.5 MG: 50 INJECTION, SOLUTION INTRAMUSCULAR; INTRAVENOUS at 12:09

## 2018-09-17 RX ADMIN — MOXIFLOXACIN HYDROCHLORIDE 400 MG: 400 TABLET, FILM COATED ORAL at 08:09

## 2018-09-17 RX ADMIN — LEVALBUTEROL 0.63 MG: 0.63 SOLUTION RESPIRATORY (INHALATION) at 07:09

## 2018-09-17 RX ADMIN — APIXABAN 5 MG: 2.5 TABLET, FILM COATED ORAL at 11:09

## 2018-09-17 RX ADMIN — APIXABAN 5 MG: 2.5 TABLET, FILM COATED ORAL at 08:09

## 2018-09-17 RX ADMIN — ARFORMOTEROL TARTRATE 15 MCG: 15 SOLUTION RESPIRATORY (INHALATION) at 07:09

## 2018-09-17 RX ADMIN — METOPROLOL TARTRATE 5 MG: 5 INJECTION, SOLUTION INTRAVENOUS at 04:09

## 2018-09-17 RX ADMIN — DILTIAZEM HYDROCHLORIDE 30 MG: 30 TABLET, FILM COATED ORAL at 11:09

## 2018-09-17 RX ADMIN — BISACODYL 10 MG: 10 SUPPOSITORY RECTAL at 08:09

## 2018-09-17 RX ADMIN — DILTIAZEM HYDROCHLORIDE 30 MG: 30 TABLET, FILM COATED ORAL at 05:09

## 2018-09-17 RX ADMIN — METOPROLOL TARTRATE 50 MG: 50 TABLET ORAL at 08:09

## 2018-09-17 RX ADMIN — DIGOXIN 0.12 MG: 125 TABLET ORAL at 08:09

## 2018-09-17 NOTE — SUBJECTIVE & OBJECTIVE
Interval History: Pt more alert and oriented this am. Pt still tachycardia. Per intensivist, pt was given benadryl and ativan to help with insomnia    Review of Systems   All other systems reviewed and are negative.  Objective:     Vital Signs (Most Recent):  Temp: 98.2 °F (36.8 °C) (09/17/18 0730)  Pulse: (!) 116 (09/17/18 1514)  Resp: (!) 31 (09/17/18 1514)  BP: (!) 147/108 (09/17/18 0915)  SpO2: (!) 94 % (09/17/18 1514) Vital Signs (24h Range):  Temp:  [98.2 °F (36.8 °C)-98.9 °F (37.2 °C)] 98.2 °F (36.8 °C)  Pulse:  [] 116  Resp:  [16-38] 31  SpO2:  [90 %-97 %] 94 %  BP: (117-168)/() 147/108     Weight: 86 kg (189 lb 9.5 oz)  Body mass index is 27.2 kg/m².    Intake/Output Summary (Last 24 hours) at 9/17/2018 1623  Last data filed at 9/17/2018 1000  Gross per 24 hour   Intake 450 ml   Output 638 ml   Net -188 ml      Physical Exam   Cardiovascular:   Murmur heard.   Constitutional: He is oriented to person, place, and time. No distress.   illl appearing   HENT:   Head: Normocephalic and atraumatic.   Nose: Nose normal.   Eyes: Conjunctivae and EOM are normal.   Neck: Normal range of motion. Neck supple.   Cardiovascular: Normal heart sounds and intact distal pulses.   No murmur heard.  tachycardia   Pulmonary/Chest: Effort normal and breath sounds normal. He has no wheezes.   Abdominal: Soft. Bowel sounds are normal. He exhibits no mass. There is no tenderness. There is no rebound and no guarding.   Musculoskeletal: Normal range of motion. He exhibits no edema.   Neurological: He is alert and oriented to person, place, and time.   Skin: Skin is warm and dry. No rash noted.   Psychiatric: He has a normal mood and affect. His behavior is normal.   Nursing note and vitals reviewed.        Significant Labs:   CBC:   Recent Labs   Lab  09/16/18   0410 09/17/18   0322   WBC  14.55*  13.95*   HGB  8.7*  8.4*   HCT  28.0*  27.1*   PLT  309  300     CMP:   Recent Labs   Lab  09/16/18 0410  09/17/18   0322    NA  147*  148*   K  3.8  3.8   CL  101  102   CO2  36*  34*   GLU  116*  140*   BUN  22  22   CREATININE  0.9  1.0   CALCIUM  8.3*  8.2*   PROT  5.9*  5.8*   ALBUMIN  2.2*  2.2*   BILITOT  0.9  0.9   ALKPHOS  85  82   AST  84*  69*   ALT  68*  64*   ANIONGAP  10  12   EGFRNONAA  >60  >60       Significant Imaging: I have reviewed all pertinent imaging results/findings within the past 24 hours.

## 2018-09-17 NOTE — SUBJECTIVE & OBJECTIVE
Review of Systems   Constitutional: Positive for malaise/fatigue. Negative for chills and diaphoresis.   Respiratory: Positive for cough. Negative for sputum production, shortness of breath and stridor.    Cardiovascular: Negative for chest pain.   Gastrointestinal: Negative for abdominal pain, nausea and vomiting.   Musculoskeletal: Positive for myalgias.   Skin: Negative.    Neurological: Positive for weakness. Negative for focal weakness and seizures.   Psychiatric/Behavioral: The patient is nervous/anxious and has insomnia.        Objective:     Vital Signs (Most Recent):  Temp: 98.6 °F (37 °C) (09/16/18 1605)  Pulse: (!) 131 (09/16/18 1805)  Resp: 20 (09/16/18 1805)  BP: (!) 140/87 (09/16/18 1805)  SpO2: 95 % (09/16/18 1805) Vital Signs (24h Range):  Temp:  [98.5 °F (36.9 °C)-98.9 °F (37.2 °C)] 98.6 °F (37 °C)  Pulse:  [] 131  Resp:  [11-28] 20  SpO2:  [92 %-100 %] 95 %  BP: ()/(45-92) 140/87     Weight: 86 kg (189 lb 9.5 oz)  Body mass index is 27.2 kg/m².      Intake/Output Summary (Last 24 hours) at 9/16/2018 1903  Last data filed at 9/16/2018 1805  Gross per 24 hour   Intake 360 ml   Output 986 ml   Net -626 ml       Physical Exam   Constitutional: He appears ill. Nasal cannula in place.   HENT:   Head: Atraumatic.   Eyes: Conjunctivae are normal. Pupils are equal, round, and reactive to light.   Neck: No JVD present. No tracheal deviation present.   Cardiovascular: An irregularly irregular rhythm present. Tachycardia present.   No murmur heard.  Pulses:       Radial pulses are 2+ on the right side, and 2+ on the left side.        Dorsalis pedis pulses are 1+ on the right side, and 1+ on the left side.   Pulmonary/Chest: No accessory muscle usage. No respiratory distress. He has decreased breath sounds.   Abdominal: Soft. Bowel sounds are normal. He exhibits no distension.   Musculoskeletal: He exhibits no edema.   Neurological: He is alert.   Intermittently confused, easily reoriented   Skin:  Skin is warm and dry. Capillary refill takes 2 to 3 seconds. No cyanosis.            Vents:  Vent Mode: Spont (09/14/18 1210)  Ventilator Initiated: Yes (09/11/18 1121)  Set Rate: 0 bmp (09/14/18 1210)  Vt Set: 450 mL (09/14/18 1210)  Pressure Support: 10 cmH20 (09/14/18 1210)  PEEP/CPAP: 5 cmH20 (09/14/18 1210)  Oxygen Concentration (%): 35 (09/16/18 1603)  Peak Airway Pressure: 15 cmH2O (09/14/18 1210)  Plateau Pressure: 16 cmH20 (09/14/18 1210)  Total Ve: 10.3 mL (09/14/18 1210)  F/VT Ratio<105 (RSBI): (!) 43.08 (09/14/18 1210)    Lines/Drains/Airways     Central Venous Catheter Line                 Percutaneous Central Line Insertion/Assessment - triple lumen  09/11/18 1124 left internal jugular 5 days          Drain                 Urethral Catheter 09/11/18 1123 Double-lumen 5 days          Pressure Ulcer                 Pressure Injury 09/13/18 1905 Left Buttocks Deep tissue injury 2 days                Significant Labs:    CBC/Anemia Profile:  Recent Labs   Lab  09/15/18   0430  09/16/18   0410   WBC  15.29*  14.55*   HGB  8.3*  8.7*   HCT  26.2*  28.0*   PLT  270  309   MCV  97  99*   RDW  17.2*  17.0*        Chemistries:  Recent Labs   Lab  09/15/18   0430  09/16/18   0410   NA  146*  147*   K  4.0  3.8   CL  103  101   CO2  34*  36*   BUN  25*  22   CREATININE  1.0  0.9   CALCIUM  7.8*  8.3*   ALBUMIN  2.0*  2.2*   PROT  5.4*  5.9*   BILITOT  0.8  0.9   ALKPHOS  96  85   ALT  60*  68*   AST  78*  84*   MG  2.0  2.8*   PHOS  2.4*  2.7       All pertinent labs within the past 24 hours have been reviewed.    Significant Imaging:  I have reviewed all pertinent imaging results/findings within the past 24 hours.

## 2018-09-17 NOTE — PLAN OF CARE
CM read therapy is recommending Skilled vs rehab. CM called therapy but unable to leave a VM for clarification on the next level of care. Spouse and dtr wants patient to return home with HH but will consider rehab if needed     09/17/18 0026   Discharge Reassessment   Assessment Type Discharge Planning Reassessment   Provided patient/caregiver education on the expected discharge date and the discharge plan No   Do you have any problems affording any of your prescribed medications? No   Discharge Plan A Other   Discharge Plan B Other   Patient choice form signed by patient/caregiver N/A   Can the patient answer the patient profile reliably? Yes, cognitively intact   How does the patient rate their overall health at the present time? Fair   Describe the patient's ability to walk at the present time. No restrictions   How often would a person be available to care for the patient? Whenever needed   Number of comorbid conditions (as recorded on the chart) Five or more   During the past month, has the patient often been bothered by feeling down, depressed or hopeless? No   During the past month, has the patient often been bothered by little interest or pleasure in doing things? No

## 2018-09-17 NOTE — PLAN OF CARE
Problem: Patient Care Overview  Goal: Plan of Care Review  No acute events overnight. Pt alert and answering orientation questions but still shows some signs of confusion. Pt tolerating vapotherm at 30L 35%. A-fib better controlled with rate mostly 90s-110s. BP stable. Afebrile. UO adequate. Pt burping after honey thickened liquids but swallows pureed diet without difficulty. Appetite still poor. No BM since 9/11. CBGs WNL but increasing with diet. Pt turned Q2H with pressure points protected. POC reviewed with pt and family. All questions and concerns addressed.

## 2018-09-17 NOTE — PROGRESS NOTES
Pt having increase work of breathing. Selma Np at bedside. Pt placed on Bipap per RT. VSS. Ativan 0.5mg iv given. Family at side.

## 2018-09-17 NOTE — PT/OT/SLP PROGRESS
Speech Language Pathology Treatment    Patient Name:  Eleazar Solo   MRN:  [id number]  Admitting Diagnosis: Acute hypoxemic respiratory failure    Recommendations:                 General Recommendations:  Dysphagia therapy  Diet recommendations:  Puree, Liquid Diet Level: Thin   Aspiration Precautions: 1 bite/sip at a time, Alternating bites/sips, HOB to 90 degrees and Small bites/sips   General Precautions: Standard, aspiration  Communication strategies:  none    Subjective     Pt cooperative but confused. His wife reports increased confusion today.  Patient goals: to get rest    Pain/Comfort:  · Pain Rating 1: 0/10  · Pain Rating Post-Intervention 1: 0/10    Objective:     Has the patient been evaluated by SLP for swallowing?   Yes  Keep patient NPO? No   Current Respiratory Status:        Pt tolerated sips of thin via cup rim and straw with no overt s/s of aspiration but with burping after swallow indicative of reflux. He refused trials of solids today. Pt completed oral motor ex x 5 each with prompting to continue. He presents with decreased lingual coordination. Fly report no difficulty with breakfast..  He was oriented to place and to time person at 75% acc.    Assessment:     Eleazar Solo is a 78 y.o. male with an SLP diagnosis of Dysphagia.  He presents with risk of aspiration.    Goals:   Multidisciplinary Problems     SLP Goals        Problem: SLP Goal    Goal Priority Disciplines Outcome   SLP Goal     SLP Ongoing (interventions implemented as appropriate)   Description:  LTG:  Pt will tolerate least restrictive diet consistency safely and efficiently.            Pt will communicate needs/ideas effectively.    ST. Reassess swallowing bedside for p.o. Readiness-  Pt looked much better today(alert, following commands); tolerated thin liquid, thick liquids and pureed consistencies without observable signs of swallowing difficulties. He fatigued with cracker.   He is recommended to start a pureed  consistency diet and thin liquids with swallow precautions which were reviewed with patient, family and staff.  **S.T. Recommended to reassess swallowing tomorrow with further goals as appropriate.  **Consider Modified Barium Swallowing Study if pt exhibits any swallowing difficulties and/or lung status does not continue to improve.               2. Oral Motor exercises x10-15             3. Oral Stim. x20 with 100%  timely laryngeal excursions.             4. Orientation with 90% with min cues             5. Follow multi-unit commands with 90%                     Plan:     · Patient to be seen:  5 x/week   · Plan of Care expires:     · Plan of Care reviewed with:  patient   · SLP Follow-Up:  Yes       Discharge recommendations:      Barriers to Discharge:  None    Time Tracking:     SLP Treatment Date:   09/17/18  Speech Start Time:  1100  Speech Stop Time:  1122     Speech Total Time (min):  22 min    Billable Minutes: Treatment Swallowing Dysfunction 22    Shana Lam CCC-SLP  09/17/2018

## 2018-09-17 NOTE — PLAN OF CARE
Problem: Physical Therapy Goal  Goal: Physical Therapy Goal  LTG's to be met by 9/22/18  1. Patient will perform supine to/from sit mod A  2. Patient will perform sit to stand with mod A  3. Patient will ambulate 25ft RW mod A   Outcome: Ongoing (interventions implemented as appropriate)  PT PERFORMED SQUAT PIVOT T/F FROM LOW CHAIR TO BED MAX A X 2, VERY LIMITED BY SOB WITH MINIMAL EXERTION

## 2018-09-17 NOTE — PT/OT/SLP PROGRESS
Physical Therapy  Treatment    Eleazar Solo   MRN: [id number]   Admitting Diagnosis: Acute hypoxemic respiratory failure    PT Received On: 09/17/18  PT Start Time: 1350     PT Stop Time: 1413    PT Total Time (min): 23 min       Billable Minutes:  Therapeutic Activity 23    Treatment Type: Treatment  PT/PTA: PT     PTA Visit Number: 0       General Precautions: Standard, fall  Orthopedic Precautions: N/A   Braces: N/A         Subjective:  Communicated with NURSE DUNLAP AND Epic CHART REVIEW prior to session.  PT FOUND SITTING UPRIGHT IN BEDSIDE CHAIR    Pain/Comfort  Pain Rating 1: 0/10    Objective:   Patient found with: telemetry, peripheral IV, pulse ox (continuous), oxygen    Functional Mobility:  PT FOUND SITTING UPRIGHT IN BEDSIDE CHAIR UPON ARRIVAL, SOB AT REST. PT PERFORMED SQUAT PIVOT T/F FROM LOW CHAIR TO BED MAX A X 2, P.T. GUIDED BOTTOM TO COMPLETE T/F. PT SIT>SUP MOD AX2 WITH REPOSITIONING IN BED. PT COULD NOT TOLERATE ADDITIONAL P.T. TX D/T RESPIRATORY DISTRESS FROM T/F. CALL BUTTON IN REACH AND ALL NEEDS MET.  PT SLOW MOVING WITH ALL FUNCTIONAL MOBILITY, FREQUENT REST GIVEN AS NEEDED DUE TO SOB WITH MINIMAL EXERTION, DEEP BREATHING ENCOURAGED THROUGHOUT TX.    AM-PAC 6 CLICK MOBILITY  How much help from another person does this patient currently need?   1 = Unable, Total/Dependent Assistance  2 = A lot, Maximum/Moderate Assistance  3 = A little, Minimum/Contact Guard/Supervision  4 = None, Modified Burns/Independent    Turning over in bed (including adjusting bedclothes, sheets and blankets)?: 2  Sitting down on and standing up from a chair with arms (e.g., wheelchair, bedside commode, etc.): 2  Moving from lying on back to sitting on the side of the bed?: 1  Moving to and from a bed to a chair (including a wheelchair)?: 2  Climbing 3-5 steps with a railing?: 1    AM-PAC Raw Score CMS G-Code Modifier Level of Impairment Assistance   6 % Total / Unable   7 - 9 CM 80 - 100% Maximal Assist    10 - 14 CL 60 - 80% Moderate Assist   15 - 19 CK 40 - 60% Moderate Assist   20 - 22 CJ 20 - 40% Minimal Assist   23 CI 1-20% SBA / CGA   24 CH 0% Independent/ Mod I     Patient left supine with call button in reach.    Assessment:  PT WILL BENEFIT FROM ADDITIONAL P.T. TO ADDRESS IMPAIRMENTS.     Rehab identified problem list/impairments: Rehab identified problem list/impairments: weakness, impaired endurance, impaired balance, impaired self care skills, impaired functional mobilty    Rehab potential is fair.    Activity tolerance: Fair    Discharge recommendations: Discharge Facility/Level Of Care Needs: rehabilitation facility     Barriers to discharge:      Equipment recommendations: Equipment Needed After Discharge: none     GOALS:   Multidisciplinary Problems     Physical Therapy Goals        Problem: Physical Therapy Goal    Goal Priority Disciplines Outcome Goal Variances Interventions   Physical Therapy Goal     PT, PT/OT Ongoing (interventions implemented as appropriate)     Description:  LTG's to be met by 9/22/18  1. Patient will perform supine to/from sit mod A  2. Patient will perform sit to stand with mod A  3. Patient will ambulate 25ft RW mod A                    PLAN:    Patient to be seen 5 x/week  to address the above listed problems via gait training, therapeutic activities, therapeutic exercises  Plan of Care expires: 09/22/18  Plan of Care reviewed with: patient     PT EDUCATED TO CALL NURSE WITH ALL NEEDS D/T FALL RISK. PT VERBALIZED UNDERSTANDING.     Amy Denton, SPT  09/17/2018

## 2018-09-17 NOTE — PROGRESS NOTES
[facility]  Cardiology  Progress Note    Patient Name: Eleazar Solo  MRN: [id number]  Admission Date: 9/10/2018  Hospital Length of Stay: 7 days  Code Status: Full Code   Attending Physician: Prachi Boyer MD   Primary Care Physician: Poly Fitch MD  Expected Discharge Date:   Principal Problem:Acute hypoxemic respiratory failure    Subjective:   HPI  Mr. Solo is a 77yo male with a PMHx of permanent AF on Eliquis, HTN, HLD, COPD, DM II, and h/o CVA, who was transferred from [facility] per family request as patient is followed OP by [facility] physicians.  He was originally admitted to [facility] on 9/6 for acute upper GI bleed with blood loss anemia (Hb 12 > 7.4 > 7.9).  Patient received total of 2 units PRBC and Eliquis held (last dose 9/6 AM).  He underwent EGD on 9/6 that showed a clot at the GE junction which was clipped  and injected with epi.  There was concern for further bleeding, therefore, repeat EGD was performed on 9/7 which showed no evidence of active bleeding.  During admission, patient developed AFib with RVR and was placed on diltiazem drip and PO dig.  CXR on 9/8 showed left pneumonia, IV Rocephin started.  Prior to transfer, vital signs and labs stable.  Upon arrival to [facility] ICU, patient hypotensive (SBP 70-80's), now on pressors and was hypoxic requiring continuous BiPAP.  Remains in AFib with controlled rate.  Repeat CXR showed RUL pneumonia.  Repeat labs resulted WBC 16.5, H&H 8.4/24.6, plt 136, BUN 38, cr. 1.4, AST 77, . ABG with pH 7.382, pCO2 44, pO2 60, HCO3 26.  Patient c/o SOB which is resolved while on BiPAP. CXR today shows marked amount of alveolar consolidation in the lower 2/3 of both lungs.  The this represents an interval worsening of the appearance of the lungs and is characteristic of pneumonia. Decision made to intubate patient  Due to resp distress and wheezing.  H/H this morning 7.5/22.6    Hospital Course:   9/12/18- Remains  intubated and sedated. Still in A-fib. Rate 100-115 bpm this morning. Started on Amio gtt for rate control. H/H stable at 7.8/23.9. Continues abx for bilateral pneumonia. Patient being weaned from Dopamine gtt.Liver enzymes improved today. Echo shows normal LVF with Moderate to severe aortic stenosis and pulm HTN     9/13/18-Patient seen and examined today. No acute events overnight. Remains intubated. Still in afib, HR variable. H and H slightly worse, 7.3/22.3. Would benefit from transfusion.     9/14/18-Patient seen and examined today. Pressor re-started overnight. Still intubated. Remains in afib with rapid rate. H and H improved s/p transfusion.     9/15/18-Patient seen and examined today, now extubated and sitting in chair. Feels ok. Remains in afib with RVR, meds adjusted. Labs reviewed. H and H stable overnight.     9/16/18-Patient seen and examined today. Looks and feels much better. More awake and alert. SOB improving. Remains in afib, HR better controlled. Labs stable.    9/17/18--Patient seen and examined today. He seems to be feeling better today, more awake and interactive. Remains in afib on Eliquis. Labs reviewed, Cr 1.0. Resume BB.     Interval History: no acute events o/n    Review of Systems   Constitution: Positive for malaise/fatigue. Negative for weakness.   HENT: Negative for hearing loss and hoarse voice.    Eyes: Negative for blurred vision and visual disturbance.   Cardiovascular: Positive for irregular heartbeat. Negative for chest pain, claudication, dyspnea on exertion, leg swelling, near-syncope, orthopnea, palpitations, paroxysmal nocturnal dyspnea and syncope.   Respiratory: Positive for shortness of breath (improving). Negative for cough, hemoptysis, sleep disturbances due to breathing, snoring and wheezing.    Endocrine: Negative for cold intolerance and heat intolerance.   Hematologic/Lymphatic: Bruises/bleeds easily (on Eliquis).   Skin: Negative for color change, dry skin and  nail changes.   Musculoskeletal: Positive for arthritis and back pain. Negative for joint pain and myalgias.   Gastrointestinal: Negative for bloating, abdominal pain, constipation, nausea and vomiting.   Genitourinary: Negative for dysuria, flank pain, hematuria and hesitancy.   Neurological: Negative for headaches, light-headedness, loss of balance, numbness and paresthesias.   Psychiatric/Behavioral: Negative for altered mental status.   Allergic/Immunologic: Negative for environmental allergies.     Objective:     Vital Signs (Most Recent):  Temp: 98.2 °F (36.8 °C) (09/17/18 0730)  Pulse: (!) 126 (09/17/18 0915)  Resp: (!) 33 (09/17/18 0915)  BP: (!) 147/108 (09/17/18 0915)  SpO2: (!) 91 % (09/17/18 0915) Vital Signs (24h Range):  Temp:  [98.2 °F (36.8 °C)-98.9 °F (37.2 °C)] 98.2 °F (36.8 °C)  Pulse:  [] 126  Resp:  [11-38] 33  SpO2:  [90 %-97 %] 91 %  BP: (106-168)/() 147/108     Weight: 86 kg (189 lb 9.5 oz)  Body mass index is 27.2 kg/m².     SpO2: (!) 91 %  O2 Device (Oxygen Therapy): Vapotherm      Intake/Output Summary (Last 24 hours) at 9/17/2018 1237  Last data filed at 9/17/2018 1000  Gross per 24 hour   Intake 450 ml   Output 793 ml   Net -343 ml       Lines/Drains/Airways     Central Venous Catheter Line                 Percutaneous Central Line Insertion/Assessment - triple lumen  09/11/18 1124 left internal jugular 6 days          Pressure Ulcer                 Pressure Injury 09/13/18 1905 Left Buttocks Deep tissue injury 3 days                Physical Exam   Constitutional: He is oriented to person, place, and time. He appears well-developed and well-nourished. He is active. No distress. Nasal cannula in place.   HENT:   Head: Normocephalic and atraumatic.   Neck: Normal range of motion and full passive range of motion without pain. Neck supple. No JVD present.   Cardiovascular: S1 normal, S2 normal and intact distal pulses. An irregularly irregular rhythm present.  Occasional  extrasystoles are present. Tachycardia present. PMI is not displaced. Exam reveals no distant heart sounds.   Murmur heard.   Harsh midsystolic murmur is present with a grade of 2/6 at the upper right sternal border radiating to the neck.  Pulses:       Radial pulses are 2+ on the right side, and 2+ on the left side.        Dorsalis pedis pulses are 1+ on the right side, and 1+ on the left side.   Pulmonary/Chest: Accessory muscle usage present. No respiratory distress. He has decreased breath sounds in the right lower field and the left lower field. He has no wheezes.   +Vapotherm in place   Abdominal: Soft. Bowel sounds are normal. He exhibits no distension. There is no tenderness.   Genitourinary:   Genitourinary Comments: deferred   Musculoskeletal: Normal range of motion. He exhibits no edema.        Right ankle: He exhibits no swelling.        Left ankle: He exhibits no swelling.   Neurological: He is alert and oriented to person, place, and time.   Skin: Skin is warm and dry. He is not diaphoretic. No cyanosis. Nails show no clubbing.   Psychiatric: He has a normal mood and affect. His speech is normal and behavior is normal. Judgment and thought content normal. Cognition and memory are normal.   Nursing note and vitals reviewed.      Significant Labs:   All pertinent lab results from the last 24 hours have been reviewed. and   Recent Lab Results       09/17/18  0357 09/17/18  0322 09/16/18  2350 09/16/18  2037 09/16/18  1551      Albumin  2.2        Alkaline Phosphatase  82        ALT  64        Anion Gap  12        AST  69        Baso #  0.01        Basophil%  0.1        Total Bilirubin  0.9  Comment:  For infants and newborns, interpretation of results should be based  on gestational age, weight and in agreement with clinical  observations.  Premature Infant recommended reference ranges:  Up to 24 hours.............<8.0 mg/dL  Up to 48 hours............<12.0 mg/dL  3-5 days..................<15.0  mg/dL  6-29 days.................<15.0 mg/dL          BUN, Bld  22        Calcium  8.2        Chloride  102        CO2  34        Creatinine  1.0        Differential Method  Automated        eGFR if   >60        eGFR if non   >60  Comment:  Calculation used to obtain the estimated glomerular filtration  rate (eGFR) is the CKD-EPI equation.           Eos #  0.2        Eosinophil%  1.6        Glucose  140        Gran # (ANC)  11.0        Gran%  80.6        Hematocrit  27.1        Hemoglobin  8.4        Lymph #  1.1        Lymph%  7.7        Magnesium  2.1        MCH  30.5        MCHC  31.0        MCV  99        Mono #  1.6        Mono%  11.7        MPV  9.5        Phosphorus  3.5        Platelets  300        POCT Glucose 134  133 100 121     Potassium  3.8        Total Protein  5.8        RBC  2.75        RDW  16.8        Sodium  148        WBC  13.95                        Significant Imaging: Echocardiogram:   2D echo with color flow doppler:   Results for orders placed or performed during the hospital encounter of 09/10/18   2D echo with color flow doppler   Result Value Ref Range    EF 50 55 - 65    Mitral Valve Regurgitation MILD     Diastolic Dysfunction No     Aortic Valve Stenosis MODERATE TO SEVERE (A)     Est. PA Systolic Pressure 56 (A)     Tricuspid Valve Regurgitation MILD TO MODERATE     and X-Ray: CXR: X-Ray Chest 1 View (CXR):   Results for orders placed or performed during the hospital encounter of 09/10/18   X-Ray Chest 1 View    Narrative    EXAMINATION:  XR CHEST 1 VIEW    CLINICAL HISTORY:  ET Tube Placement; OG Tube Placement;    COMPARISON:  09/15/2018.    FINDINGS:  Left internal jugular line remains in place.  Distal tip overlies the junction of the left brachiocephalic vein and SVC.  No change.  Mild hazy infiltrate right midlung and right lung base slightly improved.  Minimal hazy infiltrate at the left heart border is slightly worse.  Cardiac silhouette  within normal limits.  Vascular calcifications again noted.No significant bony findings.      Impression    1. Hazy right mid and lower lung infiltrate.  No significant change since 09/15/2018.  2. Mild hazy atelectasis or infiltrate at the left heart border, slightly worse.      Electronically signed by: Eleazar May MD  Date:    09/16/2018  Time:    07:43     Assessment and Plan:     Patient seen and examined in ICU this AM. Seems to be slowly improving each day per patient and family in room this AM. Remains in AFIB, adjust metoprolol dose. Continue Eliquis, BB, CCB, Digoxin and adjust as tolerated.     Pneumonia of both lungs due to infectious organism    -per critical care        Anemia, unspecified    -Stable, monitor          Nonrheumatic aortic valve stenosis    -Stable  -Moderate to severe by 2D echo  -Further workup as OP        Acute upper GI bleed with acute blood loss anemia    -H/H stable, continue to monitor  -Continue Eliquis for CVA prophylaxis         Essential hypertension    -BP controlled  -Continue same meds          Chronic atrial fibrillation with RVR    -Remains in afib with RVR in setting of sepsis/PNA/anemia  -HR better controlled  -Continue digoxin, cardizem, metoprolol  -Continue Eliquis for CVA prophylaxis             VTE Risk Mitigation (From admission, onward)        Ordered     apixaban tablet 5 mg  2 times daily      09/15/18 0958     IP VTE HIGH RISK PATIENT  Once      09/11/18 0425     Reason for No Pharmacological VTE Prophylaxis  Once      09/11/18 0425     Place sequential compression device  Until discontinued      09/11/18 0425          Selma Lomas NP  Cardiology  [facility]

## 2018-09-17 NOTE — ASSESSMENT & PLAN NOTE
S/p extubation  On  vapotherm  pulm on board  Cont Formeterol and Budesonide  Schedule xopenex  Continue avelox, diflucan

## 2018-09-17 NOTE — PLAN OF CARE
Problem: SLP Goal  Goal: SLP Goal  LTG:  Pt will tolerate least restrictive diet consistency safely and efficiently.            Pt will communicate needs/ideas effectively.    ST. Reassess swallowing bedside for p.o. Readiness-  Pt looked much better today(alert, following commands); tolerated thin liquid, thick liquids and pureed consistencies without observable signs of swallowing difficulties. He fatigued with cracker.   He is recommended to start a pureed consistency diet and thin liquids with swallow precautions which were reviewed with patient, family and staff.  **S.T. Recommended to reassess swallowing tomorrow with further goals as appropriate.  **Consider Modified Barium Swallowing Study if pt exhibits any swallowing difficulties and/or lung status does not continue to improve.               2. Oral Motor exercises x10-15             3. Oral Stim. x20 with 100%  timely laryngeal excursions.             4. Orientation with 90% with min cues             5. Follow multi-unit commands with 90%    Outcome: Ongoing (interventions implemented as appropriate)  Pt tolerated thin liquids with no overt s/s of aspiration, but he does exhibit s/s of reflux.

## 2018-09-17 NOTE — PLAN OF CARE
Problem: Occupational Therapy Goal  Goal: Occupational Therapy Goal  Goals to be met by: 9/22/18     Patient will increase functional independence with ADLs by performing:    UE Dressing with Moderate Assistance.  Grooming while EOB with Minimal Assistance.  Toileting from bedside commode with Maximum Assistance for hygiene and clothing management.   Toilet transfer to bedside commode with Maximum Assistance.  Upper extremity exercise program x10 reps per handout, with assistance as needed.     Outcome: Ongoing (interventions implemented as appropriate)  PATIENT MORE ALERT TODAY, HOWEVER STILL REQUIRES MAX A X2 TO TRANSFER CHAIR->BED. PERFORMED SQUAT PIVOT T/F, VERY LIMITED BY SOB WITH MINIMAL EXERTION

## 2018-09-17 NOTE — PT/OT/SLP PROGRESS
"Occupational Therapy  Treatment    Eleazar Solo   MRN: [id number]   Admitting Diagnosis: Acute hypoxemic respiratory failure    OT Date of Treatment: 09/17/18   OT Start Time: 1400  OT Stop Time: 1415  OT Total Time (min): 15 min    Billable Minutes:  Therapeutic Activity 15    General Precautions: Standard, fall, respiratory  Orthopedic Precautions: N/A  Braces: N/A         Subjective:  Communicated with NURSE prior to session.    Pain/Comfort  Pain Rating 1: 0/10  Pain Rating Post-Intervention 1: 0/10    Objective:  Patient found with: blood pressure cuff, oxygen, peripheral IV, pulse ox (continuous), telemetry     Functional Mobility:  Bed Mobility:       Transfers:        Functional Ambulation: DID NOT OCCUR        Balance:   Static Sit: FAIR-: Maintains without assist but inconsistent   Dynamic Sit: POOR: N/A  Static Stand: 0: Needs MAXIMAL assist to maintain   Dynamic stand: 0: N/A    Therapeutic Activities and Exercises:  PATIENT MORE ALERT TODAY, HOWEVER STILL REQUIRES MAX A X2 TO TRANSFER CHAIR->BED. PERFORMED SQUAT PIVOT T/F, VERY LIMITED BY SOB WITH MINIMAL EXERTION    AM-PAC 6 CLICK ADL   How much help from another person does this patient currently need?   1 = Unable, Total/Dependent Assistance  2 = A lot, Maximum/Moderate Assistance  3 = A little, Minimum/Contact Guard/Supervision  4 = None, Modified [place]/Independent    Putting on and taking off regular lower body clothing? : 1  Bathing (including washing, rinsing, drying)?: 1  Toileting, which includes using toilet, bedpan, or urinal? : 1  Putting on and taking off regular upper body clothing?: 1  Taking care of personal grooming such as brushing teeth?: 1  Eating meals?: 2  Daily Activity Total Score: 7     AM-PAC Raw Score CMS "G-Code Modifier Level of Impairment Assistance   6 % Total / Unable   7 - 8 CM 80 - 100% Maximal Assist   9-13 CL 60 - 80% Moderate Assist   14 - 19 CK 40 - 60% Moderate Assist   20 - 22 CJ 20 - 40% Minimal " Assist   23 CI 1-20% SBA / CGA   24 CH 0% Independent/ Mod I       Patient left HOB elevated with all lines intact, call button in reach, NURSING notified and STAFF present    ASSESSMENT:  Eleazar Solo is a 78 y.o. male with a medical diagnosis of Acute hypoxemic respiratory failure and presents with DEBILITY, POOR ACTIVITY TOLERANCE, IMPAIRED ADLS, FUNCTIONAL MOBILITY, AND IMPAIRED SAFETY AWARENESS. PT WILL BENEFIT FROM CONTINUED SKILLED OT SERVICES TO INCREASE FUNCTIONAL INDEPENDENCE, AS PT IS VERY LIMITED DUE TO RESPIRATORY DEFICITS.    Rehab identified problem list/impairments: Rehab identified problem list/impairments: weakness, impaired endurance, gait instability, impaired functional mobilty, impaired self care skills, impaired balance, impaired cognition, decreased lower extremity function, decreased upper extremity function, decreased coordination, decreased safety awareness, impaired coordination    Rehab potential is fair.    Activity tolerance: Fair    Discharge recommendations: Discharge Facility/Level Of Care Needs: rehabilitation facility, nursing facility, skilled     Barriers to discharge: Barriers to Discharge: None    Equipment recommendations: (DEFER TO NEXT LEVEL OF CARE)     GOALS:   Multidisciplinary Problems     Occupational Therapy Goals        Problem: Occupational Therapy Goal    Goal Priority Disciplines Outcome Interventions   Occupational Therapy Goal     OT, PT/OT Ongoing (interventions implemented as appropriate)    Description:  Goals to be met by: 9/22/18     Patient will increase functional independence with ADLs by performing:    UE Dressing with Moderate Assistance.  Grooming while EOB with Minimal Assistance.  Toileting from bedside commode with Maximum Assistance for hygiene and clothing management.   Toilet transfer to bedside commode with Maximum Assistance.  Upper extremity exercise program x10 reps per handout, with assistance as needed.                       Plan:  Patient to be seen 3 x/week to address the above listed problems via self-care/home management, therapeutic activities, therapeutic exercises  Plan of Care expires: 09/22/18  Plan of Care reviewed with: patient    OT G-codes  Functional Assessment Tool Used: [place] AM-PAC  Score: 7  Functional Limitation: Self care  Self Care Current Status (): CN  Self Care Goal Status (): THERON Fonseca OT  09/17/2018

## 2018-09-17 NOTE — SUBJECTIVE & OBJECTIVE
Interval History: no acute events o/n    Review of Systems   Constitution: Positive for malaise/fatigue. Negative for weakness.   HENT: Negative for hearing loss and hoarse voice.    Eyes: Negative for blurred vision and visual disturbance.   Cardiovascular: Positive for irregular heartbeat. Negative for chest pain, claudication, dyspnea on exertion, leg swelling, near-syncope, orthopnea, palpitations, paroxysmal nocturnal dyspnea and syncope.   Respiratory: Positive for shortness of breath (improving). Negative for cough, hemoptysis, sleep disturbances due to breathing, snoring and wheezing.    Endocrine: Negative for cold intolerance and heat intolerance.   Hematologic/Lymphatic: Bruises/bleeds easily (on Eliquis).   Skin: Negative for color change, dry skin and nail changes.   Musculoskeletal: Positive for arthritis and back pain. Negative for joint pain and myalgias.   Gastrointestinal: Negative for bloating, abdominal pain, constipation, nausea and vomiting.   Genitourinary: Negative for dysuria, flank pain, hematuria and hesitancy.   Neurological: Negative for headaches, light-headedness, loss of balance, numbness and paresthesias.   Psychiatric/Behavioral: Negative for altered mental status.   Allergic/Immunologic: Negative for environmental allergies.     Objective:     Vital Signs (Most Recent):  Temp: 98.2 °F (36.8 °C) (09/17/18 0730)  Pulse: (!) 126 (09/17/18 0915)  Resp: (!) 33 (09/17/18 0915)  BP: (!) 147/108 (09/17/18 0915)  SpO2: (!) 91 % (09/17/18 0915) Vital Signs (24h Range):  Temp:  [98.2 °F (36.8 °C)-98.9 °F (37.2 °C)] 98.2 °F (36.8 °C)  Pulse:  [] 126  Resp:  [11-38] 33  SpO2:  [90 %-97 %] 91 %  BP: (106-168)/() 147/108     Weight: 86 kg (189 lb 9.5 oz)  Body mass index is 27.2 kg/m².     SpO2: (!) 91 %  O2 Device (Oxygen Therapy): Vapotherm      Intake/Output Summary (Last 24 hours) at 9/17/2018 1237  Last data filed at 9/17/2018 1000  Gross per 24 hour   Intake 450 ml   Output 793  ml   Net -343 ml       Lines/Drains/Airways     Central Venous Catheter Line                 Percutaneous Central Line Insertion/Assessment - triple lumen  09/11/18 1124 left internal jugular 6 days          Pressure Ulcer                 Pressure Injury 09/13/18 1905 Left Buttocks Deep tissue injury 3 days                Physical Exam   Constitutional: He is oriented to person, place, and time. He appears well-developed and well-nourished. He is active. No distress. Nasal cannula in place.   HENT:   Head: Normocephalic and atraumatic.   Neck: Normal range of motion and full passive range of motion without pain. Neck supple. No JVD present.   Cardiovascular: S1 normal, S2 normal and intact distal pulses. An irregularly irregular rhythm present.  Occasional extrasystoles are present. Tachycardia present. PMI is not displaced. Exam reveals no distant heart sounds.   Murmur heard.   Harsh midsystolic murmur is present with a grade of 2/6 at the upper right sternal border radiating to the neck.  Pulses:       Radial pulses are 2+ on the right side, and 2+ on the left side.        Dorsalis pedis pulses are 1+ on the right side, and 1+ on the left side.   Pulmonary/Chest: Accessory muscle usage present. No respiratory distress. He has decreased breath sounds in the right lower field and the left lower field. He has no wheezes.   +Vapotherm in place   Abdominal: Soft. Bowel sounds are normal. He exhibits no distension. There is no tenderness.   Genitourinary:   Genitourinary Comments: deferred   Musculoskeletal: Normal range of motion. He exhibits no edema.        Right ankle: He exhibits no swelling.        Left ankle: He exhibits no swelling.   Neurological: He is alert and oriented to person, place, and time.   Skin: Skin is warm and dry. He is not diaphoretic. No cyanosis. Nails show no clubbing.   Psychiatric: He has a normal mood and affect. His speech is normal and behavior is normal. Judgment and thought content  normal. Cognition and memory are normal.   Nursing note and vitals reviewed.      Significant Labs:   All pertinent lab results from the last 24 hours have been reviewed. and   Recent Lab Results       09/17/18  0357 09/17/18  0322 09/16/18  2350 09/16/18 2037 09/16/18  1551      Albumin  2.2        Alkaline Phosphatase  82        ALT  64        Anion Gap  12        AST  69        Baso #  0.01        Basophil%  0.1        Total Bilirubin  0.9  Comment:  For infants and newborns, interpretation of results should be based  on gestational age, weight and in agreement with clinical  observations.  Premature Infant recommended reference ranges:  Up to 24 hours.............<8.0 mg/dL  Up to 48 hours............<12.0 mg/dL  3-5 days..................<15.0 mg/dL  6-29 days.................<15.0 mg/dL          BUN, Bld  22        Calcium  8.2        Chloride  102        CO2  34        Creatinine  1.0        Differential Method  Automated        eGFR if   >60        eGFR if non   >60  Comment:  Calculation used to obtain the estimated glomerular filtration  rate (eGFR) is the CKD-EPI equation.           Eos #  0.2        Eosinophil%  1.6        Glucose  140        Gran # (ANC)  11.0        Gran%  80.6        Hematocrit  27.1        Hemoglobin  8.4        Lymph #  1.1        Lymph%  7.7        Magnesium  2.1        MCH  30.5        MCHC  31.0        MCV  99        Mono #  1.6        Mono%  11.7        MPV  9.5        Phosphorus  3.5        Platelets  300        POCT Glucose 134  133 100 121     Potassium  3.8        Total Protein  5.8        RBC  2.75        RDW  16.8        Sodium  148        WBC  13.95                        Significant Imaging: Echocardiogram:   2D echo with color flow doppler:   Results for orders placed or performed during the hospital encounter of 09/10/18   2D echo with color flow doppler   Result Value Ref Range    EF 50 55 - 65    Mitral Valve Regurgitation MILD      Diastolic Dysfunction No     Aortic Valve Stenosis MODERATE TO SEVERE (A)     Est. PA Systolic Pressure 56 (A)     Tricuspid Valve Regurgitation MILD TO MODERATE     and X-Ray: CXR: X-Ray Chest 1 View (CXR):   Results for orders placed or performed during the hospital encounter of 09/10/18   X-Ray Chest 1 View    Narrative    EXAMINATION:  XR CHEST 1 VIEW    CLINICAL HISTORY:  ET Tube Placement; OG Tube Placement;    COMPARISON:  09/15/2018.    FINDINGS:  Left internal jugular line remains in place.  Distal tip overlies the junction of the left brachiocephalic vein and SVC.  No change.  Mild hazy infiltrate right midlung and right lung base slightly improved.  Minimal hazy infiltrate at the left heart border is slightly worse.  Cardiac silhouette within normal limits.  Vascular calcifications again noted.No significant bony findings.      Impression    1. Hazy right mid and lower lung infiltrate.  No significant change since 09/15/2018.  2. Mild hazy atelectasis or infiltrate at the left heart border, slightly worse.      Electronically signed by: Eleazar May MD  Date:    09/16/2018  Time:    07:43

## 2018-09-17 NOTE — PROGRESS NOTES
Called to bedside for dyspnea, increased work of breathing, increased tachycardia (atrial fib), and restlessness  Ordered NIPPV and lopressor IV dose  Remained at bedside to evaluate response to therapy  Family updated on status and plan    Additional 30 minutes critical care time spent at bedside  Critical care was time spent personally by myself on the following activities: development of treatment plan with patient or surrogate and bedside caregivers, discussions with consultants, evaluation of patient's response to treatment, examination of patient, ordering and performing treatments and interventions, ordering and review of laboratory studies, ordering and review of radiographic studies, pulse oximetry, re-evaluation of patient's condition.  This critical care time did not overlap with that of any other provider.     Selma Wallace, ACNP-BC  [facility]

## 2018-09-17 NOTE — PROGRESS NOTES
[facility] Hospital Medicine  Progress Note    Patient Name: Eleazar Solo  MRN: [id number]  Patient Class: IP- Inpatient   Admission Date: 9/10/2018  Length of Stay: 7 days  Attending Physician: Prachi Boyer MD  Primary Care Provider: Poly Fitch MD        Subjective:     Principal Problem:Acute hypoxemic respiratory failure    HPI:  Mr. Solo is a 79yo male with a PMHx of permanent AF on Eliquis, HTN, HLD, COPD, DM II, and h/o CVA, who was transferred from  per family request as patient is followed OP by [facility] physicians.  He was originally admitted to [facility] on 9/6 for acute upper GI bleed with blood loss anemia (Hb 12 > 7.4 > 7.9).  Patient received total of 2 units PRBC and Eliquis held (last dose 9/6 AM).  He underwent EGD on 9/6 that showed a clot at the GE junction which was clipped  and injected with epi.  There was concern for further bleeding, therefore, repeat EGD was performed on 9/7 which showed no evidence of active bleeding.  During admission, patient developed AFib with RVR and was placed on diltiazem drip and PO dig.  CXR on 9/8 showed left pneumonia, IV Rocephin started.  Prior to transfer, vital signs and labs stable.  Upon arrival to [facility] ICU, patient hypotensive (SBP 70-80's) and hypoxic requiring continuous BiPAP.  Remains in AFib with controlled rate.  Repeat CXR showed RUL pneumonia.  Repeat labs resulted WBC 16.5, H&H 8.4/24.6, plt 136, BUN 38, cr. 1.4, AST 77, . ABG with pH 7.382, pCO2 44, pO2 60, HCO3 26.  Patient c/o SOB which is resolved while on BiPAP.  Denies any CP, palpitations, orthopnea, PND, cough, edema, ABD pain, N/V/D, hematemesis, melena, hematochezia, dysuria, lightheadedness/dzziness, syncope, HA, AMS, focal deficits, weakness, fever, or chills.  Patient admitted to ICU under Hospital Medicine services.     Hospital Course:  Pt admitted and started on bipap, eventually requiring intubation on 9/11. Started  on levophed, heparin, Dig and Amiodarone infusion. Echo shows normal LVF with Moderate to severe aortic stenosis and pulm HTN. Remains intubated. Hb to 7.3. Transfuse 1 U PBRCs. Pt continued atrial fib with RVR despite amiodarone infusion. Cards will add BB, Cont dig, and amio d/c'd.  . Respiratory cx pending candida. Abx changed to avelox and diflucan. Pt extubated on 9/14. Doing well s/p extubation.  Cards will add small dose of CCB. IV Lopressor transitioned to po. Heparin drip to eliquis. More alert and oriented as progressed. Pt remains on vapotherm. WBCs trending down       Interval History: Pt more alert and oriented this am. Pt still tachycardia. Per intensivist, pt was given benadryl and ativan to help with insomnia    Review of Systems   All other systems reviewed and are negative.  Objective:     Vital Signs (Most Recent):  Temp: 98.2 °F (36.8 °C) (09/17/18 0730)  Pulse: (!) 116 (09/17/18 1514)  Resp: (!) 31 (09/17/18 1514)  BP: (!) 147/108 (09/17/18 0915)  SpO2: (!) 94 % (09/17/18 1514) Vital Signs (24h Range):  Temp:  [98.2 °F (36.8 °C)-98.9 °F (37.2 °C)] 98.2 °F (36.8 °C)  Pulse:  [] 116  Resp:  [16-38] 31  SpO2:  [90 %-97 %] 94 %  BP: (117-168)/() 147/108     Weight: 86 kg (189 lb 9.5 oz)  Body mass index is 27.2 kg/m².    Intake/Output Summary (Last 24 hours) at 9/17/2018 1623  Last data filed at 9/17/2018 1000  Gross per 24 hour   Intake 450 ml   Output 638 ml   Net -188 ml      Physical Exam   Cardiovascular:   Murmur heard.   Constitutional: He is oriented to person, place, and time. No distress.   illl appearing   HENT:   Head: Normocephalic and atraumatic.   Nose: Nose normal.   Eyes: Conjunctivae and EOM are normal.   Neck: Normal range of motion. Neck supple.   Cardiovascular: Normal heart sounds and intact distal pulses.   No murmur heard.  tachycardia   Pulmonary/Chest: Effort normal and breath sounds normal. He has no wheezes.   Abdominal: Soft. Bowel sounds are normal. He  exhibits no mass. There is no tenderness. There is no rebound and no guarding.   Musculoskeletal: Normal range of motion. He exhibits no edema.   Neurological: He is alert and oriented to person, place, and time.   Skin: Skin is warm and dry. No rash noted.   Psychiatric: He has a normal mood and affect. His behavior is normal.   Nursing note and vitals reviewed.        Significant Labs:   CBC:   Recent Labs   Lab  09/16/18 0410  09/17/18   0322   WBC  14.55*  13.95*   HGB  8.7*  8.4*   HCT  28.0*  27.1*   PLT  309  300     CMP:   Recent Labs   Lab  09/16/18 0410 09/17/18   0322   NA  147*  148*   K  3.8  3.8   CL  101  102   CO2  36*  34*   GLU  116*  140*   BUN  22  22   CREATININE  0.9  1.0   CALCIUM  8.3*  8.2*   PROT  5.9*  5.8*   ALBUMIN  2.2*  2.2*   BILITOT  0.9  0.9   ALKPHOS  85  82   AST  84*  69*   ALT  68*  64*   ANIONGAP  10  12   EGFRNONAA  >60  >60       Significant Imaging: I have reviewed all pertinent imaging results/findings within the past 24 hours.    Assessment/Plan:      * Acute hypoxemic respiratory failure on mechanical ventilation    S/p extubation  On  vapotherm  pulm on board  Cont Formeterol and Budesonide  Schedule xopenex  Continue avelox, diflucan        Pneumonia of both lungs due to infectious organism    Cont avelox and diflucan    sputum pending candida  -Diflucan    blood cultures pending        Type 2 diabetes mellitus without complication, without long-term current use of insulin    - Hold Amaryl, will resume at DC.  - Accuchecks with low SSI.          Acute upper GI bleed with acute blood loss anemia    - EGD 9/6 with clot at GE junction s/p clipping and epi injection.  Repeat EGD 9/7 negative for active bleeding.  - Follow serial H&H and transfuse as needed.  - Continue IV Protonix daily.  - Heparin drip d/c'd  -resume eliquis        Chronic kidney disease, stage 3    - Cr. Stable at 1.4 (baseline 1.3)  - Avoid nephrotoxic agents.trict I&O's.  - Follow daily BMP.         Chronic obstructive pulmonary disease with acute lower respiratory infection    - Plan as above.  - Pulmonology following.        Essential hypertension    - Patient hypotensive and on pressor  - resume as appropriate        Chronic atrial fibrillation with RVR      -eliquis  -dig  -BB po, lopressor inc to 50mg BID  -CCB po- cardizem  - Cardiology on board          VTE Risk Mitigation (From admission, onward)        Ordered     apixaban tablet 5 mg  2 times daily      09/15/18 0958     IP VTE HIGH RISK PATIENT  Once      09/11/18 0425     Reason for No Pharmacological VTE Prophylaxis  Once      09/11/18 0425     Place sequential compression device  Until discontinued      09/11/18 0425          Critical care time spent on the evaluation and treatment of severe organ dysfunction, review of pertinent labs and imaging studies, discussions with consulting providers and discussions with patient/family: >30 minutes.    Prachi Boyer MD  Department of Hospital Medicine   [facility]

## 2018-09-17 NOTE — PROGRESS NOTES
[facility] -   Critical Care Medicine  Progress Note    Patient Name: Eleazar Solo  MRN: [id number]  Admission Date: 9/10/2018  Hospital Length of Stay: 7 days  Code Status: Full Code  Attending Provider: Prachi Boyer MD  Primary Care Provider: Poly Fitch MD   Principal Problem: Acute hypoxemic respiratory failure    Subjective:     HPI:  78 year old male admitted to MICU, transfer from [facility].  I have reviewed the patient's medical history in detail and updated the computerized patient record.  Upper GI bleeding, Scoped x 2 and clipped  Developed Af RVR, Known chr A fib on elliquis on hold  Treated with Digoxin, Amiodarone and cardizem GTT  Family requested transfer  Known COPD FEV1:1.65L ( 53%)  ANORO ellipta, not on oxygen  Presented to MICU needed BIPAP for WOB  CXR shows RML infiltrate  Overnight, anxious, WOB  Last echo EF 60%      Hospital/ICU Course:  09/11: CXR shows melanie infiltrate: edema.  . Lopressor 2.5mg and lasix 20 mg given  9/12 - intubated yesterday and required pressor with sedation.  Resting on vent.  Cynthia TF.  No active bleeding  9/13 - remains intubated, sedated on mechanical ventilation, pO2 trending up with high PEEP ventilation  9/14 - remains intubated on mechanical ventilation with oxygen/PEEP demand decreased; continued atrial fib with RVR despite amiodarone infusion, some improvement with metoprolol yesterday  9/15 - tolerated extubation, on high flow nasal cannula support; awake, slow to respond and faint verbalization but oriented to self and place; remains atrial fib with RVR variable 120-140; afebrile, wbc trending down  9/16 - remains vapotherm with high flow, moderate oxygen demand; afebrile, wbc continues down; afib rate remains uncontrolled  9/17 - remains on vapotherm with slow weaning; mentation with slight improvement daily; remains afebrile; wbc continues down    Review of Systems   Constitutional: Positive for malaise/fatigue. Negative for  chills and diaphoresis.   Respiratory: Positive for cough and shortness of breath. Negative for sputum production and stridor.    Cardiovascular: Negative for chest pain.   Gastrointestinal: Negative for abdominal pain, nausea and vomiting.   Musculoskeletal: Positive for myalgias.   Skin: Negative.    Neurological: Positive for weakness. Negative for focal weakness and seizures.   Psychiatric/Behavioral: The patient is nervous/anxious. The patient does not have insomnia.        Objective:     Vital Signs (Most Recent):  Temp: 98.2 °F (36.8 °C) (09/17/18 0730)  Pulse: 108 (09/17/18 1352)  Resp: (!) 27 (09/17/18 1352)  BP: (!) 147/108 (09/17/18 0915)  SpO2: (!) 93 % (09/17/18 1352) Vital Signs (24h Range):  Temp:  [98.2 °F (36.8 °C)-98.9 °F (37.2 °C)] 98.2 °F (36.8 °C)  Pulse:  [] 108  Resp:  [16-38] 27  SpO2:  [90 %-97 %] 93 %  BP: (117-168)/() 147/108     Weight: 86 kg (189 lb 9.5 oz)  Body mass index is 27.2 kg/m².      Intake/Output Summary (Last 24 hours) at 9/17/2018 1515  Last data filed at 9/17/2018 1000  Gross per 24 hour   Intake 450 ml   Output 698 ml   Net -248 ml       Physical Exam   Constitutional: He appears ill. Nasal cannula in place.   HENT:   Head: Atraumatic.   Eyes: Conjunctivae are normal. Pupils are equal, round, and reactive to light.   Neck: No JVD present. No tracheal deviation present.   Cardiovascular: An irregularly irregular rhythm present. Tachycardia present.   No murmur heard.  Pulses:       Radial pulses are 2+ on the right side, and 2+ on the left side.        Dorsalis pedis pulses are 1+ on the right side, and 1+ on the left side.   Pulmonary/Chest: No accessory muscle usage. No respiratory distress. He has decreased breath sounds.   Abdominal: Soft. Bowel sounds are normal. He exhibits no distension.   Musculoskeletal: He exhibits no edema.   Neurological: He is alert.   Intermittently confused, easily reoriented   Skin: Skin is warm and dry. Capillary refill takes 2  to 3 seconds. No cyanosis.           Vents:  Vent Mode: Spont (09/14/18 1210)  Ventilator Initiated: Yes (09/11/18 1121)  Set Rate: 0 bmp (09/14/18 1210)  Vt Set: 450 mL (09/14/18 1210)  Pressure Support: 10 cmH20 (09/14/18 1210)  PEEP/CPAP: 5 cmH20 (09/14/18 1210)  Oxygen Concentration (%): 30 (09/17/18 1352)  Peak Airway Pressure: 15 cmH2O (09/14/18 1210)  Plateau Pressure: 16 cmH20 (09/14/18 1210)  Total Ve: 10.3 mL (09/14/18 1210)  F/VT Ratio<105 (RSBI): (!) 43.08 (09/14/18 1210)    Lines/Drains/Airways     Pressure Ulcer                 Pressure Injury 09/13/18 1905 Left Buttocks Deep tissue injury 3 days          Peripheral Intravenous Line                 Peripheral IV - Single Lumen 09/17/18 1419 Anterior;Left Hand less than 1 day                Significant Labs:    CBC/Anemia Profile:  Recent Labs   Lab  09/16/18   0410  09/17/18   0322   WBC  14.55*  13.95*   HGB  8.7*  8.4*   HCT  28.0*  27.1*   PLT  309  300   MCV  99*  99*   RDW  17.0*  16.8*        Chemistries:  Recent Labs   Lab  09/16/18   0410  09/17/18   0322   NA  147*  148*   K  3.8  3.8   CL  101  102   CO2  36*  34*   BUN  22  22   CREATININE  0.9  1.0   CALCIUM  8.3*  8.2*   ALBUMIN  2.2*  2.2*   PROT  5.9*  5.8*   BILITOT  0.9  0.9   ALKPHOS  85  82   ALT  68*  64*   AST  84*  69*   MG  2.8*  2.1   PHOS  2.7  3.5       All pertinent labs within the past 24 hours have been reviewed.  ABG  Recent Labs   Lab  09/17/18   1507   PH  7.476*   PO2  61*   PCO2  52.7*   HCO3  38.8*   BE  15         Significant Imaging:  I have reviewed all pertinent imaging results/findings within the past 24 hours.      Assessment/Plan:     Pulmonary   * Acute hypoxemic respiratory failure on mechanical ventilation    Supplemental oxygen with high flow 30L; titrate Fio2 to keep sat > 92        Chronic obstructive pulmonary disease with acute lower respiratory infection    Moderate severe COPD  Cont Formeterol and Budesonide  Schedule xopenex  Continue avelox,  diflucan        Cardiac/Vascular   Chronic atrial fibrillation with RVR    Continue metoprolol  Continue po cardizem  Continue eliquis        Renal/   Chronic kidney disease, stage 3    Accurate I/Os  Monitor creatinine        Oncology   Anemia, unspecified    No s/s of active bleeding  Monitor cbc        Endocrine   Type 2 diabetes mellitus without complication, without long-term current use of insulin    Continue SSI prn with monitoring for glucose control and prevention of insulin toxicity        GI   Acute upper GI bleed with acute blood loss anemia    Recent clipping of ulcer at GE junction at outside hospital  Cont PPI  No bleeding on eliquis          Preventive Measures:   Nutrition: start puree diet  Stress Ulcer: PI  DVT: eliquis  BB: metoprolol  Bowel Prophylaxis: miralax, add dulcolax   Head of Bed/Reposition: Elevate HOB and turn Q1-2 hours    Mobility: OOB with assist  Central Line Day: removed today  Adams Day: removed today  Code Status: full     Counseling/Consultation: I have discussed the care of this patient in detail with nursing staff and Dr. Cosme. Status and plan of care discussed with team on multidisciplinary rounds.     Critical Care Time: 50 minutes  Critical secondary to hypoxemic respiratory failure   Critical care was time spent personally by me on the following activities: development of treatment plan with patient or surrogate and bedside caregivers, discussions with consultants, evaluation of patient's response to treatment, examination of patient, ordering and performing treatments and interventions, ordering and review of laboratory studies, ordering and review of radiographic studies, pulse oximetry, re-evaluation of patient's condition. This critical care time did not overlap with that of any other provider or involve time for any procedures.     Selma Wallace, Acute Care NP-BC  Critical Care Medicine  [facility] [place]

## 2018-09-17 NOTE — PLAN OF CARE
Problem: Patient Care Overview  Goal: Plan of Care Review  Outcome: Ongoing (interventions implemented as appropriate)  Patient tolerating schedule neb txs with acapella therapy well. Rested on high flow vapotherm for majority shift. Currently on NPPV for increase wob; will continue to monitor.

## 2018-09-17 NOTE — SUBJECTIVE & OBJECTIVE
Review of Systems   Constitutional: Positive for malaise/fatigue. Negative for chills and diaphoresis.   Respiratory: Positive for cough and shortness of breath. Negative for sputum production and stridor.    Cardiovascular: Negative for chest pain.   Gastrointestinal: Negative for abdominal pain, nausea and vomiting.   Musculoskeletal: Positive for myalgias.   Skin: Negative.    Neurological: Positive for weakness. Negative for focal weakness and seizures.   Psychiatric/Behavioral: The patient is nervous/anxious. The patient does not have insomnia.        Objective:     Vital Signs (Most Recent):  Temp: 98.2 °F (36.8 °C) (09/17/18 0730)  Pulse: 108 (09/17/18 1352)  Resp: (!) 27 (09/17/18 1352)  BP: (!) 147/108 (09/17/18 0915)  SpO2: (!) 93 % (09/17/18 1352) Vital Signs (24h Range):  Temp:  [98.2 °F (36.8 °C)-98.9 °F (37.2 °C)] 98.2 °F (36.8 °C)  Pulse:  [] 108  Resp:  [16-38] 27  SpO2:  [90 %-97 %] 93 %  BP: (117-168)/() 147/108     Weight: 86 kg (189 lb 9.5 oz)  Body mass index is 27.2 kg/m².      Intake/Output Summary (Last 24 hours) at 9/17/2018 1515  Last data filed at 9/17/2018 1000  Gross per 24 hour   Intake 450 ml   Output 698 ml   Net -248 ml       Physical Exam   Constitutional: He appears ill. Nasal cannula in place.   HENT:   Head: Atraumatic.   Eyes: Conjunctivae are normal. Pupils are equal, round, and reactive to light.   Neck: No JVD present. No tracheal deviation present.   Cardiovascular: An irregularly irregular rhythm present. Tachycardia present.   No murmur heard.  Pulses:       Radial pulses are 2+ on the right side, and 2+ on the left side.        Dorsalis pedis pulses are 1+ on the right side, and 1+ on the left side.   Pulmonary/Chest: No accessory muscle usage. No respiratory distress. He has decreased breath sounds.   Abdominal: Soft. Bowel sounds are normal. He exhibits no distension.   Musculoskeletal: He exhibits no edema.   Neurological: He is alert.   Intermittently  confused, easily reoriented   Skin: Skin is warm and dry. Capillary refill takes 2 to 3 seconds. No cyanosis.            Vents:  Vent Mode: Spont (09/14/18 1210)  Ventilator Initiated: Yes (09/11/18 1121)  Set Rate: 0 bmp (09/14/18 1210)  Vt Set: 450 mL (09/14/18 1210)  Pressure Support: 10 cmH20 (09/14/18 1210)  PEEP/CPAP: 5 cmH20 (09/14/18 1210)  Oxygen Concentration (%): 30 (09/17/18 1352)  Peak Airway Pressure: 15 cmH2O (09/14/18 1210)  Plateau Pressure: 16 cmH20 (09/14/18 1210)  Total Ve: 10.3 mL (09/14/18 1210)  F/VT Ratio<105 (RSBI): (!) 43.08 (09/14/18 1210)    Lines/Drains/Airways     Pressure Ulcer                 Pressure Injury 09/13/18 1905 Left Buttocks Deep tissue injury 3 days          Peripheral Intravenous Line                 Peripheral IV - Single Lumen 09/17/18 1419 Anterior;Left Hand less than 1 day                Significant Labs:    CBC/Anemia Profile:  Recent Labs   Lab  09/16/18   0410  09/17/18   0322   WBC  14.55*  13.95*   HGB  8.7*  8.4*   HCT  28.0*  27.1*   PLT  309  300   MCV  99*  99*   RDW  17.0*  16.8*        Chemistries:  Recent Labs   Lab  09/16/18   0410  09/17/18   0322   NA  147*  148*   K  3.8  3.8   CL  101  102   CO2  36*  34*   BUN  22  22   CREATININE  0.9  1.0   CALCIUM  8.3*  8.2*   ALBUMIN  2.2*  2.2*   PROT  5.9*  5.8*   BILITOT  0.9  0.9   ALKPHOS  85  82   ALT  68*  64*   AST  84*  69*   MG  2.8*  2.1   PHOS  2.7  3.5       All pertinent labs within the past 24 hours have been reviewed.  ABG  Recent Labs   Lab  09/17/18   1507   PH  7.476*   PO2  61*   PCO2  52.7*   HCO3  38.8*   BE  15         Significant Imaging:  I have reviewed all pertinent imaging results/findings within the past 24 hours.

## 2018-09-18 PROBLEM — G72.81 CRITICAL ILLNESS MYOPATHY: Status: ACTIVE | Noted: 2018-09-18

## 2018-09-18 LAB
ALBUMIN SERPL BCP-MCNC: 2.3 G/DL
ALP SERPL-CCNC: 73 U/L
ALT SERPL W/O P-5'-P-CCNC: 55 U/L
ANION GAP SERPL CALC-SCNC: 10 MMOL/L
AST SERPL-CCNC: 46 U/L
BASOPHILS # BLD AUTO: 0.02 K/UL
BASOPHILS NFR BLD: 0.1 %
BILIRUB SERPL-MCNC: 0.8 MG/DL
BUN SERPL-MCNC: 20 MG/DL
CALCIUM SERPL-MCNC: 8.3 MG/DL
CHLORIDE SERPL-SCNC: 102 MMOL/L
CO2 SERPL-SCNC: 34 MMOL/L
CREAT SERPL-MCNC: 0.9 MG/DL
DIFFERENTIAL METHOD: ABNORMAL
EOSINOPHIL # BLD AUTO: 0.2 K/UL
EOSINOPHIL NFR BLD: 1.2 %
ERYTHROCYTE [DISTWIDTH] IN BLOOD BY AUTOMATED COUNT: 17.2 %
EST. GFR  (AFRICAN AMERICAN): >60 ML/MIN/1.73 M^2
EST. GFR  (NON AFRICAN AMERICAN): >60 ML/MIN/1.73 M^2
GLUCOSE SERPL-MCNC: 118 MG/DL
HCT VFR BLD AUTO: 29.4 %
HGB BLD-MCNC: 9 G/DL
LYMPHOCYTES # BLD AUTO: 1.7 K/UL
LYMPHOCYTES NFR BLD: 11.9 %
MAGNESIUM SERPL-MCNC: 2.1 MG/DL
MCH RBC QN AUTO: 29.8 PG
MCHC RBC AUTO-ENTMCNC: 30.6 G/DL
MCV RBC AUTO: 97 FL
MONOCYTES # BLD AUTO: 2.1 K/UL
MONOCYTES NFR BLD: 14.2 %
NEUTROPHILS # BLD AUTO: 10.5 K/UL
NEUTROPHILS NFR BLD: 73.8 %
PHOSPHATE SERPL-MCNC: 3.8 MG/DL
PLATELET # BLD AUTO: 304 K/UL
PMV BLD AUTO: 10.7 FL
POTASSIUM SERPL-SCNC: 4 MMOL/L
PROT SERPL-MCNC: 6 G/DL
RBC # BLD AUTO: 3.02 M/UL
SODIUM SERPL-SCNC: 146 MMOL/L
WBC # BLD AUTO: 14.41 K/UL

## 2018-09-18 PROCEDURE — 25000003 PHARM REV CODE 250: Performed by: INTERNAL MEDICINE

## 2018-09-18 PROCEDURE — 25000003 PHARM REV CODE 250: Performed by: FAMILY MEDICINE

## 2018-09-18 PROCEDURE — 84100 ASSAY OF PHOSPHORUS: CPT

## 2018-09-18 PROCEDURE — 25000003 PHARM REV CODE 250: Performed by: NURSE PRACTITIONER

## 2018-09-18 PROCEDURE — 36415 COLL VENOUS BLD VENIPUNCTURE: CPT

## 2018-09-18 PROCEDURE — 25000242 PHARM REV CODE 250 ALT 637 W/ HCPCS: Performed by: NURSE PRACTITIONER

## 2018-09-18 PROCEDURE — 97803 MED NUTRITION INDIV SUBSEQ: CPT

## 2018-09-18 PROCEDURE — 63600175 PHARM REV CODE 636 W HCPCS: Performed by: NURSE PRACTITIONER

## 2018-09-18 PROCEDURE — 97116 GAIT TRAINING THERAPY: CPT

## 2018-09-18 PROCEDURE — 94640 AIRWAY INHALATION TREATMENT: CPT

## 2018-09-18 PROCEDURE — 94660 CPAP INITIATION&MGMT: CPT

## 2018-09-18 PROCEDURE — 85025 COMPLETE CBC W/AUTO DIFF WBC: CPT

## 2018-09-18 PROCEDURE — 25000242 PHARM REV CODE 250 ALT 637 W/ HCPCS: Performed by: INTERNAL MEDICINE

## 2018-09-18 PROCEDURE — 27100171 HC OXYGEN HIGH FLOW UP TO 24 HOURS

## 2018-09-18 PROCEDURE — 20000000 HC ICU ROOM

## 2018-09-18 PROCEDURE — 83735 ASSAY OF MAGNESIUM: CPT

## 2018-09-18 PROCEDURE — 94664 DEMO&/EVAL PT USE INHALER: CPT

## 2018-09-18 PROCEDURE — 99291 CRITICAL CARE FIRST HOUR: CPT | Mod: ,,, | Performed by: NURSE PRACTITIONER

## 2018-09-18 PROCEDURE — 80053 COMPREHEN METABOLIC PANEL: CPT

## 2018-09-18 PROCEDURE — 27100092 HC HIGH FLOW DELIVERY CANNULA

## 2018-09-18 PROCEDURE — 99900035 HC TECH TIME PER 15 MIN (STAT)

## 2018-09-18 PROCEDURE — 92526 ORAL FUNCTION THERAPY: CPT

## 2018-09-18 PROCEDURE — 99232 SBSQ HOSP IP/OBS MODERATE 35: CPT | Mod: ,,, | Performed by: INTERNAL MEDICINE

## 2018-09-18 PROCEDURE — 97110 THERAPEUTIC EXERCISES: CPT

## 2018-09-18 RX ORDER — DILTIAZEM HYDROCHLORIDE 60 MG/1
60 TABLET, FILM COATED ORAL EVERY 6 HOURS
Status: DISCONTINUED | OUTPATIENT
Start: 2018-09-18 | End: 2018-09-19

## 2018-09-18 RX ORDER — FUROSEMIDE 10 MG/ML
40 INJECTION INTRAMUSCULAR; INTRAVENOUS ONCE
Status: COMPLETED | OUTPATIENT
Start: 2018-09-18 | End: 2018-09-18

## 2018-09-18 RX ORDER — POLYETHYLENE GLYCOL 3350 17 G/17G
17 POWDER, FOR SOLUTION ORAL DAILY
Status: DISCONTINUED | OUTPATIENT
Start: 2018-09-18 | End: 2018-09-21 | Stop reason: HOSPADM

## 2018-09-18 RX ORDER — DIPHENHYDRAMINE HYDROCHLORIDE 50 MG/ML
25 INJECTION INTRAMUSCULAR; INTRAVENOUS EVERY 6 HOURS PRN
Status: DISCONTINUED | OUTPATIENT
Start: 2018-09-18 | End: 2018-09-21 | Stop reason: HOSPADM

## 2018-09-18 RX ORDER — BISACODYL 5 MG
10 TABLET, DELAYED RELEASE (ENTERIC COATED) ORAL DAILY
Status: DISCONTINUED | OUTPATIENT
Start: 2018-09-18 | End: 2018-09-21 | Stop reason: HOSPADM

## 2018-09-18 RX ORDER — PANTOPRAZOLE SODIUM 40 MG/1
40 TABLET, DELAYED RELEASE ORAL DAILY
Status: DISCONTINUED | OUTPATIENT
Start: 2018-09-18 | End: 2018-09-21 | Stop reason: HOSPADM

## 2018-09-18 RX ORDER — OLANZAPINE 5 MG/1
15 TABLET, ORALLY DISINTEGRATING ORAL
Status: DISCONTINUED | OUTPATIENT
Start: 2018-09-18 | End: 2018-09-21 | Stop reason: HOSPADM

## 2018-09-18 RX ORDER — DEXMEDETOMIDINE HYDROCHLORIDE 4 UG/ML
0.2 INJECTION, SOLUTION INTRAVENOUS CONTINUOUS
Status: DISCONTINUED | OUTPATIENT
Start: 2018-09-18 | End: 2018-09-18

## 2018-09-18 RX ADMIN — BISACODYL 10 MG: 5 TABLET, COATED ORAL at 11:09

## 2018-09-18 RX ADMIN — DILTIAZEM HYDROCHLORIDE 60 MG: 60 TABLET, FILM COATED ORAL at 05:09

## 2018-09-18 RX ADMIN — PANTOPRAZOLE SODIUM 40 MG: 40 GRANULE, DELAYED RELEASE ORAL at 08:09

## 2018-09-18 RX ADMIN — LEVALBUTEROL 0.63 MG: 0.63 SOLUTION RESPIRATORY (INHALATION) at 07:09

## 2018-09-18 RX ADMIN — DIPHENHYDRAMINE HYDROCHLORIDE 25 MG: 50 INJECTION, SOLUTION INTRAMUSCULAR; INTRAVENOUS at 08:09

## 2018-09-18 RX ADMIN — ARFORMOTEROL TARTRATE 15 MCG: 15 SOLUTION RESPIRATORY (INHALATION) at 07:09

## 2018-09-18 RX ADMIN — DILTIAZEM HYDROCHLORIDE 30 MG: 30 TABLET, FILM COATED ORAL at 06:09

## 2018-09-18 RX ADMIN — APIXABAN 5 MG: 2.5 TABLET, FILM COATED ORAL at 08:09

## 2018-09-18 RX ADMIN — DIGOXIN 0.12 MG: 125 TABLET ORAL at 08:09

## 2018-09-18 RX ADMIN — METOPROLOL TARTRATE 50 MG: 50 TABLET ORAL at 08:09

## 2018-09-18 RX ADMIN — DILTIAZEM HYDROCHLORIDE 30 MG: 30 TABLET, FILM COATED ORAL at 11:09

## 2018-09-18 RX ADMIN — OLANZAPINE 15 MG: 5 TABLET, ORALLY DISINTEGRATING ORAL at 05:09

## 2018-09-18 RX ADMIN — PANTOPRAZOLE SODIUM 40 MG: 40 TABLET, DELAYED RELEASE ORAL at 11:09

## 2018-09-18 RX ADMIN — BUDESONIDE 0.5 MG: 0.5 SUSPENSION RESPIRATORY (INHALATION) at 07:09

## 2018-09-18 RX ADMIN — BISACODYL 10 MG: 10 SUPPOSITORY RECTAL at 08:09

## 2018-09-18 RX ADMIN — DILTIAZEM HYDROCHLORIDE 60 MG: 60 TABLET, FILM COATED ORAL at 11:09

## 2018-09-18 RX ADMIN — MOXIFLOXACIN HYDROCHLORIDE 400 MG: 400 TABLET, FILM COATED ORAL at 08:09

## 2018-09-18 RX ADMIN — FLUCONAZOLE 200 MG: 100 TABLET ORAL at 11:09

## 2018-09-18 RX ADMIN — POLYETHYLENE GLYCOL (3350) 17 G: 17 POWDER, FOR SOLUTION ORAL at 11:09

## 2018-09-18 RX ADMIN — FUROSEMIDE 40 MG: 10 INJECTION, SOLUTION INTRAMUSCULAR; INTRAVENOUS at 11:09

## 2018-09-18 NOTE — PT/OT/SLP PROGRESS
Physical Therapy  Treatment    Eleazar Solo   MRN: [id number]   Admitting Diagnosis: Acute hypoxemic respiratory failure    PT Received On: 09/18/18  PT Start Time: 0845     PT Stop Time: 0910    PT Total Time (min): 25 min       Billable Minutes:  Gait Training 15 and Therapeutic Exercise 10    Treatment Type: Treatment  PT/PTA: PT     PTA Visit Number: 0       General Precautions: Standard, fall  Orthopedic Precautions: N/A   Braces: N/A         Subjective:  Communicated with NURSE FABI AND Epic CHART REVIEW prior to session.  PT FOUND SITTING UPRIGHT IN BEDSIDE CHAIR     Pain/Comfort  Pain Rating 1: 0/10    Objective:   Patient found with: oxygen, peripheral IV, pulse ox (continuous)    Functional Mobility:  PT FOUND SITTING UPRIGHT IN CHAIR, AGREEABLE TO P.T. PT PERFORMED 3 GAIT TRIALS X 6' WITH MOD A X 2 WITH RW AND EXTENDED  SEATED REST BREAKS WITH INC VC FOR PURSED LIP BREATHING AND POSTURE, HR AND O2 SATS MONITORED THROUGHOUT. PT T/F TO CHAIR MOD A TO COMPLETE SEATED THEREX B LE INCLUDING APS, GLUTE SETS, MIP, TKE. PT REQUIRES INC TIME AND INC REST BREAKS TO PERFORM TO BEST OF ABILITY AT THIS PRESENT TIME. CALL BELL IN REACH AND FAMILY AT BEDSIDE WITH ALL NEEDS MET.       AM-PAC 6 CLICK MOBILITY  How much help from another person does this patient currently need?   1 = Unable, Total/Dependent Assistance  2 = A lot, Maximum/Moderate Assistance  3 = A little, Minimum/Contact Guard/Supervision  4 = None, Modified [place]/Independent    Turning over in bed (including adjusting bedclothes, sheets and blankets)?: 1  Sitting down on and standing up from a chair with arms (e.g., wheelchair, bedside commode, etc.): 2  Moving from lying on back to sitting on the side of the bed?: 1  Moving to and from a bed to a chair (including a wheelchair)?: 2  Need to walk in hospital room?: 2  Climbing 3-5 steps with a railing?: 1  Basic Mobility Total Score: 9    AM-PAC Raw Score CMS G-Code Modifier Level of Impairment  Assistance   6 % Total / Unable   7 - 9 CM 80 - 100% Maximal Assist   10 - 14 CL 60 - 80% Moderate Assist   15 - 19 CK 40 - 60% Moderate Assist   20 - 22 CJ 20 - 40% Minimal Assist   23 CI 1-20% SBA / CGA   24 CH 0% Independent/ Mod I     Patient left up in chair with all lines intact, call button in reach and FAMILY present.    Assessment:  PT TOLERATED P.T. WELL WITH INC TIME AND REST BREAKS. PT WILL CONTINUE TO BENEFIT FROM P.T. TO ADDRESS IMPAIRMENTS.     Rehab identified problem list/impairments: Rehab identified problem list/impairments: weakness, gait instability, impaired endurance, impaired balance, impaired functional mobilty, impaired self care skills, decreased safety awareness    Rehab potential is good.    Activity tolerance: Good    Discharge recommendations: Discharge Facility/Level Of Care Needs: nursing facility, skilled     Barriers to discharge:      Equipment recommendations: Equipment Needed After Discharge: none     GOALS:   Multidisciplinary Problems     Physical Therapy Goals        Problem: Physical Therapy Goal    Goal Priority Disciplines Outcome Goal Variances Interventions   Physical Therapy Goal     PT, PT/OT Ongoing (interventions implemented as appropriate)     Description:  LTG's to be met by 9/22/18  1. Patient will perform supine to/from sit mod A  2. Patient will perform sit to stand with mod A  3. Patient will ambulate 25ft RW mod A                    PLAN:    Patient to be seen 5 x/week  to address the above listed problems via gait training, therapeutic activities, therapeutic exercises  Plan of Care expires: 09/22/18  Plan of Care reviewed with: patient     PT EDUCATED TO CALL NURSE WITH ALL NEEDS D/T FALL RISK. PT AND FAMILY VERBALIZED UNDERSTANDING.     Amy Denton, SPT  09/18/2018

## 2018-09-18 NOTE — PLAN OF CARE
Problem: Patient Care Overview  Goal: Plan of Care Review  Outcome: Ongoing (interventions implemented as appropriate)  Pt slept most of the night after IV Benadryl. Pt AAO X 4 again this morning. BiPAP worn until 11pm. Unable to wean vapotherm < 30L 45%. A-fib rate better controlled, 90s-100s throughout the night. BP stable. Afebrile. UO adequate, pt voiding per urinal. Tolerating diet. Still no BM. Pt turned Q2H with pressure points protected. Daughter stayed the night and kept pt calm. POC reviewed with pt and family. All questions and concerns addressed.

## 2018-09-18 NOTE — PLAN OF CARE
Problem: Physical Therapy Goal  Goal: Physical Therapy Goal  LTG's to be met by 9/22/18  1. Patient will perform supine to/from sit mod A  2. Patient will perform sit to stand with mod A  3. Patient will ambulate 25ft RW mod A   Outcome: Ongoing (interventions implemented as appropriate)  PT PERFORMED 3 GAIT TRIALS X 6' WITH RW AND EXTENDED SEATED REST BREAKS WITH INC VC FOR PURSED LIP BREATHING AND POSTURE, HR AND O2 SATS MONITORED THROUGHOUT.

## 2018-09-18 NOTE — SUBJECTIVE & OBJECTIVE
Review of Systems   Constitutional: Positive for malaise/fatigue. Negative for chills and diaphoresis.   Respiratory: Positive for cough and shortness of breath. Negative for sputum production and stridor.    Cardiovascular: Negative for chest pain.   Gastrointestinal: Negative for abdominal pain, nausea and vomiting.   Musculoskeletal: Positive for myalgias.   Skin: Negative.    Neurological: Positive for weakness. Negative for focal weakness and seizures.   Psychiatric/Behavioral: The patient is nervous/anxious. The patient does not have insomnia.        Objective:     Vital Signs (Most Recent):  Temp: 98.9 °F (37.2 °C) (09/18/18 0700)  Pulse: 84 (09/18/18 1026)  Resp: (!) 24 (09/18/18 1026)  BP: (!) 146/76 (09/18/18 0800)  SpO2: 100 %(from previous settings) (09/18/18 1026) Vital Signs (24h Range):  Temp:  [97.6 °F (36.4 °C)-98.9 °F (37.2 °C)] 98.9 °F (37.2 °C)  Pulse:  [] 84  Resp:  [15-33] 24  SpO2:  [91 %-100 %] 100 %  BP: (121-159)/() 146/76     Weight: 86 kg (189 lb 9.5 oz)  Body mass index is 27.2 kg/m².      Intake/Output Summary (Last 24 hours) at 9/18/2018 1143  Last data filed at 9/18/2018 0600  Gross per 24 hour   Intake 200 ml   Output 1075 ml   Net -875 ml       Physical Exam   Constitutional: He appears ill. Nasal cannula in place.   HENT:   Head: Atraumatic.   Eyes: Conjunctivae are normal. Pupils are equal, round, and reactive to light.   Neck: No JVD present. No tracheal deviation present.   Cardiovascular: An irregularly irregular rhythm present. Tachycardia present.   No murmur heard.  Pulses:       Radial pulses are 2+ on the right side, and 2+ on the left side.        Dorsalis pedis pulses are 1+ on the right side, and 1+ on the left side.   Pulmonary/Chest: No accessory muscle usage. No respiratory distress. He has decreased breath sounds.   Abdominal: Soft. Bowel sounds are normal. He exhibits no distension.   Musculoskeletal: He exhibits no edema.   Neurological: He is alert.    Intermittently confused, agitated last night   Skin: Skin is warm and dry. Capillary refill takes 2 to 3 seconds. No cyanosis.           Lines/Drains/Airways     Pressure Ulcer                 Pressure Injury 09/13/18 1905 Left Buttocks Deep tissue injury 4 days          Peripheral Intravenous Line                 Peripheral IV - Single Lumen 09/17/18 1905 Right Upper Arm less than 1 day                Significant Labs:    CBC/Anemia Profile:  Recent Labs   Lab  09/17/18 0322 09/18/18   0431   WBC  13.95*  14.41*   HGB  8.4*  9.0*   HCT  27.1*  29.4*   PLT  300  304   MCV  99*  97   RDW  16.8*  17.2*        Chemistries:  Recent Labs   Lab  09/17/18 0322 09/18/18   0431   NA  148*  146*   K  3.8  4.0   CL  102  102   CO2  34*  34*   BUN  22  20   CREATININE  1.0  0.9   CALCIUM  8.2*  8.3*   ALBUMIN  2.2*  2.3*   PROT  5.8*  6.0   BILITOT  0.9  0.8   ALKPHOS  82  73   ALT  64*  55*   AST  69*  46*   MG  2.1  2.1   PHOS  3.5  3.8       All pertinent labs within the past 24 hours have been reviewed.    Significant Imaging:  I have reviewed all pertinent imaging results/findings within the past 24 hours.

## 2018-09-18 NOTE — PLAN OF CARE
Problem: SLP Goal  Goal: SLP Goal  LTG:  Pt will tolerate least restrictive diet consistency safely and efficiently.            Pt will communicate needs/ideas effectively.    ST. Reassess swallowing bedside for p.o. Readiness-  Pt looked much better today(alert, following commands); tolerated thin liquid, thick liquids and pureed consistencies without observable signs of swallowing difficulties. He fatigued with cracker.   He is recommended to start a pureed consistency diet and thin liquids with swallow precautions which were reviewed with patient, family and staff.  **S.T. Recommended to reassess swallowing tomorrow with further goals as appropriate.  **Consider Modified Barium Swallowing Study if pt exhibits any swallowing difficulties and/or lung status does not continue to improve.               2. Oral Motor exercises x10-15             3. Oral Stim. x20 with 100%  timely laryngeal excursions.             4. Orientation with 90% with min cues             5. Follow multi-unit commands with 90%    Outcome: Ongoing (interventions implemented as appropriate)  Pt tolerated solids and thin liquids at bedside without s/s of aspiration.  Oral motor was assessed to be WFL.

## 2018-09-18 NOTE — SUBJECTIVE & OBJECTIVE
Interval History: intermittent confusion per nurse's notes, resolved at present time.     Review of Systems   Constitution: Positive for malaise/fatigue. Negative for weakness.   HENT: Negative for hearing loss and hoarse voice.    Eyes: Negative for blurred vision and visual disturbance.   Cardiovascular: Positive for irregular heartbeat and leg swelling. Negative for chest pain, claudication, dyspnea on exertion, near-syncope, orthopnea, palpitations, paroxysmal nocturnal dyspnea and syncope.   Respiratory: Positive for shortness of breath (improving). Negative for cough, hemoptysis, sleep disturbances due to breathing, snoring and wheezing.    Endocrine: Negative for cold intolerance and heat intolerance.   Hematologic/Lymphatic: Bruises/bleeds easily (on Eliquis).   Skin: Negative for color change, dry skin and nail changes.   Musculoskeletal: Positive for arthritis and back pain. Negative for joint pain and myalgias.   Gastrointestinal: Negative for bloating, abdominal pain, constipation, nausea and vomiting.   Genitourinary: Negative for dysuria, flank pain, hematuria and hesitancy.   Neurological: Negative for headaches, light-headedness, loss of balance, numbness and paresthesias.   Psychiatric/Behavioral: Negative for altered mental status.        Intermittent confusion overnight   Allergic/Immunologic: Negative for environmental allergies.     Objective:     Vital Signs (Most Recent):  Temp: 98.9 °F (37.2 °C) (09/18/18 0700)  Pulse: 85 (09/18/18 1308)  Resp: (!) 23 (09/18/18 1308)  BP: (!) 146/76 (09/18/18 0800)  SpO2: 100 % (09/18/18 1308) Vital Signs (24h Range):  Temp:  [97.6 °F (36.4 °C)-98.9 °F (37.2 °C)] 98.9 °F (37.2 °C)  Pulse:  [] 85  Resp:  [15-33] 23  SpO2:  [91 %-100 %] 100 %  BP: (122-159)/() 146/76     Weight: 86 kg (189 lb 9.5 oz)  Body mass index is 27.2 kg/m².     SpO2: 100 %  O2 Device (Oxygen Therapy): Vapotherm      Intake/Output Summary (Last 24 hours) at 9/18/2018  1315  Last data filed at 9/18/2018 0600  Gross per 24 hour   Intake 200 ml   Output 1075 ml   Net -875 ml       Lines/Drains/Airways     Pressure Ulcer                 Pressure Injury 09/13/18 1905 Left Buttocks Deep tissue injury 4 days          Peripheral Intravenous Line                 Peripheral IV - Single Lumen 09/17/18 1905 Right Upper Arm less than 1 day                Physical Exam   Constitutional: He is oriented to person, place, and time. He appears well-developed and well-nourished. He is active. No distress. Nasal cannula in place.   HENT:   Head: Normocephalic and atraumatic.   Neck: Normal range of motion and full passive range of motion without pain. Neck supple. No JVD present.   Cardiovascular: Normal rate, S1 normal, S2 normal and intact distal pulses. An irregularly irregular rhythm present.  Occasional extrasystoles are present. PMI is not displaced. Exam reveals no distant heart sounds.   Murmur heard.   Harsh midsystolic murmur is present with a grade of 2/6 at the upper right sternal border radiating to the neck.  Pulses:       Radial pulses are 2+ on the right side, and 2+ on the left side.        Dorsalis pedis pulses are 1+ on the right side, and 1+ on the left side.   Pulmonary/Chest: Accessory muscle usage present. No respiratory distress. He has decreased breath sounds in the right lower field and the left lower field. He has no wheezes.   +Vapotherm in place   Abdominal: Soft. Bowel sounds are normal. He exhibits no distension. There is no tenderness.   Genitourinary:   Genitourinary Comments: deferred   Musculoskeletal: Normal range of motion. He exhibits no edema.        Right ankle: He exhibits swelling.        Left ankle: He exhibits swelling.   Neurological: He is alert and oriented to person, place, and time.   Skin: Skin is warm and dry. He is not diaphoretic. No cyanosis. Nails show no clubbing.   Psychiatric: He has a normal mood and affect. His speech is normal and behavior  is normal. Judgment and thought content normal. Cognition and memory are normal.   Nursing note and vitals reviewed.      Significant Labs:   All pertinent lab results from the last 24 hours have been reviewed. and   Recent Lab Results       09/18/18  0431 09/17/18 2010 09/17/18  1507      Albumin 2.3       Alkaline Phosphatase 73       Allens Test   Pass     ALT 55       Anion Gap 10       AST 46       Baso # 0.02       Basophil% 0.1       Total Bilirubin 0.8  Comment:  For infants and newborns, interpretation of results should be based  on gestational age, weight and in agreement with clinical  observations.  Premature Infant recommended reference ranges:  Up to 24 hours.............<8.0 mg/dL  Up to 48 hours............<12.0 mg/dL  3-5 days..................<15.0 mg/dL  6-29 days.................<15.0 mg/dL         Site   RR     BUN, Bld 20       Calcium 8.3       Chloride 102       CO2 34       Creatinine 0.9       DelSys   Nasal Can     Differential Method Automated       eGFR if  >60       eGFR if non  >60  Comment:  Calculation used to obtain the estimated glomerular filtration  rate (eGFR) is the CKD-EPI equation.          Eos # 0.2       Eosinophil% 1.2       FiO2   35     Flow   25     Glucose 118       Gran # (ANC) 10.5       Gran% 73.8       Hematocrit 29.4       Hemoglobin 9.0       Lymph # 1.7       Lymph% 11.9       Magnesium 2.1       MCH 29.8       MCHC 30.6       MCV 97       Mode   SPONT     Mono # 2.1       Mono% 14.2       MPV 10.7       Phosphorus 3.8       Platelets 304       POC BE   15     POC HCO3   38.8     POC PCO2   52.7     POC PH   7.476     POC PO2   61     POC SATURATED O2   92     POCT Glucose  180      Potassium 4.0       Total Protein 6.0       RBC 3.02       RDW 17.2       Sample   ARTERIAL     Sodium 146       WBC 14.41             Significant Imaging: Echocardiogram: Reviewed and X-Ray: CXR: X-Ray Chest 1 View (CXR):   Results for orders placed  or performed during the hospital encounter of 09/10/18   X-Ray Chest 1 View    Narrative    EXAMINATION:  XR CHEST 1 VIEW    CLINICAL HISTORY:  respiratory failure;    TECHNIQUE:  Single frontal view of the chest was performed.    COMPARISON:  09/16/2018    FINDINGS:  Patchy bibasilar interstitial and alveolar opacities which could reflect airspace disease or edema.  Small bilateral pleural effusions.  In comparison to the prior study, there is no adverse interval changes      Impression    In comparison to the prior study, there is no adverse interval changes      Electronically signed by: Mark Garcia MD  Date:    09/18/2018  Time:    07:17

## 2018-09-18 NOTE — PROGRESS NOTES
[facility] -   Adult Nutrition  Progress Note    SUMMARY     Recommendations    Recommendation/Intervention:   1.Continue current diet. 2. Add boost glucose control TID. 3. Will continue to monitor.     Goals: Meet > 85 % EEN/EPN while admitted  Nutrition Goal Status: continues   Communication of RD Recs: POC, sticky note      Reason for Assessment    Reason for Assessment: RD f/u  Dx:  1. Acute upper GI bleed    2. Atrial fibrillation    3. Aspiration pneumonia of right upper lobe, unspecified aspiration pneumonia type    4. Atrial fibrillation, permanent    5. Chronic obstructive pulmonary disease with acute lower respiratory infection    6. Type 2 diabetes mellitus without complication, without long-term current use of insulin    7. Acute hypoxemic respiratory failure    8. Anemia, unspecified type    9. Chronic kidney disease, stage 3    10. Pneumonia of both lungs due to infectious organism, unspecified part of lung    11. Septic shock    12. On mechanically assisted ventilation    13. Chronic atrial fibrillation with RVR      Past Medical History:   Diagnosis Date    *Atrial fibrillation     Acute hypoxemic respiratory failure     Acute on chronic diastolic congestive heart failure 9/12/2018    Anemia, unspecified 9/12/2018    Atrial fibrillation     Bilateral carotid artery disease 8/10/2015    Chronic obstructive pulmonary disease with acute lower respiratory infection     COPD (chronic obstructive pulmonary disease)     Diabetes mellitus     Gout     Hyperlipidemia     Hypertension     Low testosterone     Pneumonia     Septic shock     Stroke 1/3/15 L occiput    Unspecified disorder of kidney and ureter        Interdisciplinary Rounds: attended  General Information Comments: Pt confused. Per ICU rounds: pt on diabetic cardiac pureed diet w/ poor intake, ST to re-evaluate today, pt to be transferred to telemetry floor.   Nutrition Discharge Planning: pending medical  "course    Nutrition Risk Screen    Nutrition Risk Screen: no indicators present    Nutrition/Diet History    Do you have any cultural, spiritual, Yarsani conflicts, given your current situation?: None    Anthropometrics    Temp: 98.4 °F (36.9 °C)  Height Method: Stated  Height: 5' 10" (177.8 cm)  Height (inches): 70 in  Weight Method: Bed Scale  Weight: 86 kg (189 lb 9.5 oz)  Weight (lb): 189.6 lb  Ideal Body Weight (IBW), Male: 166 lb  % Ideal Body Weight, Male (lb): 126.17 lb  BMI (Calculated): 30.1  BMI Grade: 30 - 34.9- obesity - grade I       Lab/Procedures/Meds    Pertinent Labs Reviewed: reviewed  BMP  Lab Results   Component Value Date     (H) 09/18/2018    K 4.0 09/18/2018     09/18/2018    CO2 34 (H) 09/18/2018    BUN 20 09/18/2018    CREATININE 0.9 09/18/2018    CALCIUM 8.3 (L) 09/18/2018    ANIONGAP 10 09/18/2018    ESTGFRAFRICA >60 09/18/2018    EGFRNONAA >60 09/18/2018     Lab Results   Component Value Date    CALCIUM 8.3 (L) 09/18/2018    PHOS 3.8 09/18/2018     Lab Results   Component Value Date    ALBUMIN 2.3 (L) 09/18/2018     Recent Labs   Lab  09/17/18 2010   POCTGLUCOSE  180*     Lab Results   Component Value Date    HGBA1C 5.6 03/14/2018       Pertinent Medications Reviewed: reviewed    Physical Findings/Assessment    Overall Physical Appearance: obese  Oral/Mouth Cavity: tooth/teeth missing  Skin: (Lawson score 15)    Estimated/Assessed Needs    Weight Used For Calorie Calculations: 86 kg (189 lb 9.5 oz)  Energy Calorie Requirements (kcal): 1596  Energy Need Method: Gregg-St Jeor (No AF)  Protein Requirements: 103 g  Weight Used For Protein Calculations: 86 kg (189 lb 9.5 oz)     Fluid Need Method: RDA Method(or per MD)  RDA Method (mL): 1596  CHO Requirement: 50 % EEN      Nutrition Prescription Ordered    Current Diet Order: Diabetic 2000 kcal, pureed, cardiac with thin liquids    Evaluation of Received Nutrient/Fluid Intake    Intake/Output Summary (Last 24 hours) at " 9/18/2018 1016  Last data filed at 9/18/2018 0600  Gross per 24 hour   Intake 200 ml   Output 1075 ml   Net -875 ml          % Intake of Estimated Energy Needs: 25 - 50 %  % Meal Intake: 25 - 50 %    Nutrition Risk      2xweekly    Assessment and Plan      Nutrition Problem  Inadequate oral intake     Related to (etiology):   Decreased appetite    Signs and Symptoms (as evidenced by):   PO intake ~ 25 %     Interventions/Recommendations (treatment strategy):  See above     Nutrition Diagnosis Status:   New        Monitor and Evaluation    Food and Nutrient Intake: energy intake  Food and Nutrient Adminstration: diet order  Anthropometric Measurements: weight  Biochemical Data, Medical Tests and Procedures: electrolyte and renal panel, glucose/endocrine profile  Nutrition-Focused Physical Findings: overall appearance     Nutrition Follow-Up    RD Follow-up?: Yes(2xweekly)

## 2018-09-18 NOTE — PROGRESS NOTES
[facility] -   Critical Care Medicine  Progress Note    Patient Name: Eleazar Solo  MRN: [id number]  Admission Date: 9/10/2018  Hospital Length of Stay: 8 days  Code Status: Full Code  Attending Provider: Prachi Boyer MD  Primary Care Provider: Poly Fitch MD   Principal Problem: Acute hypoxemic respiratory failure    Subjective:     HPI:  78 year old male admitted to MICU, transfer from [facility].  I have reviewed the patient's medical history in detail and updated the computerized patient record.  Upper GI bleeding, Scoped x 2 and clipped  Developed Af RVR, Known chr A fib on elliquis on hold  Treated with Digoxin, Amiodarone and cardizem GTT  Family requested transfer  Known COPD FEV1:1.65L ( 53%)  ANORO ellipta, not on oxygen  Presented to MICU needed BIPAP for WOB  CXR shows RML infiltrate  Overnight, anxious, WOB  Last echo EF 60%      Hospital/ICU Course:  09/11: CXR shows melanie infiltrate: edema.  . Lopressor 2.5mg and lasix 20 mg given  9/12 - intubated yesterday and required pressor with sedation.  Resting on vent.  Cynthia TF.  No active bleeding  9/13 - remains intubated, sedated on mechanical ventilation, pO2 trending up with high PEEP ventilation  9/14 - remains intubated on mechanical ventilation with oxygen/PEEP demand decreased; continued atrial fib with RVR despite amiodarone infusion, some improvement with metoprolol yesterday  9/15 - tolerated extubation, on high flow nasal cannula support; awake, slow to respond and faint verbalization but oriented to self and place; remains atrial fib with RVR variable 120-140; afebrile, wbc trending down  9/16 - remains vapotherm with high flow, moderate oxygen demand; afebrile, wbc continues down; afib rate remains uncontrolled  9/17 - remains on vapotherm with slow weaning; mentation with slight improvement daily; remains afebrile; wbc continues down  9/18 - increased work of breathing and hypoxia last evening improved with NIPPV,  transitioned back to vapotherm when awake this am; HR range  variable; continued intermittent confusion/agitation worse evening/night    Review of Systems   Constitutional: Positive for malaise/fatigue. Negative for chills and diaphoresis.   Respiratory: Positive for cough and shortness of breath. Negative for sputum production and stridor.    Cardiovascular: Negative for chest pain.   Gastrointestinal: Negative for abdominal pain, nausea and vomiting.   Musculoskeletal: Positive for myalgias.   Skin: Negative.    Neurological: Positive for weakness. Negative for focal weakness and seizures.   Psychiatric/Behavioral: The patient is nervous/anxious. The patient does not have insomnia.        Objective:     Vital Signs (Most Recent):  Temp: 98.9 °F (37.2 °C) (09/18/18 0700)  Pulse: 84 (09/18/18 1026)  Resp: (!) 24 (09/18/18 1026)  BP: (!) 146/76 (09/18/18 0800)  SpO2: 100 %(from previous settings) (09/18/18 1026) Vital Signs (24h Range):  Temp:  [97.6 °F (36.4 °C)-98.9 °F (37.2 °C)] 98.9 °F (37.2 °C)  Pulse:  [] 84  Resp:  [15-33] 24  SpO2:  [91 %-100 %] 100 %  BP: (121-159)/() 146/76     Weight: 86 kg (189 lb 9.5 oz)  Body mass index is 27.2 kg/m².      Intake/Output Summary (Last 24 hours) at 9/18/2018 1143  Last data filed at 9/18/2018 0600  Gross per 24 hour   Intake 200 ml   Output 1075 ml   Net -875 ml       Physical Exam   Constitutional: He appears ill. Nasal cannula in place.   HENT:   Head: Atraumatic.   Eyes: Conjunctivae are normal. Pupils are equal, round, and reactive to light.   Neck: No JVD present. No tracheal deviation present.   Cardiovascular: An irregularly irregular rhythm present. Tachycardia present.   No murmur heard.  Pulses:       Radial pulses are 2+ on the right side, and 2+ on the left side.        Dorsalis pedis pulses are 1+ on the right side, and 1+ on the left side.   Pulmonary/Chest: No accessory muscle usage. No respiratory distress. He has decreased breath sounds.    Abdominal: Soft. Bowel sounds are normal. He exhibits no distension.   Musculoskeletal: He exhibits no edema.   Neurological: He is alert.   Intermittently confused, agitated last night   Skin: Skin is warm and dry. Capillary refill takes 2 to 3 seconds. No cyanosis.           Lines/Drains/Airways     Pressure Ulcer                 Pressure Injury 09/13/18 1905 Left Buttocks Deep tissue injury 4 days          Peripheral Intravenous Line                 Peripheral IV - Single Lumen 09/17/18 1905 Right Upper Arm less than 1 day                Significant Labs:    CBC/Anemia Profile:  Recent Labs   Lab  09/17/18 0322  09/18/18   0431   WBC  13.95*  14.41*   HGB  8.4*  9.0*   HCT  27.1*  29.4*   PLT  300  304   MCV  99*  97   RDW  16.8*  17.2*        Chemistries:  Recent Labs   Lab  09/17/18 0322  09/18/18   0431   NA  148*  146*   K  3.8  4.0   CL  102  102   CO2  34*  34*   BUN  22  20   CREATININE  1.0  0.9   CALCIUM  8.2*  8.3*   ALBUMIN  2.2*  2.3*   PROT  5.8*  6.0   BILITOT  0.9  0.8   ALKPHOS  82  73   ALT  64*  55*   AST  69*  46*   MG  2.1  2.1   PHOS  3.5  3.8       All pertinent labs within the past 24 hours have been reviewed.    Significant Imaging:  I have reviewed all pertinent imaging results/findings within the past 24 hours.      Assessment/Plan:     Pulmonary   * Acute hypoxemic respiratory failure    Supplemental oxygen with high flow; titrate Fio2 to keep sat > 92  Ok to use BiPap prn and noctunal for dyspnea, hypoxia  Anticipate long weaning; high risk decompensation s/t debility, chronic lung disease, loss of reserve        Chronic obstructive pulmonary disease with acute lower respiratory infection    Moderate severe COPD  Cont Formeterol, Budesonide, and xopenex  Continue avelox, diflucan        Cardiac/Vascular   Chronic atrial fibrillation with RVR    Continue metoprolol  Trial increase po cardizem  Continue eliquis        Renal/   Chronic kidney disease, stage 3    Accurate  I/Os  Monitor creatinine        Oncology   Anemia, unspecified    No s/s of active bleeding  Monitor cbc        Endocrine   Type 2 diabetes mellitus without complication, without long-term current use of insulin    Continue SSI prn with monitoring for glucose control and prevention of insulin toxicity        GI   Acute upper GI bleed with acute blood loss anemia    Recent clipping of ulcer at GE junction at outside hospital  Cont PPI  No bleeding on eliquis        Orthopedic   Critical illness myopathy    Advanced age, prolonged sedation for ventilation, chronic comorbidities  Anticipate prolonged recovery, high risk for complication  PT/OT/ST following        Preventive Measures:   Nutrition: puree diet  Stress Ulcer: PI  DVT: eliquis  BB: metoprolol  Bowel Prophylaxis: miralax, dulcolax   Head of Bed/Reposition: Elevate HOB and turn Q1-2 hours    Mobility: OOB with assist  Code Status: full     Counseling/Consultation: I have discussed the care of this patient in detail with nursing staff and Dr. Oh. Status and plan of care discussed with team on multidisciplinary rounds.       Critical Care Time: 63 minutes  Critical secondary to hypoxemic respiratory failure, critical care myopathy     Critical care was time spent personally by me on the following activities: development of treatment plan with patient or surrogate and bedside caregivers, discussions with consultants, evaluation of patient's response to treatment, examination of patient, ordering and performing treatments and interventions, ordering and review of laboratory studies, ordering and review of radiographic studies, pulse oximetry, re-evaluation of patient's condition. This critical care time did not overlap with that of any other provider or involve time for any procedures.     Selma Wallace NP  Critical Care Medicine  [facility] -

## 2018-09-18 NOTE — PROGRESS NOTES
[facility]  Cardiology  Progress Note    Patient Name: Eleazar Solo  MRN: [id number]  Admission Date: 9/10/2018  Hospital Length of Stay: 8 days  Code Status: Full Code   Attending Physician: Prachi Boyer MD   Primary Care Physician: Poly Fitch MD  Expected Discharge Date:   Principal Problem:Acute hypoxemic respiratory failure    Subjective:   HPI  Mr. Solo is a 79yo male with a PMHx of permanent AF on Eliquis, HTN, HLD, COPD, DM II, and h/o CVA, who was transferred from [facility] per family request as patient is followed OP by [facility].  He was originally admitted to [facility] on 9/6 for acute upper GI bleed with blood loss anemia (Hb 12 > 7.4 > 7.9).  Patient received total of 2 units PRBC and Eliquis held (last dose 9/6 AM).  He underwent EGD on 9/6 that showed a clot at the GE junction which was clipped  and injected with epi.  There was concern for further bleeding, therefore, repeat EGD was performed on 9/7 which showed no evidence of active bleeding.  During admission, patient developed AFib with RVR and was placed on diltiazem drip and PO dig.  CXR on 9/8 showed left pneumonia, IV Rocephin started.  Prior to transfer, vital signs and labs stable.  Upon arrival to [facility] ICU, patient hypotensive (SBP 70-80's), now on pressors and was hypoxic requiring continuous BiPAP.  Remains in AFib with controlled rate.  Repeat CXR showed RUL pneumonia.  Repeat labs resulted WBC 16.5, H&H 8.4/24.6, plt 136, BUN 38, cr. 1.4, AST 77, . ABG with pH 7.382, pCO2 44, pO2 60, HCO3 26.  Patient c/o SOB which is resolved while on BiPAP. CXR today shows marked amount of alveolar consolidation in the lower 2/3 of both lungs.  The this represents an interval worsening of the appearance of the lungs and is characteristic of pneumonia. Decision made to intubate patient  Due to resp distress and wheezing.  H/H this morning 7.5/22.6    Hospital Course:   9/12/18- Remains  intubated and sedated. Still in A-fib. Rate 100-115 bpm this morning. Started on Amio gtt for rate control. H/H stable at 7.8/23.9. Continues abx for bilateral pneumonia. Patient being weaned from Dopamine gtt.Liver enzymes improved today. Echo shows normal LVF with Moderate to severe aortic stenosis and pulm HTN     9/13/18-Patient seen and examined today. No acute events overnight. Remains intubated. Still in afib, HR variable. H and H slightly worse, 7.3/22.3. Would benefit from transfusion.     9/14/18-Patient seen and examined today. Pressor re-started overnight. Still intubated. Remains in afib with rapid rate. H and H improved s/p transfusion.     9/15/18-Patient seen and examined today, now extubated and sitting in chair. Feels ok. Remains in afib with RVR, meds adjusted. Labs reviewed. H and H stable overnight.     9/16/18-Patient seen and examined today. Looks and feels much better. More awake and alert. SOB improving. Remains in afib, HR better controlled. Labs stable.    9/17/18--Patient seen and examined today. He seems to be feeling better today, more awake and interactive. Remains in afib on Eliquis. Labs reviewed, Cr 1.0. Resume BB.     9/18/18--Patient seen and examined today in ICU, in bedside chair. Denies chest pain or anginal equivalents. Continues to be in AFIB today, on eliquis. Labs reviewed, H/H 9/29.4, Cr 0.9. IV lasix x 1 dose this AM.     Interval History: intermittent confusion per nurse's notes, resolved at present time.     Review of Systems   Constitution: Positive for malaise/fatigue. Negative for weakness.   HENT: Negative for hearing loss and hoarse voice.    Eyes: Negative for blurred vision and visual disturbance.   Cardiovascular: Positive for irregular heartbeat and leg swelling. Negative for chest pain, claudication, dyspnea on exertion, near-syncope, orthopnea, palpitations, paroxysmal nocturnal dyspnea and syncope.   Respiratory: Positive for shortness of breath (improving).  Negative for cough, hemoptysis, sleep disturbances due to breathing, snoring and wheezing.    Endocrine: Negative for cold intolerance and heat intolerance.   Hematologic/Lymphatic: Bruises/bleeds easily (on Eliquis).   Skin: Negative for color change, dry skin and nail changes.   Musculoskeletal: Positive for arthritis and back pain. Negative for joint pain and myalgias.   Gastrointestinal: Negative for bloating, abdominal pain, constipation, nausea and vomiting.   Genitourinary: Negative for dysuria, flank pain, hematuria and hesitancy.   Neurological: Negative for headaches, light-headedness, loss of balance, numbness and paresthesias.   Psychiatric/Behavioral: Negative for altered mental status.        Intermittent confusion overnight   Allergic/Immunologic: Negative for environmental allergies.     Objective:     Vital Signs (Most Recent):  Temp: 98.9 °F (37.2 °C) (09/18/18 0700)  Pulse: 85 (09/18/18 1308)  Resp: (!) 23 (09/18/18 1308)  BP: (!) 146/76 (09/18/18 0800)  SpO2: 100 % (09/18/18 1308) Vital Signs (24h Range):  Temp:  [97.6 °F (36.4 °C)-98.9 °F (37.2 °C)] 98.9 °F (37.2 °C)  Pulse:  [] 85  Resp:  [15-33] 23  SpO2:  [91 %-100 %] 100 %  BP: (122-159)/() 146/76     Weight: 86 kg (189 lb 9.5 oz)  Body mass index is 27.2 kg/m².     SpO2: 100 %  O2 Device (Oxygen Therapy): Vapotherm      Intake/Output Summary (Last 24 hours) at 9/18/2018 1315  Last data filed at 9/18/2018 0600  Gross per 24 hour   Intake 200 ml   Output 1075 ml   Net -875 ml       Lines/Drains/Airways     Pressure Ulcer                 Pressure Injury 09/13/18 1905 Left Buttocks Deep tissue injury 4 days          Peripheral Intravenous Line                 Peripheral IV - Single Lumen 09/17/18 1905 Right Upper Arm less than 1 day                Physical Exam   Constitutional: He is oriented to person, place, and time. He appears well-developed and well-nourished. He is active. No distress. Nasal cannula in place.   HENT:   Head:  Normocephalic and atraumatic.   Neck: Normal range of motion and full passive range of motion without pain. Neck supple. No JVD present.   Cardiovascular: Normal rate, S1 normal, S2 normal and intact distal pulses. An irregularly irregular rhythm present.  Occasional extrasystoles are present. PMI is not displaced. Exam reveals no distant heart sounds.   Murmur heard.   Harsh midsystolic murmur is present with a grade of 2/6 at the upper right sternal border radiating to the neck.  Pulses:       Radial pulses are 2+ on the right side, and 2+ on the left side.        Dorsalis pedis pulses are 1+ on the right side, and 1+ on the left side.   Pulmonary/Chest: Accessory muscle usage present. No respiratory distress. He has decreased breath sounds in the right lower field and the left lower field. He has no wheezes.   +Vapotherm in place   Abdominal: Soft. Bowel sounds are normal. He exhibits no distension. There is no tenderness.   Genitourinary:   Genitourinary Comments: deferred   Musculoskeletal: Normal range of motion. He exhibits no edema.        Right ankle: He exhibits swelling.        Left ankle: He exhibits swelling.   Neurological: He is alert and oriented to person, place, and time.   Skin: Skin is warm and dry. He is not diaphoretic. No cyanosis. Nails show no clubbing.   Psychiatric: He has a normal mood and affect. His speech is normal and behavior is normal. Judgment and thought content normal. Cognition and memory are normal.   Nursing note and vitals reviewed.      Significant Labs:   All pertinent lab results from the last 24 hours have been reviewed. and   Recent Lab Results       09/18/18  0431 09/17/18 2010 09/17/18  1507      Albumin 2.3       Alkaline Phosphatase 73       Allens Test   Pass     ALT 55       Anion Gap 10       AST 46       Baso # 0.02       Basophil% 0.1       Total Bilirubin 0.8  Comment:  For infants and newborns, interpretation of results should be based  on gestational age,  weight and in agreement with clinical  observations.  Premature Infant recommended reference ranges:  Up to 24 hours.............<8.0 mg/dL  Up to 48 hours............<12.0 mg/dL  3-5 days..................<15.0 mg/dL  6-29 days.................<15.0 mg/dL         Site   RR     BUN, Bld 20       Calcium 8.3       Chloride 102       CO2 34       Creatinine 0.9       DelSys   Nasal Can     Differential Method Automated       eGFR if  >60       eGFR if non  >60  Comment:  Calculation used to obtain the estimated glomerular filtration  rate (eGFR) is the CKD-EPI equation.          Eos # 0.2       Eosinophil% 1.2       FiO2   35     Flow   25     Glucose 118       Gran # (ANC) 10.5       Gran% 73.8       Hematocrit 29.4       Hemoglobin 9.0       Lymph # 1.7       Lymph% 11.9       Magnesium 2.1       MCH 29.8       MCHC 30.6       MCV 97       Mode   SPONT     Mono # 2.1       Mono% 14.2       MPV 10.7       Phosphorus 3.8       Platelets 304       POC BE   15     POC HCO3   38.8     POC PCO2   52.7     POC PH   7.476     POC PO2   61     POC SATURATED O2   92     POCT Glucose  180      Potassium 4.0       Total Protein 6.0       RBC 3.02       RDW 17.2       Sample   ARTERIAL     Sodium 146       WBC 14.41             Significant Imaging: Echocardiogram: Reviewed and X-Ray: CXR: X-Ray Chest 1 View (CXR):   Results for orders placed or performed during the hospital encounter of 09/10/18   X-Ray Chest 1 View    Narrative    EXAMINATION:  XR CHEST 1 VIEW    CLINICAL HISTORY:  respiratory failure;    TECHNIQUE:  Single frontal view of the chest was performed.    COMPARISON:  09/16/2018    FINDINGS:  Patchy bibasilar interstitial and alveolar opacities which could reflect airspace disease or edema.  Small bilateral pleural effusions.  In comparison to the prior study, there is no adverse interval changes      Impression    In comparison to the prior study, there is no adverse interval  changes      Electronically signed by: Mark Garcia MD  Date:    09/18/2018  Time:    07:17     Assessment and Plan:     Patient seen and examined in ICU room, per family he had issues with confusion overnight but is improving this AM. Denies chest pain or anginal equivalents. No CNS complaints to suggest TIA or CVA. No signs of abnormal bleeding on Eliquis. IV lasix x 1 dose this AM.     Pneumonia of both lungs due to infectious organism    -per critical care        Anemia, unspecified    -Stable, monitor          Nonrheumatic aortic valve stenosis    -Stable  -Moderate to severe by 2D echo  -Further workup as OP        Acute upper GI bleed with acute blood loss anemia    -H/H stable, continue to monitor  -Continue Eliquis for CVA prophylaxis         Chronic kidney disease, stage 3    Cr 0.9, monitor        Essential hypertension    -BP controlled  -Continue same meds  -IV lasix x 1 dose this AM.           Chronic atrial fibrillation with RVR    -Remains in afib with RVR in setting of sepsis/PNA/anemia  -HR better controlled  -Continue digoxin, cardizem, metoprolol  -Continue Eliquis for CVA prophylaxis   -IV lasix x 1 dose today            VTE Risk Mitigation (From admission, onward)        Ordered     apixaban tablet 5 mg  2 times daily      09/15/18 0958     IP VTE HIGH RISK PATIENT  Once      09/11/18 0425     Reason for No Pharmacological VTE Prophylaxis  Once      09/11/18 0425     Place sequential compression device  Until discontinued      09/11/18 0425          Selma Lomas NP  Cardiology  [facility]

## 2018-09-18 NOTE — ASSESSMENT & PLAN NOTE
-Remains in afib with RVR in setting of sepsis/PNA/anemia  -HR better controlled  -Continue digoxin, cardizem, metoprolol  -Continue Eliquis for CVA prophylaxis   -IV lasix x 1 dose today

## 2018-09-18 NOTE — PROGRESS NOTES
Pt assisted up to chair. Pt has SOB with minimal movement but recovers quickley. Worked with pt on chair exercise. Encouraged to advance as tolerated. VSS.

## 2018-09-18 NOTE — PROGRESS NOTES
Pt assisted back to bed. Total assist with 2 people. Placed on left side with pillows to support. sr up and call light in hand.

## 2018-09-18 NOTE — PLAN OF CARE
Problem: Patient Care Overview  Goal: Plan of Care Review  POC with family. Increased activity. Out of bed to chair. Rehab in discharge planning. Maintained oxygen. Diet advanced. Safety maintained.

## 2018-09-18 NOTE — PLAN OF CARE
Problem: Patient Care Overview  Goal: Plan of Care Review  Outcome: Ongoing (interventions implemented as appropriate)  Recommendations     Recommendation/Intervention:   1.Continue current diet. 2. Add boost glucose control TID. 3. Will continue to monitor.     Goals: Meet > 85 % EEN/EPN while admitted  Nutrition Goal Status: continues   Communication of RD Recs: POC, sticky note

## 2018-09-18 NOTE — ASSESSMENT & PLAN NOTE
Supplemental oxygen with high flow; titrate Fio2 to keep sat > 92  Ok to use BiPap prn and noctunal for dyspnea, hypoxia  Anticipate long weaning; high risk decompensation s/t debility, chronic lung disease, loss of reserve

## 2018-09-18 NOTE — ASSESSMENT & PLAN NOTE
Advanced age, prolonged sedation for ventilation, chronic comorbidities  Anticipate prolonged recovery, high risk for complication  PT/OT/ST following

## 2018-09-18 NOTE — PT/OT/SLP PROGRESS
Speech Language Pathology Treatment    Patient Name:  Eleazar Solo   MRN:  [id number]  Admitting Diagnosis: Acute hypoxemic respiratory failure    Recommendations:                 General Recommendations:  Dysphagia therapy  Diet recommendations:  Mechanical soft, Liquid Diet Level: Thin   Aspiration Precautions: 1 bite/sip at a time, Assistance with meals, HOB to 90 degrees, Meds crushed in puree, Remain upright 30 minutes post meal and Small bites/sips   General Precautions: Standard, aspiration  Communication strategies:  none    Subjective     Pt was seen sitting in a chair at bedside with his wife present.    Patient goals: none reported     Pain/Comfort:  · Pain Rating 1: 0/10    Objective:     Has the patient been evaluated by SLP for swallowing?   Yes  Keep patient NPO? No   Current Respiratory Status: (vapo-therme)      Swallow assessed with thin liquids and solids.  Pt tolerated without overt signs of aspiration, however he continues to exhibit decreased laryngeal elevation to palpitation.  He required cueing for small sips as he exhibits impulsiveness.  Pt's wife stated that pt had eggs and sausage for breakfast without difficulty.  Nursing reported that pt took increased time and multiple swallows during intake of pills.  Pt will benefit from crushed medications in puree.    Assessment:     Eleazar Solo is a 78 y.o. male with an SLP diagnosis of Dysphagia.  He presents with decreased laryngeal elevation and impulsiveness which increases his aspiration risk.    Goals:   Multidisciplinary Problems     SLP Goals        Problem: SLP Goal    Goal Priority Disciplines Outcome   SLP Goal     SLP Ongoing (interventions implemented as appropriate)   Description:  LTG:  Pt will tolerate least restrictive diet consistency safely and efficiently.            Pt will communicate needs/ideas effectively.    ST. Reassess swallowing bedside for p.o. Readiness-  Pt looked much better today(alert, following commands);  tolerated thin liquid, thick liquids and pureed consistencies without observable signs of swallowing difficulties. He fatigued with cracker.   He is recommended to start a pureed consistency diet and thin liquids with swallow precautions which were reviewed with patient, family and staff.  **S.T. Recommended to reassess swallowing tomorrow with further goals as appropriate.  **Consider Modified Barium Swallowing Study if pt exhibits any swallowing difficulties and/or lung status does not continue to improve.               2. Oral Motor exercises x10-15             3. Oral Stim. x20 with 100%  timely laryngeal excursions.             4. Orientation with 90% with min cues             5. Follow multi-unit commands with 90%                     Plan:     · Patient to be seen:  2 x/week   · Plan of Care expires:   9/25/2018  · Plan of Care reviewed with:  patient, spouse   · SLP Follow-Up:  Yes      Time Tracking:     SLP Treatment Date:   09/18/18  Speech Start Time:  1015  Speech Stop Time:  1030     Speech Total Time (min):  15 min    Billable Minutes: Treatment Swallowing Dysfunction 15    Dalila Luciano CCC-SLP  09/18/2018

## 2018-09-18 NOTE — NURSING
Pt was very agitated and confused, tried to get out of bed, pulled out IV, and removed BiPAP. THAIS Wallace, NP at bedside. IV Benadryl given. Pt now asleep. Daughter updated and staying at bedside to aid in any future agitation. PO meds will be held due to pt's current status.

## 2018-09-19 LAB
ALBUMIN SERPL BCP-MCNC: 2.3 G/DL
ALP SERPL-CCNC: 73 U/L
ALT SERPL W/O P-5'-P-CCNC: 49 U/L
ANION GAP SERPL CALC-SCNC: 10 MMOL/L
AST SERPL-CCNC: 37 U/L
BASOPHILS # BLD AUTO: 0.02 K/UL
BASOPHILS NFR BLD: 0.2 %
BILIRUB SERPL-MCNC: 0.8 MG/DL
BUN SERPL-MCNC: 14 MG/DL
CALCIUM SERPL-MCNC: 8.3 MG/DL
CHLORIDE SERPL-SCNC: 97 MMOL/L
CO2 SERPL-SCNC: 37 MMOL/L
CREAT SERPL-MCNC: 1 MG/DL
DIFFERENTIAL METHOD: ABNORMAL
DIGOXIN SERPL-MCNC: 0.4 NG/ML
EOSINOPHIL # BLD AUTO: 0.2 K/UL
EOSINOPHIL NFR BLD: 1.8 %
ERYTHROCYTE [DISTWIDTH] IN BLOOD BY AUTOMATED COUNT: 16.9 %
EST. GFR  (AFRICAN AMERICAN): >60 ML/MIN/1.73 M^2
EST. GFR  (NON AFRICAN AMERICAN): >60 ML/MIN/1.73 M^2
GLUCOSE SERPL-MCNC: 119 MG/DL
HCT VFR BLD AUTO: 28.5 %
HGB BLD-MCNC: 8.7 G/DL
LYMPHOCYTES # BLD AUTO: 1.6 K/UL
LYMPHOCYTES NFR BLD: 13.2 %
MAGNESIUM SERPL-MCNC: 2 MG/DL
MCH RBC QN AUTO: 29.8 PG
MCHC RBC AUTO-ENTMCNC: 30.5 G/DL
MCV RBC AUTO: 98 FL
MONOCYTES # BLD AUTO: 1.6 K/UL
MONOCYTES NFR BLD: 12.9 %
NEUTROPHILS # BLD AUTO: 8.6 K/UL
NEUTROPHILS NFR BLD: 71.9 %
PHOSPHATE SERPL-MCNC: 3.9 MG/DL
PLATELET # BLD AUTO: 311 K/UL
PMV BLD AUTO: 9.9 FL
POTASSIUM SERPL-SCNC: 3.4 MMOL/L
PROT SERPL-MCNC: 5.9 G/DL
RBC # BLD AUTO: 2.92 M/UL
SODIUM SERPL-SCNC: 144 MMOL/L
WBC # BLD AUTO: 12 K/UL

## 2018-09-19 PROCEDURE — 99900035 HC TECH TIME PER 15 MIN (STAT)

## 2018-09-19 PROCEDURE — 63600175 PHARM REV CODE 636 W HCPCS: Performed by: NURSE PRACTITIONER

## 2018-09-19 PROCEDURE — 25000242 PHARM REV CODE 250 ALT 637 W/ HCPCS: Performed by: NURSE PRACTITIONER

## 2018-09-19 PROCEDURE — 94640 AIRWAY INHALATION TREATMENT: CPT

## 2018-09-19 PROCEDURE — 20000000 HC ICU ROOM

## 2018-09-19 PROCEDURE — 94660 CPAP INITIATION&MGMT: CPT

## 2018-09-19 PROCEDURE — 99232 SBSQ HOSP IP/OBS MODERATE 35: CPT | Mod: ,,, | Performed by: INTERNAL MEDICINE

## 2018-09-19 PROCEDURE — 80053 COMPREHEN METABOLIC PANEL: CPT

## 2018-09-19 PROCEDURE — 25000003 PHARM REV CODE 250: Performed by: NURSE PRACTITIONER

## 2018-09-19 PROCEDURE — 97116 GAIT TRAINING THERAPY: CPT

## 2018-09-19 PROCEDURE — 80162 ASSAY OF DIGOXIN TOTAL: CPT

## 2018-09-19 PROCEDURE — 99291 CRITICAL CARE FIRST HOUR: CPT | Mod: ,,, | Performed by: NURSE PRACTITIONER

## 2018-09-19 PROCEDURE — 25000003 PHARM REV CODE 250: Performed by: FAMILY MEDICINE

## 2018-09-19 PROCEDURE — 83735 ASSAY OF MAGNESIUM: CPT

## 2018-09-19 PROCEDURE — 27100092 HC HIGH FLOW DELIVERY CANNULA

## 2018-09-19 PROCEDURE — 25000003 PHARM REV CODE 250: Performed by: INTERNAL MEDICINE

## 2018-09-19 PROCEDURE — 94664 DEMO&/EVAL PT USE INHALER: CPT

## 2018-09-19 PROCEDURE — 36415 COLL VENOUS BLD VENIPUNCTURE: CPT

## 2018-09-19 PROCEDURE — 84100 ASSAY OF PHOSPHORUS: CPT

## 2018-09-19 PROCEDURE — 27000221 HC OXYGEN, UP TO 24 HOURS

## 2018-09-19 PROCEDURE — 97110 THERAPEUTIC EXERCISES: CPT

## 2018-09-19 PROCEDURE — 27100171 HC OXYGEN HIGH FLOW UP TO 24 HOURS

## 2018-09-19 PROCEDURE — 27000646 HC AEROBIKA DEVICE

## 2018-09-19 PROCEDURE — 85025 COMPLETE CBC W/AUTO DIFF WBC: CPT

## 2018-09-19 PROCEDURE — 97530 THERAPEUTIC ACTIVITIES: CPT

## 2018-09-19 PROCEDURE — 25000242 PHARM REV CODE 250 ALT 637 W/ HCPCS: Performed by: INTERNAL MEDICINE

## 2018-09-19 RX ORDER — METOPROLOL SUCCINATE 50 MG/1
50 TABLET, EXTENDED RELEASE ORAL NIGHTLY
Status: DISCONTINUED | OUTPATIENT
Start: 2018-09-19 | End: 2018-09-21 | Stop reason: HOSPADM

## 2018-09-19 RX ORDER — MOXIFLOXACIN HYDROCHLORIDE 400 MG/1
400 TABLET ORAL DAILY
Status: COMPLETED | OUTPATIENT
Start: 2018-09-20 | End: 2018-09-20

## 2018-09-19 RX ORDER — DILTIAZEM HYDROCHLORIDE 60 MG/1
60 TABLET, FILM COATED ORAL EVERY 8 HOURS
Status: DISCONTINUED | OUTPATIENT
Start: 2018-09-19 | End: 2018-09-21 | Stop reason: HOSPADM

## 2018-09-19 RX ORDER — FLUCONAZOLE 100 MG/1
200 TABLET ORAL DAILY
Status: COMPLETED | OUTPATIENT
Start: 2018-09-19 | End: 2018-09-20

## 2018-09-19 RX ORDER — ATROPINE SULFATE 0.1 MG/ML
0.5 INJECTION INTRAVENOUS ONCE
Status: COMPLETED | OUTPATIENT
Start: 2018-09-19 | End: 2018-09-19

## 2018-09-19 RX ORDER — FUROSEMIDE 10 MG/ML
60 INJECTION INTRAMUSCULAR; INTRAVENOUS ONCE
Status: COMPLETED | OUTPATIENT
Start: 2018-09-19 | End: 2018-09-19

## 2018-09-19 RX ORDER — ATROPINE SULFATE 0.1 MG/ML
INJECTION INTRAVENOUS
Status: DISPENSED
Start: 2018-09-19 | End: 2018-09-19

## 2018-09-19 RX ORDER — POTASSIUM CHLORIDE 20 MEQ/15ML
40 SOLUTION ORAL ONCE
Status: COMPLETED | OUTPATIENT
Start: 2018-09-19 | End: 2018-09-19

## 2018-09-19 RX ADMIN — POLYETHYLENE GLYCOL (3350) 17 G: 17 POWDER, FOR SOLUTION ORAL at 08:09

## 2018-09-19 RX ADMIN — DIPHENHYDRAMINE HYDROCHLORIDE 25 MG: 50 INJECTION, SOLUTION INTRAMUSCULAR; INTRAVENOUS at 11:09

## 2018-09-19 RX ADMIN — ARFORMOTEROL TARTRATE 15 MCG: 15 SOLUTION RESPIRATORY (INHALATION) at 09:09

## 2018-09-19 RX ADMIN — BUDESONIDE 0.5 MG: 0.5 SUSPENSION RESPIRATORY (INHALATION) at 07:09

## 2018-09-19 RX ADMIN — APIXABAN 5 MG: 2.5 TABLET, FILM COATED ORAL at 08:09

## 2018-09-19 RX ADMIN — DILTIAZEM HYDROCHLORIDE 60 MG: 60 TABLET, FILM COATED ORAL at 04:09

## 2018-09-19 RX ADMIN — FUROSEMIDE 60 MG: 10 INJECTION, SOLUTION INTRAMUSCULAR; INTRAVENOUS at 08:09

## 2018-09-19 RX ADMIN — DILTIAZEM HYDROCHLORIDE 60 MG: 60 TABLET, FILM COATED ORAL at 10:09

## 2018-09-19 RX ADMIN — LEVALBUTEROL 0.63 MG: 0.63 SOLUTION RESPIRATORY (INHALATION) at 07:09

## 2018-09-19 RX ADMIN — METOPROLOL TARTRATE 50 MG: 50 TABLET ORAL at 08:09

## 2018-09-19 RX ADMIN — OLANZAPINE 15 MG: 5 TABLET, ORALLY DISINTEGRATING ORAL at 06:09

## 2018-09-19 RX ADMIN — ARFORMOTEROL TARTRATE 15 MCG: 15 SOLUTION RESPIRATORY (INHALATION) at 07:09

## 2018-09-19 RX ADMIN — MOXIFLOXACIN HYDROCHLORIDE 400 MG: 400 TABLET, FILM COATED ORAL at 08:09

## 2018-09-19 RX ADMIN — APIXABAN 5 MG: 2.5 TABLET, FILM COATED ORAL at 09:09

## 2018-09-19 RX ADMIN — METOPROLOL SUCCINATE 50 MG: 50 TABLET, EXTENDED RELEASE ORAL at 09:09

## 2018-09-19 RX ADMIN — ATROPINE SULFATE 0.5 MG: 0.1 INJECTION PARENTERAL at 10:09

## 2018-09-19 RX ADMIN — PANTOPRAZOLE SODIUM 40 MG: 40 TABLET, DELAYED RELEASE ORAL at 08:09

## 2018-09-19 RX ADMIN — SODIUM CHLORIDE 250 ML: 0.9 INJECTION, SOLUTION INTRAVENOUS at 10:09

## 2018-09-19 RX ADMIN — POTASSIUM CHLORIDE 40 MEQ: 20 SOLUTION ORAL at 08:09

## 2018-09-19 RX ADMIN — FLUCONAZOLE 200 MG: 100 TABLET ORAL at 10:09

## 2018-09-19 RX ADMIN — DIGOXIN 0.12 MG: 125 TABLET ORAL at 08:09

## 2018-09-19 RX ADMIN — LEVALBUTEROL 0.63 MG: 0.63 SOLUTION RESPIRATORY (INHALATION) at 09:09

## 2018-09-19 RX ADMIN — DILTIAZEM HYDROCHLORIDE 60 MG: 60 TABLET, FILM COATED ORAL at 06:09

## 2018-09-19 RX ADMIN — BUDESONIDE 0.5 MG: 0.5 SUSPENSION RESPIRATORY (INHALATION) at 09:09

## 2018-09-19 RX ADMIN — BISACODYL 10 MG: 5 TABLET, COATED ORAL at 08:09

## 2018-09-19 NOTE — PROGRESS NOTES
[facility] Hospital Medicine  Progress Note    Patient Name: Eleazar Solo  MRN: [id number]  Patient Class: IP- Inpatient   Admission Date: 9/10/2018  Length of Stay: 8 days  Attending Physician: Prachi Boyer MD  Primary Care Provider: Poly Fitch MD        Subjective:     Principal Problem:Acute hypoxemic respiratory failure    HPI:  Mr. Solo is a 77yo male with a PMHx of permanent AF on Eliquis, HTN, HLD, COPD, DM II, and h/o CVA, who was transferred from [facility] per family request as patient is followed OP by [facility] physicians.  He was originally admitted to [facility] on 9/6 for acute upper GI bleed with blood loss anemia (Hb 12 > 7.4 > 7.9).  Patient received total of 2 units PRBC and Eliquis held (last dose 9/6 AM).  He underwent EGD on 9/6 that showed a clot at the GE junction which was clipped  and injected with epi.  There was concern for further bleeding, therefore, repeat EGD was performed on 9/7 which showed no evidence of active bleeding.  During admission, patient developed AFib with RVR and was placed on diltiazem drip and PO dig.  CXR on 9/8 showed left pneumonia, IV Rocephin started.  Prior to transfer, vital signs and labs stable.  Upon arrival to [facility] ICU, patient hypotensive (SBP 70-80's) and hypoxic requiring continuous BiPAP.  Remains in AFib with controlled rate.  Repeat CXR showed RUL pneumonia.  Repeat labs resulted WBC 16.5, H&H 8.4/24.6, plt 136, BUN 38, cr. 1.4, AST 77, . ABG with pH 7.382, pCO2 44, pO2 60, HCO3 26.  Patient c/o SOB which is resolved while on BiPAP.  Denies any CP, palpitations, orthopnea, PND, cough, edema, ABD pain, N/V/D, hematemesis, melena, hematochezia, dysuria, lightheadedness/dzziness, syncope, HA, AMS, focal deficits, weakness, fever, or chills.  Patient admitted to ICU under Hospital Medicine services.     Hospital Course:  Pt admitted and started on bipap, eventually requiring intubation on 9/11. Started  on levophed, heparin, Dig and Amiodarone infusion. Echo shows normal LVF with Moderate to severe aortic stenosis and pulm HTN. Remains intubated. Hb to 7.3. Transfuse 1 U PBRCs. Pt continued atrial fib with RVR despite amiodarone infusion. Cards will add BB, Cont dig, and amio d/c'd.  . Respiratory cx pending candida. Abx changed to avelox and diflucan. Pt extubated on 9/14. Doing well s/p extubation.  Cards will add small dose of CCB. IV Lopressor transitioned to po. Heparin drip to eliquis. More alert and oriented as progressed. Pt remains on vapotherm. WBCs trending down . Continued intermittent confusion/agitation worse evening/night.           Interval History: Denies chest pain  Or SOB.  intermittent confusion overnight. Tachycardia resolving. Lasix x1 given    Review of Systems   All other systems reviewed and are negative.    Objective:     Vital Signs (Most Recent):  Temp: 98 °F (36.7 °C) (09/18/18 1600)  Pulse: 96 (09/18/18 1700)  Resp: (!) 26 (09/18/18 1700)  BP: 130/68 (09/18/18 1700)  SpO2: 98 % (09/18/18 1700) Vital Signs (24h Range):  Temp:  [97.6 °F (36.4 °C)-98.9 °F (37.2 °C)] 98 °F (36.7 °C)  Pulse:  [] 96  Resp:  [17-33] 26  SpO2:  [93 %-100 %] 98 %  BP: (114-159)/() 130/68     Weight: 86 kg (189 lb 9.5 oz)  Body mass index is 27.2 kg/m².    Intake/Output Summary (Last 24 hours) at 9/18/2018 1905  Last data filed at 9/18/2018 1700  Gross per 24 hour   Intake 900 ml   Output 1275 ml   Net -375 ml      Physical Exam   Cardiovascular:   Murmur heard.   Constitutional: He is oriented to person, place, and time. No distress.   illl appearing   HENT:   Head: Normocephalic and atraumatic.   Nose: Nose normal.   Eyes: Conjunctivae and EOM are normal.   Neck: Normal range of motion. Neck supple.   Cardiovascular: Normal heart sounds and intact distal pulses.   No murmur heard.  tachycardia   Pulmonary/Chest: Effort normal and breath sounds normal. He has no wheezes.   Abdominal: Soft. Bowel  sounds are normal. He exhibits no mass. There is no tenderness. There is no rebound and no guarding.   Musculoskeletal: Normal range of motion. He exhibits no edema.   Neurological: He is alert and oriented to person, place, and time.   Skin: Skin is warm and dry. No rash noted.   Psychiatric: He has a normal mood and affect. His behavior is normal.   Nursing note and vitals reviewed.        Significant Labs:   CBC:   Recent Labs   Lab  09/17/18   0322  09/18/18   0431   WBC  13.95*  14.41*   HGB  8.4*  9.0*   HCT  27.1*  29.4*   PLT  300  304     CMP:   Recent Labs   Lab  09/17/18   0322  09/18/18   0431   NA  148*  146*   K  3.8  4.0   CL  102  102   CO2  34*  34*   GLU  140*  118*   BUN  22  20   CREATININE  1.0  0.9   CALCIUM  8.2*  8.3*   PROT  5.8*  6.0   ALBUMIN  2.2*  2.3*   BILITOT  0.9  0.8   ALKPHOS  82  73   AST  69*  46*   ALT  64*  55*   ANIONGAP  12  10   EGFRNONAA  >60  >60       Significant Imaging: I have reviewed all pertinent imaging results/findings within the past 24 hours.    Assessment/Plan:      * Acute hypoxemic respiratory failure on mechanical ventilation    S/p extubation  On  vapotherm  pulm on board  Cont Formeterol and Budesonide  Schedule xopenex  Continue avelox, diflucan        Pneumonia of both lungs due to infectious organism    Cont avelox and diflucan    sputum pending candida  -Diflucan    blood cultures pending        Type 2 diabetes mellitus without complication, without long-term current use of insulin    - Hold Amaryl, will resume at DC.  - Accuchecks with low SSI.          Acute upper GI bleed with acute blood loss anemia    - EGD 9/6 with clot at GE junction s/p clipping and epi injection.  Repeat EGD 9/7 negative for active bleeding.  - Follow serial H&H and transfuse as needed.  - Continue IV Protonix daily.  - Heparin drip d/c'd  -resume eliquis        Chronic kidney disease, stage 3    - Cr. Stable at 1.4 (baseline 1.3)  - Avoid nephrotoxic agents.trict I&O's.  -  Follow daily BMP.        Chronic obstructive pulmonary disease with acute lower respiratory infection    - Plan as above.  - Pulmonology following.        Essential hypertension    - Patient hypotensive and on pressor  - resume as appropriate        Chronic atrial fibrillation with RVR      -eliquis  -dig  -BB po, lopressor inc to 50mg BID  -CCB po- cardizem  - Cardiology on board          VTE Risk Mitigation (From admission, onward)        Ordered     apixaban tablet 5 mg  2 times daily      09/15/18 0958     IP VTE HIGH RISK PATIENT  Once      09/11/18 0425     Reason for No Pharmacological VTE Prophylaxis  Once      09/11/18 0425     Place sequential compression device  Until discontinued      09/11/18 0425          Critical care time spent on the evaluation and treatment of severe organ dysfunction, review of pertinent labs and imaging studies, discussions with consulting providers and discussions with patient/family:>30 minutes.    Prachi Boyer MD  Department of Hospital Medicine   [facility]

## 2018-09-19 NOTE — SUBJECTIVE & OBJECTIVE
Interval History: no acute issues noted o/n, AFIB VR controlled in 60's.     Review of Systems   Constitution: Positive for malaise/fatigue. Negative for weakness.   HENT: Negative for hearing loss and hoarse voice.    Eyes: Negative for blurred vision and visual disturbance.   Cardiovascular: Positive for irregular heartbeat and leg swelling. Negative for chest pain, claudication, dyspnea on exertion, near-syncope, orthopnea, palpitations, paroxysmal nocturnal dyspnea and syncope.   Respiratory: Positive for shortness of breath (improving). Negative for cough, hemoptysis, sleep disturbances due to breathing, snoring and wheezing.    Endocrine: Negative for cold intolerance and heat intolerance.   Hematologic/Lymphatic: Bruises/bleeds easily (on Eliquis).   Skin: Negative for color change, dry skin and nail changes.   Musculoskeletal: Positive for arthritis and back pain. Negative for joint pain and myalgias.   Gastrointestinal: Negative for bloating, abdominal pain, constipation, nausea and vomiting.   Genitourinary: Negative for dysuria, flank pain, hematuria and hesitancy.   Neurological: Negative for headaches, light-headedness, loss of balance, numbness and paresthesias.   Psychiatric/Behavioral: Negative for altered mental status. The patient is nervous/anxious.         Intermittent confusion overnight   Allergic/Immunologic: Negative for environmental allergies.     Objective:     Vital Signs (Most Recent):  Temp: 98.4 °F (36.9 °C) (09/19/18 0700)  Pulse: 78 (09/19/18 1058)  Resp: (!) 24 (09/19/18 1058)  BP: 124/62 (09/19/18 0700)  SpO2: 99 % (09/19/18 1058) Vital Signs (24h Range):  Temp:  [97.5 °F (36.4 °C)-98.7 °F (37.1 °C)] 98.4 °F (36.9 °C)  Pulse:  [] 78  Resp:  [18-33] 24  SpO2:  [86 %-100 %] 99 %  BP: (105-145)/(41-74) 124/62     Weight: 86 kg (189 lb 9.5 oz)  Body mass index is 27.2 kg/m².     SpO2: 99 %  O2 Device (Oxygen Therapy): Vapotherm      Intake/Output Summary (Last 24 hours) at  9/19/2018 1131  Last data filed at 9/19/2018 0600  Gross per 24 hour   Intake 950 ml   Output 1000 ml   Net -50 ml       Lines/Drains/Airways     Pressure Ulcer                 Pressure Injury 09/13/18 1905 Left Buttocks Deep tissue injury 5 days          Peripheral Intravenous Line                 Peripheral IV - Single Lumen 09/17/18 1905 Right Upper Arm 1 day                Physical Exam   Constitutional: He is oriented to person, place, and time. He appears well-developed and well-nourished. He is active. No distress. Nasal cannula in place.   HENT:   Head: Normocephalic and atraumatic.   Eyes: Pupils are equal, round, and reactive to light.   Neck: Normal range of motion and full passive range of motion without pain. Neck supple. No JVD present.   Cardiovascular: Normal rate, S1 normal, S2 normal and intact distal pulses. An irregularly irregular rhythm present.  Occasional extrasystoles are present. PMI is not displaced. Exam reveals no distant heart sounds.   Murmur heard.   Harsh midsystolic murmur is present with a grade of 2/6 at the upper right sternal border radiating to the neck.  Pulses:       Radial pulses are 2+ on the right side, and 2+ on the left side.        Dorsalis pedis pulses are 1+ on the right side, and 1+ on the left side.   Pulmonary/Chest: Accessory muscle usage present. No respiratory distress. He has decreased breath sounds in the right lower field and the left lower field. He has no wheezes.   +Vapotherm in place   Abdominal: Soft. Bowel sounds are normal. He exhibits no distension. There is no tenderness.   Genitourinary:   Genitourinary Comments: deferred   Musculoskeletal: Normal range of motion. He exhibits no edema.        Right ankle: He exhibits swelling.        Left ankle: He exhibits swelling.   Neurological: He is alert and oriented to person, place, and time.   Skin: Skin is warm and dry. He is not diaphoretic. No cyanosis. Nails show no clubbing.   Psychiatric: He has a  normal mood and affect. His speech is normal and behavior is normal. Judgment and thought content normal. Cognition and memory are normal.   Nursing note and vitals reviewed.      Significant Labs:   All pertinent lab results from the last 24 hours have been reviewed. and   Recent Lab Results       09/19/18  0441      Albumin 2.3     Alkaline Phosphatase 73     ALT 49     Anion Gap 10     AST 37     Baso # 0.02     Basophil% 0.2     Total Bilirubin 0.8  Comment:  For infants and newborns, interpretation of results should be based  on gestational age, weight and in agreement with clinical  observations.  Premature Infant recommended reference ranges:  Up to 24 hours.............<8.0 mg/dL  Up to 48 hours............<12.0 mg/dL  3-5 days..................<15.0 mg/dL  6-29 days.................<15.0 mg/dL       BUN, Bld 14     Calcium 8.3     Chloride 97     CO2 37     Creatinine 1.0     Differential Method Automated     Digoxin Lvl 0.4  Comment:  Toxic:  Adult: >2.5 ng/mL, Pediatric: >3.0 ng/mL       eGFR if  >60     eGFR if non  >60  Comment:  Calculation used to obtain the estimated glomerular filtration  rate (eGFR) is the CKD-EPI equation.        Eos # 0.2     Eosinophil% 1.8     Glucose 119     Gran # (ANC) 8.6     Gran% 71.9     Hematocrit 28.5     Hemoglobin 8.7     Lymph # 1.6     Lymph% 13.2     Magnesium 2.0     MCH 29.8     MCHC 30.5     MCV 98     Mono # 1.6     Mono% 12.9     MPV 9.9     Phosphorus 3.9     Platelets 311     Potassium 3.4     Total Protein 5.9     RBC 2.92     RDW 16.9     Sodium 144     WBC 12.00           Significant Imaging: Echocardiogram:   2D echo with color flow doppler:   Results for orders placed or performed during the hospital encounter of 09/10/18   2D echo with color flow doppler   Result Value Ref Range    EF 50 55 - 65    Mitral Valve Regurgitation MILD     Diastolic Dysfunction No     Aortic Valve Stenosis MODERATE TO SEVERE (A)     Est. PA  Systolic Pressure 56 (A)     Tricuspid Valve Regurgitation MILD TO MODERATE     and X-Ray: CXR: X-Ray Chest 1 View (CXR):   Results for orders placed or performed during the hospital encounter of 09/10/18   X-Ray Chest 1 View    Narrative    EXAMINATION:  XR CHEST 1 VIEW    CLINICAL HISTORY:  respiratory failure;    TECHNIQUE:  Single frontal view of the chest was performed.    COMPARISON:  09/18/2018    FINDINGS:  In comparison to the prior study, there is no adverse interval changes      Impression    In comparison to the prior study, there is no adverse interval changes      Electronically signed by: Mark Garcia MD  Date:    09/19/2018  Time:    07:44

## 2018-09-19 NOTE — PROGRESS NOTES
Pt placed on BiPAP, tolerating well at this time. Mepilex in place, no redness or breakdown noted at this time.

## 2018-09-19 NOTE — SUBJECTIVE & OBJECTIVE
Interval History: Denies chest pain  Or SOB.  intermittent confusion overnight.     Review of Systems   All other systems reviewed and are negative.    Objective:     Vital Signs (Most Recent):  Temp: 98.1 °F (36.7 °C) (09/19/18 1615)  Pulse: 72 (09/19/18 1800)  Resp: (!) 24 (09/19/18 1800)  BP: (!) 126/57 (09/19/18 1800)  SpO2: 97 % (09/19/18 1800) Vital Signs (24h Range):  Temp:  [97.5 °F (36.4 °C)-98.7 °F (37.1 °C)] 98.1 °F (36.7 °C)  Pulse:  [] 72  Resp:  [18-33] 24  SpO2:  [86 %-100 %] 97 %  BP: ()/(29-74) 126/57     Weight: 86 kg (189 lb 9.5 oz)  Body mass index is 27.2 kg/m².    Intake/Output Summary (Last 24 hours) at 9/19/2018 1844  Last data filed at 9/19/2018 1600  Gross per 24 hour   Intake 1500 ml   Output 850 ml   Net 650 ml      Physical Exam   Cardiovascular:   Murmur heard.   Constitutional: He is oriented to person, place, and time. No distress.   illl appearing   HENT:   Head: Normocephalic and atraumatic.   Nose: Nose normal.   Eyes: Conjunctivae and EOM are normal.   Neck: Normal range of motion. Neck supple.   Cardiovascular: Normal heart sounds and intact distal pulses.   No murmur heard.  tachycardia   Pulmonary/Chest: Effort normal and breath sounds normal. He has no wheezes.   Abdominal: Soft. Bowel sounds are normal. He exhibits no mass. There is no tenderness. There is no rebound and no guarding.   Musculoskeletal: Normal range of motion. He exhibits no edema.   Neurological: He is alert and oriented to person, place, and time.   Skin: Skin is warm and dry. No rash noted.   Psychiatric: He has a normal mood and affect. His behavior is normal.   Nursing note and vitals reviewed.        Significant Labs:   CBC:   Recent Labs   Lab  09/18/18   0431  09/19/18   0441   WBC  14.41*  12.00   HGB  9.0*  8.7*   HCT  29.4*  28.5*   PLT  304  311     CMP:   Recent Labs   Lab  09/18/18   0431  09/19/18   0441   NA  146*  144   K  4.0  3.4*   CL  102  97   CO2  34*  37*   GLU  118*  119*    BUN  20  14   CREATININE  0.9  1.0   CALCIUM  8.3*  8.3*   PROT  6.0  5.9*   ALBUMIN  2.3*  2.3*   BILITOT  0.8  0.8   ALKPHOS  73  73   AST  46*  37   ALT  55*  49*   ANIONGAP  10  10   EGFRNONAA  >60  >60       Significant Imaging: I have reviewed all pertinent imaging results/findings within the past 24 hours.

## 2018-09-19 NOTE — PLAN OF CARE
Problem: Patient Care Overview  Goal: Plan of Care Review  Outcome: Ongoing (interventions implemented as appropriate)  No acute events overnight. Pt constantly fidgeted and did not sleep. Pt between BiPAP and Vapotherm throughout the night depending on comfort. HR 60s-80s, A-fib. BP stable. Afebrile. UO adequate. Tolerating diet. Last BM 9/18. Foam dressing on R buttocks DTI replaced. Pt turned Q2H with pressure points protected. POC reviewed with pt and family. All questions and concerns addressed.

## 2018-09-19 NOTE — PROGRESS NOTES
[facility] Hospital Medicine  Progress Note    Patient Name: Eleazar Solo  MRN: [id number]  Patient Class: IP- Inpatient   Admission Date: 9/10/2018  Length of Stay: 9 days  Attending Physician: Prachi Boyer MD  Primary Care Provider: Poly Fitch MD        Subjective:     Principal Problem:Acute hypoxemic respiratory failure    HPI:  Mr. Solo is a 77yo male with a PMHx of permanent AF on Eliquis, HTN, HLD, COPD, DM II, and h/o CVA, who was transferred from [facility] per family request as patient is followed OP by [facility].  He was originally admitted to [facility] on 9/6 for acute upper GI bleed with blood loss anemia (Hb 12 > 7.4 > 7.9).  Patient received total of 2 units PRBC and Eliquis held (last dose 9/6 AM).  He underwent EGD on 9/6 that showed a clot at the GE junction which was clipped  and injected with epi.  There was concern for further bleeding, therefore, repeat EGD was performed on 9/7 which showed no evidence of active bleeding.  During admission, patient developed AFib with RVR and was placed on diltiazem drip and PO dig.  CXR on 9/8 showed left pneumonia, IV Rocephin started.  Prior to transfer, vital signs and labs stable.  Upon arrival to [facility] ICU, patient hypotensive (SBP 70-80's) and hypoxic requiring continuous BiPAP.  Remains in AFib with controlled rate.  Repeat CXR showed RUL pneumonia.  Repeat labs resulted WBC 16.5, H&H 8.4/24.6, plt 136, BUN 38, cr. 1.4, AST 77, . ABG with pH 7.382, pCO2 44, pO2 60, HCO3 26.  Patient c/o SOB which is resolved while on BiPAP.  Denies any CP, palpitations, orthopnea, PND, cough, edema, ABD pain, N/V/D, hematemesis, melena, hematochezia, dysuria, lightheadedness/dzziness, syncope, HA, AMS, focal deficits, weakness, fever, or chills.  Patient admitted to ICU under Hospital Medicine services.     Hospital Course:  Pt admitted and started on bipap, eventually requiring intubation on 9/11. Started  on levophed, heparin, Dig and Amiodarone infusion. Echo shows normal LVF with Moderate to severe aortic stenosis and pulm HTN. Remains intubated. Hb to 7.3. Transfuse 1 U PBRCs. Pt continued atrial fib with RVR despite amiodarone infusion. Cards will add BB, Cont dig, and amio d/c'd.  . Respiratory cx pending candida. Abx changed to avelox and diflucan. Pt extubated on 9/14. Doing well s/p extubation.  Cards will add small dose of CCB. IV Lopressor transitioned to po. Heparin drip to eliquis. More alert and oriented as progressed. Pt remains on vapotherm. WBCs trending down . Continued intermittent confusion/agitation worse evening/night.   9/19 - restless night reported with intermittent agitation/NIPPV use; improved rate control since increase cardizem dosing, now bradycardia low 40s with hypotension after receiving all am meds    Awaiting LTAC placement vs snf?        Interval History: Denies chest pain  Or SOB.  intermittent confusion overnight.     Review of Systems   All other systems reviewed and are negative.    Objective:     Vital Signs (Most Recent):  Temp: 98.1 °F (36.7 °C) (09/19/18 1615)  Pulse: 72 (09/19/18 1800)  Resp: (!) 24 (09/19/18 1800)  BP: (!) 126/57 (09/19/18 1800)  SpO2: 97 % (09/19/18 1800) Vital Signs (24h Range):  Temp:  [97.5 °F (36.4 °C)-98.7 °F (37.1 °C)] 98.1 °F (36.7 °C)  Pulse:  [] 72  Resp:  [18-33] 24  SpO2:  [86 %-100 %] 97 %  BP: ()/(29-74) 126/57     Weight: 86 kg (189 lb 9.5 oz)  Body mass index is 27.2 kg/m².    Intake/Output Summary (Last 24 hours) at 9/19/2018 3661  Last data filed at 9/19/2018 1600  Gross per 24 hour   Intake 1500 ml   Output 850 ml   Net 650 ml      Physical Exam   Cardiovascular:   Murmur heard.   Constitutional: He is oriented to person, place, and time. No distress.   illl appearing   HENT:   Head: Normocephalic and atraumatic.   Nose: Nose normal.   Eyes: Conjunctivae and EOM are normal.   Neck: Normal range of motion. Neck supple.    Cardiovascular: Normal heart sounds and intact distal pulses.   No murmur heard.  tachycardia   Pulmonary/Chest: Effort normal and breath sounds normal. He has no wheezes.   Abdominal: Soft. Bowel sounds are normal. He exhibits no mass. There is no tenderness. There is no rebound and no guarding.   Musculoskeletal: Normal range of motion. He exhibits no edema.   Neurological: He is alert and oriented to person, place, and time.   Skin: Skin is warm and dry. No rash noted.   Psychiatric: He has a normal mood and affect. His behavior is normal.   Nursing note and vitals reviewed.        Significant Labs:   CBC:   Recent Labs   Lab  09/18/18 0431 09/19/18 0441   WBC  14.41*  12.00   HGB  9.0*  8.7*   HCT  29.4*  28.5*   PLT  304  311     CMP:   Recent Labs   Lab  09/18/18 0431 09/19/18 0441   NA  146*  144   K  4.0  3.4*   CL  102  97   CO2  34*  37*   GLU  118*  119*   BUN  20  14   CREATININE  0.9  1.0   CALCIUM  8.3*  8.3*   PROT  6.0  5.9*   ALBUMIN  2.3*  2.3*   BILITOT  0.8  0.8   ALKPHOS  73  73   AST  46*  37   ALT  55*  49*   ANIONGAP  10  10   EGFRNONAA  >60  >60       Significant Imaging: I have reviewed all pertinent imaging results/findings within the past 24 hours.    Assessment/Plan:      * Acute hypoxemic respiratory failure    S/p extubation  On  vapotherm  pulm on board  Cont Formeterol and Budesonide  Schedule xopenex  Continue avelox, diflucan        Pneumonia of both lungs due to infectious organism    Cont avelox and diflucan    sputum pending candida  -Diflucan    blood cultures pending        Type 2 diabetes mellitus without complication, without long-term current use of insulin    - Hold Amaryl, will resume at DC.  - Accuchecks with low SSI.          Acute upper GI bleed with acute blood loss anemia    - EGD 9/6 with clot at GE junction s/p clipping and epi injection.  Repeat EGD 9/7 negative for active bleeding.  - Follow serial H&H and transfuse as needed.  - Continue IV Protonix  daily.  - Heparin drip d/c'd  -resume eliquis        Chronic kidney disease, stage 3    - Cr. Stable at 1.4 (baseline 1.3)  - Avoid nephrotoxic agents.trict I&O's.  - Follow daily BMP.        Chronic obstructive pulmonary disease with acute lower respiratory infection    - Plan as above.  - Pulmonology following.        Essential hypertension    - Patient hypotensive and on pressor  - resume as appropriate        Chronic atrial fibrillation with RVR      -eliquis  -dig  -BB po, lopressor inc to 50mg BID  -CCB po- cardizem  - Cardiology on board          VTE Risk Mitigation (From admission, onward)        Ordered     apixaban tablet 5 mg  2 times daily      09/15/18 0958     IP VTE HIGH RISK PATIENT  Once      09/11/18 0425     Reason for No Pharmacological VTE Prophylaxis  Once      09/11/18 0425     Place sequential compression device  Until discontinued      09/11/18 0425          Critical care time spent on the evaluation and treatment of severe organ dysfunction, review of pertinent labs and imaging studies, discussions with consulting providers and discussions with patient/family: >30minutes.    Prachi Boyer MD  Department of Hospital Medicine   [facility]

## 2018-09-19 NOTE — SUBJECTIVE & OBJECTIVE
Interval History: Denies chest pain  Or SOB.  intermittent confusion overnight. Tachycardia resolving. Lasix x1 given    Review of Systems   All other systems reviewed and are negative.    Objective:     Vital Signs (Most Recent):  Temp: 98 °F (36.7 °C) (09/18/18 1600)  Pulse: 96 (09/18/18 1700)  Resp: (!) 26 (09/18/18 1700)  BP: 130/68 (09/18/18 1700)  SpO2: 98 % (09/18/18 1700) Vital Signs (24h Range):  Temp:  [97.6 °F (36.4 °C)-98.9 °F (37.2 °C)] 98 °F (36.7 °C)  Pulse:  [] 96  Resp:  [17-33] 26  SpO2:  [93 %-100 %] 98 %  BP: (114-159)/() 130/68     Weight: 86 kg (189 lb 9.5 oz)  Body mass index is 27.2 kg/m².    Intake/Output Summary (Last 24 hours) at 9/18/2018 1905  Last data filed at 9/18/2018 1700  Gross per 24 hour   Intake 900 ml   Output 1275 ml   Net -375 ml      Physical Exam   Cardiovascular:   Murmur heard.   Constitutional: He is oriented to person, place, and time. No distress.   illl appearing   HENT:   Head: Normocephalic and atraumatic.   Nose: Nose normal.   Eyes: Conjunctivae and EOM are normal.   Neck: Normal range of motion. Neck supple.   Cardiovascular: Normal heart sounds and intact distal pulses.   No murmur heard.  tachycardia   Pulmonary/Chest: Effort normal and breath sounds normal. He has no wheezes.   Abdominal: Soft. Bowel sounds are normal. He exhibits no mass. There is no tenderness. There is no rebound and no guarding.   Musculoskeletal: Normal range of motion. He exhibits no edema.   Neurological: He is alert and oriented to person, place, and time.   Skin: Skin is warm and dry. No rash noted.   Psychiatric: He has a normal mood and affect. His behavior is normal.   Nursing note and vitals reviewed.        Significant Labs:   CBC:   Recent Labs   Lab  09/17/18   0322  09/18/18   0431   WBC  13.95*  14.41*   HGB  8.4*  9.0*   HCT  27.1*  29.4*   PLT  300  304     CMP:   Recent Labs   Lab  09/17/18   0322  09/18/18   0431   NA  148*  146*   K  3.8  4.0   CL  102  102    CO2  34*  34*   GLU  140*  118*   BUN  22  20   CREATININE  1.0  0.9   CALCIUM  8.2*  8.3*   PROT  5.8*  6.0   ALBUMIN  2.2*  2.3*   BILITOT  0.9  0.8   ALKPHOS  82  73   AST  69*  46*   ALT  64*  55*   ANIONGAP  12  10   EGFRNONAA  >60  >60       Significant Imaging: I have reviewed all pertinent imaging results/findings within the past 24 hours.

## 2018-09-19 NOTE — PLAN OF CARE
CM spent at least 30-40 min with the family discussing d/c plans ... LTAC and skilled care. Preference form signed if patient cont on Vapotherm for LTAC - [facility]. Copy givne to the patient and the original to the blue folder. CM place referrals in [facility] and sent email to our Post Acute Dept.  CM to f/u for safe transition

## 2018-09-19 NOTE — PT/OT/SLP PROGRESS
"Occupational Therapy  Treatment    Eleazar Solo   MRN: [id number]   Admitting Diagnosis: Acute hypoxemic respiratory failure    OT Date of Treatment: 09/19/18   OT Start Time: 1050  OT Stop Time: 1120  OT Total Time (min): 30 min    Billable Minutes:  Therapeutic Activity 30 minutes    General Precautions: Standard, fall  Orthopedic Precautions: N/A  Braces:           Subjective:  Communicated with nurse Tse and epic chart review prior to session.    Pain/Comfort  Pain Rating 1: 0/10    Objective:  Patient found with: telemetry, peripheral IV, pulse ox (continuous), oxygen     Functional Mobility:  Bed Mobility:       Transfers:        Functional Ambulation: pt reqmod/ min a x 3 set x 8 feet with max vc's for safety, posture and correct technique    Activities of Daily Living:     Feeding adaptive equipment: na     UE adaptive equipment: na     LE adaptive equipment: na                    Bathing adaptive equipment:na  Balance:   Static Sit: GOOD: Takes MODERATE challenges from all directions in supported chair  Dynamic Sit: FAIR+: Maintains balance through MINIMAL excursions of active trunk motion in supported hair  Static Stand: POOR: Needs MODERATE assist to maintain  Dynamic stand: POOR: Needs MOD (moderate) assist during gait        AM-PAC 6 CLICK ADL   How much help from another person does this patient currently need?   1 = Unable, Total/Dependent Assistance  2 = A lot, Maximum/Moderate Assistance  3 = A little, Minimum/Contact Guard/Supervision  4 = None, Modified [place]/Independent    Putting on and taking off regular lower body clothing? : 1  Bathing (including washing, rinsing, drying)?: 1  Toileting, which includes using toilet, bedpan, or urinal? : 1  Putting on and taking off regular upper body clothing?: 1  Taking care of personal grooming such as brushing teeth?: 2  Eating meals?: 2  Daily Activity Total Score: 8     AM-PAC Raw Score CMS "G-Code Modifier Level of Impairment Assistance   6 CN " 100% Total / Unable   7 - 8 CM 80 - 100% Maximal Assist   9-13 CL 60 - 80% Moderate Assist   14 - 19 CK 40 - 60% Moderate Assist   20 - 22 CJ 20 - 40% Minimal Assist   23 CI 1-20% SBA / CGA   24 CH 0% Independent/ Mod I       Patient left up in chair with all lines intact, call button in reach, nurse notified and P.T and spouse present    ASSESSMENT:  Eleazar Solo is a 78 y.o. male with a medical diagnosis of Acute hypoxemic respiratory failure and presents with debility and generalized weakness. Pt will continue to benefit from skilled O.T.    Rehab identified problem list/impairments: Rehab identified problem list/impairments: weakness, impaired self care skills, impaired balance, decreased safety awareness, impaired endurance, impaired functional mobilty, decreased upper extremity function, gait instability    Rehab potential is good.    Activity tolerance: Good    Discharge recommendations: Discharge Facility/Level Of Care Needs: nursing facility, skilled     Barriers to discharge: Barriers to Discharge: None    Equipment recommendations: (tbd)     GOALS:   Multidisciplinary Problems     Occupational Therapy Goals        Problem: Occupational Therapy Goal    Goal Priority Disciplines Outcome Interventions   Occupational Therapy Goal     OT, PT/OT Ongoing (interventions implemented as appropriate)    Description:  Goals to be met by: 9/22/18     Patient will increase functional independence with ADLs by performing:    UE Dressing with Moderate Assistance.  Grooming while EOB with Minimal Assistance.  Toileting from bedside commode with Maximum Assistance for hygiene and clothing management.   Toilet transfer to bedside commode with Maximum Assistance.  Upper extremity exercise program x10 reps per handout, with assistance as needed.                      Plan:  Patient to be seen 3 x/week to address the above listed problems via self-care/home management, therapeutic activities, therapeutic exercises  Plan of  Care expires: 09/22/18  Plan of Care reviewed with: patient, spouse         Yareli Quijano, OT  09/19/2018

## 2018-09-19 NOTE — ASSESSMENT & PLAN NOTE
Now with slow ventricular rate requiring atropine  Stop digoxin  Change diltiazem to Q8h  Change metoprolol to toprol to be given HS  Discussed above changes with cardiology  Continue eliquis

## 2018-09-19 NOTE — PROGRESS NOTES
Care assumed. Pt awake and appears restless/picking at gown/arms/ect. Pt has delayed responses but answer correctly. Aware of place/year. Daughter at bedside and stated he did not sleep at all. Appears very tired. VSS. Chart review.

## 2018-09-19 NOTE — ASSESSMENT & PLAN NOTE
Moderate severe COPD  Cont Formeterol, Budesonide, and xopenex  Continue avelox, diflucan for planned 7 day course

## 2018-09-19 NOTE — ASSESSMENT & PLAN NOTE
-Remains in afib with RVR in setting of sepsis/PNA/anemia  -HR better controlled  -Continue digoxin, cardizem, metoprolol  -Continue Eliquis for CVA prophylaxis   -IV lasix x 1 dose today    9/19  -AFIB with VR controlled, 60's this AM  -Notified per Critical care NP this AM after AM meds HR decreased to 40's and decreased BP as well  -HR improved after small bolus of IVF's  -Digoxin stopped for now  -BB switched to Toprol XL at bedtime  -Continue CCB  -Continue Eliquis for CVA prophylaxis  -NO CNS complaints to suggest TIA or CVA   -No signs of abnormal bleeding on Eliquis.

## 2018-09-19 NOTE — PROGRESS NOTES
[facility] -   Critical Care Medicine  Progress Note    Patient Name: Eleazar Solo  MRN: [id number]  Admission Date: 9/10/2018  Hospital Length of Stay: 9 days  Code Status: DNR  Attending Provider: Prachi Boyer MD  Primary Care Provider: Poly Fitch MD   Principal Problem: Acute hypoxemic respiratory failure    Subjective:     HPI:  78 year old male admitted to MICU, transfer from [facility].  I have reviewed the patient's medical history in detail and updated the computerized patient record.  Upper GI bleeding, Scoped x 2 and clipped  Developed Af RVR, Known chr A fib on elliquis on hold  Treated with Digoxin, Amiodarone and cardizem GTT  Family requested transfer  Known COPD FEV1:1.65L ( 53%)  ANORO ellipta, not on oxygen  Presented to MICU needed BIPAP for WOB  CXR shows RML infiltrate  Overnight, anxious, WOB  Last echo EF 60%      Hospital/ICU Course:  09/11: CXR shows melanie infiltrate: edema.  . Lopressor 2.5mg and lasix 20 mg given  9/12 - intubated yesterday and required pressor with sedation.  Resting on vent.  Cynthia TF.  No active bleeding  9/13 - remains intubated, sedated on mechanical ventilation, pO2 trending up with high PEEP ventilation  9/14 - remains intubated on mechanical ventilation with oxygen/PEEP demand decreased; continued atrial fib with RVR despite amiodarone infusion, some improvement with metoprolol yesterday  9/15 - tolerated extubation, on high flow nasal cannula support; awake, slow to respond and faint verbalization but oriented to self and place; remains atrial fib with RVR variable 120-140; afebrile, wbc trending down  9/16 - remains vapotherm with high flow, moderate oxygen demand; afebrile, wbc continues down; afib rate remains uncontrolled  9/17 - remains on vapotherm with slow weaning; mentation with slight improvement daily; remains afebrile; wbc continues down  9/18 - increased work of breathing and hypoxia last evening improved with NIPPV,  transitioned back to vapotherm when awake this am; HR range  variable; continued intermittent confusion/agitation worse evening/night  9/19 - restless night reported with intermittent agitation/NIPPV use; improved rate control since increase cardizem dosing, now bradycardia low 40s with hypotension after receiving all am meds    Review of Systems   Constitutional: Positive for malaise/fatigue. Negative for chills and diaphoresis.   Respiratory: Positive for cough and shortness of breath. Negative for sputum production and stridor.    Cardiovascular: Negative for chest pain.   Gastrointestinal: Negative for abdominal pain, nausea and vomiting.   Musculoskeletal: Positive for myalgias.   Skin: Negative.    Neurological: Positive for weakness. Negative for focal weakness and seizures.   Psychiatric/Behavioral: The patient is nervous/anxious. The patient does not have insomnia.        Objective:     Vital Signs (Most Recent):  Temp: 98.4 °F (36.9 °C) (09/19/18 0700)  Pulse: (!) 119 (09/19/18 1002)  Resp: (!) 22 (09/19/18 1002)  BP: 124/62 (09/19/18 0700)  SpO2: 98 % (09/19/18 1002) Vital Signs (24h Range):  Temp:  [97.5 °F (36.4 °C)-98.7 °F (37.1 °C)] 98.4 °F (36.9 °C)  Pulse:  [] 119  Resp:  [18-33] 22  SpO2:  [86 %-100 %] 98 %  BP: (105-145)/(41-74) 124/62     Weight: 86 kg (189 lb 9.5 oz)  Body mass index is 27.2 kg/m².      Intake/Output Summary (Last 24 hours) at 9/19/2018 1034  Last data filed at 9/19/2018 0600  Gross per 24 hour   Intake 950 ml   Output 1000 ml   Net -50 ml       Physical Exam   Constitutional: He appears ill. Nasal cannula in place.   HENT:   Head: Atraumatic.   Eyes: Conjunctivae are normal. Pupils are equal, round, and reactive to light.   Neck: No JVD present. No tracheal deviation present.   Cardiovascular: An irregularly irregular rhythm present. Tachycardia present.   No murmur heard.  Pulses:       Radial pulses are 2+ on the right side, and 2+ on the left side.         Dorsalis pedis pulses are 1+ on the right side, and 1+ on the left side.   Pulmonary/Chest: No accessory muscle usage. No respiratory distress. He has decreased breath sounds.   Abdominal: Soft. Bowel sounds are normal. He exhibits no distension.   Genitourinary: Penile tenderness: .ros.   Musculoskeletal: He exhibits no edema.   Neurological: He is alert.   Intermittently confused, agitated last night   Skin: Skin is warm. Capillary refill takes 2 to 3 seconds. He is diaphoretic. No cyanosis.         Lines/Drains/Airways     Pressure Ulcer                 Pressure Injury 09/13/18 1905 Left Buttocks Deep tissue injury 5 days          Peripheral Intravenous Line                 Peripheral IV - Single Lumen 09/17/18 1905 Right Upper Arm 1 day                Significant Labs:    CBC/Anemia Profile:  Recent Labs   Lab  09/18/18 0431 09/19/18   0441   WBC  14.41*  12.00   HGB  9.0*  8.7*   HCT  29.4*  28.5*   PLT  304  311   MCV  97  98   RDW  17.2*  16.9*        Chemistries:  Recent Labs   Lab  09/18/18 0431  09/19/18   0441   NA  146*  144   K  4.0  3.4*   CL  102  97   CO2  34*  37*   BUN  20  14   CREATININE  0.9  1.0   CALCIUM  8.3*  8.3*   ALBUMIN  2.3*  2.3*   PROT  6.0  5.9*   BILITOT  0.8  0.8   ALKPHOS  73  73   ALT  55*  49*   AST  46*  37   MG  2.1  2.0   PHOS  3.8  3.9       All pertinent labs within the past 24 hours have been reviewed.    Significant Imaging:  I have reviewed all pertinent imaging results/findings within the past 24 hours.      Assessment/Plan:     Pulmonary   * Acute hypoxemic respiratory failure    Supplemental oxygen with high flow; titrate Fio2 to keep sat > 92  Ok to use BiPap prn and noctunal for dyspnea, hypoxia  Anticipate long weaning; high risk decompensation s/t debility, chronic lung disease, loss of reserve        Chronic obstructive pulmonary disease with acute lower respiratory infection    Moderate severe COPD  Cont Formeterol, Budesonide, and xopenex  Continue avelox,  diflucan for planned 7 day course        Cardiac/Vascular   Chronic atrial fibrillation with RVR    Now with slow ventricular rate requiring atropine  Stop digoxin  Change diltiazem to Q8h  Change metoprolol to toprol to be given HS  Discussed above changes with cardiology  Continue eliquis        Renal/   Chronic kidney disease, stage 3    Accurate I/Os  Monitor creatinine        Oncology   Anemia, unspecified    No s/s of active bleeding  Monitor cbc        Endocrine   Type 2 diabetes mellitus without complication, without long-term current use of insulin    Continue SSI prn with monitoring for glucose control and prevention of insulin toxicity        GI   Acute upper GI bleed with acute blood loss anemia    Recent clipping of ulcer at GE junction at outside hospital  Cont PPI  No bleeding on eliquis        Orthopedic   Critical illness myopathy    Advanced age, prolonged sedation for ventilation, chronic comorbidities  Anticipate prolonged recovery, high risk for complication  PT/OT/ST following        Preventive Measures:   Nutrition: puree diet  Stress Ulcer: PI  DVT: eliquis  BB: metoprolol  Bowel Prophylaxis: miralax, dulcolax   Head of Bed/Reposition: Elevate HOB and turn Q1-2 hours    Mobility: OOB with assist  Code Status: full    Counseling/Consultation: I have discussed the care of this patient in detail with nursing staff and Dr. Oh. Status and plan of care discussed with team on multidisciplinary rounds.     Critical Care Time: 68 minutes  Critical secondary to hypoxemic respiratory failure; a fib with slow vent rate requiring atropine/IVF bolus   Critical care was time spent personally by me on the following activities: development of treatment plan with patient or surrogate and bedside caregivers, discussions with consultants, evaluation of patient's response to treatment, examination of patient, ordering and performing treatments and interventions, ordering and review of laboratory studies,  ordering and review of radiographic studies, pulse oximetry, re-evaluation of patient's condition. This critical care time did not overlap with that of any other provider or involve time for any procedures.     Selma Wallace NP-BC  Critical Care Medicine  [facility]

## 2018-09-19 NOTE — PLAN OF CARE
Problem: Patient Care Overview  Goal: Plan of Care Review  Continued plan for rehab facility. PT/OT time today tolerated well. Skin assessment maintained. Medication review and changed.

## 2018-09-19 NOTE — PROGRESS NOTES
[facility]  Cardiology  Progress Note    Patient Name: Eleazar Solo  MRN: [id number]  Admission Date: 9/10/2018  Hospital Length of Stay: 9 days  Code Status: DNR   Attending Physician: Prachi Boyer MD   Primary Care Physician: Poly Fitch MD  Expected Discharge Date:   Principal Problem:Acute hypoxemic respiratory failure    Subjective:   HPI  Mr. Solo is a 79yo male with a PMHx of permanent AF on Eliquis, HTN, HLD, COPD, DM II, and h/o CVA, who was transferred from [facility] per family request as patient is followed OP by [facility] physicians.  He was originally admitted to [facility] on 9/6 for acute upper GI bleed with blood loss anemia (Hb 12 > 7.4 > 7.9).  Patient received total of 2 units PRBC and Eliquis held (last dose 9/6 AM).  He underwent EGD on 9/6 that showed a clot at the GE junction which was clipped  and injected with epi.  There was concern for further bleeding, therefore, repeat EGD was performed on 9/7 which showed no evidence of active bleeding.  During admission, patient developed AFib with RVR and was placed on diltiazem drip and PO dig.  CXR on 9/8 showed left pneumonia, IV Rocephin started.  Prior to transfer, vital signs and labs stable.  Upon arrival to [facility] ICU, patient hypotensive (SBP 70-80's), now on pressors and was hypoxic requiring continuous BiPAP.  Remains in AFib with controlled rate.  Repeat CXR showed RUL pneumonia.  Repeat labs resulted WBC 16.5, H&H 8.4/24.6, plt 136, BUN 38, cr. 1.4, AST 77, . ABG with pH 7.382, pCO2 44, pO2 60, HCO3 26.  Patient c/o SOB which is resolved while on BiPAP. CXR today shows marked amount of alveolar consolidation in the lower 2/3 of both lungs.  The this represents an interval worsening of the appearance of the lungs and is characteristic of pneumonia. Decision made to intubate patient  Due to resp distress and wheezing.  H/H this morning 7.5/22.6    Hospital Course:   9/12/18- Remains  intubated and sedated. Still in A-fib. Rate 100-115 bpm this morning. Started on Amio gtt for rate control. H/H stable at 7.8/23.9. Continues abx for bilateral pneumonia. Patient being weaned from Dopamine gtt.Liver enzymes improved today. Echo shows normal LVF with Moderate to severe aortic stenosis and pulm HTN     9/13/18-Patient seen and examined today. No acute events overnight. Remains intubated. Still in afib, HR variable. H and H slightly worse, 7.3/22.3. Would benefit from transfusion.     9/14/18-Patient seen and examined today. Pressor re-started overnight. Still intubated. Remains in afib with rapid rate. H and H improved s/p transfusion.     9/15/18-Patient seen and examined today, now extubated and sitting in chair. Feels ok. Remains in afib with RVR, meds adjusted. Labs reviewed. H and H stable overnight.     9/16/18-Patient seen and examined today. Looks and feels much better. More awake and alert. SOB improving. Remains in afib, HR better controlled. Labs stable.    9/17/18--Patient seen and examined today. He seems to be feeling better today, more awake and interactive. Remains in afib on Eliquis. Labs reviewed, Cr 1.0. Resume BB.     9/18/18--Patient seen and examined today in ICU, in bedside chair. Denies chest pain or anginal equivalents. Continues to be in AFIB today, on eliquis. Labs reviewed, H/H 9/29.4, Cr 0.9. IV lasix x 1 dose this AM.     9/19/18--Patient seen and examined in room, Denies chest pain or anginal equivalents. Remains in AFIB, VR controlled in 60's today. Labs reviewed, Mag 2.0, K+ 3.4, Cr 1.0, H/H 8.7/28.5.     Interval History: no acute issues noted o/n, AFIB VR controlled in 60's.     Review of Systems   Constitution: Positive for malaise/fatigue. Negative for weakness.   HENT: Negative for hearing loss and hoarse voice.    Eyes: Negative for blurred vision and visual disturbance.   Cardiovascular: Positive for irregular heartbeat and leg swelling. Negative for chest pain,  claudication, dyspnea on exertion, near-syncope, orthopnea, palpitations, paroxysmal nocturnal dyspnea and syncope.   Respiratory: Positive for shortness of breath (improving). Negative for cough, hemoptysis, sleep disturbances due to breathing, snoring and wheezing.    Endocrine: Negative for cold intolerance and heat intolerance.   Hematologic/Lymphatic: Bruises/bleeds easily (on Eliquis).   Skin: Negative for color change, dry skin and nail changes.   Musculoskeletal: Positive for arthritis and back pain. Negative for joint pain and myalgias.   Gastrointestinal: Negative for bloating, abdominal pain, constipation, nausea and vomiting.   Genitourinary: Negative for dysuria, flank pain, hematuria and hesitancy.   Neurological: Negative for headaches, light-headedness, loss of balance, numbness and paresthesias.   Psychiatric/Behavioral: Negative for altered mental status. The patient is nervous/anxious.         Intermittent confusion overnight   Allergic/Immunologic: Negative for environmental allergies.     Objective:     Vital Signs (Most Recent):  Temp: 98.4 °F (36.9 °C) (09/19/18 0700)  Pulse: 78 (09/19/18 1058)  Resp: (!) 24 (09/19/18 1058)  BP: 124/62 (09/19/18 0700)  SpO2: 99 % (09/19/18 1058) Vital Signs (24h Range):  Temp:  [97.5 °F (36.4 °C)-98.7 °F (37.1 °C)] 98.4 °F (36.9 °C)  Pulse:  [] 78  Resp:  [18-33] 24  SpO2:  [86 %-100 %] 99 %  BP: (105-145)/(41-74) 124/62     Weight: 86 kg (189 lb 9.5 oz)  Body mass index is 27.2 kg/m².     SpO2: 99 %  O2 Device (Oxygen Therapy): Vapotherm      Intake/Output Summary (Last 24 hours) at 9/19/2018 1131  Last data filed at 9/19/2018 0600  Gross per 24 hour   Intake 950 ml   Output 1000 ml   Net -50 ml       Lines/Drains/Airways     Pressure Ulcer                 Pressure Injury 09/13/18 1905 Left Buttocks Deep tissue injury 5 days          Peripheral Intravenous Line                 Peripheral IV - Single Lumen 09/17/18 1905 Right Upper Arm 1 day                 Physical Exam   Constitutional: He is oriented to person, place, and time. He appears well-developed and well-nourished. He is active. No distress. Nasal cannula in place.   HENT:   Head: Normocephalic and atraumatic.   Eyes: Pupils are equal, round, and reactive to light.   Neck: Normal range of motion and full passive range of motion without pain. Neck supple. No JVD present.   Cardiovascular: Normal rate, S1 normal, S2 normal and intact distal pulses. An irregularly irregular rhythm present.  Occasional extrasystoles are present. PMI is not displaced. Exam reveals no distant heart sounds.   Murmur heard.   Harsh midsystolic murmur is present with a grade of 2/6 at the upper right sternal border radiating to the neck.  Pulses:       Radial pulses are 2+ on the right side, and 2+ on the left side.        Dorsalis pedis pulses are 1+ on the right side, and 1+ on the left side.   Pulmonary/Chest: Accessory muscle usage present. No respiratory distress. He has decreased breath sounds in the right lower field and the left lower field. He has no wheezes.   +Vapotherm in place   Abdominal: Soft. Bowel sounds are normal. He exhibits no distension. There is no tenderness.   Genitourinary:   Genitourinary Comments: deferred   Musculoskeletal: Normal range of motion. He exhibits no edema.        Right ankle: He exhibits swelling.        Left ankle: He exhibits swelling.   Neurological: He is alert and oriented to person, place, and time.   Skin: Skin is warm and dry. He is not diaphoretic. No cyanosis. Nails show no clubbing.   Psychiatric: He has a normal mood and affect. His speech is normal and behavior is normal. Judgment and thought content normal. Cognition and memory are normal.   Nursing note and vitals reviewed.      Significant Labs:   All pertinent lab results from the last 24 hours have been reviewed. and   Recent Lab Results       09/19/18  0441      Albumin 2.3     Alkaline Phosphatase 73     ALT 49      Anion Gap 10     AST 37     Baso # 0.02     Basophil% 0.2     Total Bilirubin 0.8  Comment:  For infants and newborns, interpretation of results should be based  on gestational age, weight and in agreement with clinical  observations.  Premature Infant recommended reference ranges:  Up to 24 hours.............<8.0 mg/dL  Up to 48 hours............<12.0 mg/dL  3-5 days..................<15.0 mg/dL  6-29 days.................<15.0 mg/dL       BUN, Bld 14     Calcium 8.3     Chloride 97     CO2 37     Creatinine 1.0     Differential Method Automated     Digoxin Lvl 0.4  Comment:  Toxic:  Adult: >2.5 ng/mL, Pediatric: >3.0 ng/mL       eGFR if  >60     eGFR if non  >60  Comment:  Calculation used to obtain the estimated glomerular filtration  rate (eGFR) is the CKD-EPI equation.        Eos # 0.2     Eosinophil% 1.8     Glucose 119     Gran # (ANC) 8.6     Gran% 71.9     Hematocrit 28.5     Hemoglobin 8.7     Lymph # 1.6     Lymph% 13.2     Magnesium 2.0     MCH 29.8     MCHC 30.5     MCV 98     Mono # 1.6     Mono% 12.9     MPV 9.9     Phosphorus 3.9     Platelets 311     Potassium 3.4     Total Protein 5.9     RBC 2.92     RDW 16.9     Sodium 144     WBC 12.00           Significant Imaging: Echocardiogram:   2D echo with color flow doppler:   Results for orders placed or performed during the hospital encounter of 09/10/18   2D echo with color flow doppler   Result Value Ref Range    EF 50 55 - 65    Mitral Valve Regurgitation MILD     Diastolic Dysfunction No     Aortic Valve Stenosis MODERATE TO SEVERE (A)     Est. PA Systolic Pressure 56 (A)     Tricuspid Valve Regurgitation MILD TO MODERATE     and X-Ray: CXR: X-Ray Chest 1 View (CXR):   Results for orders placed or performed during the hospital encounter of 09/10/18   X-Ray Chest 1 View    Narrative    EXAMINATION:  XR CHEST 1 VIEW    CLINICAL HISTORY:  respiratory failure;    TECHNIQUE:  Single frontal view of the chest was  performed.    COMPARISON:  09/18/2018    FINDINGS:  In comparison to the prior study, there is no adverse interval changes      Impression    In comparison to the prior study, there is no adverse interval changes      Electronically signed by: Mark Garcia MD  Date:    09/19/2018  Time:    07:44     Assessment and Plan:     Patient seen and examined in ICU room this AM, sitting in bedside chair. Denies any chest pain or anginal equivalents this AM. Remains in AFIB, VR controlled in 60's. He did have decrease in BP and HR after AM meds, digoxin held for now and improved after small IVF bolus this AM. Continue Eliquis, BB, CCB. Close monitoring of Heart rate for further adjustment of meds as needed.     Pneumonia of both lungs due to infectious organism    -per critical care        Anemia, unspecified    -Stable, monitor          Nonrheumatic aortic valve stenosis    -Stable  -Moderate to severe by 2D echo  -Further workup as OP        Acute upper GI bleed with acute blood loss anemia    -H/H stable, continue to monitor  -Continue Eliquis for CVA prophylaxis         Chronic kidney disease, stage 3    Cr 0.9, monitor    9/19  -Cr 1.0 monitor        Essential hypertension    -BP controlled  -Continue same meds  -IV lasix x 1 dose this AM.           Chronic atrial fibrillation with RVR    -Remains in afib with RVR in setting of sepsis/PNA/anemia  -HR better controlled  -Continue digoxin, cardizem, metoprolol  -Continue Eliquis for CVA prophylaxis   -IV lasix x 1 dose today    9/19  -AFIB with VR controlled, 60's this AM  -Notified per Critical care NP this AM after AM meds HR decreased to 40's and decreased BP as well  -HR improved after small bolus of IVF's  -Digoxin stopped for now  -BB switched to Toprol XL at bedtime  -Continue CCB  -Continue Eliquis for CVA prophylaxis  -NO CNS complaints to suggest TIA or CVA   -No signs of abnormal bleeding on Eliquis.             VTE Risk Mitigation (From admission, onward)         Ordered     apixaban tablet 5 mg  2 times daily      09/15/18 0958     IP VTE HIGH RISK PATIENT  Once      09/11/18 0425     Reason for No Pharmacological VTE Prophylaxis  Once      09/11/18 0425     Place sequential compression device  Until discontinued      09/11/18 0425          Selma Lomas NP  Cardiology  [facility]

## 2018-09-19 NOTE — PROGRESS NOTES
Follow up visit with Kami Edil for Left buttock DTI.  patient turned to right side with assistance. Foam dressing peeled back gently to reveal maroon and purple discoloration that is improving in appearance from last assessment.  Area of blistering noted to center of wound which is typical for evolving DTI and may lead to open full thickness wound in future. This was discussed with patient's s/o at bedside.  Recommend continued offloading and use of specialty WOODY pulsate bed.  Will continue to follow.

## 2018-09-19 NOTE — SUBJECTIVE & OBJECTIVE
Review of Systems   Constitutional: Positive for malaise/fatigue. Negative for chills and diaphoresis.   Respiratory: Positive for cough and shortness of breath. Negative for sputum production and stridor.    Cardiovascular: Negative for chest pain.   Gastrointestinal: Negative for abdominal pain, nausea and vomiting.   Musculoskeletal: Positive for myalgias.   Skin: Negative.    Neurological: Positive for weakness. Negative for focal weakness and seizures.   Psychiatric/Behavioral: The patient is nervous/anxious. The patient does not have insomnia.        Objective:     Vital Signs (Most Recent):  Temp: 98.4 °F (36.9 °C) (09/19/18 0700)  Pulse: (!) 119 (09/19/18 1002)  Resp: (!) 22 (09/19/18 1002)  BP: 124/62 (09/19/18 0700)  SpO2: 98 % (09/19/18 1002) Vital Signs (24h Range):  Temp:  [97.5 °F (36.4 °C)-98.7 °F (37.1 °C)] 98.4 °F (36.9 °C)  Pulse:  [] 119  Resp:  [18-33] 22  SpO2:  [86 %-100 %] 98 %  BP: (105-145)/(41-74) 124/62     Weight: 86 kg (189 lb 9.5 oz)  Body mass index is 27.2 kg/m².      Intake/Output Summary (Last 24 hours) at 9/19/2018 1034  Last data filed at 9/19/2018 0600  Gross per 24 hour   Intake 950 ml   Output 1000 ml   Net -50 ml       Physical Exam   Constitutional: He appears ill. Nasal cannula in place.   HENT:   Head: Atraumatic.   Eyes: Conjunctivae are normal. Pupils are equal, round, and reactive to light.   Neck: No JVD present. No tracheal deviation present.   Cardiovascular: An irregularly irregular rhythm present. Tachycardia present.   No murmur heard.  Pulses:       Radial pulses are 2+ on the right side, and 2+ on the left side.        Dorsalis pedis pulses are 1+ on the right side, and 1+ on the left side.   Pulmonary/Chest: No accessory muscle usage. No respiratory distress. He has decreased breath sounds.   Abdominal: Soft. Bowel sounds are normal. He exhibits no distension.   Genitourinary: Penile tenderness: .ros.   Musculoskeletal: He exhibits no edema.    Neurological: He is alert.   Intermittently confused, agitated last night   Skin: Skin is warm. Capillary refill takes 2 to 3 seconds. He is diaphoretic. No cyanosis.         Lines/Drains/Airways     Pressure Ulcer                 Pressure Injury 09/13/18 1905 Left Buttocks Deep tissue injury 5 days          Peripheral Intravenous Line                 Peripheral IV - Single Lumen 09/17/18 1905 Right Upper Arm 1 day                Significant Labs:    CBC/Anemia Profile:  Recent Labs   Lab  09/18/18   0431  09/19/18   0441   WBC  14.41*  12.00   HGB  9.0*  8.7*   HCT  29.4*  28.5*   PLT  304  311   MCV  97  98   RDW  17.2*  16.9*        Chemistries:  Recent Labs   Lab  09/18/18   0431  09/19/18   0441   NA  146*  144   K  4.0  3.4*   CL  102  97   CO2  34*  37*   BUN  20  14   CREATININE  0.9  1.0   CALCIUM  8.3*  8.3*   ALBUMIN  2.3*  2.3*   PROT  6.0  5.9*   BILITOT  0.8  0.8   ALKPHOS  73  73   ALT  55*  49*   AST  46*  37   MG  2.1  2.0   PHOS  3.8  3.9       All pertinent labs within the past 24 hours have been reviewed.    Significant Imaging:  I have reviewed all pertinent imaging results/findings within the past 24 hours.

## 2018-09-19 NOTE — PT/OT/SLP PROGRESS
Physical Therapy  Treatment    Eleazar Solo   MRN: [id number]   Admitting Diagnosis: Acute hypoxemic respiratory failure    PT Received On: 09/19/18  PT Start Time: 1124     PT Stop Time: 1152    PT Total Time (min): 28 min       Billable Minutes:  Gait Training 18 and Therapeutic Exercise 10    Treatment Type: Treatment  PT/PTA: PT     PTA Visit Number: 0       General Precautions: Standard, fall  Orthopedic Precautions: N/A   Braces: N/A         Subjective:  Communicated with NURSE DUNLAP AND Epic CHART REVIEW prior to session.  PT FOUND SITTING UPRIGHT IN CHAIR    Pain/Comfort  Pain Rating 1: 0/10    Objective:   Patient found with: peripheral IV, telemetry, pulse ox (continuous), oxygen    Functional Mobility:  PT FOUND SITTING UPRIGHT IN CHAIR AND AGREEABLE TO P.T. PT PERFORMED SIT>STAND MOD A X 2 > MIN A X 2AND GAIT TRAINING 6' X 4 TRIALS  WITH RW AND MIN A X 2 AND INC VC FOR POSTURE WITH SEATED REST BREAKS. PT PERFORMED STANDING GLUTE SETS 3 X 5 REPETITIONS AND T/F TO CHAIR MIN A. PT PERFORMED SEATED THEREX B LE INCLUDING APS, TKE, MIP X 15 REPETITIONS. PT SIT>STAND MIN A X 2 AND T/F TO EOB MIN A. PT SIT>SUP CGA. PT EDUCATED ON PURSED LIP AND DEEP BREATHING. CALL BELL IN REACH, WIFE AT BEDSIDE, AND ALL NEEDS MET.   AM-PAC 6 CLICK MOBILITY  How much help from another person does this patient currently need?   1 = Unable, Total/Dependent Assistance  2 = A lot, Maximum/Moderate Assistance  3 = A little, Minimum/Contact Guard/Supervision  4 = None, Modified [place]/Independent    Turning over in bed (including adjusting bedclothes, sheets and blankets)?: 4  Sitting down on and standing up from a chair with arms (e.g., wheelchair, bedside commode, etc.): 2  Moving from lying on back to sitting on the side of the bed?: 1  Moving to and from a bed to a chair (including a wheelchair)?: 3  Need to walk in hospital room?: 2  Climbing 3-5 steps with a railing?: 1  Basic Mobility Total Score: 13    AM-PAC Raw Score CMS  G-Code Modifier Level of Impairment Assistance   6 % Total / Unable   7 - 9 CM 80 - 100% Maximal Assist   10 - 14 CL 60 - 80% Moderate Assist   15 - 19 CK 40 - 60% Moderate Assist   20 - 22 CJ 20 - 40% Minimal Assist   23 CI 1-20% SBA / CGA   24 CH 0% Independent/ Mod I     Patient left HOB elevated with all lines intact, call button in reach and WIFE present.    Assessment:  PT CONTINUES TO PROGRESS WITH P.T. AND WILL BENEFIT FROM ADDITIONAL P.T. TO ADDRESS IMPAIRMENTS.     Rehab identified problem list/impairments: Rehab identified problem list/impairments: weakness, gait instability, impaired endurance, impaired balance, decreased safety awareness, impaired self care skills, impaired functional mobilty    Rehab potential is good.    Activity tolerance: Good    Discharge recommendations: Discharge Facility/Level Of Care Needs: nursing facility, skilled     Equipment recommendations: Equipment Needed After Discharge: none     GOALS:   Multidisciplinary Problems     Physical Therapy Goals        Problem: Physical Therapy Goal    Goal Priority Disciplines Outcome Goal Variances Interventions   Physical Therapy Goal     PT, PT/OT Ongoing (interventions implemented as appropriate)     Description:  LTG's to be met by 9/22/18  1. Patient will perform supine to/from sit mod A  2. Patient will perform sit to stand with mod A  3. Patient will ambulate 25ft RW mod A                    PLAN:    Patient to be seen 5 x/week  to address the above listed problems via gait training, therapeutic activities, therapeutic exercises  Plan of Care expires: 09/22/18  Plan of Care reviewed with: patient     PT EDUCATED TO CALL NURSE WITH ALL NEEDS D/T FALL RISK. PT VERBALIZED UNDERSTANDING.     Amy Denton, SPT  09/19/2018

## 2018-09-19 NOTE — PLAN OF CARE
Problem: Physical Therapy Goal  Goal: Physical Therapy Goal  LTG's to be met by 9/22/18  1. Patient will perform supine to/from sit mod A  2. Patient will perform sit to stand with mod A  3. Patient will ambulate 25ft RW mod A   Outcome: Ongoing (interventions implemented as appropriate)  PT PERFORMED SIT>STAND MOD A X 2 > MIN A X 2AND GAIT TRAINING 6' X 4 TRIALS  WITH RW AND MIN A X 2 WITH SEATED REST BREAKS.

## 2018-09-20 PROBLEM — Z99.11 ON MECHANICALLY ASSISTED VENTILATION: Status: RESOLVED | Noted: 2018-09-13 | Resolved: 2018-09-20

## 2018-09-20 LAB
ALBUMIN SERPL BCP-MCNC: 2.5 G/DL
ALP SERPL-CCNC: 81 U/L
ALT SERPL W/O P-5'-P-CCNC: 46 U/L
ANION GAP SERPL CALC-SCNC: 9 MMOL/L
AST SERPL-CCNC: 30 U/L
BASOPHILS # BLD AUTO: 0.01 K/UL
BASOPHILS NFR BLD: 0.1 %
BILIRUB SERPL-MCNC: 0.7 MG/DL
BNP SERPL-MCNC: 1316 PG/ML
BUN SERPL-MCNC: 16 MG/DL
CALCIUM SERPL-MCNC: 8.4 MG/DL
CHLORIDE SERPL-SCNC: 96 MMOL/L
CO2 SERPL-SCNC: 37 MMOL/L
CREAT SERPL-MCNC: 1.1 MG/DL
DIFFERENTIAL METHOD: ABNORMAL
EOSINOPHIL # BLD AUTO: 0.2 K/UL
EOSINOPHIL NFR BLD: 1.7 %
ERYTHROCYTE [DISTWIDTH] IN BLOOD BY AUTOMATED COUNT: 16.8 %
EST. GFR  (AFRICAN AMERICAN): >60 ML/MIN/1.73 M^2
EST. GFR  (NON AFRICAN AMERICAN): >60 ML/MIN/1.73 M^2
GLUCOSE SERPL-MCNC: 151 MG/DL
HCT VFR BLD AUTO: 29.1 %
HGB BLD-MCNC: 8.9 G/DL
LYMPHOCYTES # BLD AUTO: 1.3 K/UL
LYMPHOCYTES NFR BLD: 14 %
MAGNESIUM SERPL-MCNC: 2 MG/DL
MCH RBC QN AUTO: 29.9 PG
MCHC RBC AUTO-ENTMCNC: 30.6 G/DL
MCV RBC AUTO: 98 FL
MONOCYTES # BLD AUTO: 1 K/UL
MONOCYTES NFR BLD: 10.3 %
NEUTROPHILS # BLD AUTO: 6.9 K/UL
NEUTROPHILS NFR BLD: 73.9 %
PHOSPHATE SERPL-MCNC: 4 MG/DL
PLATELET # BLD AUTO: 324 K/UL
PMV BLD AUTO: 9.9 FL
POTASSIUM SERPL-SCNC: 3.5 MMOL/L
PROT SERPL-MCNC: 6.1 G/DL
RBC # BLD AUTO: 2.98 M/UL
SODIUM SERPL-SCNC: 142 MMOL/L
WBC # BLD AUTO: 9.28 K/UL

## 2018-09-20 PROCEDURE — 99291 CRITICAL CARE FIRST HOUR: CPT | Mod: ,,, | Performed by: NURSE PRACTITIONER

## 2018-09-20 PROCEDURE — 84100 ASSAY OF PHOSPHORUS: CPT

## 2018-09-20 PROCEDURE — 25000003 PHARM REV CODE 250: Performed by: NURSE PRACTITIONER

## 2018-09-20 PROCEDURE — 20000000 HC ICU ROOM

## 2018-09-20 PROCEDURE — 27000646 HC AEROBIKA DEVICE

## 2018-09-20 PROCEDURE — 25000242 PHARM REV CODE 250 ALT 637 W/ HCPCS: Performed by: INTERNAL MEDICINE

## 2018-09-20 PROCEDURE — 99900035 HC TECH TIME PER 15 MIN (STAT)

## 2018-09-20 PROCEDURE — 94640 AIRWAY INHALATION TREATMENT: CPT

## 2018-09-20 PROCEDURE — 83880 ASSAY OF NATRIURETIC PEPTIDE: CPT

## 2018-09-20 PROCEDURE — 27000221 HC OXYGEN, UP TO 24 HOURS

## 2018-09-20 PROCEDURE — 83735 ASSAY OF MAGNESIUM: CPT

## 2018-09-20 PROCEDURE — 25000242 PHARM REV CODE 250 ALT 637 W/ HCPCS: Performed by: NURSE PRACTITIONER

## 2018-09-20 PROCEDURE — 27100171 HC OXYGEN HIGH FLOW UP TO 24 HOURS

## 2018-09-20 PROCEDURE — 85025 COMPLETE CBC W/AUTO DIFF WBC: CPT

## 2018-09-20 PROCEDURE — 99232 SBSQ HOSP IP/OBS MODERATE 35: CPT | Mod: ,,, | Performed by: INTERNAL MEDICINE

## 2018-09-20 PROCEDURE — 97116 GAIT TRAINING THERAPY: CPT

## 2018-09-20 PROCEDURE — 25000003 PHARM REV CODE 250: Performed by: FAMILY MEDICINE

## 2018-09-20 PROCEDURE — 27100092 HC HIGH FLOW DELIVERY CANNULA

## 2018-09-20 PROCEDURE — 94660 CPAP INITIATION&MGMT: CPT

## 2018-09-20 PROCEDURE — 63600175 PHARM REV CODE 636 W HCPCS: Performed by: NURSE PRACTITIONER

## 2018-09-20 PROCEDURE — 80053 COMPREHEN METABOLIC PANEL: CPT

## 2018-09-20 PROCEDURE — 94664 DEMO&/EVAL PT USE INHALER: CPT

## 2018-09-20 PROCEDURE — 36415 COLL VENOUS BLD VENIPUNCTURE: CPT

## 2018-09-20 PROCEDURE — 92526 ORAL FUNCTION THERAPY: CPT

## 2018-09-20 PROCEDURE — 97803 MED NUTRITION INDIV SUBSEQ: CPT

## 2018-09-20 PROCEDURE — 97110 THERAPEUTIC EXERCISES: CPT

## 2018-09-20 RX ORDER — LOSARTAN POTASSIUM 25 MG/1
25 TABLET ORAL DAILY
Status: DISCONTINUED | OUTPATIENT
Start: 2018-09-20 | End: 2018-09-21 | Stop reason: HOSPADM

## 2018-09-20 RX ORDER — POTASSIUM CHLORIDE 750 MG/1
10 TABLET, EXTENDED RELEASE ORAL DAILY
Status: DISCONTINUED | OUTPATIENT
Start: 2018-09-20 | End: 2018-09-21 | Stop reason: HOSPADM

## 2018-09-20 RX ORDER — FUROSEMIDE 20 MG/1
20 TABLET ORAL DAILY
Status: DISCONTINUED | OUTPATIENT
Start: 2018-09-20 | End: 2018-09-21 | Stop reason: HOSPADM

## 2018-09-20 RX ORDER — FUROSEMIDE 10 MG/ML
40 INJECTION INTRAMUSCULAR; INTRAVENOUS ONCE
Status: COMPLETED | OUTPATIENT
Start: 2018-09-20 | End: 2018-09-20

## 2018-09-20 RX ORDER — POTASSIUM CHLORIDE 20 MEQ/15ML
40 SOLUTION ORAL ONCE
Status: COMPLETED | OUTPATIENT
Start: 2018-09-20 | End: 2018-09-20

## 2018-09-20 RX ADMIN — DILTIAZEM HYDROCHLORIDE 60 MG: 60 TABLET, FILM COATED ORAL at 10:09

## 2018-09-20 RX ADMIN — FUROSEMIDE 20 MG: 20 TABLET ORAL at 12:09

## 2018-09-20 RX ADMIN — APIXABAN 5 MG: 2.5 TABLET, FILM COATED ORAL at 08:09

## 2018-09-20 RX ADMIN — BUDESONIDE 0.5 MG: 0.5 SUSPENSION RESPIRATORY (INHALATION) at 07:09

## 2018-09-20 RX ADMIN — METOPROLOL SUCCINATE 50 MG: 50 TABLET, EXTENDED RELEASE ORAL at 08:09

## 2018-09-20 RX ADMIN — ARFORMOTEROL TARTRATE 15 MCG: 15 SOLUTION RESPIRATORY (INHALATION) at 07:09

## 2018-09-20 RX ADMIN — OLANZAPINE 15 MG: 5 TABLET, ORALLY DISINTEGRATING ORAL at 05:09

## 2018-09-20 RX ADMIN — DILTIAZEM HYDROCHLORIDE 60 MG: 60 TABLET, FILM COATED ORAL at 03:09

## 2018-09-20 RX ADMIN — LEVALBUTEROL 0.63 MG: 0.63 SOLUTION RESPIRATORY (INHALATION) at 07:09

## 2018-09-20 RX ADMIN — APIXABAN 5 MG: 2.5 TABLET, FILM COATED ORAL at 09:09

## 2018-09-20 RX ADMIN — LOSARTAN POTASSIUM 25 MG: 25 TABLET, FILM COATED ORAL at 12:09

## 2018-09-20 RX ADMIN — FUROSEMIDE 40 MG: 10 INJECTION, SOLUTION INTRAMUSCULAR; INTRAVENOUS at 09:09

## 2018-09-20 RX ADMIN — POLYETHYLENE GLYCOL (3350) 17 G: 17 POWDER, FOR SOLUTION ORAL at 09:09

## 2018-09-20 RX ADMIN — POTASSIUM CHLORIDE 10 MEQ: 750 TABLET, EXTENDED RELEASE ORAL at 12:09

## 2018-09-20 RX ADMIN — MOXIFLOXACIN HYDROCHLORIDE 400 MG: 400 TABLET, FILM COATED ORAL at 09:09

## 2018-09-20 RX ADMIN — FLUCONAZOLE 200 MG: 100 TABLET ORAL at 12:09

## 2018-09-20 RX ADMIN — BISACODYL 10 MG: 5 TABLET, COATED ORAL at 09:09

## 2018-09-20 RX ADMIN — PANTOPRAZOLE SODIUM 40 MG: 40 TABLET, DELAYED RELEASE ORAL at 09:09

## 2018-09-20 RX ADMIN — DILTIAZEM HYDROCHLORIDE 60 MG: 60 TABLET, FILM COATED ORAL at 06:09

## 2018-09-20 RX ADMIN — POTASSIUM CHLORIDE 40 MEQ: 20 SOLUTION ORAL at 10:09

## 2018-09-20 NOTE — SUBJECTIVE & OBJECTIVE
Interval History: no acute issues o/n    Review of Systems   Constitution: Positive for malaise/fatigue. Negative for weakness.   HENT: Negative for hearing loss and hoarse voice.    Eyes: Negative for blurred vision and visual disturbance.   Cardiovascular: Positive for dyspnea on exertion and irregular heartbeat. Negative for chest pain, claudication, leg swelling, near-syncope, orthopnea, palpitations, paroxysmal nocturnal dyspnea and syncope.   Respiratory: Positive for shortness of breath (improving). Negative for cough, hemoptysis, sleep disturbances due to breathing, snoring and wheezing.    Endocrine: Negative for cold intolerance and heat intolerance.   Hematologic/Lymphatic: Bruises/bleeds easily (on Eliquis).   Skin: Negative for color change, dry skin and nail changes.   Musculoskeletal: Positive for arthritis and back pain. Negative for joint pain and myalgias.   Gastrointestinal: Negative for bloating, abdominal pain, constipation, nausea and vomiting.   Genitourinary: Negative for dysuria, flank pain, hematuria and hesitancy.   Neurological: Negative for headaches, light-headedness, loss of balance, numbness and paresthesias.   Psychiatric/Behavioral: Negative for altered mental status. The patient is not nervous/anxious.    Allergic/Immunologic: Negative for environmental allergies.     Objective:     Vital Signs (Most Recent):  Temp: 98.4 °F (36.9 °C) (09/20/18 1100)  Pulse: 67 (09/20/18 1100)  Resp: (!) 24 (09/20/18 1100)  BP: 127/70 (09/20/18 1100)  SpO2: 99 % (09/20/18 1100) Vital Signs (24h Range):  Temp:  [97.7 °F (36.5 °C)-98.6 °F (37 °C)] 98.4 °F (36.9 °C)  Pulse:  [] 67  Resp:  [15-30] 24  SpO2:  [92 %-100 %] 99 %  BP: (105-148)/() 127/70     Weight: 86 kg (189 lb 9.5 oz)  Body mass index is 27.2 kg/m².     SpO2: 99 %  O2 Device (Oxygen Therapy): nasal cannula      Intake/Output Summary (Last 24 hours) at 9/20/2018 1145  Last data filed at 9/20/2018 1100  Gross per 24 hour    Intake 760 ml   Output 835 ml   Net -75 ml       Lines/Drains/Airways     Pressure Ulcer                 Pressure Injury 09/13/18 1905 Left Buttocks Deep tissue injury 6 days          Peripheral Intravenous Line                 Peripheral IV - Single Lumen 09/17/18 1905 Right Upper Arm 2 days                Physical Exam   Constitutional: He is oriented to person, place, and time. He appears well-developed and well-nourished. He is active. No distress. Nasal cannula in place.   HENT:   Head: Normocephalic and atraumatic.   Eyes: Pupils are equal, round, and reactive to light.   Neck: Normal range of motion and full passive range of motion without pain. Neck supple. No JVD present.   Cardiovascular: Normal rate, S1 normal, S2 normal and intact distal pulses. An irregularly irregular rhythm present.  Occasional extrasystoles are present. PMI is not displaced. Exam reveals no distant heart sounds.   Murmur heard.   Harsh midsystolic murmur is present with a grade of 2/6 at the upper right sternal border radiating to the neck.  Pulses:       Radial pulses are 2+ on the right side, and 2+ on the left side.        Dorsalis pedis pulses are 1+ on the right side, and 1+ on the left side.   Pulmonary/Chest: Accessory muscle usage present. No respiratory distress. He has decreased breath sounds in the right lower field and the left lower field. He has wheezes.   +Vapotherm in place   Abdominal: Soft. Bowel sounds are normal. He exhibits no distension. There is no tenderness.   Genitourinary:   Genitourinary Comments: deferred   Musculoskeletal: Normal range of motion. He exhibits no edema.        Right ankle: He exhibits swelling.        Left ankle: He exhibits swelling.   Neurological: He is alert and oriented to person, place, and time.   Skin: Skin is warm and dry. No rash noted. He is not diaphoretic. No cyanosis or erythema. No pallor. Nails show no clubbing.   Psychiatric: He has a normal mood and affect. His speech  is normal and behavior is normal. Judgment and thought content normal. Cognition and memory are normal.   Nursing note and vitals reviewed.      Significant Labs:   All pertinent lab results from the last 24 hours have been reviewed. and   Recent Lab Results       09/20/18  0823 09/20/18  0324      Albumin  2.5     Alkaline Phosphatase  81     ALT  46     Anion Gap  9     AST  30     Baso #  0.01     Basophil%  0.1     Total Bilirubin  0.7  Comment:  For infants and newborns, interpretation of results should be based  on gestational age, weight and in agreement with clinical  observations.  Premature Infant recommended reference ranges:  Up to 24 hours.............<8.0 mg/dL  Up to 48 hours............<12.0 mg/dL  3-5 days..................<15.0 mg/dL  6-29 days.................<15.0 mg/dL       BNP 1,316  Comment:  Values of less than 100 pg/ml are consistent with non-CHF populations.      BUN, Bld  16     Calcium  8.4     Chloride  96     CO2  37     Creatinine  1.1     Differential Method  Automated     eGFR if   >60     eGFR if non   >60  Comment:  Calculation used to obtain the estimated glomerular filtration  rate (eGFR) is the CKD-EPI equation.        Eos #  0.2     Eosinophil%  1.7     Glucose  151     Gran # (ANC)  6.9     Gran%  73.9     Hematocrit  29.1     Hemoglobin  8.9     Lymph #  1.3     Lymph%  14.0     Magnesium  2.0     MCH  29.9     MCHC  30.6     MCV  98     Mono #  1.0     Mono%  10.3     MPV  9.9     Phosphorus  4.0     Platelets  324     Potassium  3.5     Total Protein  6.1     RBC  2.98     RDW  16.8     Sodium  142     WBC  9.28           Significant Imaging: Echocardiogram:   2D echo with color flow doppler:   Results for orders placed or performed during the hospital encounter of 09/10/18   2D echo with color flow doppler   Result Value Ref Range    EF 50 55 - 65    Mitral Valve Regurgitation MILD     Diastolic Dysfunction No     Aortic Valve Stenosis  MODERATE TO SEVERE (A)     Est. PA Systolic Pressure 56 (A)     Tricuspid Valve Regurgitation MILD TO MODERATE     and X-Ray: CXR: X-Ray Chest 1 View (CXR):   Results for orders placed or performed during the hospital encounter of 09/10/18   X-Ray Chest 1 View    Narrative    EXAMINATION:  XR CHEST 1 VIEW    CLINICAL HISTORY:  fluid overload;    COMPARISON:  09/19/2018    FINDINGS:  The size of the heart is normal.  There is a moderate amount of alveolar consolidation scattered throughout the lower 2/3 of the right lung.  There is a mild amount of interstitial opacities seen in both lungs with Kerley B-lines visualized bilaterally.  There is blunting of the costophrenic angles.  There is no pneumothorax.      Impression    1. There is a moderate amount of alveolar consolidation scattered throughout the lower 2/3 of the right lung.  This is characteristic of pneumonia.  2. There is a mild amount of interstitial opacities seen in both lungs with Kerley B-lines visualized bilaterally.  This is characteristic of pulmonary edema.  3. There is blunting of the costophrenic angles.  This is characteristic of tiny pleural effusions.  .      Electronically signed by: Eleazar Samuel MD  Date:    09/20/2018  Time:    08:25

## 2018-09-20 NOTE — PLAN OF CARE
Late Entry     09/18/18 1612   Medicare Message   Important Message from Medicare regarding Discharge Appeal Rights Given to patient/caregiver;Explained to patient/caregiver;Signed/date by patient/caregiver   Date IMM was signed 09/18/18   Time IMM was signed 1612

## 2018-09-20 NOTE — PLAN OF CARE
Problem: Patient Care Overview  Goal: Plan of Care Review  Outcome: Ongoing (interventions implemented as appropriate)  Pt oriented at start of shift, then became disoriented throughout the shift, pt easily restless and agitated causing oxygen levels to drop, prn benadryl given; pt taken off bipap mask around 0100 and placed back on vapotherm with increase oxygen 22L/45%.  Pt is Afib on the heart monitor.  Pt remains of purred diet, meds crushed with pudding for aspiration risk.  Pt voiding per urinal, incontinence at times.  DTI to buttocks with mepilex in place, pt on specialty mattress, pt difficult to turn at times d/t agitation.  Pt turned and repositioned with use of pillows and specialty bed.  PIV intact with no redness, swelling or drainage.  Bed low, wheels locked, call light in reach.  Pt son at bedside assisting with reorientation of pt.  Pt and son instructed to call for assistance.  Plan of care reviewed.  Pt and son verbalizes understanding. Will continue to monitor.

## 2018-09-20 NOTE — SUBJECTIVE & OBJECTIVE
Interval History: Denies chest pain  Or SOB.   Review of Systems   All other systems reviewed and are negative.    Objective:     Vital Signs (Most Recent):  Temp: 98.1 °F (36.7 °C) (09/20/18 1500)  Pulse: 84 (09/20/18 1705)  Resp: (!) 30 (09/20/18 1705)  BP: (!) 147/57 (09/20/18 1705)  SpO2: 95 % (09/20/18 1705) Vital Signs (24h Range):  Temp:  [97.7 °F (36.5 °C)-98.6 °F (37 °C)] 98.1 °F (36.7 °C)  Pulse:  [] 84  Resp:  [15-30] 30  SpO2:  [94 %-100 %] 95 %  BP: ()/() 147/57     Weight: 86 kg (189 lb 9.5 oz)  Body mass index is 27.2 kg/m².    Intake/Output Summary (Last 24 hours) at 9/20/2018 1718  Last data filed at 9/20/2018 1700  Gross per 24 hour   Intake 600 ml   Output 1960 ml   Net -1360 ml      Physical Exam   Cardiovascular:   Murmur heard.   Constitutional: He is oriented to person, place, and time. No distress.   illl appearing   HENT:   Head: Normocephalic and atraumatic.   Nose: Nose normal.   Eyes: Conjunctivae and EOM are normal.   Neck: Normal range of motion. Neck supple.   Cardiovascular: Normal heart sounds and intact distal pulses.   No murmur heard.  tachycardia   Pulmonary/Chest: Effort normal and breath sounds normal. He has no wheezes.   Abdominal: Soft. Bowel sounds are normal. He exhibits no mass. There is no tenderness. There is no rebound and no guarding.   Musculoskeletal: Normal range of motion. He exhibits no edema.   Neurological: He is alert and oriented to person, place, and time.   Skin: Skin is warm and dry. No rash noted.   Psychiatric: He has a normal mood and affect. His behavior is normal.   Nursing note and vitals reviewed.        Significant Labs:   CBC:   Recent Labs   Lab  09/19/18   0441  09/20/18   0324   WBC  12.00  9.28   HGB  8.7*  8.9*   HCT  28.5*  29.1*   PLT  311  324     CMP:   Recent Labs   Lab  09/19/18   0441  09/20/18   0324   NA  144  142   K  3.4*  3.5   CL  97  96   CO2  37*  37*   GLU  119*  151*   BUN  14  16   CREATININE  1.0  1.1    CALCIUM  8.3*  8.4*   PROT  5.9*  6.1   ALBUMIN  2.3*  2.5*   BILITOT  0.8  0.7   ALKPHOS  73  81   AST  37  30   ALT  49*  46*   ANIONGAP  10  9   EGFRNONAA  >60  >60       Significant Imaging: I have reviewed all pertinent imaging results/findings within the past 24 hours.

## 2018-09-20 NOTE — PLAN OF CARE
Problem: Physical Therapy Goal  Goal: Physical Therapy Goal  LTG's to be met by 9/22/18  1. Patient will perform supine to/from sit mod A  2. Patient will perform sit to stand with mod A  3. Patient will ambulate 25ft RW mod A   Outcome: Ongoing (interventions implemented as appropriate)  PT GAIT TRAINED X 76' WITH RW, MIN A X 2 WITH O2 AND CHAIR IN TOW FOR SAFETY WITH 3 SEATED REST BREAKS.

## 2018-09-20 NOTE — PROGRESS NOTES
[facility] -   Critical Care Medicine  Progress Note    Patient Name: Eleazar Solo  MRN: [id number]  Admission Date: 9/10/2018  Hospital Length of Stay: 10 days  Code Status: DNR  Attending Provider: Prachi Boyer MD  Primary Care Provider: Poly Fitch MD   Principal Problem: Pneumonia of right lower lobe due to infectious organism    Subjective:     HPI:  78 year old male admitted to MICU, transfer from [facility].  I have reviewed the patient's medical history in detail and updated the computerized patient record.  Upper GI bleeding, Scoped x 2 and clipped  Developed Af RVR, Known chr A fib on elliquis on hold  Treated with Digoxin, Amiodarone and cardizem GTT  Family requested transfer  Known COPD FEV1:1.65L ( 53%)  ANORO ellipta, not on oxygen  Presented to MICU needed BIPAP for WOB  CXR shows RML infiltrate  Overnight, anxious, WOB  Last echo EF 60%      Hospital/ICU Course:  09/11: CXR shows melanie infiltrate: edema.  . Lopressor 2.5mg and lasix 20 mg given  9/12 - intubated yesterday and required pressor with sedation.  Resting on vent.  Cynthia TF.  No active bleeding  9/13 - remains intubated, sedated on mechanical ventilation, pO2 trending up with high PEEP ventilation  9/14 - remains intubated on mechanical ventilation with oxygen/PEEP demand decreased; continued atrial fib with RVR despite amiodarone infusion, some improvement with metoprolol yesterday  9/15 - tolerated extubation, on high flow nasal cannula support; awake, slow to respond and faint verbalization but oriented to self and place; remains atrial fib with RVR variable 120-140; afebrile, wbc trending down  9/16 - remains vapotherm with high flow, moderate oxygen demand; afebrile, wbc continues down; afib rate remains uncontrolled  9/17 - remains on vapotherm with slow weaning; mentation with slight improvement daily; remains afebrile; wbc continues down  9/18 - increased work of breathing and hypoxia last evening  improved with NIPPV, transitioned back to vapotherm when awake this am; HR range  variable; continued intermittent confusion/agitation worse evening/night  9/19 - restless night reported with intermittent agitation/NIPPV use; improved rate control since increase cardizem dosing, now bradycardia low 40s with hypotension after receiving all am meds  9/20 - tolerating weaning of Vapotherm.  Cynthia diet and OOB daily with PT/OT    Review of Systems   Constitutional: Positive for malaise/fatigue. Negative for chills and diaphoresis.   Respiratory: Positive for cough. Negative for sputum production, shortness of breath and stridor.    Cardiovascular: Negative for chest pain.   Gastrointestinal: Negative for abdominal pain, nausea and vomiting.   Musculoskeletal: Negative for myalgias.   Skin: Negative.    Neurological: Positive for weakness. Negative for focal weakness and seizures.   Psychiatric/Behavioral: The patient is nervous/anxious. The patient does not have insomnia.        Objective:     Vital Signs (Most Recent):  Temp: 97.7 °F (36.5 °C) (09/20/18 0700)  Pulse: 62 (09/20/18 0746)  Resp: (!) 27 (09/20/18 0746)  BP: (!) 112/59 (09/20/18 0700)  SpO2: 97 % (09/20/18 0746) Vital Signs (24h Range):  Temp:  [97.7 °F (36.5 °C)-98.6 °F (37 °C)] 97.7 °F (36.5 °C)  Pulse:  [] 62  Resp:  [15-28] 27  SpO2:  [92 %-100 %] 97 %  BP: ()/() 112/59     Weight: 86 kg (189 lb 9.5 oz)  Body mass index is 27.2 kg/m².      Intake/Output Summary (Last 24 hours) at 9/20/2018 0855  Last data filed at 9/20/2018 0600  Gross per 24 hour   Intake 650 ml   Output 775 ml   Net -125 ml       Physical Exam   Constitutional: Vital signs are normal. He appears well-developed. He is cooperative. He appears ill. Nasal cannula in place.   HENT:   Head: Normocephalic and atraumatic.   Mouth/Throat: Oropharynx is clear and moist.   Eyes: Conjunctivae are normal. Pupils are equal, round, and reactive to light.   Neck: Full passive  range of motion without pain. No JVD present. No tracheal deviation present.   Cardiovascular: Normal rate. An irregularly irregular rhythm present.   No murmur heard.  Pulses:       Radial pulses are 2+ on the right side, and 2+ on the left side.        Dorsalis pedis pulses are 1+ on the right side, and 1+ on the left side.   Pulmonary/Chest: No accessory muscle usage. No respiratory distress. He has decreased breath sounds. He has wheezes (mild bilat).   Abdominal: Soft. He exhibits no distension. Bowel sounds are decreased. There is no tenderness.   Obese   Genitourinary: Penile tenderness: .ros.   Musculoskeletal: Normal range of motion. He exhibits no edema.   Trace edema and mild to mod atrophy   Lymphadenopathy:     He has no cervical adenopathy.   Neurological: He is alert.   Intermittently confused, less agitated last night   Skin: Skin is warm and dry. Capillary refill takes 2 to 3 seconds. No cyanosis.   Psychiatric: His speech is normal. Judgment and thought content normal. His affect is blunt. He is slowed. He exhibits abnormal recent memory.       Vents:  Vent Mode: Spont (09/14/18 1210)  Ventilator Initiated: Yes (09/11/18 1121)  Set Rate: 0 bmp (09/14/18 1210)  Vt Set: 450 mL (09/14/18 1210)  Pressure Support: 10 cmH20 (09/14/18 1210)  PEEP/CPAP: 5 cmH20 (09/14/18 1210)  Oxygen Concentration (%): 40 (09/20/18 0746)  Peak Airway Pressure: 15 cmH2O (09/14/18 1210)  Plateau Pressure: 16 cmH20 (09/14/18 1210)  Total Ve: 10.3 mL (09/14/18 1210)  F/VT Ratio<105 (RSBI): (!) 43.08 (09/14/18 1210)    Lines/Drains/Airways     Pressure Ulcer                 Pressure Injury 09/13/18 1905 Left Buttocks Deep tissue injury 6 days          Peripheral Intravenous Line                 Peripheral IV - Single Lumen 09/17/18 1905 Right Upper Arm 2 days                Significant Labs:    CBC/Anemia Profile:  Recent Labs   Lab  09/19/18   0441  09/20/18   0324   WBC  12.00  9.28   HGB  8.7*  8.9*   HCT  28.5*  29.1*    PLT  311  324   MCV  98  98   RDW  16.9*  16.8*        Chemistries:  Recent Labs   Lab  09/19/18   0441  09/20/18   0324   NA  144  142   K  3.4*  3.5   CL  97  96   CO2  37*  37*   BUN  14  16   CREATININE  1.0  1.1   CALCIUM  8.3*  8.4*   ALBUMIN  2.3*  2.5*   PROT  5.9*  6.1   BILITOT  0.8  0.7   ALKPHOS  73  81   ALT  49*  46*   AST  37  30   MG  2.0  2.0   PHOS  3.9  4.0       All pertinent labs within the past 24 hours have been reviewed.        Assessment/Plan:     Pulmonary   * Pneumonia of right lower lobe due to infectious organism    Cont Avelox and nebs  Encourage OOB and C&DB  Repeat CXR in AM        Chronic obstructive pulmonary disease with acute lower respiratory infection    Moderate severe COPD  Cont Formeterol, Budesonide, and xopenex  Continue avelox, diflucan for planned 7 day course        Acute hypoxemic respiratory failure    Supplemental oxygen with high flow - weaning  Attempt low flow NC today  Ok to use BiPap prn and noctunal for dyspnea, hypoxia  Anticipate long weaning; high risk decompensation s/t debility, chronic lung disease, loss of reserve        Cardiac/Vascular   Acute on chronic diastolic congestive heart failure    Cont Toprol and Lasix        Nonrheumatic aortic valve stenosis    Cont Lasix and Apixaban        Chronic atrial fibrillation with RVR    Now with slow ventricular rate in 50s and 60s   Cont Cardizem and Toprol  Continue eliquis        Renal/   Chronic kidney disease, stage 3    Accurate I/Os  Monitor creatinine on Lasix        Oncology   Anemia, unspecified    No s/s of active bleeding  Monitor cbc  Conservative transfusion protocol        Endocrine   Type 2 diabetes mellitus without complication, without long-term current use of insulin    Continue SSI prn with monitoring for glucose control and prevention of insulin toxicity        GI   Acute upper GI bleed with acute blood loss anemia    Recent clipping of ulcer at GE junction at outside hospital  Cont  PPI  No bleeding on eliquis        Orthopedic   Critical illness myopathy    Advanced age, prolonged sedation for ventilation, chronic comorbidities  Anticipate prolonged recovery, high risk for complication  PT/OT/ST following          Preventive Measures and Monitoring:   Stress Ulcer: PPI  Nutrition: diet  Glucose control: SSI  Bowel prophylaxis: Miralax  DVT prophylaxis: Apixaban  Hx CAD on B-Blocker: Toprol  Head of Bed/Reposition: Elevate HOB and turn Q1-2 hours   Early Mobility: OOB today  Code Status: DNR    Counseling/Consultation:I have discussed the care of this patient in detail with the bedside nursing staff and Dr. Oh and Dr. Boyer    Critical Care Time: 41 minutes  Critical secondary to Patient has a condition that poses threat to life and bodily function: Hypoxic Resp Failure      Critical care was time spent personally by me on the following activities: development of treatment plan with patient or surrogate and bedside caregivers, discussions with consultants, evaluation of patient's response to treatment, examination of patient, ordering and performing treatments and interventions, ordering and review of laboratory studies, ordering and review of radiographic studies, pulse oximetry, re-evaluation of patient's condition. This critical care time did not overlap with that of any other provider or involve time for any procedures.     London Ruelas NP  Critical Care Medicine  [facility] [place]

## 2018-09-20 NOTE — SUBJECTIVE & OBJECTIVE
Review of Systems   Constitutional: Positive for malaise/fatigue. Negative for chills and diaphoresis.   Respiratory: Positive for cough. Negative for sputum production, shortness of breath and stridor.    Cardiovascular: Negative for chest pain.   Gastrointestinal: Negative for abdominal pain, nausea and vomiting.   Musculoskeletal: Negative for myalgias.   Skin: Negative.    Neurological: Positive for weakness. Negative for focal weakness and seizures.   Psychiatric/Behavioral: The patient is nervous/anxious. The patient does not have insomnia.        Objective:     Vital Signs (Most Recent):  Temp: 97.7 °F (36.5 °C) (09/20/18 0700)  Pulse: 62 (09/20/18 0746)  Resp: (!) 27 (09/20/18 0746)  BP: (!) 112/59 (09/20/18 0700)  SpO2: 97 % (09/20/18 0746) Vital Signs (24h Range):  Temp:  [97.7 °F (36.5 °C)-98.6 °F (37 °C)] 97.7 °F (36.5 °C)  Pulse:  [] 62  Resp:  [15-28] 27  SpO2:  [92 %-100 %] 97 %  BP: ()/() 112/59     Weight: 86 kg (189 lb 9.5 oz)  Body mass index is 27.2 kg/m².      Intake/Output Summary (Last 24 hours) at 9/20/2018 0855  Last data filed at 9/20/2018 0600  Gross per 24 hour   Intake 650 ml   Output 775 ml   Net -125 ml       Physical Exam   Constitutional: Vital signs are normal. He appears well-developed. He is cooperative. He appears ill. Nasal cannula in place.   HENT:   Head: Normocephalic and atraumatic.   Mouth/Throat: Oropharynx is clear and moist.   Eyes: Conjunctivae are normal. Pupils are equal, round, and reactive to light.   Neck: Full passive range of motion without pain. No JVD present. No tracheal deviation present.   Cardiovascular: Normal rate. An irregularly irregular rhythm present.   No murmur heard.  Pulses:       Radial pulses are 2+ on the right side, and 2+ on the left side.        Dorsalis pedis pulses are 1+ on the right side, and 1+ on the left side.   Pulmonary/Chest: No accessory muscle usage. No respiratory distress. He has decreased breath sounds. He  has wheezes (mild bilat).   Abdominal: Soft. He exhibits no distension. Bowel sounds are decreased. There is no tenderness.   Obese   Genitourinary: Penile tenderness: .ros.   Musculoskeletal: Normal range of motion. He exhibits no edema.   Trace edema and mild to mod atrophy   Lymphadenopathy:     He has no cervical adenopathy.   Neurological: He is alert.   Intermittently confused, less agitated last night   Skin: Skin is warm and dry. Capillary refill takes 2 to 3 seconds. No cyanosis.   Psychiatric: His speech is normal. Judgment and thought content normal. His affect is blunt. He is slowed. He exhibits abnormal recent memory.       Vents:  Vent Mode: Spont (09/14/18 1210)  Ventilator Initiated: Yes (09/11/18 1121)  Set Rate: 0 bmp (09/14/18 1210)  Vt Set: 450 mL (09/14/18 1210)  Pressure Support: 10 cmH20 (09/14/18 1210)  PEEP/CPAP: 5 cmH20 (09/14/18 1210)  Oxygen Concentration (%): 40 (09/20/18 0746)  Peak Airway Pressure: 15 cmH2O (09/14/18 1210)  Plateau Pressure: 16 cmH20 (09/14/18 1210)  Total Ve: 10.3 mL (09/14/18 1210)  F/VT Ratio<105 (RSBI): (!) 43.08 (09/14/18 1210)    Lines/Drains/Airways     Pressure Ulcer                 Pressure Injury 09/13/18 1905 Left Buttocks Deep tissue injury 6 days          Peripheral Intravenous Line                 Peripheral IV - Single Lumen 09/17/18 1905 Right Upper Arm 2 days                Significant Labs:    CBC/Anemia Profile:  Recent Labs   Lab  09/19/18   0441  09/20/18   0324   WBC  12.00  9.28   HGB  8.7*  8.9*   HCT  28.5*  29.1*   PLT  311  324   MCV  98  98   RDW  16.9*  16.8*        Chemistries:  Recent Labs   Lab  09/19/18   0441  09/20/18   0324   NA  144  142   K  3.4*  3.5   CL  97  96   CO2  37*  37*   BUN  14  16   CREATININE  1.0  1.1   CALCIUM  8.3*  8.4*   ALBUMIN  2.3*  2.5*   PROT  5.9*  6.1   BILITOT  0.8  0.7   ALKPHOS  73  81   ALT  49*  46*   AST  37  30   MG  2.0  2.0   PHOS  3.9  4.0       All pertinent labs within the past 24 hours have  been reviewed.

## 2018-09-20 NOTE — PLAN OF CARE
Problem: Patient Care Overview  Goal: Plan of Care Review  Outcome: Ongoing (interventions implemented as appropriate)  Recommendations     Recommendation/Intervention:   1.Continue current diet. 2. Add boost glucose control TID (Reviewed with RN). 3. Will continue to encourage PO intake.   Goals: Meet > 85 % EEN/EPN while admitted  Nutrition Goal Status: progressing towards goal  Communication of RD Recs: Reviewed with RN, POC, sticky note

## 2018-09-20 NOTE — PT/OT/SLP PROGRESS
Speech Language Pathology Treatment    Patient Name:  Eleazar Solo   MRN:  [id number]  Admitting Diagnosis: Pneumonia of right lower lobe due to infectious organism    Recommendations:                 General Recommendations:  Dysphagia therapy  Diet recommendations:  Mechanical soft, No Rice, Chopped meat, Liquid Diet Level: Thin   Aspiration Precautions: 1 bite/sip at a time, Alternating bites/sips, HOB to 90 degrees and Small bites/sips   General Precautions: Standard, fall  Communication strategies:  none    Subjective     Pt cooperative. He states that his diet was changed back to puree. Nsg reported and incident the evening before where pt had difficulty initiating a swallow. Nurse Flores reports no difficulty today.   Patient goals: to eat whole foods     Pain/Comfort:  · Pain Rating 1: 0/10  · Pain Rating Post-Intervention 1: 0/10    Objective:     Has the patient been evaluated by SLP for swallowing?   Yes  Keep patient NPO? No   Current Respiratory Status: (vapo-therme)      Swallow re-assessed with cracker and thin liquids. Pt had no overt s/s of aspiration. He is recommended for po diet of mech soft with chopped meats and no rice. He completed oral motor ex x 15 each to improve swallow coordination. He was oriented to place at 66% acc and time at 75% acc.     Assessment:     Eleazar Solo is a 78 y.o. male with an SLP diagnosis of dysphagia.  He presents with improved swallow function.    Goals:   Multidisciplinary Problems     SLP Goals        Problem: SLP Goal    Goal Priority Disciplines Outcome   SLP Goal     SLP Ongoing (interventions implemented as appropriate)   Description:  LTG:  Pt will tolerate least restrictive diet consistency safely and efficiently.            Pt will communicate needs/ideas effectively.    ST. Reassess swallowing bedside for p.o. Readiness-  Pt looked much better today(alert, following commands); tolerated thin liquid, thick liquids and pureed consistencies without  observable signs of swallowing difficulties. He fatigued with cracker.   He is recommended to start a pureed consistency diet and thin liquids with swallow precautions which were reviewed with patient, family and staff.  **S.T. Recommended to reassess swallowing tomorrow with further goals as appropriate.  **Consider Modified Barium Swallowing Study if pt exhibits any swallowing difficulties and/or lung status does not continue to improve.               2. Oral Motor exercises x10-15             3. Oral Stim. x20 with 100%  timely laryngeal excursions.             4. Orientation with 90% with min cues             5. Follow multi-unit commands with 90%                     Plan:     · Patient to be seen:  2 x/week   · Plan of Care expires:     · Plan of Care reviewed with:  patient, spouse   · SLP Follow-Up:  Yes       Discharge recommendations:      Barriers to Discharge:  None    Time Tracking:     SLP Treatment Date:   09/20/18  Speech Start Time:  1100  Speech Stop Time:  1125     Speech Total Time (min):  25 min    Billable Minutes: Treatment Swallowing Dysfunction 25    Shana Lam CCC-SLP  09/20/2018

## 2018-09-20 NOTE — ASSESSMENT & PLAN NOTE
-Remains in afib with RVR in setting of sepsis/PNA/anemia  -HR better controlled  -Continue digoxin, cardizem, metoprolol  -Continue Eliquis for CVA prophylaxis   -IV lasix x 1 dose today    9/19  -AFIB with VR controlled, 60's this AM  -Notified per Critical care NP this AM after AM meds HR decreased to 40's and decreased BP as well  -HR improved after small bolus of IVF's  -Digoxin stopped for now  -BB switched to Toprol XL at bedtime  -Continue CCB  -Continue Eliquis for CVA prophylaxis  -NO CNS complaints to suggest TIA or CVA   -No signs of abnormal bleeding on Eliquis.     9/20/18  -AFIB with VR controlled rate, 60-70's  -Continue Eliquis, CCB, BB, Lasix  -No CNS complaints to suggest TIA or CVA today  -No signs of abnormal bleeding on Eliquis  -Hold digoxin for now

## 2018-09-20 NOTE — ASSESSMENT & PLAN NOTE
-BP controlled  -Continue same meds  -IV lasix x 1 dose this AM.     9/20/18  -BP controlled on current regimen  -IV lasix x 1 dose this AM

## 2018-09-20 NOTE — ASSESSMENT & PLAN NOTE
Supplemental oxygen with high flow - weaning  Attempt low flow NC today  Ok to use BiPap prn and noctunal for dyspnea, hypoxia  Anticipate long weaning; high risk decompensation s/t debility, chronic lung disease, loss of reserve

## 2018-09-20 NOTE — PLAN OF CARE
Problem: SLP Goal  Goal: SLP Goal  LTG:  Pt will tolerate least restrictive diet consistency safely and efficiently.            Pt will communicate needs/ideas effectively.    ST. Reassess swallowing bedside for p.o. Readiness-  Pt looked much better today(alert, following commands); tolerated thin liquid, thick liquids and pureed consistencies without observable signs of swallowing difficulties. He fatigued with cracker.   He is recommended to start a pureed consistency diet and thin liquids with swallow precautions which were reviewed with patient, family and staff.  **S.T. Recommended to reassess swallowing tomorrow with further goals as appropriate.  **Consider Modified Barium Swallowing Study if pt exhibits any swallowing difficulties and/or lung status does not continue to improve.               2. Oral Motor exercises x10-15             3. Oral Stim. x20 with 100%  timely laryngeal excursions.             4. Orientation with 90% with min cues             5. Follow multi-unit commands with 90%    Outcome: Ongoing (interventions implemented as appropriate)  Pt tolerated solids without difficulty.

## 2018-09-20 NOTE — PLAN OF CARE
Late Entry. Patient with no family at the bedside to sign for IMM. Patient is critical secondary to resp disatress     09/11/18 1608   Medicare Message   Important Message from Medicare regarding Discharge Appeal Rights Other (comments)   Date IMM was signed 09/11/18   Time IMM was signed 1607

## 2018-09-20 NOTE — PROGRESS NOTES
[facility] -   Adult Nutrition  Progress Note    SUMMARY     Recommendations    Recommendation/Intervention:   1.Continue current diet. 2. Add boost glucose control TID (Reviewed with RN). 3. Will continue to encourage PO intake.   Goals: Meet > 85 % EEN/EPN while admitted  Nutrition Goal Status: progressing towards goal  Communication of RD Recs: Reviewed with RN, POC, joan note      Reason for Assessment    Reason for Assessment: RD f/u  Dx:  1. Acute upper GI bleed    2. Atrial fibrillation    3. Aspiration pneumonia of right upper lobe, unspecified aspiration pneumonia type    4. Atrial fibrillation, permanent    5. Chronic obstructive pulmonary disease with acute lower respiratory infection    6. Type 2 diabetes mellitus without complication, without long-term current use of insulin    7. Acute hypoxemic respiratory failure    8. Anemia, unspecified type    9. Chronic kidney disease, stage 3    10. Pneumonia of both lungs due to infectious organism, unspecified part of lung    11. Septic shock    12. On mechanically assisted ventilation    13. Chronic atrial fibrillation with RVR    14. Critical illness myopathy    15. Pneumonia of right lower lobe due to infectious organism    16. Acute on chronic diastolic congestive heart failure    17. Nonrheumatic aortic valve stenosis      Past Medical History:   Diagnosis Date    *Atrial fibrillation     Acute hypoxemic respiratory failure     Acute on chronic diastolic congestive heart failure 9/12/2018    Anemia, unspecified 9/12/2018    Atrial fibrillation     Bilateral carotid artery disease 8/10/2015    Chronic obstructive pulmonary disease with acute lower respiratory infection     COPD (chronic obstructive pulmonary disease)     Diabetes mellitus     Gout     Hyperlipidemia     Hypertension     Low testosterone     Pneumonia     Septic shock     Stroke 1/3/15 L occiput    Unspecified disorder of kidney and ureter   "      Interdisciplinary Rounds: attended  General Information Comments: Family and pt report decreased appetite (PO intake ~ 40 % this am). Pt agreed to try oral supplements. Reviewed dietary recommendations with RN (Mark)  this am.   Nutrition Discharge Planning: diabetic cardiac diet with pureed consistency per SLP    Nutrition Risk Screen    Nutrition Risk Screen: no indicators present    Nutrition/Diet History    Do you have any cultural, spiritual, Hinduism conflicts, given your current situation?: None    Anthropometrics    Temp: 98.4 °F (36.9 °C)  Height Method: Stated  Height: 5' 10" (177.8 cm)  Height (inches): 70 in  Weight Method: Bed Scale  Weight: 86 kg (189 lb 9.5 oz)  Weight (lb): 189.6 lb  Ideal Body Weight (IBW), Male: 166 lb  % Ideal Body Weight, Male (lb): 126.17 lb  BMI (Calculated): 30.1  BMI Grade: 30 - 34.9- obesity - grade I       Lab/Procedures/Meds    Pertinent Labs Reviewed: reviewed  BMP  Lab Results   Component Value Date     09/20/2018    K 3.5 09/20/2018    CL 96 09/20/2018    CO2 37 (H) 09/20/2018    BUN 16 09/20/2018    CREATININE 1.1 09/20/2018    CALCIUM 8.4 (L) 09/20/2018    ANIONGAP 9 09/20/2018    ESTGFRAFRICA >60 09/20/2018    EGFRNONAA >60 09/20/2018     Lab Results   Component Value Date    CALCIUM 8.4 (L) 09/20/2018    PHOS 4.0 09/20/2018     Lab Results   Component Value Date    ALBUMIN 2.5 (L) 09/20/2018     No results for input(s): POCTGLUCOSE in the last 24 hours.  Lab Results   Component Value Date    HGBA1C 5.6 03/14/2018       Pertinent Medications Reviewed: reviewed    Physical Findings/Assessment    Overall Physical Appearance: obese  Oral/Mouth Cavity: tooth/teeth missing  Skin: (Lawson score 15)    Estimated/Assessed Needs    Weight Used For Calorie Calculations: 86 kg (189 lb 9.5 oz)  Energy Calorie Requirements (kcal): 1596  Energy Need Method: [place]-St Jeor (No AF)  Protein Requirements: 103 g  Weight Used For Protein Calculations: 86 kg (189 lb 9.5 " oz)     Fluid Need Method: RDA Method(or per MD)  RDA Method (mL): 1596  CHO Requirement: 50 % EEN      Nutrition Prescription Ordered    Current Diet Order: Diabetic 2000 kcal, pureed, cardiac with thin liquids    Evaluation of Received Nutrient/Fluid Intake    Intake/Output Summary (Last 24 hours) at 9/20/2018 1101  Last data filed at 9/20/2018 1100  Gross per 24 hour   Intake 760 ml   Output 835 ml   Net -75 ml          % Intake of Estimated Energy Needs: 25 - 50 %  % Meal Intake: 25 - 50 %    Nutrition Risk      2xweekly    Assessment and Plan      Nutrition Problem  Inadequate oral intake     Related to (etiology):   Decreased appetite    Signs and Symptoms (as evidenced by):   PO intake ~ 25 %     Interventions/Recommendations (treatment strategy):  See above     Nutrition Diagnosis Status:    Improving. 9.20.18, PO intake today ~ 40 % this am.         Monitor and Evaluation    Food and Nutrient Intake: energy intake  Food and Nutrient Adminstration: diet order  Anthropometric Measurements: weight  Biochemical Data, Medical Tests and Procedures: electrolyte and renal panel, glucose/endocrine profile  Nutrition-Focused Physical Findings: overall appearance     Nutrition Follow-Up    RD Follow-up?: Yes(2xweekly)

## 2018-09-20 NOTE — PT/OT/SLP PROGRESS
Physical Therapy  Treatment    Eleazar Solo   MRN: [id number]   Admitting Diagnosis: Pneumonia of right lower lobe due to infectious organism    PT Received On: 09/20/18  PT Start Time: 1404     PT Stop Time: 1428    PT Total Time (min): 24 min       Billable Minutes:  Gait Training 14 and Therapeutic Exercise 10    Treatment Type: Treatment  PT/PTA: PT     PTA Visit Number: 0       General Precautions: Standard, fall  Orthopedic Precautions: N/A   Braces: N/A         Subjective:  Communicated with NURSE ESPINOZA AND [facility] CHART REVIEW prior to session.  PT FOUND SUPINE IN BED WITH WIFE AT BEDSIDE    Pain/Comfort  Pain Rating 1: 0/10    Objective:   Patient found with: telemetry, pulse ox (continuous), blood pressure cuff, oxygen    Functional Mobility:  PT FOUND SUPINE IN BED AND AGREEABLE TO P.T. PT HOB LOWERED COMPLETELY AND PT SUP>SIT EOB WITH SBA. PT SIT>STAND MIN A WITH RW FOR B UE SUPPORT. PT GAIT TRAINED X 76' WITH RW, MIN A X 2 WITH O2 AND CHAIR IN TOW FOR SAFETY. PT REQUIRED 3 SEATED REST BREAKS. PT RETURNED TO ROOM AND PERFORMED 2 TRIALS OF SIT>STAND MIN A AND STANDING THEREX OF GLUTE SETS AND MIP X 10 REPS EACH. PT TOOK 3 LATERAL STEPS TO LEFT TO HOB AND T/F TO EOB CGA. PT SIT>SUP SBA. CALL BELL IN REACH AND ALL NEEDS MET.     AM-PAC 6 CLICK MOBILITY  How much help from another person does this patient currently need?   1 = Unable, Total/Dependent Assistance  2 = A lot, Maximum/Moderate Assistance  3 = A little, Minimum/Contact Guard/Supervision  4 = None, Modified [place]/Independent    Turning over in bed (including adjusting bedclothes, sheets and blankets)?: 4  Sitting down on and standing up from a chair with arms (e.g., wheelchair, bedside commode, etc.): 3  Moving from lying on back to sitting on the side of the bed?: 4  Moving to and from a bed to a chair (including a wheelchair)?: 3  Need to walk in hospital room?: 2  Climbing 3-5 steps with a railing?: 1  Basic Mobility Total Score: 17    AM-PAC  Raw Score CMS G-Code Modifier Level of Impairment Assistance   6 % Total / Unable   7 - 9 CM 80 - 100% Maximal Assist   10 - 14 CL 60 - 80% Moderate Assist   15 - 19 CK 40 - 60% Moderate Assist   20 - 22 CJ 20 - 40% Minimal Assist   23 CI 1-20% SBA / CGA   24 CH 0% Independent/ Mod I     Patient left supine with call button in reach and WIFE present.    Assessment:  PT CONTINUES TO PROGRESS WITH P.T. AND WILL BENEFIT FROM ADDITIONAL P.T. TX TO ADDRESS IMPAIRMENTS.     Rehab identified problem list/impairments: Rehab identified problem list/impairments: weakness, impaired balance, impaired endurance, impaired functional mobilty, gait instability, decreased lower extremity function, decreased safety awareness, impaired self care skills    Rehab potential is good.    Activity tolerance: Good    Discharge recommendations: Discharge Facility/Level Of Care Needs: nursing facility, skilled     Equipment recommendations: Equipment Needed After Discharge: none     GOALS:   Multidisciplinary Problems     Physical Therapy Goals        Problem: Physical Therapy Goal    Goal Priority Disciplines Outcome Goal Variances Interventions   Physical Therapy Goal     PT, PT/OT Ongoing (interventions implemented as appropriate)     Description:  LTG's to be met by 9/22/18  1. Patient will perform supine to/from sit mod A  2. Patient will perform sit to stand with mod A  3. Patient will ambulate 25ft RW mod A                    PLAN:    Patient to be seen 5 x/week  to address the above listed problems via gait training, therapeutic activities, therapeutic exercises  Plan of Care expires: 09/22/18  Plan of Care reviewed with: patient, spouse     PT EDUCATED TO CALL NURSE WITH ALL NEEDS D/T FALL RISK. PT VERBALIZED UNDERSTANDING.    Amy Denton, SPT  09/20/2018

## 2018-09-20 NOTE — PLAN OF CARE
Problem: Patient Care Overview  Goal: Plan of Care Review  Outcome: Ongoing (interventions implemented as appropriate)  Patient doing well, VSS, no c/o discomfort. Weaned off vapotherm today, now on nasal cannula tolerating well. Patient does get SOB on exertion. Patient walked through ICU with PT and walker, tolerated well. Patient has poor appetite, speech therapist saw patient again today and wrote recs. Patient accepted to rehab, awaiting insurance clearence. Family at bedside, update given, POC discussed.

## 2018-09-20 NOTE — PROGRESS NOTES
[facility]  Cardiology  Progress Note    Patient Name: Eleazar Solo  MRN: [id number]  Admission Date: 9/10/2018  Hospital Length of Stay: 10 days  Code Status: DNR   Attending Physician: Prachi Boyer MD   Primary Care Physician: Poly Fitch MD  Expected Discharge Date:   Principal Problem:Pneumonia of right lower lobe due to infectious organism    Subjective:   HPI  Mr. Solo is a 79yo male with a PMHx of permanent AF on Eliquis, HTN, HLD, COPD, DM II, and h/o CVA, who was transferred from [facility] per family request as patient is followed OP by [facility] physicians.  He was originally admitted to [facility] on 9/6 for acute upper GI bleed with blood loss anemia (Hb 12 > 7.4 > 7.9).  Patient received total of 2 units PRBC and Eliquis held (last dose 9/6 AM).  He underwent EGD on 9/6 that showed a clot at the GE junction which was clipped  and injected with epi.  There was concern for further bleeding, therefore, repeat EGD was performed on 9/7 which showed no evidence of active bleeding.  During admission, patient developed AFib with RVR and was placed on diltiazem drip and PO dig.  CXR on 9/8 showed left pneumonia, IV Rocephin started.  Prior to transfer, vital signs and labs stable.  Upon arrival to [facility] ICU, patient hypotensive (SBP 70-80's), now on pressors and was hypoxic requiring continuous BiPAP.  Remains in AFib with controlled rate.  Repeat CXR showed RUL pneumonia.  Repeat labs resulted WBC 16.5, H&H 8.4/24.6, plt 136, BUN 38, cr. 1.4, AST 77, . ABG with pH 7.382, pCO2 44, pO2 60, HCO3 26.  Patient c/o SOB which is resolved while on BiPAP. CXR today shows marked amount of alveolar consolidation in the lower 2/3 of both lungs.  The this represents an interval worsening of the appearance of the lungs and is characteristic of pneumonia. Decision made to intubate patient  Due to resp distress and wheezing.  H/H this morning 7.5/22.6      Hospital Course:    9/12/18- Remains intubated and sedated. Still in A-fib. Rate 100-115 bpm this morning. Started on Amio gtt for rate control. H/H stable at 7.8/23.9. Continues abx for bilateral pneumonia. Patient being weaned from Dopamine gtt.Liver enzymes improved today. Echo shows normal LVF with Moderate to severe aortic stenosis and pulm HTN     9/13/18-Patient seen and examined today. No acute events overnight. Remains intubated. Still in afib, HR variable. H and H slightly worse, 7.3/22.3. Would benefit from transfusion.     9/14/18-Patient seen and examined today. Pressor re-started overnight. Still intubated. Remains in afib with rapid rate. H and H improved s/p transfusion.     9/15/18-Patient seen and examined today, now extubated and sitting in chair. Feels ok. Remains in afib with RVR, meds adjusted. Labs reviewed. H and H stable overnight.     9/16/18-Patient seen and examined today. Looks and feels much better. More awake and alert. SOB improving. Remains in afib, HR better controlled. Labs stable.    9/17/18--Patient seen and examined today. He seems to be feeling better today, more awake and interactive. Remains in afib on Eliquis. Labs reviewed, Cr 1.0. Resume BB.     9/18/18--Patient seen and examined today in ICU, in bedside chair. Denies chest pain or anginal equivalents. Continues to be in AFIB today, on eliquis. Labs reviewed, H/H 9/29.4, Cr 0.9. IV lasix x 1 dose this AM.     9/19/18--Patient seen and examined in room, Denies chest pain or anginal equivalents. Remains in AFIB, VR controlled in 60's today. Labs reviewed, Mag 2.0, K+ 3.4, Cr 1.0, H/H 8.7/28.5.     9/20/18--Patient seen and examined in room, lying in bed. Denies chest pain or anginal equivalents. Remains in AFIB, VR controlled in 60-70's this AM. CXR revealed mild amount of interstitial opacities seen in both lungs, characteristic of pulmonary edema. BNP 1316, Mag 2.0,  K+3.5, Cr 1.1.    Interval History: no acute issues o/n    Review of  Systems   Constitution: Positive for malaise/fatigue. Negative for weakness.   HENT: Negative for hearing loss and hoarse voice.    Eyes: Negative for blurred vision and visual disturbance.   Cardiovascular: Positive for dyspnea on exertion and irregular heartbeat. Negative for chest pain, claudication, leg swelling, near-syncope, orthopnea, palpitations, paroxysmal nocturnal dyspnea and syncope.   Respiratory: Positive for shortness of breath (improving). Negative for cough, hemoptysis, sleep disturbances due to breathing, snoring and wheezing.    Endocrine: Negative for cold intolerance and heat intolerance.   Hematologic/Lymphatic: Bruises/bleeds easily (on Eliquis).   Skin: Negative for color change, dry skin and nail changes.   Musculoskeletal: Positive for arthritis and back pain. Negative for joint pain and myalgias.   Gastrointestinal: Negative for bloating, abdominal pain, constipation, nausea and vomiting.   Genitourinary: Negative for dysuria, flank pain, hematuria and hesitancy.   Neurological: Negative for headaches, light-headedness, loss of balance, numbness and paresthesias.   Psychiatric/Behavioral: Negative for altered mental status. The patient is not nervous/anxious.    Allergic/Immunologic: Negative for environmental allergies.     Objective:     Vital Signs (Most Recent):  Temp: 98.4 °F (36.9 °C) (09/20/18 1100)  Pulse: 67 (09/20/18 1100)  Resp: (!) 24 (09/20/18 1100)  BP: 127/70 (09/20/18 1100)  SpO2: 99 % (09/20/18 1100) Vital Signs (24h Range):  Temp:  [97.7 °F (36.5 °C)-98.6 °F (37 °C)] 98.4 °F (36.9 °C)  Pulse:  [] 67  Resp:  [15-30] 24  SpO2:  [92 %-100 %] 99 %  BP: (105-148)/() 127/70     Weight: 86 kg (189 lb 9.5 oz)  Body mass index is 27.2 kg/m².     SpO2: 99 %  O2 Device (Oxygen Therapy): nasal cannula      Intake/Output Summary (Last 24 hours) at 9/20/2018 1145  Last data filed at 9/20/2018 1100  Gross per 24 hour   Intake 760 ml   Output 835 ml   Net -75 ml        Lines/Drains/Airways     Pressure Ulcer                 Pressure Injury 09/13/18 1905 Left Buttocks Deep tissue injury 6 days          Peripheral Intravenous Line                 Peripheral IV - Single Lumen 09/17/18 1905 Right Upper Arm 2 days                Physical Exam   Constitutional: He is oriented to person, place, and time. He appears well-developed and well-nourished. He is active. No distress. Nasal cannula in place.   HENT:   Head: Normocephalic and atraumatic.   Eyes: Pupils are equal, round, and reactive to light.   Neck: Normal range of motion and full passive range of motion without pain. Neck supple. No JVD present.   Cardiovascular: Normal rate, S1 normal, S2 normal and intact distal pulses. An irregularly irregular rhythm present.  Occasional extrasystoles are present. PMI is not displaced. Exam reveals no distant heart sounds.   Murmur heard.   Harsh midsystolic murmur is present with a grade of 2/6 at the upper right sternal border radiating to the neck.  Pulses:       Radial pulses are 2+ on the right side, and 2+ on the left side.        Dorsalis pedis pulses are 1+ on the right side, and 1+ on the left side.   Pulmonary/Chest: Accessory muscle usage present. No respiratory distress. He has decreased breath sounds in the right lower field and the left lower field. He has wheezes.   +Vapotherm in place   Abdominal: Soft. Bowel sounds are normal. He exhibits no distension. There is no tenderness.   Genitourinary:   Genitourinary Comments: deferred   Musculoskeletal: Normal range of motion. He exhibits no edema.        Right ankle: He exhibits swelling.        Left ankle: He exhibits swelling.   Neurological: He is alert and oriented to person, place, and time.   Skin: Skin is warm and dry. No rash noted. He is not diaphoretic. No cyanosis or erythema. No pallor. Nails show no clubbing.   Psychiatric: He has a normal mood and affect. His speech is normal and behavior is normal. Judgment and  thought content normal. Cognition and memory are normal.   Nursing note and vitals reviewed.      Significant Labs:   All pertinent lab results from the last 24 hours have been reviewed. and   Recent Lab Results       09/20/18  0823 09/20/18  0324      Albumin  2.5     Alkaline Phosphatase  81     ALT  46     Anion Gap  9     AST  30     Baso #  0.01     Basophil%  0.1     Total Bilirubin  0.7  Comment:  For infants and newborns, interpretation of results should be based  on gestational age, weight and in agreement with clinical  observations.  Premature Infant recommended reference ranges:  Up to 24 hours.............<8.0 mg/dL  Up to 48 hours............<12.0 mg/dL  3-5 days..................<15.0 mg/dL  6-29 days.................<15.0 mg/dL       BNP 1,316  Comment:  Values of less than 100 pg/ml are consistent with non-CHF populations.      BUN, Bld  16     Calcium  8.4     Chloride  96     CO2  37     Creatinine  1.1     Differential Method  Automated     eGFR if   >60     eGFR if non   >60  Comment:  Calculation used to obtain the estimated glomerular filtration  rate (eGFR) is the CKD-EPI equation.        Eos #  0.2     Eosinophil%  1.7     Glucose  151     Gran # (ANC)  6.9     Gran%  73.9     Hematocrit  29.1     Hemoglobin  8.9     Lymph #  1.3     Lymph%  14.0     Magnesium  2.0     MCH  29.9     MCHC  30.6     MCV  98     Mono #  1.0     Mono%  10.3     MPV  9.9     Phosphorus  4.0     Platelets  324     Potassium  3.5     Total Protein  6.1     RBC  2.98     RDW  16.8     Sodium  142     WBC  9.28           Significant Imaging: Echocardiogram:   2D echo with color flow doppler:   Results for orders placed or performed during the hospital encounter of 09/10/18   2D echo with color flow doppler   Result Value Ref Range    EF 50 55 - 65    Mitral Valve Regurgitation MILD     Diastolic Dysfunction No     Aortic Valve Stenosis MODERATE TO SEVERE (A)     Est. PA Systolic  Pressure 56 (A)     Tricuspid Valve Regurgitation MILD TO MODERATE     and X-Ray: CXR: X-Ray Chest 1 View (CXR):   Results for orders placed or performed during the hospital encounter of 09/10/18   X-Ray Chest 1 View    Narrative    EXAMINATION:  XR CHEST 1 VIEW    CLINICAL HISTORY:  fluid overload;    COMPARISON:  09/19/2018    FINDINGS:  The size of the heart is normal.  There is a moderate amount of alveolar consolidation scattered throughout the lower 2/3 of the right lung.  There is a mild amount of interstitial opacities seen in both lungs with Kerley B-lines visualized bilaterally.  There is blunting of the costophrenic angles.  There is no pneumothorax.      Impression    1. There is a moderate amount of alveolar consolidation scattered throughout the lower 2/3 of the right lung.  This is characteristic of pneumonia.  2. There is a mild amount of interstitial opacities seen in both lungs with Kerley B-lines visualized bilaterally.  This is characteristic of pulmonary edema.  3. There is blunting of the costophrenic angles.  This is characteristic of tiny pleural effusions.  .      Electronically signed by: Eleazar Samuel MD  Date:    09/20/2018  Time:    08:25     Assessment and Plan:     Patient seen and examined in room, lying in bed. Denies chest pain or issues this AM. Continue Eliquis, BB, CCB, Lasix. Additional IV lasix dose this AM for elevated BNP. Resume Losartan at decreased dose today.     * Pneumonia of right lower lobe due to infectious organism    -per critical care        Anemia, unspecified    -Stable, monitor          Nonrheumatic aortic valve stenosis    -Stable  -Moderate to severe by 2D echo  -Further workup as OP        Acute upper GI bleed with acute blood loss anemia    -H/H stable, continue to monitor  -Continue Eliquis for CVA prophylaxis         Chronic kidney disease, stage 3    Cr 0.9, monitor    9/19  -Cr 1.0 monitor        Essential hypertension    -BP controlled  -Continue  same meds  -IV lasix x 1 dose this AM.     9/20/18  -BP controlled on current regimen  -IV lasix x 1 dose this AM          Chronic atrial fibrillation with RVR    -Remains in afib with RVR in setting of sepsis/PNA/anemia  -HR better controlled  -Continue digoxin, cardizem, metoprolol  -Continue Eliquis for CVA prophylaxis   -IV lasix x 1 dose today    9/19  -AFIB with VR controlled, 60's this AM  -Notified per Critical care NP this AM after AM meds HR decreased to 40's and decreased BP as well  -HR improved after small bolus of IVF's  -Digoxin stopped for now  -BB switched to Toprol XL at bedtime  -Continue CCB  -Continue Eliquis for CVA prophylaxis  -NO CNS complaints to suggest TIA or CVA   -No signs of abnormal bleeding on Eliquis.     9/20/18  -AFIB with VR controlled rate, 60-70's  -Continue Eliquis, CCB, BB, Lasix  -No CNS complaints to suggest TIA or CVA today  -No signs of abnormal bleeding on Eliquis  -Hold digoxin for now            VTE Risk Mitigation (From admission, onward)        Ordered     apixaban tablet 5 mg  2 times daily      09/15/18 0958     IP VTE HIGH RISK PATIENT  Once      09/11/18 0425     Reason for No Pharmacological VTE Prophylaxis  Once      09/11/18 0425     Place sequential compression device  Until discontinued      09/11/18 0425          Selma Lomas NP  Cardiology  [facility]

## 2018-09-20 NOTE — PLAN OF CARE
Patient stated he wanted wife to sign because he did not understand IMM. CM called spouse and she will be here tomorrow     09/17/18 1611   Medicare Message   Important Message from Medicare regarding Discharge Appeal Rights Other (comments)

## 2018-09-21 VITALS
RESPIRATION RATE: 21 BRPM | WEIGHT: 189.63 LBS | TEMPERATURE: 98 F | HEIGHT: 70 IN | HEART RATE: 73 BPM | SYSTOLIC BLOOD PRESSURE: 145 MMHG | OXYGEN SATURATION: 95 % | DIASTOLIC BLOOD PRESSURE: 68 MMHG | BODY MASS INDEX: 27.15 KG/M2

## 2018-09-21 PROBLEM — R79.89 ELEVATED LFTS: Status: RESOLVED | Noted: 2018-09-11 | Resolved: 2018-09-21

## 2018-09-21 LAB
ALBUMIN SERPL BCP-MCNC: 2.5 G/DL
ALP SERPL-CCNC: 67 U/L
ALT SERPL W/O P-5'-P-CCNC: 36 U/L
ANION GAP SERPL CALC-SCNC: 10 MMOL/L
AST SERPL-CCNC: 25 U/L
BASOPHILS # BLD AUTO: 0.02 K/UL
BASOPHILS NFR BLD: 0.2 %
BILIRUB SERPL-MCNC: 0.7 MG/DL
BUN SERPL-MCNC: 11 MG/DL
CALCIUM SERPL-MCNC: 8.4 MG/DL
CHLORIDE SERPL-SCNC: 97 MMOL/L
CO2 SERPL-SCNC: 35 MMOL/L
CREAT SERPL-MCNC: 0.9 MG/DL
DIFFERENTIAL METHOD: ABNORMAL
EOSINOPHIL # BLD AUTO: 0.2 K/UL
EOSINOPHIL NFR BLD: 1.7 %
ERYTHROCYTE [DISTWIDTH] IN BLOOD BY AUTOMATED COUNT: 17 %
EST. GFR  (AFRICAN AMERICAN): >60 ML/MIN/1.73 M^2
EST. GFR  (NON AFRICAN AMERICAN): >60 ML/MIN/1.73 M^2
GLUCOSE SERPL-MCNC: 131 MG/DL
HCT VFR BLD AUTO: 30.4 %
HGB BLD-MCNC: 9.2 G/DL
LYMPHOCYTES # BLD AUTO: 1.5 K/UL
LYMPHOCYTES NFR BLD: 15.2 %
MAGNESIUM SERPL-MCNC: 1.9 MG/DL
MCH RBC QN AUTO: 30 PG
MCHC RBC AUTO-ENTMCNC: 30.3 G/DL
MCV RBC AUTO: 99 FL
MONOCYTES # BLD AUTO: 1.4 K/UL
MONOCYTES NFR BLD: 14.6 %
NEUTROPHILS # BLD AUTO: 6.6 K/UL
NEUTROPHILS NFR BLD: 68.3 %
PHOSPHATE SERPL-MCNC: 3.7 MG/DL
PLATELET # BLD AUTO: 310 K/UL
PMV BLD AUTO: 10.2 FL
POTASSIUM SERPL-SCNC: 4.2 MMOL/L
PROT SERPL-MCNC: 5.9 G/DL
RBC # BLD AUTO: 3.07 M/UL
SODIUM SERPL-SCNC: 142 MMOL/L
WBC # BLD AUTO: 9.64 K/UL

## 2018-09-21 PROCEDURE — 83735 ASSAY OF MAGNESIUM: CPT

## 2018-09-21 PROCEDURE — 97535 SELF CARE MNGMENT TRAINING: CPT

## 2018-09-21 PROCEDURE — 99232 SBSQ HOSP IP/OBS MODERATE 35: CPT | Mod: ,,, | Performed by: INTERNAL MEDICINE

## 2018-09-21 PROCEDURE — 94640 AIRWAY INHALATION TREATMENT: CPT

## 2018-09-21 PROCEDURE — 27000221 HC OXYGEN, UP TO 24 HOURS

## 2018-09-21 PROCEDURE — G8978 MOBILITY CURRENT STATUS: HCPCS | Mod: CJ

## 2018-09-21 PROCEDURE — 25000242 PHARM REV CODE 250 ALT 637 W/ HCPCS: Performed by: INTERNAL MEDICINE

## 2018-09-21 PROCEDURE — 94664 DEMO&/EVAL PT USE INHALER: CPT

## 2018-09-21 PROCEDURE — 97116 GAIT TRAINING THERAPY: CPT

## 2018-09-21 PROCEDURE — 85025 COMPLETE CBC W/AUTO DIFF WBC: CPT

## 2018-09-21 PROCEDURE — 99900035 HC TECH TIME PER 15 MIN (STAT)

## 2018-09-21 PROCEDURE — 25000003 PHARM REV CODE 250: Performed by: NURSE PRACTITIONER

## 2018-09-21 PROCEDURE — 84100 ASSAY OF PHOSPHORUS: CPT

## 2018-09-21 PROCEDURE — 25000003 PHARM REV CODE 250: Performed by: FAMILY MEDICINE

## 2018-09-21 PROCEDURE — G8979 MOBILITY GOAL STATUS: HCPCS | Mod: CI

## 2018-09-21 PROCEDURE — 97530 THERAPEUTIC ACTIVITIES: CPT

## 2018-09-21 PROCEDURE — 25000242 PHARM REV CODE 250 ALT 637 W/ HCPCS: Performed by: NURSE PRACTITIONER

## 2018-09-21 PROCEDURE — 36415 COLL VENOUS BLD VENIPUNCTURE: CPT

## 2018-09-21 PROCEDURE — 80053 COMPREHEN METABOLIC PANEL: CPT

## 2018-09-21 RX ORDER — POLYETHYLENE GLYCOL 3350 17 G/17G
17 POWDER, FOR SOLUTION ORAL DAILY
Qty: 10 EACH | Refills: 1 | Status: SHIPPED | OUTPATIENT
Start: 2018-09-22

## 2018-09-21 RX ORDER — BISACODYL 5 MG
10 TABLET, DELAYED RELEASE (ENTERIC COATED) ORAL DAILY
Refills: 0 | COMMUNITY
Start: 2018-09-22

## 2018-09-21 RX ORDER — LOSARTAN POTASSIUM 25 MG/1
25 TABLET ORAL DAILY
Qty: 90 TABLET | Refills: 3 | Status: SHIPPED | OUTPATIENT
Start: 2018-09-22 | End: 2019-09-22

## 2018-09-21 RX ORDER — LEVALBUTEROL INHALATION SOLUTION 0.63 MG/3ML
0.63 SOLUTION RESPIRATORY (INHALATION) EVERY 12 HOURS
Qty: 1 BOX | Refills: 0 | Status: SHIPPED | OUTPATIENT
Start: 2018-09-21 | End: 2019-09-21

## 2018-09-21 RX ORDER — PANTOPRAZOLE SODIUM 40 MG/1
40 TABLET, DELAYED RELEASE ORAL DAILY
Qty: 30 TABLET | Refills: 11 | Status: SHIPPED | OUTPATIENT
Start: 2018-09-22 | End: 2019-09-22

## 2018-09-21 RX ORDER — METOPROLOL SUCCINATE 50 MG/1
50 TABLET, EXTENDED RELEASE ORAL NIGHTLY
Qty: 30 TABLET | Refills: 11 | Status: SHIPPED | OUTPATIENT
Start: 2018-09-21 | End: 2019-09-21

## 2018-09-21 RX ORDER — DILTIAZEM HYDROCHLORIDE 60 MG/1
60 TABLET, FILM COATED ORAL EVERY 8 HOURS
Qty: 90 TABLET | Refills: 11 | Status: SHIPPED | OUTPATIENT
Start: 2018-09-21 | End: 2019-09-21

## 2018-09-21 RX ADMIN — BUDESONIDE 0.5 MG: 0.5 SUSPENSION RESPIRATORY (INHALATION) at 08:09

## 2018-09-21 RX ADMIN — DILTIAZEM HYDROCHLORIDE 60 MG: 60 TABLET, FILM COATED ORAL at 02:09

## 2018-09-21 RX ADMIN — OLANZAPINE 15 MG: 5 TABLET, ORALLY DISINTEGRATING ORAL at 05:09

## 2018-09-21 RX ADMIN — LEVALBUTEROL 0.63 MG: 0.63 SOLUTION RESPIRATORY (INHALATION) at 07:09

## 2018-09-21 RX ADMIN — FUROSEMIDE 20 MG: 20 TABLET ORAL at 10:09

## 2018-09-21 RX ADMIN — APIXABAN 5 MG: 2.5 TABLET, FILM COATED ORAL at 10:09

## 2018-09-21 RX ADMIN — POLYETHYLENE GLYCOL (3350) 17 G: 17 POWDER, FOR SOLUTION ORAL at 10:09

## 2018-09-21 RX ADMIN — PANTOPRAZOLE SODIUM 40 MG: 40 TABLET, DELAYED RELEASE ORAL at 10:09

## 2018-09-21 RX ADMIN — BISACODYL 10 MG: 5 TABLET, COATED ORAL at 10:09

## 2018-09-21 RX ADMIN — ARFORMOTEROL TARTRATE 15 MCG: 15 SOLUTION RESPIRATORY (INHALATION) at 08:09

## 2018-09-21 RX ADMIN — LOSARTAN POTASSIUM 25 MG: 25 TABLET, FILM COATED ORAL at 10:09

## 2018-09-21 RX ADMIN — IPRATROPIUM BROMIDE AND ALBUTEROL SULFATE 3 ML: .5; 3 SOLUTION RESPIRATORY (INHALATION) at 02:09

## 2018-09-21 RX ADMIN — POTASSIUM CHLORIDE 10 MEQ: 750 TABLET, EXTENDED RELEASE ORAL at 10:09

## 2018-09-21 NOTE — PLAN OF CARE
Problem: Physical Therapy Goal  Goal: Physical Therapy Goal  LTG's to be met by 9/22/18  1. Patient will perform supine to/from sit mod A  2. Patient will perform sit to stand with mod A  3. Patient will ambulate 25ft RW mod A   Patient 60ft x 2 with RW and min/cga d/t unsteady and min sob on 4 Lo2 in tow. Patient had to sit between gt trials d/t fatigue. Sit to stand x multiple reps with RW in front min A each trial.

## 2018-09-21 NOTE — PLAN OF CARE
Problem: Patient Care Overview  Goal: Plan of Care Review  Outcome: Ongoing (interventions implemented as appropriate)  Patient doing well today, VSS, no c/o discomfort. Up in cardiac chair this AM, appetite improving, ate breakfast and lunch well. Ambulated through nurses station we with PT using a walker, strength improving, less bouts of dyspnea with exertion. Spouse present, POC discussed. Waiting for rehab placement

## 2018-09-21 NOTE — NURSING
Patient taken via wheelchair to transport vehicle on 4L nasal cannula O2. All belongings with patient, copy of discharge paperwork given to .

## 2018-09-21 NOTE — PT/OT/SLP PROGRESS
Occupational Therapy  Treatment    Eleazar Solo   MRN: [id number]   Admitting Diagnosis: Pneumonia of right lower lobe due to infectious organism    OT Date of Treatment: 09/21/18   OT Start Time: 1105  OT Stop Time: 1145  OT Total Time (min): 40 min    Billable Minutes:  Self Care/Home Management 15 minutes and Therapeutic Activity 25 MINUTES    General Precautions: Standard, fall  Orthopedic Precautions: N/A  Braces: N/A         Subjective:  Communicated with nurse and epic chart review prior to session.    Pain/Comfort  Pain Rating 1: 0/10    Objective:  Patient found with: pulse ox (continuous), telemetry, blood pressure cuff, oxygen     Functional Mobility:       Transfers:    min a with ae    Functional Ambulation:     Activities of Daily Living:     Feeding adaptive equipment: na     UE adaptive equipment: mod a     LE adaptive equipment: NA  TOILETING: MOD A                     Bathing adaptive equipment: no assistive device    Balance:   Static Sit: GOOD: Takes MODERATE challenges from all directions  Dynamic Sit: GOOD-: Incosistently Maintains balance through MODERATE excursions of active trunk movement,     Static Stand: POOR+: Needs MINIMAL assist to maintain  Dynamic stand: POOR+: Needs MIN (minimal ) assist during gait    Therapeutic Activities and Exercises:  PT SEEN IN ROOM. PT REQUESTED TO TOILET. PT REQ MOD A WITH TOILETING. PT REQ GAETANO X 2 WITH SIT<>STAND T/F'S. PT REQ MOD A WITH TOILETING AND MIN A WITH TOILET T/F'S WITH AE. PT REQ MOD A WITH UE DRESSING. PT AMBULATED 60 FEET X 2 WITH MIN A, VC'S FOR SAFETY, BREATHING TECHNIQUES AND POSTURE CHAIR IN TOW AND O2 IN TOW. PT O2 SATURATION 98% PRIOR TO AMBULATION AND 95% S/P AMBULATION. PT LEFT IN BED SIDE CHAIR EATING WITH ALL NEEDS MET AND CALL BUTTON IN REACH AND SPOUSE PRESENT.    AM-PAC 6 CLICK ADL   How much help from another person does this patient currently need?   1 = Unable, Total/Dependent Assistance  2 = A lot, Maximum/Moderate  "Assistance  3 = A little, Minimum/Contact Guard/Supervision  4 = None, Modified [place]/Independent    Putting on and taking off regular lower body clothing? : 2  Bathing (including washing, rinsing, drying)?: 2  Toileting, which includes using toilet, bedpan, or urinal? : 2  Putting on and taking off regular upper body clothing?: 2  Taking care of personal grooming such as brushing teeth?: 2  Eating meals?: 2  Daily Activity Total Score: 12     AM-PAC Raw Score CMS "G-Code Modifier Level of Impairment Assistance   6 % Total / Unable   7 - 8 CM 80 - 100% Maximal Assist   9-13 CL 60 - 80% Moderate Assist   14 - 19 CK 40 - 60% Moderate Assist   20 - 22 CJ 20 - 40% Minimal Assist   23 CI 1-20% SBA / CGA   24 CH 0% Independent/ Mod I       Patient left up in chair with all lines intact, call button in reach, NURSE ALEXIS notified and SPOUSE present    ASSESSMENT:  Eleazar Solo is a 78 y.o. male with a medical diagnosis of Pneumonia of right lower lobe due to infectious organism and presents with DEBILITY AND GENERALIZED WEAKNESS. PT CONTINUE TO BENEFIT FROM SKILLED O.T.    Rehab identified problem list/impairments: Rehab identified problem list/impairments: weakness, impaired functional mobilty, decreased safety awareness, impaired endurance, gait instability, impaired balance, impaired self care skills    Rehab potential is good.    Activity tolerance: Good    Discharge recommendations: Discharge Facility/Level Of Care Needs: nursing facility, skilled     Barriers to discharge: Barriers to Discharge: None    Equipment recommendations: none     GOALS:   Multidisciplinary Problems     Occupational Therapy Goals        Problem: Occupational Therapy Goal    Goal Priority Disciplines Outcome Interventions   Occupational Therapy Goal     OT, PT/OT Ongoing (interventions implemented as appropriate)    Description:  Goals to be met by: 9/22/18     Patient will increase functional independence with ADLs by " performing:    UE Dressing with Moderate Assistance.  Grooming while EOB with Minimal Assistance.  Toileting from bedside commode with Maximum Assistance for hygiene and clothing management.   Toilet transfer to bedside commode with Maximum Assistance.  Upper extremity exercise program x10 reps per handout, with assistance as needed.                      Plan:  Patient to be seen 3 x/week to address the above listed problems via self-care/home management, therapeutic activities, therapeutic exercises  Plan of Care expires: 09/22/18  Plan of Care reviewed with: patient, spouse         Yareli Quijano, OT  09/21/2018

## 2018-09-21 NOTE — PT/OT/SLP PROGRESS
Physical Therapy  Treatment    Eleazar Solo   MRN: [id number]   Admitting Diagnosis: Pneumonia of right lower lobe due to infectious organism    PT Received On: 09/15/18  PT Start Time: 1115     PT Stop Time: 1140    PT Total Time (min): 25 min       Billable Minutes:  Gait Training 10 and Therapeutic Activity 15    Treatment Type: Treatment  PT/PTA: PT     PTA Visit Number: 0       General Precautions: Standard, fall, respiratory  Orthopedic Precautions: N/A   Braces: N/A         Subjective:  Communicated with Mark prior to session.      Pain/Comfort  Pain Rating 1: 0/10    Objective:   Patient found with: telemetry, blood pressure cuff, peripheral IV, oxygen, pulse ox (continuous)    Functional Mobility:  Bed Mobility: Min/cga       Transfers: min A - cues for safe hand placement and with inc control       Gait: min A for balance and safety; cues for slow, deep breathing to dec sob; 60ft x 2 RW - seated rest between trials       Stairs: unable    Balance:   Sitting: Good  Standing: P+/F- with RW    Therapeutic Activities and Exercises:   PT educated patient on POC, role of PT, D/C recs, LE exs to do in the bed and safety/fall precautions.    AM-PAC 6 CLICK MOBILITY  How much help from another person does this patient currently need?   1 = Unable, Total/Dependent Assistance  2 = A lot, Maximum/Moderate Assistance  3 = A little, Minimum/Contact Guard/Supervision  4 = None, Modified [place]/Independent    Turning over in bed (including adjusting bedclothes, sheets and blankets)?: 3  Sitting down on and standing up from a chair with arms (e.g., wheelchair, bedside commode, etc.): 3  Moving from lying on back to sitting on the side of the bed?: 3  Moving to and from a bed to a chair (including a wheelchair)?: 3  Need to walk in hospital room?: 3  Climbing 3-5 steps with a railing?: 1  Basic Mobility Total Score: 16    AM-PAC Raw Score CMS G-Code Modifier Level of Impairment Assistance   6 % Total / Unable    7 - 9 CM 80 - 100% Maximal Assist   10 - 14 CL 60 - 80% Moderate Assist   15 - 19 CK 40 - 60% Moderate Assist   20 - 22 CJ 20 - 40% Minimal Assist   23 CI 1-20% SBA / CGA   24 CH 0% Independent/ Mod I     Patient left up in chair with all lines intact, call button in reach, nurse notified and wife present.    Assessment:  Eleazar Solo is a 78 y.o. male with a medical diagnosis of Pneumonia of right lower lobe due to infectious organism and presents with impaired mobility.    Rehab identified problem list/impairments: Rehab identified problem list/impairments: weakness, impaired functional mobilty, impaired cognition, decreased safety awareness, impaired coordination, impaired endurance, gait instability, impaired balance, impaired self care skills    Rehab potential is good.    Activity tolerance: Fair    Discharge recommendations: Discharge Facility/Level Of Care Needs: nursing facility, skilled     Barriers to discharge:      Equipment recommendations: Equipment Needed After Discharge: (tbd)     GOALS:   Multidisciplinary Problems     Physical Therapy Goals        Problem: Physical Therapy Goal    Goal Priority Disciplines Outcome Goal Variances Interventions   Physical Therapy Goal     PT, PT/OT Ongoing (interventions implemented as appropriate)     Description:  LTG's to be met by 9/22/18  1. Patient will perform supine to/from sit mod A  2. Patient will perform sit to stand with mod A  3. Patient will ambulate 25ft RW mod A                    PLAN:    Patient to be seen 5 x/week  to address the above listed problems via gait training, therapeutic activities, therapeutic exercises  Plan of Care expires: 09/28/18  Plan of Care reviewed with: spouse, patient    PT G-Codes  Mobility: Walking and Moving Around Current Status (): CJ  Mobility: Walking and Moving Around Goal Status (): LYNSEY Coronado, PT  09/21/2018

## 2018-09-21 NOTE — PLAN OF CARE
ENZO called Venkat  to f/u on auth and Called Praneeth  As well and left  for Ade to return the call for AUTH APPROVAL OR DENIAL.

## 2018-09-21 NOTE — DISCHARGE SUMMARY
[facility] Hospital Medicine  Discharge Summary      Patient Name: Eleazar Solo  MRN: [id number]  Admission Date: 9/10/2018  Hospital Length of Stay: 11 days  Discharge Date and Time:  09/21/2018 4:17 PM  Attending Physician: Yaritza Bob MD   Discharging Provider: Yaritza Bob MD  Primary Care Provider: Poly Fitch MD      HPI:   Mr. Solo is a 77yo male with a PMHx of permanent AF on Eliquis, HTN, HLD, COPD, DM II, and h/o CVA, who was transferred from [facility] per family request as patient is followed OP by [facility] physicians.  He was originally admitted to [facility] on 9/6 for acute upper GI bleed with blood loss anemia (Hb 12 > 7.4 > 7.9).  Patient received total of 2 units PRBC and Eliquis held (last dose 9/6 AM).  He underwent EGD on 9/6 that showed a clot at the GE junction which was clipped  and injected with epi.  There was concern for further bleeding, therefore, repeat EGD was performed on 9/7 which showed no evidence of active bleeding.  During admission, patient developed AFib with RVR and was placed on diltiazem drip and PO dig.  CXR on 9/8 showed left pneumonia, IV Rocephin started.  Prior to transfer, vital signs and labs stable.  Upon arrival to [facility] ICU, patient hypotensive (SBP 70-80's) and hypoxic requiring continuous BiPAP.  Remains in AFib with controlled rate.  Repeat CXR showed RUL pneumonia.  Repeat labs resulted WBC 16.5, H&H 8.4/24.6, plt 136, BUN 38, cr. 1.4, AST 77, . ABG with pH 7.382, pCO2 44, pO2 60, HCO3 26.  Patient c/o SOB which is resolved while on BiPAP.  Denies any CP, palpitations, orthopnea, PND, cough, edema, ABD pain, N/V/D, hematemesis, melena, hematochezia, dysuria, lightheadedness/dzziness, syncope, HA, AMS, focal deficits, weakness, fever, or chills.  Patient admitted to ICU under Hospital Medicine services.     * No surgery found *      Hospital Course:   Pt admitted and started on bipap, eventually requiring  intubation on 9/11. Started on levophed, heparin, Dig and Amiodarone infusion. Echo shows normal LVF with Moderate to severe aortic stenosis and pulm HTN. Remains intubated. Hb to 7.3. Transfuse 1 U PBRCs. Pt continued atrial fib with RVR despite amiodarone infusion. Cards will add BB, Cont dig, and amio d/c'd.  . Respiratory cx pending candida. Abx changed to avelox and diflucan. Pt extubated on 9/14. Doing well s/p extubation.  Cards will add small dose of CCB. IV Lopressor transitioned to po. Heparin drip to eliquis. More alert and oriented as progressed. Pt remains on vapotherm. WBCs trending down . Continued intermittent confusion/agitation worse evening/night.   9/19 - restless night reported with intermittent agitation/NIPPV use; improved rate control since increase cardizem dosing, now bradycardia low 40s with hypotension after receiving all am meds, which were decreased to Cozaar 25 daily, D/c Verapamil and start Cardizem 60 tid and Toprol XL 50 daily. He responded well and afib remained under controlled, BP improved. He however remains terribly weak and is max assist for any activity-- hence SNF consulted. He was weaned off vapotherm to NC 4 L. He appears a little better, eating drinking better, walking around better and is more alert and oriented. He was accepted at [facility] SNF today. He was seen and examined today and deemed stable for discharge.  9/20: Weaning vapotherm   Awaiting  snf?         Consults:   Consults (From admission, onward)        Status Ordering Provider     Inpatient consult to Cardiology  Once     Provider:  Sonny Schneider MD    Completed MONALISA ANTONIO.     Inpatient consult to Gastroenterology  Once     Provider:  James Colon MD    Completed MONALISA ANTONIO.     Inpatient consult to Pulmonology  Once     Provider:  Abilio Cosme MD    Acknowledged MONALISA ANTONIO.     Inpatient consult to Registered Dietitian/Nutritionist  Once     Provider:  (Not yet  assigned)    Completed GEORGETTE VÁZQUEZ          No new Assessment & Plan notes have been filed under this hospital service since the last note was generated.  Service: Hospital Medicine    Final Active Diagnoses:    Diagnosis Date Noted POA    PRINCIPAL PROBLEM:  Pneumonia of right lower lobe due to infectious organism [J18.1] 09/12/2018 Yes    Critical illness myopathy [G72.81] 09/18/2018 Yes    Acute on chronic diastolic congestive heart failure [I50.33] 09/12/2018 Yes    Nonrheumatic aortic valve stenosis [I35.0] 09/12/2018 Yes    Anemia, unspecified [D64.9] 09/12/2018 Yes    Type 2 diabetes mellitus without complication, without long-term current use of insulin [E11.9] 09/11/2018 Yes     Chronic    Acute hypoxemic respiratory failure [J96.01]  Yes    Acute upper GI bleed with acute blood loss anemia [K92.2] 09/10/2018 Yes    Chronic kidney disease, stage 3 [N18.3] 08/10/2015 Yes     Chronic    Chronic obstructive pulmonary disease with acute lower respiratory infection [J44.0] 08/10/2015 Yes    Essential hypertension [I10] 06/24/2014 Yes     Chronic    Chronic atrial fibrillation with RVR [I48.2] 08/12/2013 Yes      Problems Resolved During this Admission:    Diagnosis Date Noted Date Resolved POA    On mechanically assisted ventilation [Z99.11] 09/13/2018 09/20/2018 Not Applicable    Elevated LFTs [R79.89] 09/11/2018 09/21/2018 Yes    Septic shock [A41.9, R65.21] 09/10/2018 09/15/2018 Yes       Discharged Condition: stable    Disposition: Home or Self Care    Follow Up:  Follow-up Information     Poly Fitch MD. Schedule an appointment as soon as possible for a visit in 1 week.    Specialty:  Family Medicine  Why:  Hospital follow up  Contact information:  [id number] [facility]  [address]  [facility] [address]  [phone]             Selma Lomas NP. Schedule an appointment as soon as possible for a visit in 1 week.    Specialty:  Cardiology  Why:  Hospital follow up  Contact  information:  [id number] [place] [address] [address] [place] [address]  [phone]                 Patient Instructions:      Activity as tolerated       Significant Diagnostic Studies: Labs:   BMP:   Recent Labs   Lab  09/20/18   0324  09/21/18   0451   GLU  151*  131*   NA  142  142   K  3.5  4.2   CL  96  97   CO2  37*  35*   BUN  16  11   CREATININE  1.1  0.9   CALCIUM  8.4*  8.4*   MG  2.0  1.9   , CMP   Recent Labs   Lab  09/20/18   0324  09/21/18   0451   NA  142  142   K  3.5  4.2   CL  96  97   CO2  37*  35*   GLU  151*  131*   BUN  16  11   CREATININE  1.1  0.9   CALCIUM  8.4*  8.4*   PROT  6.1  5.9*   ALBUMIN  2.5*  2.5*   BILITOT  0.7  0.7   ALKPHOS  81  67   AST  30  25   ALT  46*  36   ANIONGAP  9  10   ESTGFRAFRICA  >60  >60   EGFRNONAA  >60  >60   , CBC   Recent Labs   Lab  09/20/18   0324  09/21/18   0451   WBC  9.28  9.64   HGB  8.9*  9.2*   HCT  29.1*  30.4*   PLT  324  310   , INR   Lab Results   Component Value Date    INR 1.0 09/12/2018    INR 1.2 09/11/2018    INR 1.2 09/10/2018   , Lipid Panel   Lab Results   Component Value Date    CHOL 122 03/14/2018    HDL 42 03/14/2018    LDLCALC 52.8 (L) 03/14/2018    TRIG 136 03/14/2018    CHOLHDL 34.4 03/14/2018   All labs within the past 24 hours have been reviewed  Microbiology:   Blood Culture   Lab Results   Component Value Date    LABBLOO No growth after 5 days. 09/10/2018   , Sputum Culture   Lab Results   Component Value Date    GSRESP <10 epithelial cells per low power field. 09/11/2018    GSRESP Rare WBC's 09/11/2018    GSRESP Rare yeast 09/11/2018    RESPIRATORYC BRISEIDA ALBICANS  Many   09/11/2018   Urine Culture  No results found for: LABURIN  Radiology: X-Ray: CXR: X-Ray Chest 1 View (CXR):   Results for orders placed or performed during the hospital encounter of 09/10/18   X-Ray Chest 1 View    Narrative    EXAMINATION:  XR CHEST 1 VIEW    CLINICAL HISTORY:  PNA;    TECHNIQUE:  Single frontal view of the chest was  performed.    COMPARISON:  09/20/2018    FINDINGS:  Mild cardiomegaly.  Patchy infiltrate throughout the right mid lower lung zones with small right-sided pleural effusion.  Findings are concerning for airspace disease with areas of alveolar consolidation. Aorta demonstrates atherosclerotic disease.  Mild DJD.  In comparison to the prior study, there is no adverse interval changes      Impression    In comparison to the prior study, there is no adverse interval changes      Electronically signed by: Mark Garcia MD  Date:    09/21/2018  Time:    07:51     Cardiac Graphics: Echocardiogram:   2D echo with color flow doppler:   Results for orders placed or performed during the hospital encounter of 09/10/18   2D echo with color flow doppler   Result Value Ref Range    EF 50 55 - 65    Mitral Valve Regurgitation MILD     Diastolic Dysfunction No     Aortic Valve Stenosis MODERATE TO SEVERE (A)     Est. PA Systolic Pressure 56 (A)     Tricuspid Valve Regurgitation MILD TO MODERATE        Pending Diagnostic Studies:     None         Medications:  Reconciled Home Medications:      Medication List      START taking these medications    bisacodyl 5 mg EC tablet  Commonly known as:  DULCOLAX  Take 2 tablets (10 mg total) by mouth once daily.     diltiaZEM 60 MG tablet  Commonly known as:  CARDIZEM  Take 1 tablet (60 mg total) by mouth every 8 (eight) hours.     levalbuterol 0.63 mg/3 mL nebulizer solution  Commonly known as:  XOPENEX  Take 3 mLs (0.63 mg total) by nebulization every 12 (twelve) hours. Rescue     metoprolol succinate 50 MG 24 hr tablet  Commonly known as:  TOPROL-XL  Take 1 tablet (50 mg total) by mouth every evening.     pantoprazole 40 MG tablet  Commonly known as:  PROTONIX  Take 1 tablet (40 mg total) by mouth once daily.     polyethylene glycol 17 gram Pwpk  Commonly known as:  GLYCOLAX  Take 17 g by mouth once daily.        CHANGE how you take these medications    cyanocobalamin 100 MCG tablet  Commonly  known as:  VITAMIN B-12  Take 10 tablets (1,000 mcg total) by mouth once daily.  What changed:  how much to take     losartan 25 MG tablet  Commonly known as:  COZAAR  Take 1 tablet (25 mg total) by mouth once daily.  What changed:    · medication strength  · See the new instructions.        CONTINUE taking these medications    allopurinol 100 MG tablet  Commonly known as:  ZYLOPRIM  Take 1 tablet (100 mg total) by mouth once daily.     blood sugar diagnostic Strp  Commonly known as:  TRUETEST TEST STRIPS  1 strip by Misc.(Non-Drug; Combo Route) route daily as needed. [employer] True Metrix Test Strips     blood-glucose meter kit  Commonly known as:  FREESTYLE SYSTEM KIT  [employer] TRUE METRIX AIR METER Use as instructed for TWICE DAILY testing     digoxin 125 mcg tablet  Commonly known as:  LANOXIN  TAKE 1 TABLET ONE TIME DAILY     ELIQUIS 5 mg Tab  Generic drug:  apixaban  TAKE 1 TABLET TWICE DAILY     furosemide 20 MG tablet  Commonly known as:  LASIX  Take 1 tablet (20 mg total) by mouth daily as needed. As needed for swelling     glimepiride 1 MG tablet  Commonly known as:  AMARYL  Take 1 tablet (1 mg total) by mouth before breakfast.     hydroCHLOROthiazide 25 MG tablet  Commonly known as:  HYDRODIURIL  Take 1 tablet (25 mg total) by mouth once daily.     lancets 28 gauge Misc  Commonly known as:  TRUEPLUS LANCETS  1 lancet by Misc.(Non-Drug; Combo Route) route daily as needed. [employer] brand     potassium chloride SA 20 MEQ tablet  Commonly known as:  K-DUR,KLOR-CON  Take 1 tablet (20 mEq total) by mouth daily as needed (with taking furosemide).     PRESERVISION AREDS ORAL  Take 1 capsule by mouth 2 (two) times daily.     rosuvastatin 10 MG tablet  Commonly known as:  CRESTOR  Take 1 tablet (10 mg total) by mouth once daily. Stop simvastatin        STOP taking these medications    BIOFREEZE (MENTHOL) 4 % Gel  Generic drug:  menthol     verapamil 240 MG CR tablet  Commonly known as:  CALAN-SR            Indwelling  Lines/Drains at time of discharge:   Lines/Drains/Airways     Pressure Ulcer                 Pressure Injury 09/13/18 1905 Left Buttocks Deep tissue injury 7 days                Time spent on the discharge of patient: 45 minutes  Patient was seen and examined on the date of discharge and determined to be suitable for discharge.        Yaritza Bob MD  Department of Hospital Medicine  [facility]

## 2018-09-21 NOTE — NURSING
Called report to Nga nurse, at [facility] [phone], awaiting transport from [facility]

## 2018-09-21 NOTE — SUBJECTIVE & OBJECTIVE
Interval History: no acute issues noted o /n    Review of Systems   Constitution: Negative for weakness and malaise/fatigue.   HENT: Negative for hearing loss and hoarse voice.    Eyes: Negative for blurred vision and visual disturbance.   Cardiovascular: Positive for dyspnea on exertion and irregular heartbeat. Negative for chest pain, claudication, leg swelling, near-syncope, orthopnea, palpitations, paroxysmal nocturnal dyspnea and syncope.   Respiratory: Positive for shortness of breath (improving) and wheezing. Negative for cough, hemoptysis, sleep disturbances due to breathing and snoring.    Endocrine: Negative for cold intolerance and heat intolerance.   Hematologic/Lymphatic: Bruises/bleeds easily (on Eliquis).   Skin: Negative for color change, dry skin and nail changes.   Musculoskeletal: Positive for arthritis and back pain. Negative for joint pain and myalgias.   Gastrointestinal: Negative for bloating, abdominal pain, constipation, nausea and vomiting.   Genitourinary: Negative for dysuria, flank pain, hematuria and hesitancy.   Neurological: Negative for headaches, light-headedness, loss of balance, numbness and paresthesias.   Psychiatric/Behavioral: Negative for altered mental status. The patient is not nervous/anxious.    Allergic/Immunologic: Negative for environmental allergies.     Objective:     Vital Signs (Most Recent):  Temp: 98.3 °F (36.8 °C) (09/21/18 1105)  Pulse: 72 (09/21/18 1105)  Resp: (!) 22 (09/21/18 1105)  BP: (!) 115/52 (09/21/18 1105)  SpO2: 100 % (09/21/18 1105) Vital Signs (24h Range):  Temp:  [97.4 °F (36.3 °C)-98.3 °F (36.8 °C)] 98.3 °F (36.8 °C)  Pulse:  [53-95] 72  Resp:  [14-32] 22  SpO2:  [89 %-100 %] 100 %  BP: (115-159)/(51-76) 115/52     Weight: 86 kg (189 lb 9.5 oz)  Body mass index is 27.2 kg/m².     SpO2: 100 %  O2 Device (Oxygen Therapy): nasal cannula      Intake/Output Summary (Last 24 hours) at 9/21/2018 1209  Last data filed at 9/21/2018 1000  Gross per 24 hour    Intake 530 ml   Output 1600 ml   Net -1070 ml       Lines/Drains/Airways     Pressure Ulcer                 Pressure Injury 09/13/18 1905 Left Buttocks Deep tissue injury 7 days          Peripheral Intravenous Line                 Peripheral IV - Single Lumen 09/17/18 1905 Right Upper Arm 3 days                Physical Exam   Constitutional: He is oriented to person, place, and time. He appears well-developed and well-nourished. He is active. No distress. Nasal cannula in place.   HENT:   Head: Normocephalic and atraumatic.   Eyes: Pupils are equal, round, and reactive to light.   Neck: Normal range of motion and full passive range of motion without pain. Neck supple. No JVD present.   Cardiovascular: Normal rate, S1 normal, S2 normal and intact distal pulses. An irregularly irregular rhythm present.  Occasional extrasystoles are present. PMI is not displaced. Exam reveals no distant heart sounds.   Murmur heard.   Harsh midsystolic murmur is present with a grade of 2/6 at the upper right sternal border radiating to the neck.  Pulses:       Radial pulses are 2+ on the right side, and 2+ on the left side.        Dorsalis pedis pulses are 1+ on the right side, and 1+ on the left side.   Pulmonary/Chest: No accessory muscle usage. No respiratory distress. He has no decreased breath sounds. He has wheezes.   Abdominal: Soft. Bowel sounds are normal. He exhibits no distension. There is no tenderness.   Genitourinary:   Genitourinary Comments: deferred   Musculoskeletal: Normal range of motion. He exhibits no edema.        Right ankle: He exhibits swelling.        Left ankle: He exhibits swelling.   Neurological: He is alert and oriented to person, place, and time.   Skin: Skin is warm and dry. No rash noted. He is not diaphoretic. No cyanosis or erythema. No pallor. Nails show no clubbing.   Psychiatric: He has a normal mood and affect. His speech is normal and behavior is normal. Judgment and thought content normal.  Cognition and memory are normal.   Nursing note and vitals reviewed.      Significant Labs:   All pertinent lab results from the last 24 hours have been reviewed. and   Recent Lab Results       09/21/18  0451      Albumin 2.5     Alkaline Phosphatase 67     ALT 36     Anion Gap 10     AST 25     Baso # 0.02     Basophil% 0.2     Total Bilirubin 0.7  Comment:  For infants and newborns, interpretation of results should be based  on gestational age, weight and in agreement with clinical  observations.  Premature Infant recommended reference ranges:  Up to 24 hours.............<8.0 mg/dL  Up to 48 hours............<12.0 mg/dL  3-5 days..................<15.0 mg/dL  6-29 days.................<15.0 mg/dL       BUN, Bld 11     Calcium 8.4     Chloride 97     CO2 35     Creatinine 0.9     Differential Method Automated     eGFR if  >60     eGFR if non  >60  Comment:  Calculation used to obtain the estimated glomerular filtration  rate (eGFR) is the CKD-EPI equation.        Eos # 0.2     Eosinophil% 1.7     Glucose 131     Gran # (ANC) 6.6     Gran% 68.3     Hematocrit 30.4     Hemoglobin 9.2     Lymph # 1.5     Lymph% 15.2     Magnesium 1.9     MCH 30.0     MCHC 30.3     MCV 99     Mono # 1.4     Mono% 14.6     MPV 10.2     Phosphorus 3.7     Platelets 310     Potassium 4.2     Total Protein 5.9     RBC 3.07     RDW 17.0     Sodium 142     WBC 9.64           Significant Imaging: Echocardiogram:   2D echo with color flow doppler:   Results for orders placed or performed during the hospital encounter of 09/10/18   2D echo with color flow doppler   Result Value Ref Range    EF 50 55 - 65    Mitral Valve Regurgitation MILD     Diastolic Dysfunction No     Aortic Valve Stenosis MODERATE TO SEVERE (A)     Est. PA Systolic Pressure 56 (A)     Tricuspid Valve Regurgitation MILD TO MODERATE     and X-Ray: CXR: X-Ray Chest 1 View (CXR):   Results for orders placed or performed during the hospital  encounter of 09/10/18   X-Ray Chest 1 View    Narrative    EXAMINATION:  XR CHEST 1 VIEW    CLINICAL HISTORY:  PNA;    TECHNIQUE:  Single frontal view of the chest was performed.    COMPARISON:  09/20/2018    FINDINGS:  Mild cardiomegaly.  Patchy infiltrate throughout the right mid lower lung zones with small right-sided pleural effusion.  Findings are concerning for airspace disease with areas of alveolar consolidation. Aorta demonstrates atherosclerotic disease.  Mild DJD.  In comparison to the prior study, there is no adverse interval changes      Impression    In comparison to the prior study, there is no adverse interval changes      Electronically signed by: Mark Garcia MD  Date:    09/21/2018  Time:    07:51

## 2018-09-21 NOTE — PLAN OF CARE
CM received call from [facility]. They have received auth. CM notified staff , family nd Dr. Bob to place d/c orders. Once orders received RN to call report to [address] [place] [address] [phone]

## 2018-09-21 NOTE — PROGRESS NOTES
[facility]  Cardiology  Progress Note    Patient Name: Eleazar Solo  MRN: [id number]  Admission Date: 9/10/2018  Hospital Length of Stay: 11 days  Code Status: DNR   Attending Physician: Yaritza Bob MD   Primary Care Physician: Poly Fitch MD  Expected Discharge Date:   Principal Problem:Pneumonia of right lower lobe due to infectious organism    Subjective:   HPI  Mr. Solo is a 79yo male with a PMHx of permanent AF on Eliquis, HTN, HLD, COPD, DM II, and h/o CVA, who was transferred from [facility] per family request as patient is followed OP by [facility] physicians.  He was originally admitted to [facility] on 9/6 for acute upper GI bleed with blood loss anemia (Hb 12 > 7.4 > 7.9).  Patient received total of 2 units PRBC and Eliquis held (last dose 9/6 AM).  He underwent EGD on 9/6 that showed a clot at the GE junction which was clipped  and injected with epi.  There was concern for further bleeding, therefore, repeat EGD was performed on 9/7 which showed no evidence of active bleeding.  During admission, patient developed AFib with RVR and was placed on diltiazem drip and PO dig.  CXR on 9/8 showed left pneumonia, IV Rocephin started.  Prior to transfer, vital signs and labs stable.  Upon arrival to [facility] ICU, patient hypotensive (SBP 70-80's), now on pressors and was hypoxic requiring continuous BiPAP.  Remains in AFib with controlled rate.  Repeat CXR showed RUL pneumonia.  Repeat labs resulted WBC 16.5, H&H 8.4/24.6, plt 136, BUN 38, cr. 1.4, AST 77, . ABG with pH 7.382, pCO2 44, pO2 60, HCO3 26.  Patient c/o SOB which is resolved while on BiPAP. CXR today shows marked amount of alveolar consolidation in the lower 2/3 of both lungs.  The this represents an interval worsening of the appearance of the lungs and is characteristic of pneumonia. Decision made to intubate patient  Due to resp distress and wheezing.  H/H this morning 7.5/22.6    Hospital Course:    9/12/18- Remains intubated and sedated. Still in A-fib. Rate 100-115 bpm this morning. Started on Amio gtt for rate control. H/H stable at 7.8/23.9. Continues abx for bilateral pneumonia. Patient being weaned from Dopamine gtt.Liver enzymes improved today. Echo shows normal LVF with Moderate to severe aortic stenosis and pulm HTN     9/13/18-Patient seen and examined today. No acute events overnight. Remains intubated. Still in afib, HR variable. H and H slightly worse, 7.3/22.3. Would benefit from transfusion.     9/14/18-Patient seen and examined today. Pressor re-started overnight. Still intubated. Remains in afib with rapid rate. H and H improved s/p transfusion.     9/15/18-Patient seen and examined today, now extubated and sitting in chair. Feels ok. Remains in afib with RVR, meds adjusted. Labs reviewed. H and H stable overnight.     9/16/18-Patient seen and examined today. Looks and feels much better. More awake and alert. SOB improving. Remains in afib, HR better controlled. Labs stable.    9/17/18--Patient seen and examined today. He seems to be feeling better today, more awake and interactive. Remains in afib on Eliquis. Labs reviewed, Cr 1.0. Resume BB.     9/18/18--Patient seen and examined today in ICU, in bedside chair. Denies chest pain or anginal equivalents. Continues to be in AFIB today, on eliquis. Labs reviewed, H/H 9/29.4, Cr 0.9. IV lasix x 1 dose this AM.     9/19/18--Patient seen and examined in room, Denies chest pain or anginal equivalents. Remains in AFIB, VR controlled in 60's today. Labs reviewed, Mag 2.0, K+ 3.4, Cr 1.0, H/H 8.7/28.5.     9/20/18--Patient seen and examined in room, lying in bed. Denies chest pain or anginal equivalents. Remains in AFIB, VR controlled in 60-70's this AM. CXR revealed mild amount of interstitial opacities seen in both lungs, characteristic of pulmonary edema. BNP 1316, Mag 2.0,  K+3.5, Cr 1.1.    9/21/18--Patient seen and examined in ICU, sitting in  bedside chair. Seems to be doing better each day. Remains in AFIB, VR controlled 70's. Labs reviewed, K+4.2, Cr 0.9, Na 142.     Interval History: no acute issues noted o /n    Review of Systems   Constitution: Negative for weakness and malaise/fatigue.   HENT: Negative for hearing loss and hoarse voice.    Eyes: Negative for blurred vision and visual disturbance.   Cardiovascular: Positive for dyspnea on exertion and irregular heartbeat. Negative for chest pain, claudication, leg swelling, near-syncope, orthopnea, palpitations, paroxysmal nocturnal dyspnea and syncope.   Respiratory: Positive for shortness of breath (improving) and wheezing. Negative for cough, hemoptysis, sleep disturbances due to breathing and snoring.    Endocrine: Negative for cold intolerance and heat intolerance.   Hematologic/Lymphatic: Bruises/bleeds easily (on Eliquis).   Skin: Negative for color change, dry skin and nail changes.   Musculoskeletal: Positive for arthritis and back pain. Negative for joint pain and myalgias.   Gastrointestinal: Negative for bloating, abdominal pain, constipation, nausea and vomiting.   Genitourinary: Negative for dysuria, flank pain, hematuria and hesitancy.   Neurological: Negative for headaches, light-headedness, loss of balance, numbness and paresthesias.   Psychiatric/Behavioral: Negative for altered mental status. The patient is not nervous/anxious.    Allergic/Immunologic: Negative for environmental allergies.     Objective:     Vital Signs (Most Recent):  Temp: 98.3 °F (36.8 °C) (09/21/18 1105)  Pulse: 72 (09/21/18 1105)  Resp: (!) 22 (09/21/18 1105)  BP: (!) 115/52 (09/21/18 1105)  SpO2: 100 % (09/21/18 1105) Vital Signs (24h Range):  Temp:  [97.4 °F (36.3 °C)-98.3 °F (36.8 °C)] 98.3 °F (36.8 °C)  Pulse:  [53-95] 72  Resp:  [14-32] 22  SpO2:  [89 %-100 %] 100 %  BP: (115-159)/(51-76) 115/52     Weight: 86 kg (189 lb 9.5 oz)  Body mass index is 27.2 kg/m².     SpO2: 100 %  O2 Device (Oxygen Therapy):  nasal cannula      Intake/Output Summary (Last 24 hours) at 9/21/2018 1209  Last data filed at 9/21/2018 1000  Gross per 24 hour   Intake 530 ml   Output 1600 ml   Net -1070 ml       Lines/Drains/Airways     Pressure Ulcer                 Pressure Injury 09/13/18 1905 Left Buttocks Deep tissue injury 7 days          Peripheral Intravenous Line                 Peripheral IV - Single Lumen 09/17/18 1905 Right Upper Arm 3 days                Physical Exam   Constitutional: He is oriented to person, place, and time. He appears well-developed and well-nourished. He is active. No distress. Nasal cannula in place.   HENT:   Head: Normocephalic and atraumatic.   Eyes: Pupils are equal, round, and reactive to light.   Neck: Normal range of motion and full passive range of motion without pain. Neck supple. No JVD present.   Cardiovascular: Normal rate, S1 normal, S2 normal and intact distal pulses. An irregularly irregular rhythm present.  Occasional extrasystoles are present. PMI is not displaced. Exam reveals no distant heart sounds.   Murmur heard.   Harsh midsystolic murmur is present with a grade of 2/6 at the upper right sternal border radiating to the neck.  Pulses:       Radial pulses are 2+ on the right side, and 2+ on the left side.        Dorsalis pedis pulses are 1+ on the right side, and 1+ on the left side.   Pulmonary/Chest: No accessory muscle usage. No respiratory distress. He has no decreased breath sounds. He has wheezes.   Abdominal: Soft. Bowel sounds are normal. He exhibits no distension. There is no tenderness.   Genitourinary:   Genitourinary Comments: deferred   Musculoskeletal: Normal range of motion. He exhibits no edema.        Right ankle: He exhibits swelling.        Left ankle: He exhibits swelling.   Neurological: He is alert and oriented to person, place, and time.   Skin: Skin is warm and dry. No rash noted. He is not diaphoretic. No cyanosis or erythema. No pallor. Nails show no clubbing.    Psychiatric: He has a normal mood and affect. His speech is normal and behavior is normal. Judgment and thought content normal. Cognition and memory are normal.   Nursing note and vitals reviewed.      Significant Labs:   All pertinent lab results from the last 24 hours have been reviewed. and   Recent Lab Results       09/21/18  0451      Albumin 2.5     Alkaline Phosphatase 67     ALT 36     Anion Gap 10     AST 25     Baso # 0.02     Basophil% 0.2     Total Bilirubin 0.7  Comment:  For infants and newborns, interpretation of results should be based  on gestational age, weight and in agreement with clinical  observations.  Premature Infant recommended reference ranges:  Up to 24 hours.............<8.0 mg/dL  Up to 48 hours............<12.0 mg/dL  3-5 days..................<15.0 mg/dL  6-29 days.................<15.0 mg/dL       BUN, Bld 11     Calcium 8.4     Chloride 97     CO2 35     Creatinine 0.9     Differential Method Automated     eGFR if  >60     eGFR if non  >60  Comment:  Calculation used to obtain the estimated glomerular filtration  rate (eGFR) is the CKD-EPI equation.        Eos # 0.2     Eosinophil% 1.7     Glucose 131     Gran # (ANC) 6.6     Gran% 68.3     Hematocrit 30.4     Hemoglobin 9.2     Lymph # 1.5     Lymph% 15.2     Magnesium 1.9     MCH 30.0     MCHC 30.3     MCV 99     Mono # 1.4     Mono% 14.6     MPV 10.2     Phosphorus 3.7     Platelets 310     Potassium 4.2     Total Protein 5.9     RBC 3.07     RDW 17.0     Sodium 142     WBC 9.64           Significant Imaging: Echocardiogram:   2D echo with color flow doppler:   Results for orders placed or performed during the hospital encounter of 09/10/18   2D echo with color flow doppler   Result Value Ref Range    EF 50 55 - 65    Mitral Valve Regurgitation MILD     Diastolic Dysfunction No     Aortic Valve Stenosis MODERATE TO SEVERE (A)     Est. PA Systolic Pressure 56 (A)     Tricuspid Valve Regurgitation  MILD TO MODERATE     and X-Ray: CXR: X-Ray Chest 1 View (CXR):   Results for orders placed or performed during the hospital encounter of 09/10/18   X-Ray Chest 1 View    Narrative    EXAMINATION:  XR CHEST 1 VIEW    CLINICAL HISTORY:  PNA;    TECHNIQUE:  Single frontal view of the chest was performed.    COMPARISON:  09/20/2018    FINDINGS:  Mild cardiomegaly.  Patchy infiltrate throughout the right mid lower lung zones with small right-sided pleural effusion.  Findings are concerning for airspace disease with areas of alveolar consolidation. Aorta demonstrates atherosclerotic disease.  Mild DJD.  In comparison to the prior study, there is no adverse interval changes      Impression    In comparison to the prior study, there is no adverse interval changes      Electronically signed by: Mark Garcia MD  Date:    09/21/2018  Time:    07:51     Assessment and Plan:     Patient seen and examined in ICU. Pending insurance approval for SNF placement possible today. Continue eliquis, BB, CCB, Lasix, ARB. Needs cardiology f/u after discharge. Compliance with meds, diet and fluid restriction emphasized.     * Pneumonia of right lower lobe due to infectious organism    -per critical care        Anemia, unspecified    -Stable, monitor          Nonrheumatic aortic valve stenosis    -Stable  -Moderate to severe by 2D echo  -Further workup as OP        Acute upper GI bleed with acute blood loss anemia    -H/H stable, continue to monitor  -Continue Eliquis for CVA prophylaxis         Chronic kidney disease, stage 3    Cr 0.9, monitor    9/19  -Cr 1.0 monitor    9/21  Cr 0.9        Essential hypertension    -BP controlled  -Continue same meds  -IV lasix x 1 dose this AM.     9/20/18  -BP controlled on current regimen  -IV lasix x 1 dose this AM    9/21  -continue current regimen          Chronic atrial fibrillation with RVR    -Remains in afib with RVR in setting of sepsis/PNA/anemia  -HR better controlled  -Continue digoxin,  cardizem, metoprolol  -Continue Eliquis for CVA prophylaxis   -IV lasix x 1 dose today    9/19  -AFIB with VR controlled, 60's this AM  -Notified per Critical care NP this AM after AM meds HR decreased to 40's and decreased BP as well  -HR improved after small bolus of IVF's  -Digoxin stopped for now  -BB switched to Toprol XL at bedtime  -Continue CCB  -Continue Eliquis for CVA prophylaxis  -NO CNS complaints to suggest TIA or CVA   -No signs of abnormal bleeding on Eliquis.     9/20/18  -AFIB with VR controlled rate, 60-70's  -Continue Eliquis, CCB, BB, Lasix  -No CNS complaints to suggest TIA or CVA today  -No signs of abnormal bleeding on Eliquis  -Hold digoxin for now    9/21  -Continue Eliquis, CCB, BB, ARB, Lasix  -No CNS complaints to suggest TIA or CVA today  NO signs of abnormal bleeding  Close cardiology follow up after discharge.               VTE Risk Mitigation (From admission, onward)        Ordered     apixaban tablet 5 mg  2 times daily      09/15/18 0958     IP VTE HIGH RISK PATIENT  Once      09/11/18 0425     Reason for No Pharmacological VTE Prophylaxis  Once      09/11/18 0425     Place sequential compression device  Until discontinued      09/11/18 0425          Selma Lomas NP  Cardiology  [facility]

## 2018-09-21 NOTE — PLAN OF CARE
Problem: Patient Care Overview  Goal: Plan of Care Review  Outcome: Ongoing (interventions implemented as appropriate)  Pt oriented with periods of confusion but pt able to be reoriented, pt more calm and cooperative this shift.  Pt tolerated 4L while awake, pt placed on Ventimask at 50% when asleep d/t sats staying 85-89% on 4L.  Pt coughing up tan/green sputum.  Pt is Afib on the heart monitor, HR range 50-80s.  Pt voiding per urinal.  DTI to buttocks with mepilex in place, pt on specialty pulsating mattress, pt turned and repositioned with self in the bed.  PIV intact with no redness, swelling or drainage.  Bed low, wheels locked, call light in reach.  Pt instructed to call for assistance.  Plan of care reviewed.  Pt verbalizes understanding. Will continue to monitor.

## 2018-09-21 NOTE — ASSESSMENT & PLAN NOTE
-BP controlled  -Continue same meds  -IV lasix x 1 dose this AM.     9/20/18  -BP controlled on current regimen  -IV lasix x 1 dose this AM    9/21  -continue current regimen

## 2018-09-21 NOTE — ASSESSMENT & PLAN NOTE
-Remains in afib with RVR in setting of sepsis/PNA/anemia  -HR better controlled  -Continue digoxin, cardizem, metoprolol  -Continue Eliquis for CVA prophylaxis   -IV lasix x 1 dose today    9/19  -AFIB with VR controlled, 60's this AM  -Notified per Critical care NP this AM after AM meds HR decreased to 40's and decreased BP as well  -HR improved after small bolus of IVF's  -Digoxin stopped for now  -BB switched to Toprol XL at bedtime  -Continue CCB  -Continue Eliquis for CVA prophylaxis  -NO CNS complaints to suggest TIA or CVA   -No signs of abnormal bleeding on Eliquis.     9/20/18  -AFIB with VR controlled rate, 60-70's  -Continue Eliquis, CCB, BB, Lasix  -No CNS complaints to suggest TIA or CVA today  -No signs of abnormal bleeding on Eliquis  -Hold digoxin for now    9/21  -Continue Eliquis, CCB, BB, ARB, Lasix  -No CNS complaints to suggest TIA or CVA today  NO signs of abnormal bleeding  Close cardiology follow up after discharge.

## 2018-09-24 NOTE — PHYSICIAN QUERY
PT Name: Eleazar Solo  MR #: [id number]    Physician Query Form - CardioPulmonary Clarification      CDS Deidra Goldberg RN, BSN        Contact Information:  [phone]    [email or web address]           This form is a permanent document in the medical record.    Query Date: September 24, 2018    By submitting this query, we are merely seeking further clarification of documentation. Please utilize your independent clinical judgment when addressing the question(s) below.    The Medical record contains the following:   Indicators   Supporting Clinical Findings Location in Medical Record   X   Pulmonary Hypertension documented pulm HTN    9/12 Cardiology PN filed 4:50pm    X   Acute/Chronic Illness Acute upper GI bleed     PMHx of permanent AF on Eliquis, HTN, HLD, COPD, DM II, and h/o CVA    Severe sepsis secondary to pneumonia of right upper lobe with acute hypoxic respiratory failure    Chronic kidney disease, stage 3    Chronic obstructive pulmonary disease with acute lower respiratory infection H&P   X   Echo and/or Heart Cath Findings CONCLUSIONS     1 - Low normal to mildly depressed left ventricular systolic function (EF 50-55%).     2 - Normal left ventricular diastolic function.     3 - Right ventricular enlargement with low normal systolic function.     4 - Moderate to severe aortic stenosis, COLIN = 0.79 cm2, AVAi = 0.37 cm2/m2, peak velocity = 3.2 m/s, mean gradient = 31 mmHg.     5 - Mild to moderate tricuspid regurgitation.     6 - Mild mitral regurgitation.     7 - Pulmonary hypertension. The estimated PA systolic pressure is 56 mmHg.     8 - Increased central venous pressure.  9/11 Echo     BiPAP/Intubation/Supplemental O2      SOB, [place], Fatigue, Dizziness, LE Edema, Cyanosis, Chest Pain, Respiratory Distress, Hypoxia, etc.      Treatment         Medication      Other     Provider, please specify the type of pulmonary hypertension:  [   ]  Group 1:  Pulmonary Arterial Hypertension - includes Primary,  Idiopathic, Inheritable, and Secondary (due to drugs, toxins, congenital heart diease, HIV infection, etc.)    [xxx ]  Group 2:  Pulmonary Hypertension due to Left Heart Disease, including left heart failure and/or left heart valve disease    [   ]  Group 3:  Pulmonary Hypertension due to Lung Disease    [   ]  Group 4:  Pulmonary Hypertension due to Obstruction of the Pulmonary Vessels caused by Chronic Thromboemboli, Tumor, or Foreign Bodies    [   ]  Group 5:  Pulmonary Hypertension due to other, multifactorial, or unclear mechanisms    [   ]  Pulmonary Hypertension, unspecified    [   ]  Other Cardiopulmonary Condition (please specify):  _____________________________________    [   ]  Clinically Undetermined    Please document in your progress notes daily for the duration of treatment, until resolved, and include in your discharge summary.

## 2018-09-24 NOTE — PHYSICIAN QUERY
PT Name: Eleazar Solo  MR #: [id number]    Physician Query Form - Consultant Diagnosis Clarification     CDS Deidra Goldberg RN, BSN        Contact Information:  [phone]    [email or web address]         This form is a permanent document in the medical record.     Query Date: September 24, 2018      By submitting this query, we are merely seeking further clarification of documentation.  Please utilize your independent clinical judgment when addressing the question(s) below.      The Medical record contains the following:   Diagnosis Supporting Clinical Information Location in Medical Record   Left Buttocks Deep tissue injury     Date First Assessed/Time First Assessed: 09/13/18 1905       Pressure Injury Present on Admission: no      Side: Left  Location: Buttocks      Is this injury device related?: No      Staging: Deep tissue injury    significant for HTN, AF, HLP, COPD, DM, CVA.  He is on a specialty WOODY pulsate bed and pressure injury prevention plan. Per nursing reports, after admission he was only able to turn to left and supine positions due to decreased sats with right sided turn.  Today, he is waking up from sedation with plans to attempt extubation.  Patient turned to right side per VARINDER Gonzalez CNA.  Left buttock DTI noted measuring 8x5cm with maroon and purple discoloration. Painted with cavilon and foam dressing applied. Patient needs to be turned to right side and supine and stay off left side as much as possible. Due to nature of DTI, I expect this will evolve into full thickness wound. Patient to remain on WOODY pulsate bed, and turn to right side and supine with foam wedge as tolerated.  Bilateral heels and elbows intact with no redness or breakdown noted         9/14 Wound care consult filed 1:17pm          Do you agree with the Consultants diagnosis of     Left Buttocks Deep tissue injury not present on       admission                 [xx   ]   Yes, I agree with Left buttocks not present on  admission                [   ]   Yes, but it resolved prior to my assessment of the patient               [   ]   No                [   ]   Clinically undetermined               [   ]   Other/Clarification of findings: ___________________________________________

## 2018-09-24 NOTE — PHYSICIAN QUERY
"PT Name: Eleazar Solo  MR #: [id number]    Physician Query Form - Pneumonia Clarification     CDS Deidra Goldberg RN, BSN        Contact Information:  [phone]    [email or web address]         This form is a permanent document in the medical record.    Query Date:  September 24, 2018    By submitting this query, we are merely seeking further clarification of documentation. Please utilize your independent clinical judgment when addressing the question(s) below.    The Medical record contains the following:   Indicators   Supporting Clinical Findings Location in Medical Record   X   "Pneumonia" documented Transferred from another facility -- Acute upper GI bleed   During admission, patient developed AFib with RVR and was placed on diltiazem drip and PO dig.  CXR on 9/8 showed left pneumonia, IV Rocephin started.  Prior to transfer, vital signs and labs stable.  Upon arrival to [facility] ICU, patient hypotensive (SBP 70-80's) and hypoxic requiring continuous BiPAP.  Remains in AFib with controlled rate.  Repeat CXR showed RUL pneumonia.   H&P   X   Chest X-Ray: FINDINGS:  Right upper lobe consolidation.  Small right effusion.  No pneumothorax.  Patient rotated to the right.  Cardiac silhouette and mediastinum are unremarkable.  Osseous structures intact.    FINDINGS:  The size of the heart is normal. The lungs are clear.  There are emphysematous changes in the apex of the right lung.  There is a marked amount of alveolar consolidation in the lower 2/3 of both lungs.  There is no pneumothorax.  The costophrenic angles are sharp. 9/10 CXR              9/11 CXR     PaO2    PaCO2     O2 sat     X   Cultures     CANDIDA ALBICANS   Many   Micro 9/11    X   Treatment  Empiric IV Unasyn and vanc, Continue BiPAP, wean as tolerated.  Duonebs PRN.   H&P    Supplemental O2      Other         Provider, please specify type of pneumonia.    [  ] Bacterial Pneumonia (Specify organism): ______________________  [  ] Bacterial, Gram " Negative organism Pneumonia  [  ] Viral Pneumonia (Specify virus): _______________________  [ xx ] Fungal Pneumonia (Specify organism): _______________________  [  ] Aspiration Pneumonia  [  ] Other type of pneumonia (please specify): ______________________________________  [  ] Clinically undetermined    Please document in your progress notes daily for the duration of treatment, until resolved, and include in your discharge summary.    .

## 2018-09-25 NOTE — PLAN OF CARE
D/C 9/21/18 to [facility]     09/25/18 0852   Final Note   Assessment Type Final Discharge Note   Discharge Disposition SNF  ([facility])   Hospital Follow Up  Appt(s) scheduled? No   Discharge plans and expectations educations in teach back method with documentation complete? Yes   Right Care Referral Info   Post Acute Recommendation SNF / Sub-Acute Rehab  ([facility])

## 2018-09-25 NOTE — PLAN OF CARE
Late Entry. Patient and spouse verbalized understanding of IMM     09/21/18 0900   Medicare Message   Important Message from Medicare regarding Discharge Appeal Rights Given to patient/caregiver;Explained to patient/caregiver   Date IMM was signed 09/21/18   Time IMM was signed 0856

## 2018-10-01 ENCOUNTER — TELEPHONE (OUTPATIENT)
Dept: CARDIOLOGY | Facility: CLINIC | Age: 78
End: 2018-10-01

## 2018-10-01 NOTE — TELEPHONE ENCOUNTER
----- Message from Glenys Belle sent at 10/1/2018  9:26 AM CDT -----  Contact: pt wife  Calling in regards to please give her a call and pt is . [phone]

## 2018-10-01 NOTE — TELEPHONE ENCOUNTER
Returned call. Patient called to inform of patient passing and wanted to make sure you are aware, and very grateful for all the kindness, excellent care extended to Kami Edil.

## 2018-12-10 ENCOUNTER — PATIENT OUTREACH (OUTPATIENT)
Dept: ADMINISTRATIVE | Facility: HOSPITAL | Age: 78
End: 2018-12-10

## 2019-04-24 NOTE — PROGRESS NOTES
[facility] -   Critical Care Medicine  Progress Note    Patient Name: Eleazar Solo  MRN: [id number]  Admission Date: 9/10/2018  Hospital Length of Stay: 6 days  Code Status: Full Code  Attending Provider: Prachi Boyer MD  Primary Care Provider: Poly Fitch MD   Principal Problem: Acute hypoxemic respiratory failure    Subjective:     HPI:  78 year old male admitted to MICU, transfer from [facility].  I have reviewed the patient's medical history in detail and updated the computerized patient record.  Upper GI bleeding, Scoped x 2 and clipped  Developed Af RVR, Known chr A fib on elliquis on hold  Treated with Digoxin, Amiodarone and cardizem GTT  Family requested transfer  Known COPD FEV1:1.65L ( 53%)  ANORO ellipta, not on oxygen  Presented to MICU needed BIPAP for WOB  CXR shows RML infiltrate  Overnight, anxious, WOB  Last echo EF 60%      Hospital/ICU Course:  09/11: CXR shows melanie infiltrate: edema.  . Lopressor 2.5mg and lasix 20 mg given  9/12 - intubated yesterday and required pressor with sedation.  Resting on vent.  Cynthia TF.  No active bleeding  9/13 - remains intubated, sedated on mechanical ventilation, pO2 trending up with high PEEP ventilation  9/14 - remains intubated on mechanical ventilation with oxygen/PEEP demand decreased; continued atrial fib with RVR despite amiodarone infusion, some improvement with metoprolol yesterday  9/15 - tolerated extubation, on high flow nasal cannula support; awake, slow to respond and faint verbalization but oriented to self and place; remains atrial fib with RVR variable 120-140; afebrile, wbc trending down  9/16 - remains vapotherm with high flow, moderate oxygen demand; afebrile, wbc continues down; afib rate remains uncontrolled    Review of Systems   Constitutional: Positive for malaise/fatigue. Negative for chills and diaphoresis.   Respiratory: Positive for cough. Negative for sputum production, shortness of breath and stridor.     Cardiovascular: Negative for chest pain.   Gastrointestinal: Negative for abdominal pain, nausea and vomiting.   Musculoskeletal: Positive for myalgias.   Skin: Negative.    Neurological: Positive for weakness. Negative for focal weakness and seizures.   Psychiatric/Behavioral: The patient is nervous/anxious and has insomnia.        Objective:     Vital Signs (Most Recent):  Temp: 98.6 °F (37 °C) (09/16/18 1605)  Pulse: (!) 131 (09/16/18 1805)  Resp: 20 (09/16/18 1805)  BP: (!) 140/87 (09/16/18 1805)  SpO2: 95 % (09/16/18 1805) Vital Signs (24h Range):  Temp:  [98.5 °F (36.9 °C)-98.9 °F (37.2 °C)] 98.6 °F (37 °C)  Pulse:  [] 131  Resp:  [11-28] 20  SpO2:  [92 %-100 %] 95 %  BP: ()/(45-92) 140/87     Weight: 86 kg (189 lb 9.5 oz)  Body mass index is 27.2 kg/m².      Intake/Output Summary (Last 24 hours) at 9/16/2018 1903  Last data filed at 9/16/2018 1805  Gross per 24 hour   Intake 360 ml   Output 986 ml   Net -626 ml       Physical Exam   Constitutional: He appears ill. Nasal cannula in place.   HENT:   Head: Atraumatic.   Eyes: Conjunctivae are normal. Pupils are equal, round, and reactive to light.   Neck: No JVD present. No tracheal deviation present.   Cardiovascular: An irregularly irregular rhythm present. Tachycardia present.   No murmur heard.  Pulses:       Radial pulses are 2+ on the right side, and 2+ on the left side.        Dorsalis pedis pulses are 1+ on the right side, and 1+ on the left side.   Pulmonary/Chest: No accessory muscle usage. No respiratory distress. He has decreased breath sounds.   Abdominal: Soft. Bowel sounds are normal. He exhibits no distension.   Musculoskeletal: He exhibits no edema.   Neurological: He is alert.   Intermittently confused, easily reoriented   Skin: Skin is warm and dry. Capillary refill takes 2 to 3 seconds. No cyanosis.            Vents:  Vent Mode: Spont (09/14/18 1210)  Ventilator Initiated: Yes (09/11/18 1121)  Set Rate: 0 bmp (09/14/18 1210)  Vt  Set: 450 mL (09/14/18 1210)  Pressure Support: 10 cmH20 (09/14/18 1210)  PEEP/CPAP: 5 cmH20 (09/14/18 1210)  Oxygen Concentration (%): 35 (09/16/18 1603)  Peak Airway Pressure: 15 cmH2O (09/14/18 1210)  Plateau Pressure: 16 cmH20 (09/14/18 1210)  Total Ve: 10.3 mL (09/14/18 1210)  F/VT Ratio<105 (RSBI): (!) 43.08 (09/14/18 1210)    Lines/Drains/Airways     Central Venous Catheter Line                 Percutaneous Central Line Insertion/Assessment - triple lumen  09/11/18 1124 left internal jugular 5 days          Drain                 Urethral Catheter 09/11/18 1123 Double-lumen 5 days          Pressure Ulcer                 Pressure Injury 09/13/18 1905 Left Buttocks Deep tissue injury 2 days                Significant Labs:    CBC/Anemia Profile:  Recent Labs   Lab  09/15/18   0430  09/16/18   0410   WBC  15.29*  14.55*   HGB  8.3*  8.7*   HCT  26.2*  28.0*   PLT  270  309   MCV  97  99*   RDW  17.2*  17.0*        Chemistries:  Recent Labs   Lab  09/15/18   0430  09/16/18   0410   NA  146*  147*   K  4.0  3.8   CL  103  101   CO2  34*  36*   BUN  25*  22   CREATININE  1.0  0.9   CALCIUM  7.8*  8.3*   ALBUMIN  2.0*  2.2*   PROT  5.4*  5.9*   BILITOT  0.8  0.9   ALKPHOS  96  85   ALT  60*  68*   AST  78*  84*   MG  2.0  2.8*   PHOS  2.4*  2.7       All pertinent labs within the past 24 hours have been reviewed.    Significant Imaging:  I have reviewed all pertinent imaging results/findings within the past 24 hours.      Assessment/Plan:     Pulmonary   * Acute hypoxemic respiratory failure on mechanical ventilation    Supplemental oxygen with high flow 30L; titrate Fio2 to keep sat > 92        Chronic obstructive pulmonary disease with acute lower respiratory infection    Moderate severe COPD  Cont Formeterol and Budesonide  Schedule xopenex  Continue avelox, diflucan        Cardiac/Vascular   Chronic atrial fibrillation with RVR    Change metoprolol to po  Continue po cardizem  Continue eliquis        Renal/    Chronic kidney disease, stage 3    Accurate I/Os  Monitor creatinine        Oncology   Anemia, unspecified    No s/s of active bleeding  Monitor cbc        Endocrine   Type 2 diabetes mellitus without complication, without long-term current use of insulin    Continue SSI prn with monitoring for glucose control and prevention of insulin toxicity        GI   Acute upper GI bleed with acute blood loss anemia    Recent clipping of ulcer at GE junction at outside hospital  Cont PPI  No bleeding on eliquis        Preventive Measures:   Nutrition: start puree diet  Stress Ulcer: PI  DVT: eliquis  BB: metoprolol  Bowel Prophylaxis: miralax, add dulcolax   Head of Bed/Reposition: Elevate HOB and turn Q1-2 hours    Mobility: OOB with assist  Central Line Day: 6  Adams Day: 6  Code Status: full    Counseling/Consultation: I have discussed the care of this patient in detail with nursing staff and Dr. Cosme. Status and plan of care discussed with team on multidisciplinary rounds.     Critical Care Time: 56 minutes  Critical secondary to acute respiratory failure     Critical care was time spent personally by me on the following activities: development of treatment plan with patient or surrogate and bedside caregivers, discussions with consultants, evaluation of patient's response to treatment, examination of patient, ordering and performing treatments and interventions, ordering and review of laboratory studies, ordering and review of radiographic studies, pulse oximetry, re-evaluation of patient's condition. This critical care time did not overlap with that of any other provider or involve time for any procedures.     Selma Wallace, Acute Care NP-BC  Critical Care Medicine  [facility]   independent

## 2020-12-09 NOTE — PLAN OF CARE
Anesthesiologist present for case.  See Anesthesia Record for details regarding sedation/anesthesia, medications, and vital signs.   Problem: Patient Care Overview  Goal: Plan of Care Review  Outcome: Ongoing (interventions implemented as appropriate)  Patient resting on high flow vapotherm with no distress. Tolerated schedule neb tx with aerobika therapy. NPPV BiPAP on standby for prn and noctunal for dyspnea, hypoxia. Will continue to monitor.

## 2021-07-28 NOTE — PLAN OF CARE
Problem: Patient Care Overview  Goal: Individualization & Mutuality  Outcome: Ongoing (interventions implemented as appropriate)  Patient requires total A x 2 people for all mobility but he is initiating movement; he did tolerate sitting up in chair x 4 hrs today       Offered and patient declined

## 2022-06-30 NOTE — PROGRESS NOTES
Care assumed. Pt awake and follows commands. Pt family at side. VSS. Safety maintained.    Released to LEBRON Raymundo /69, 60, 16. Family present in room.

## 2022-11-12 NOTE — ASSESSMENT & PLAN NOTE
-BP improving with pressor support  -continue current medical management for now  -Home meds on hold    Other

## 2024-01-15 NOTE — PROGRESS NOTES
Documentation of Influenza vaccine routed to [facility] Medical Records for August 2016- March 31, 2017. Influenza vaccine received 11/18/2016  
normal...

## 2024-04-25 NOTE — MR AVS SNAPSHOT
[facility]  [address] [place] [address]  Phone: [phone]  Fax: [phone]                  Eleazar Solo   2017 9:20 AM   Office Visit    Description:  Male : 1940   Provider:  Poly Fitch MD   [facility]           Reason for Visit     Follow-up           Diagnoses this Visit        Comments    Routine general medical examination at a health care facility    -  Primary     Mixed hyperlipidemia         Essential hypertension         Chronic kidney disease, stage 3         Chronic anticoagulation         Bilateral carotid artery disease         Atrial fibrillation, permanent         Atherosclerosis of aorta         Idiopathic chronic gout of right foot without tophus         Macular degeneration         Chronic obstructive pulmonary disease, unspecified COPD type                To Do List           Future Appointments        Provider Department Dept Phone    2017 1:10 PM PULMONARY LAB, [facility]- Pulmonary Function [facility] [phone]      Goals (5 Years of Data)     None      Follow-Up and Disposition     Return in about 1 year (around 2018) for physical.      [facility]     [facility] On Call Nurse Care Line -  Assistance  Unless otherwise directed by your provider, please contact [facility], our nurse care line that is available for  assistance.     Registered nurses in the [facility] On Call Center provide: appointment scheduling, clinical advisement, health education, and other advisory services.  Call: [phone] (toll free)               Medications           Message regarding Medications     Verify the changes and/or additions to your medication regime listed below are the same as discussed with your clinician today.  If any of these changes or additions are incorrect, please notify your healthcare provider.             Verify that the below list of medications is an accurate  "representation of the medications you are currently taking.  If none reported, the list may be blank. If incorrect, please contact your healthcare provider. Carry this list with you in case of emergency.           Current Medications     allopurinol (ZYLOPRIM) 100 MG tablet TAKE 2 TABLETS  DAILY.    digoxin (LANOXIN) 125 mcg tablet TAKE 1 TABLET ONE TIME DAILY    ELIQUIS 5 mg Tab TAKE 1 TABLET TWICE DAILY    hydrochlorothiazide (HYDRODIURIL) 25 MG tablet Take 1 tablet (25 mg total) by mouth once daily.    losartan (COZAAR) 100 MG tablet Take 1 tablet (100 mg total) by mouth once daily.    menthol (BIOFREEZE, MENTHOL,) 4 % Gel Apply topically.    potassium chloride SA (K-DUR,KLOR-CON) 20 MEQ tablet TAKE 1 TABLET ONE TIME DAILY    simvastatin (ZOCOR) 40 MG tablet TAKE 1 TABLET EVERY DAY    verapamil (CALAN-SR) 240 MG CR tablet TAKE 1 TABLET EVERY DAY    VIT A/VIT C/VIT E/ZINC/COPPER (PRESERVISION AREDS ORAL) Take 1 capsule by mouth 2 (two) times daily.           Clinical Reference Information           Your Vitals Were     BP Pulse Temp Height Weight BMI    112/80 70 98.1 °F (36.7 °C) 5' 10" (1.778 m) 90 kg (198 lb 6.6 oz) 28.47 kg/m2      Blood Pressure          Most Recent Value    BP  112/80      Allergies as of 5/18/2017     No Known Drug Allergies      Immunizations Administered on Date of Encounter - 5/18/2017     None      Orders Placed During Today's Visit     Future Labs/Procedures Expected by Expires    CBC auto differential  5/18/2017 7/17/2018    Comprehensive metabolic panel  5/18/2017 7/17/2018    Lipid panel  5/18/2017 7/17/2018    Uric acid  5/18/2017 5/18/2018    Complete PFT with bronchodilator  As directed 5/18/2018    PULSE OXIMETRY WITH REST - PULM  As directed 5/18/2018      Language Assistance Services     ATTENTION: Language assistance services are available, free of charge. Please call [phone].      ATENCIÓN: Si habla español, tiene a vizcarra disposición servicios gratuitos de asistencia " lingüística. Dionna al [phone].     FABIOLA Ý: N?u b?n nói Ti?ng Vi?t, có các d?ch v? h? tr? ngôn ng? mi?n phí dành cho b?n. G?i s? [phone].         [facility] complies with applicable Federal civil rights laws and does not discriminate on the basis of race, color, national origin, age, disability, or sex.         Yes...